# Patient Record
Sex: FEMALE | Race: WHITE | NOT HISPANIC OR LATINO | Employment: OTHER | URBAN - METROPOLITAN AREA
[De-identification: names, ages, dates, MRNs, and addresses within clinical notes are randomized per-mention and may not be internally consistent; named-entity substitution may affect disease eponyms.]

---

## 2017-04-03 ENCOUNTER — OFFICE VISIT (OUTPATIENT)
Dept: FAMILY MEDICINE | Facility: CLINIC | Age: 67
End: 2017-04-03
Payer: MEDICARE

## 2017-04-03 VITALS
HEIGHT: 63 IN | HEART RATE: 92 BPM | WEIGHT: 167.4 LBS | DIASTOLIC BLOOD PRESSURE: 80 MMHG | TEMPERATURE: 98.8 F | SYSTOLIC BLOOD PRESSURE: 138 MMHG | BODY MASS INDEX: 29.66 KG/M2 | OXYGEN SATURATION: 96 %

## 2017-04-03 DIAGNOSIS — K21.00 GASTROESOPHAGEAL REFLUX DISEASE WITH ESOPHAGITIS: ICD-10-CM

## 2017-04-03 DIAGNOSIS — F32.5 MAJOR DEPRESSION IN COMPLETE REMISSION (H): ICD-10-CM

## 2017-04-03 DIAGNOSIS — I10 ESSENTIAL HYPERTENSION WITH GOAL BLOOD PRESSURE LESS THAN 140/90: ICD-10-CM

## 2017-04-03 DIAGNOSIS — L20.82 FLEXURAL ECZEMA: ICD-10-CM

## 2017-04-03 DIAGNOSIS — L29.9 PRURITIC DISORDER: Primary | ICD-10-CM

## 2017-04-03 DIAGNOSIS — E78.5 HYPERLIPIDEMIA LDL GOAL <130: ICD-10-CM

## 2017-04-03 LAB
ALBUMIN SERPL-MCNC: 4 G/DL (ref 3.4–5)
ALP SERPL-CCNC: 92 U/L (ref 40–150)
ALT SERPL W P-5'-P-CCNC: 39 U/L (ref 0–50)
ANION GAP SERPL CALCULATED.3IONS-SCNC: 9 MMOL/L (ref 3–14)
AST SERPL W P-5'-P-CCNC: 36 U/L (ref 0–45)
BILIRUB SERPL-MCNC: 0.5 MG/DL (ref 0.2–1.3)
BUN SERPL-MCNC: 17 MG/DL (ref 7–30)
CALCIUM SERPL-MCNC: 9.8 MG/DL (ref 8.5–10.1)
CHLORIDE SERPL-SCNC: 101 MMOL/L (ref 94–109)
CO2 SERPL-SCNC: 29 MMOL/L (ref 20–32)
CREAT SERPL-MCNC: 1.2 MG/DL (ref 0.52–1.04)
CREAT UR-MCNC: 62 MG/DL
GFR SERPL CREATININE-BSD FRML MDRD: 45 ML/MIN/1.7M2
GLUCOSE SERPL-MCNC: 96 MG/DL (ref 70–99)
MICROALBUMIN UR-MCNC: <5 MG/L
MICROALBUMIN/CREAT UR: NORMAL MG/G CR (ref 0–25)
POTASSIUM SERPL-SCNC: 4.4 MMOL/L (ref 3.4–5.3)
PROT SERPL-MCNC: 7.9 G/DL (ref 6.8–8.8)
SODIUM SERPL-SCNC: 139 MMOL/L (ref 133–144)
TSH SERPL DL<=0.005 MIU/L-ACNC: 0.49 MU/L (ref 0.4–4)

## 2017-04-03 PROCEDURE — 99214 OFFICE O/P EST MOD 30 MIN: CPT | Performed by: PHYSICIAN ASSISTANT

## 2017-04-03 PROCEDURE — 84443 ASSAY THYROID STIM HORMONE: CPT | Performed by: PHYSICIAN ASSISTANT

## 2017-04-03 PROCEDURE — 36415 COLL VENOUS BLD VENIPUNCTURE: CPT | Performed by: PHYSICIAN ASSISTANT

## 2017-04-03 PROCEDURE — 80053 COMPREHEN METABOLIC PANEL: CPT | Performed by: PHYSICIAN ASSISTANT

## 2017-04-03 PROCEDURE — 82043 UR ALBUMIN QUANTITATIVE: CPT | Performed by: PHYSICIAN ASSISTANT

## 2017-04-03 RX ORDER — HYDROXYZINE HYDROCHLORIDE 25 MG/1
25-50 TABLET, FILM COATED ORAL EVERY 6 HOURS PRN
Qty: 60 TABLET | Refills: 1 | Status: SHIPPED | OUTPATIENT
Start: 2017-04-03 | End: 2017-07-17

## 2017-04-03 RX ORDER — TRIAMTERENE AND HYDROCHLOROTHIAZIDE 37.5; 25 MG/1; MG/1
1 CAPSULE ORAL DAILY
Qty: 90 CAPSULE | Refills: 1 | Status: SHIPPED | OUTPATIENT
Start: 2017-04-03 | End: 2017-10-09

## 2017-04-03 RX ORDER — LISINOPRIL 10 MG/1
10 TABLET ORAL DAILY
Qty: 90 TABLET | Refills: 1 | Status: SHIPPED | OUTPATIENT
Start: 2017-04-03 | End: 2017-10-09

## 2017-04-03 RX ORDER — TRIAMCINOLONE ACETONIDE 1 MG/G
OINTMENT TOPICAL
Qty: 60 G | Refills: 1 | Status: SHIPPED | OUTPATIENT
Start: 2017-04-03 | End: 2017-11-28

## 2017-04-03 RX ORDER — ATORVASTATIN CALCIUM 20 MG/1
20 TABLET, FILM COATED ORAL DAILY
Qty: 90 TABLET | Refills: 1 | Status: SHIPPED | OUTPATIENT
Start: 2017-04-03 | End: 2017-10-09

## 2017-04-03 RX ORDER — PREDNISONE 20 MG/1
40 TABLET ORAL DAILY
Qty: 10 TABLET | Refills: 0 | Status: SHIPPED | OUTPATIENT
Start: 2017-04-03 | End: 2017-04-08

## 2017-04-03 ASSESSMENT — ANXIETY QUESTIONNAIRES
7. FEELING AFRAID AS IF SOMETHING AWFUL MIGHT HAPPEN: NOT AT ALL
GAD7 TOTAL SCORE: 0
1. FEELING NERVOUS, ANXIOUS, OR ON EDGE: NOT AT ALL
5. BEING SO RESTLESS THAT IT IS HARD TO SIT STILL: NOT AT ALL
3. WORRYING TOO MUCH ABOUT DIFFERENT THINGS: NOT AT ALL
6. BECOMING EASILY ANNOYED OR IRRITABLE: NOT AT ALL
2. NOT BEING ABLE TO STOP OR CONTROL WORRYING: NOT AT ALL

## 2017-04-03 ASSESSMENT — PATIENT HEALTH QUESTIONNAIRE - PHQ9: 5. POOR APPETITE OR OVEREATING: NOT AT ALL

## 2017-04-03 NOTE — PROGRESS NOTES
SUBJECTIVE:                                                    Marcela Allen is a 66 year old female who presents to clinic today for the following health issues:    Hyperlipidemia Follow-Up      Rate your low fat/cholesterol diet?: good    Taking statin?  Yes, no muscle aches from statin    Other lipid medications/supplements?:  none     Hypertension Follow-up      Outpatient blood pressures are not being checked.    Low Salt Diet: not monitoring salt       Amount of exercise or physical activity: 2-3 days/week for an average of 45-60 minutes    Problems taking medications regularly: No    Medication side effects: itching not sure if it is related to medication    Diet: regular (no restrictions)  Depression Followup    Status since last visit: Stable- have periods of feeling down    See PHQ-9 for current symptoms.  Other associated symptoms: None    Complicating factors:   Significant life event:  Yes-   passed away last october   Current substance abuse:  None  Anxiety or Panic symptoms:  No    PHQ-9  English PHQ-9   Any Language        She continues to have generalized itchy rash.  She did see derm for this and was started on medication, however it has not improved.  She is so itchy and she will scratch it with a pumice stone.            Problem list and histories reviewed & adjusted, as indicated.  Additional history: as documented    Patient Active Problem List   Diagnosis     GERD (gastroesophageal reflux disease)     Eczema     Hiatal hernia     HTN (hypertension)     OA (osteoarthritis)     Osteopenia     Cervical pain     Advanced directives, counseling/discussion     Major depression in complete remission (H)     Hyperlipidemia LDL goal <130     Hypertension goal BP (blood pressure) < 140/90     Seasonal allergies     CKD (chronic kidney disease) stage 3, GFR 30-59 ml/min     Liver hemangioma     S/P LEEP of cervix     Former smoker     Elevated platelet count (H)     s/p B TKA 6/27     S/P knee  replacement     Postsurgical hypothyroidism     Papillary thyroid carcinoma (H)     Enlarged lymph nodes     Cervical cancer (H)     Advance care planning     Cervical radiculopathy     Malignant neoplasm of cervix, unspecified site (H)     Vulvar cancer (H)     Chronic obstructive pulmonary disease, unspecified COPD type (H)     Past Surgical History:   Procedure Laterality Date     ABDOMEN SURGERY      Teenager     APPENDECTOMY OPEN  1968     ARTHROPLASTY MINIMALLY INVASIVE KNEE BILATERAL  6/27/2013    Procedure: ARTHROPLASTY MINIMALLY INVASIVE KNEE BILATERAL;  Minimally Invasive Bilateral Total Knee Arthroplasty;  Surgeon: Christophe Welch MD;  Location: [facility] OR     BIOPSY  Ongoing    Lymph nodes neck     BONE MARROW ASPIRATION ONLY Left 12/7/2015    Procedure: BONE MARROW ASPIRATION ONLY;  Surgeon: Aguilar Roldan MD;  Location:  OR     CARPAL TUNNEL RELEASE RT/LT       COLONOSCOPY  2007    neg     COLPOSCOPY CERVIX, BIOPSY CERVIX, ENDOCERVICAL CURETTAGE, COMBINED  2003, 2007     completion thyroidectomy Right 3/14/00     CYSTECTOMY OVARIAN BENIGN  1969     ESOPHAGOSCOPY, GASTROSCOPY, DUODENOSCOPY (EGD), COMBINED N/A 8/28/2014    Procedure: COMBINED ESOPHAGOSCOPY, GASTROSCOPY, DUODENOSCOPY (EGD), BIOPSY SINGLE OR MULTIPLE;  Surgeon: Kelvin Montgomery MD;  Location: [facility] OR     FUSION CERVICAL ANTERIOR THREE+ LEVELS WITH BONE ALLOGRAFT N/A 12/7/2015    Procedure: FUSION CERVICAL ANTERIOR THREE+ LEVELS WITH BONE ALLOGRAFT;  Surgeon: Aguilar Roldan MD;  Location:  OR     HAND SURGERY       HEAD & NECK SURGERY  2000    Thyroidectomy     KNEE SURGERY  2001 & 2005    bilat     LAPAROSCOPIC CHOLECYSTECTOMY  1998     LEEP TX, CERVICAL  2007    BULMARO II on colp, leep path WNL     left thyroid total lobectomy, isthmusectomy and 50% right thyroid lobectomy Left 3/7/00     TUBAL LIGATION  1988     VULVECTOMY SIMPLE  2006    FELICIA 3, wide local excision x 2     VULVECTOMY SIMPLE         Social History    Substance Use Topics     Smoking status: Former Smoker     Packs/day: 1.00     Years: 45.00     Types: Cigarettes     Quit date: 8/1/2012     Smokeless tobacco: Never Used      Comment: quitting with e cigarette     Alcohol use Yes      Comment: less than weekly     Family History   Problem Relation Age of Onset     C.A.D. Father 61     three MI's, smoker     Blood Disease Mother 75     thrombocythemia on hydrea     Hypertension Mother      Hypertension Father      DIABETES Paternal Aunt      Cancer - colorectal Maternal Grandmother      unsure of her age.     Breast Cancer No family hx of      Asthma No family hx of      Coronary Artery Disease Father      CEREBROVASCULAR DISEASE Mother      TIA     Anesthesia Reaction Mother      N/V     OSTEOPOROSIS Mother          Current Outpatient Prescriptions   Medication Sig Dispense Refill     hydrOXYzine (ATARAX) 25 MG tablet Take 1-2 tablets (25-50 mg) by mouth every 6 hours as needed for itching 60 tablet 1     predniSONE (DELTASONE) 20 MG tablet Take 2 tablets (40 mg) by mouth daily for 5 days 10 tablet 0     atorvastatin (LIPITOR) 20 MG tablet Take 1 tablet (20 mg) by mouth daily 90 tablet 1     FLUoxetine (PROZAC) 20 MG capsule Take 3 capsules (60 mg) by mouth daily 270 capsule 1     lisinopril (PRINIVIL/ZESTRIL) 10 MG tablet Take 1 tablet (10 mg) by mouth daily 90 tablet 1     omeprazole (PRILOSEC) 20 MG CR capsule Take 1 capsule (20 mg) by mouth every 3 days 90 capsule 1     triamcinolone (KENALOG) 0.1 % ointment Apply twice daily as needed to affected area, do not use for more than 10 days. 60 g 1     triamterene-hydrochlorothiazide (DYAZIDE) 37.5-25 MG per capsule Take 1 capsule by mouth daily 90 capsule 1     fluocinonide (LIDEX) 0.05 % ointment Apply twice a day to affected areas of the arms, legs, trunk for up to 4 weeks. 60 g 1     levothyroxine (SYNTHROID,LEVOTHROID) 100 MCG tablet Monday-Saturday: (1 tablet/day);   Sunday: (0.5 tablet) 90 tablet 3      multivitamin, therapeutic with minerals (MULTI-VITAMIN) TABS Take 1 tablet by mouth daily       hypromellose (ARTIFICIAL TEARS) 0.5 % SOLN Place 1 drop into both eyes every 4 hours as needed        sennosides (SENOKOT) 8.6 MG tablet Take 2 tablets by mouth daily        cetirizine (ZYRTEC) 10 MG tablet Take 1 tablet (10 mg) by mouth daily 90 tablet 1     aspirin 81 MG tablet Take 81 mg by mouth daily        Omega-3 Fatty Acids (FISH OIL) 1200 MG CAPS Take 1 capsule by mouth daily        CALCIUM CITRATE PO Take 1,200 mg by mouth daily        Cholecalciferol (VITAMIN D) 1000 UNIT capsule Take 1 capsule by mouth daily.       [DISCONTINUED] triamterene-hydrochlorothiazide (DYAZIDE) 37.5-25 MG per capsule Take 1 capsule by mouth daily 90 capsule 1     [DISCONTINUED] lisinopril (PRINIVIL,ZESTRIL) 10 MG tablet Take 1 tablet (10 mg) by mouth daily 90 tablet 1     [DISCONTINUED] atorvastatin (LIPITOR) 20 MG tablet Take 1 tablet (20 mg) by mouth daily 90 tablet 1     [DISCONTINUED] FLUoxetine (PROZAC) 20 MG capsule Take 3 capsules (60 mg) by mouth daily 270 capsule 1     HYDROcodone-acetaminophen (NORCO) 5-325 MG per tablet Take 1-2 tablets by mouth every 4 hours as needed for moderate to severe pain (Patient not taking: Reported on 4/3/2017) 60 tablet 0     BP Readings from Last 3 Encounters:   04/03/17 138/80   10/10/16 93/64   08/02/16 113/75    Wt Readings from Last 3 Encounters:   04/03/17 167 lb 6.4 oz (75.9 kg)   10/10/16 156 lb (70.8 kg)   08/02/16 159 lb (72.1 kg)                    Reviewed and updated as needed this visit by clinical staff  Tobacco  Allergies  Meds  Med Hx  Surg Hx  Fam Hx  Soc Hx      Reviewed and updated as needed this visit by Provider         ROS:  Constitutional, HEENT, cardiovascular, pulmonary, GI, , musculoskeletal, neuro, skin, endocrine and psych systems are negative, except as otherwise noted.    OBJECTIVE:                                                    /80 (BP Location:  "Left arm, Patient Position: Chair, Cuff Size: Adult Regular)  Pulse 92  Temp 98.8  F (37.1  C) (Oral)  Ht 5' 2.76\" (1.594 m)  Wt 167 lb 6.4 oz (75.9 kg)  SpO2 96%  BMI 29.88 kg/m2  Body mass index is 29.88 kg/(m^2).  GENERAL: healthy, alert and no distress  NECK: no adenopathy, no asymmetry, masses, or scars and thyroid normal to palpation  RESP: lungs clear to auscultation - no rales, rhonchi or wheezes  CV: regular rate and rhythm, normal S1 S2, no S3 or S4, no murmur, click or rub, no peripheral edema and peripheral pulses strong  ABDOMEN: soft, nontender, no hepatosplenomegaly, no masses and bowel sounds normal  MS: no gross musculoskeletal defects noted, no edema  Skin:  Diffuse erythematous papular rash with excoriations noted.     Diagnostic Test Results:  none      ASSESSMENT/PLAN:                                                            1. Essential hypertension with goal blood pressure less than 140/90  BP at goal, will leave meds as is and due to recheck labs.   - Comprehensive metabolic panel  - Albumin Random Urine Quantitative  - lisinopril (PRINIVIL/ZESTRIL) 10 MG tablet; Take 1 tablet (10 mg) by mouth daily  Dispense: 90 tablet; Refill: 1  - triamterene-hydrochlorothiazide (DYAZIDE) 37.5-25 MG per capsule; Take 1 capsule by mouth daily  Dispense: 90 capsule; Refill: 1    2. Pruritic disorder  At this point we need to breaking the itching cycle, will start atarax and prednisone, if no improvement f/u with derm.   Continue to moisturize BID.   - hydrOXYzine (ATARAX) 25 MG tablet; Take 1-2 tablets (25-50 mg) by mouth every 6 hours as needed for itching  Dispense: 60 tablet; Refill: 1  - TSH with free T4 reflex  - predniSONE (DELTASONE) 20 MG tablet; Take 2 tablets (40 mg) by mouth daily for 5 days  Dispense: 10 tablet; Refill: 0    3. Hyperlipidemia LDL goal <130  Stable.   - atorvastatin (LIPITOR) 20 MG tablet; Take 1 tablet (20 mg) by mouth daily  Dispense: 90 tablet; Refill: 1    4. Major " depression in complete remission  Doing well. She is grieving the loss of her  and feels she is handling it ok.  She declines counseling at this time.  She does not have a strong support system (she states she does not have many friends and does not like to join groups).  She does volunteer and has some social outlet there as also has 2 dogs.    - FLUoxetine (PROZAC) 20 MG capsule; Take 3 capsules (60 mg) by mouth daily  Dispense: 270 capsule; Refill: 1    5. Gastroesophageal reflux disease with esophagitis  Stable.   - omeprazole (PRILOSEC) 20 MG CR capsule; Take 1 capsule (20 mg) by mouth every 3 days  Dispense: 90 capsule; Refill: 1    6. Flexural eczema  As above.   - triamcinolone (KENALOG) 0.1 % ointment; Apply twice daily as needed to affected area, do not use for more than 10 days.  Dispense: 60 g; Refill: 1    FUTURE APPOINTMENTS:       - Follow-up visit in 6 months, sooner razia García PA-C  [facility]

## 2017-04-03 NOTE — MR AVS SNAPSHOT
After Visit Summary   4/3/2017    Marcela Allen    MRN: [id number]           Patient Information     Date Of Birth          1950        Visit Information        Provider Department      4/3/2017 9:00 AM Honey García PA-C [facility]        Today's Diagnoses     Pruritic disorder    -  1    Essential hypertension with goal blood pressure less than 140/90        Hyperlipidemia LDL goal <130        Major depression in complete remission        Gastroesophageal reflux disease with esophagitis        Flexural eczema           Follow-ups after your visit        Who to contact     If you have questions or need follow up information about today's clinic visit or your schedule please contact [facility] directly at [phone].  Normal or non-critical lab and imaging results will be communicated to you by [employer], letter or phone within 4 business days after the clinic has received the results. If you do not hear from us within 7 days, please contact the clinic through [employer] or phone. If you have a critical or abnormal lab result, we will notify you by phone as soon as possible.  Submit refill requests through [employer] or call your pharmacy and they will forward the refill request to us. Please allow 3 business days for your refill to be completed.          Additional Information About Your Visit        MyChart Information     [employer] gives you secure access to your electronic health record. If you see a primary care provider, you can also send messages to your care team and make appointments. If you have questions, please call your primary care clinic.  If you do not have a primary care provider, please call [phone] and they will assist you.        Care EveryWhere ID     This is your Care EveryWhere ID. This could be used by other organizations to access your [facility] medical records  [id number]        Your Vitals Were     Pulse Temperature Height  "Pulse Oximetry BMI (Body Mass Index)       92 98.8  F (37.1  C) (Oral) 5' 2.76\" (1.594 m) 96% 29.88 kg/m2        Blood Pressure from Last 3 Encounters:   04/03/17 138/80   10/10/16 93/64   08/02/16 113/75    Weight from Last 3 Encounters:   04/03/17 167 lb 6.4 oz (75.9 kg)   10/10/16 156 lb (70.8 kg)   08/02/16 159 lb (72.1 kg)              We Performed the Following     Albumin Random Urine Quantitative     Comprehensive metabolic panel     TSH with free T4 reflex          Today's Medication Changes          These changes are accurate as of: 4/3/17 10:58 AM.  If you have any questions, ask your nurse or doctor.               Start taking these medicines.        Dose/Directions    hydrOXYzine 25 MG tablet   Commonly known as:  ATARAX   Used for:  Pruritic disorder   Started by:  Honey García PA-C        Dose:  25-50 mg   Take 1-2 tablets (25-50 mg) by mouth every 6 hours as needed for itching   Quantity:  60 tablet   Refills:  1       predniSONE 20 MG tablet   Commonly known as:  DELTASONE   Used for:  Pruritic disorder   Started by:  Honey García PA-C        Dose:  40 mg   Take 2 tablets (40 mg) by mouth daily for 5 days   Quantity:  10 tablet   Refills:  0            Where to get your medicines      These medications were sent to [facility] [place], [place] - [address]  [address] [facility] [address]     Phone:  [phone]     hydrOXYzine 25 MG tablet    predniSONE 20 MG tablet         These medications [facility]  [address] [address]     Phone:  [phone]     atorvastatin 20 MG tablet    FLUoxetine 20 MG capsule    lisinopril 10 MG tablet    omeprazole 20 MG CR capsule    triamcinolone 0.1 % ointment    triamterene-hydrochlorothiazide 37.5-25 MG per capsule                Primary Care Provider Office Phone # Fax #    Honey García PA-C [phone]       " [facility] [address]  [facility] [address]        Thank you!     Thank you for choosing [facility]  for your care. Our goal is always to provide you with excellent care. Hearing back from our patients is one way we can continue to improve our services. Please take a few minutes to complete the written survey that you may receive in the mail after your visit with us. Thank you!             Your Updated Medication List - Protect others around you: Learn how to safely use, store and throw away your medicines at www.disposemymeds.org.          This list is accurate as of: 4/3/17 10:58 AM.  Always use your most recent med list.                   Brand Name Dispense Instructions for use    aspirin 81 MG tablet      Take 81 mg by mouth daily       atorvastatin 20 MG tablet    LIPITOR    90 tablet    Take 1 tablet (20 mg) by mouth daily       CALCIUM CITRATE PO      Take 1,200 mg by mouth daily       cetirizine 10 MG tablet    zyrTEC    90 tablet    Take 1 tablet (10 mg) by mouth daily       Fish Oil 1200 MG Caps      Take 1 capsule by mouth daily       fluocinonide 0.05 % ointment    LIDEX    60 g    Apply twice a day to affected areas of the arms, legs, trunk for up to 4 weeks.       FLUoxetine 20 MG capsule    PROzac    270 capsule    Take 3 capsules (60 mg) by mouth daily       HYDROcodone-acetaminophen 5-325 MG per tablet    NORCO    60 tablet    Take 1-2 tablets by mouth every 4 hours as needed for moderate to severe pain       hydrOXYzine 25 MG tablet    ATARAX    60 tablet    Take 1-2 tablets (25-50 mg) by mouth every 6 hours as needed for itching       hypromellose 0.5 % Soln ophthalmic solution    ARTIFICIAL TEARS     Place 1 drop into both eyes every 4 hours as needed       levothyroxine 100 MCG tablet    SYNTHROID/LEVOTHROID    90 tablet    Monday-Saturday: (1 tablet/day);  Sunday: (0.5 tablet)       lisinopril 10 MG tablet    PRINIVIL/ZESTRIL    90 tablet    Take 1  tablet (10 mg) by mouth daily       Multi-vitamin Tabs tablet      Take 1 tablet by mouth daily       omeprazole 20 MG CR capsule    priLOSEC    90 capsule    Take 1 capsule (20 mg) by mouth every 3 days       predniSONE 20 MG tablet    DELTASONE    10 tablet    Take 2 tablets (40 mg) by mouth daily for 5 days       sennosides 8.6 MG tablet    SENOKOT     Take 2 tablets by mouth daily       triamcinolone 0.1 % ointment    KENALOG    60 g    Apply twice daily as needed to affected area, do not use for more than 10 days.       triamterene-hydrochlorothiazide 37.5-25 MG per capsule    DYAZIDE    90 capsule    Take 1 capsule by mouth daily       vitamin D 1000 UNITS capsule      Take 1 capsule by mouth daily.

## 2017-04-03 NOTE — NURSING NOTE
"Chief Complaint   Patient presents with     RECHECK     medication       Initial /80 (BP Location: Left arm, Patient Position: Chair, Cuff Size: Adult Regular)  Pulse 92  Temp 98.8  F (37.1  C) (Oral)  Ht 5' 2.76\" (1.594 m)  Wt 167 lb 6.4 oz (75.9 kg)  SpO2 96%  BMI 29.88 kg/m2 Estimated body mass index is 29.88 kg/(m^2) as calculated from the following:    Height as of this encounter: 5' 2.76\" (1.594 m).    Weight as of this encounter: 167 lb 6.4 oz (75.9 kg).  Medication Reconciliation: complete   Jackie Lazaro CMA      "

## 2017-04-04 ASSESSMENT — PATIENT HEALTH QUESTIONNAIRE - PHQ9: SUM OF ALL RESPONSES TO PHQ QUESTIONS 1-9: 8

## 2017-04-04 ASSESSMENT — ANXIETY QUESTIONNAIRES: GAD7 TOTAL SCORE: 0

## 2017-04-05 ENCOUNTER — MYC MEDICAL ADVICE (OUTPATIENT)
Dept: FAMILY MEDICINE | Facility: CLINIC | Age: 67
End: 2017-04-05

## 2017-06-02 ENCOUNTER — MYC MEDICAL ADVICE (OUTPATIENT)
Dept: DERMATOLOGY | Facility: CLINIC | Age: 67
End: 2017-06-02

## 2017-06-03 ENCOUNTER — HEALTH MAINTENANCE LETTER (OUTPATIENT)
Age: 67
End: 2017-06-03

## 2017-06-06 ENCOUNTER — OFFICE VISIT (OUTPATIENT)
Dept: DERMATOLOGY | Facility: CLINIC | Age: 67
End: 2017-06-06
Payer: MEDICARE

## 2017-06-06 DIAGNOSIS — L20.84 INTRINSIC ATOPIC DERMATITIS: Primary | ICD-10-CM

## 2017-06-06 PROCEDURE — 99214 OFFICE O/P EST MOD 30 MIN: CPT | Performed by: DERMATOLOGY

## 2017-06-06 RX ORDER — TRIAMCINOLONE ACETONIDE 1 MG/G
OINTMENT TOPICAL
Qty: 454 G | Refills: 1 | Status: SHIPPED | OUTPATIENT
Start: 2017-06-06 | End: 2018-10-05

## 2017-06-06 RX ORDER — PREDNISONE 10 MG/1
TABLET ORAL
Qty: 19 TABLET | Refills: 0 | Status: SHIPPED | OUTPATIENT
Start: 2017-06-06 | End: 2017-10-09

## 2017-06-06 NOTE — PATIENT INSTRUCTIONS
Wet PJ treatment:    Do this once a day every day or every other day initially. Apply moisturizer and medicine. Then put on a set of damp PJ's. This is not sopping wet, just damp. And then over that put on a set of dry clothing. Sit in this for at least an hour. This helps drive the medicine into the skin.      CLn wash: this is a wash that can take place of the bleach baths. This is over the counter but may be found online.

## 2017-06-06 NOTE — PROGRESS NOTES
[facility] Dermatology Note      Dermatology Problem List:  1. Intrinsic eczema - Currently flaring: Sarah cream BID, Alternate TMC 0.1% ointment and Fluocinonide (LIDEX) 0.05% ointment BID for 4 weeks max then break for 2-3 weeks. Minimize soap use. PRN 1% hydrocortisone to eyelids.    Last TBSE: 2017    Encounter Date: 2017    CC:  Chief Complaint   Patient presents with     Derm Problem     recheck itching/eczema       History of Present Illness:  Ms. Marcela Allen is a 66 year old female who presents for a follow-up for eczema.  She was last seen 10/27/2016 and began alternating fluocinonide 0.05% ointment and triamcinolone 0.1% twice daily with using each one for a few weeks at a time.    Within the last couple months the patient reports extreme pruritus. Her primary care provider prescribe prednisone 20mg BID for 5 days (which she took as 20 mg QD for 10 days) and this helped significantly, but it returned once she stopped it. She has since switched from Sarah to Eucerin lotion and to an oatmeal body wash. She does not have anyone at home to help apply medication on her back and she cannot apply the triamcinolone to the mid-back. She has not tried bleach baths because she has trouble getting in and out of the bathtub. No seasonal allergies. No significant stress lately (  in Oct 2016 but no new stressors since). No changes in oral medications. She has kidney disease at baseline. No oral medication changes.       Past Medical History:   Patient Active Problem List   Diagnosis     GERD (gastroesophageal reflux disease)     Eczema     Hiatal hernia     HTN (hypertension)     OA (osteoarthritis)     Osteopenia     Cervical pain     Advanced directives, counseling/discussion     Major depression in complete remission (H)     Hyperlipidemia LDL goal <130     Hypertension goal BP (blood pressure) < 140/90     Seasonal allergies     CKD (chronic kidney disease) stage 3, GFR  30-59 ml/min     Liver hemangioma     S/P LEEP of cervix     Former smoker     Elevated platelet count (H)     s/p B TKA 6/27     S/P knee replacement     Postsurgical hypothyroidism     Papillary thyroid carcinoma (H)     Enlarged lymph nodes     Cervical cancer (H)     Advance care planning     Cervical radiculopathy     Malignant neoplasm of cervix, unspecified site (H)     Vulvar cancer (H)     Chronic obstructive pulmonary disease, unspecified COPD type (H)     Past Medical History:   Diagnosis Date     BULMARO II (cervical intraepithelial neoplasia II) 2007    on colp - leep path WNL     COPD (chronic obstructive pulmonary disease) (H)      Depression 1990     Depressive disorder 25 yrs     Eczema      GERD (gastroesophageal reflux disease)      Hiatal hernia      History of blood transfusion 2013    Post op     HTN (hypertension)      Hypercholesterolemia      Liver hemangioma 9/4/2012     OA (osteoarthritis)      Osteopenia      Papillary thyroid carcinoma (H) 2000    BL, MF; Tx in 2 operations, RRA     PONV (postoperative nausea and vomiting)      Postsurgical hypothyroidism 2000     FELICIA III (vulvar intraepithelial neoplasia III) 2006    wide local exc x 2     Past Surgical History:   Procedure Laterality Date     ABDOMEN SURGERY      Teenager     APPENDECTOMY OPEN  1968     ARTHROPLASTY MINIMALLY INVASIVE KNEE BILATERAL  6/27/2013    Procedure: ARTHROPLASTY MINIMALLY INVASIVE KNEE BILATERAL;  Minimally Invasive Bilateral Total Knee Arthroplasty;  Surgeon: Christophe Welch MD;  Location: [facility]     BIOPSY  Ongoing    Lymph nodes neck     BONE MARROW ASPIRATION ONLY Left 12/7/2015    Procedure: BONE MARROW ASPIRATION ONLY;  Surgeon: Aguilar Roldan MD;  Location: [facility] OR     CARPAL TUNNEL RELEASE RT/LT       COLONOSCOPY  2007    neg     COLPOSCOPY CERVIX, BIOPSY CERVIX, ENDOCERVICAL CURETTAGE, COMBINED  2003, 2007     completion thyroidectomy Right 3/14/00     CYSTECTOMY OVARIAN BENIGN  1969      ESOPHAGOSCOPY, GASTROSCOPY, DUODENOSCOPY (EGD), COMBINED N/A 2014    Procedure: COMBINED ESOPHAGOSCOPY, GASTROSCOPY, DUODENOSCOPY (EGD), BIOPSY SINGLE OR MULTIPLE;  Surgeon: Kelvin Montgomery MD;  Location: [facility] OR     FUSION CERVICAL ANTERIOR THREE+ LEVELS WITH BONE ALLOGRAFT N/A 2015    Procedure: FUSION CERVICAL ANTERIOR THREE+ LEVELS WITH BONE ALLOGRAFT;  Surgeon: Aguilar Roldan MD;  Location: [facility] OR     HAND SURGERY       HEAD & NECK SURGERY      Thyroidectomy     KNEE SURGERY   &     bilat     LAPAROSCOPIC CHOLECYSTECTOMY  1998     LEEP TX, CERVICAL  2007    BULMARO II on colp, leep path WNL     left thyroid total lobectomy, isthmusectomy and 50% right thyroid lobectomy Left 3/7/00     TUBAL LIGATION       VULVECTOMY SIMPLE  2006    FELICIA 3, wide local excision x 2     VULVECTOMY SIMPLE         Social History:  Reviewed and unchanged but kept in chart for clinician convenience  The patient is retired nurse. The patient admits to 1-2 drinks a week and is a non-smoker.   10/2016. Volunteers.    Family History:  Reviewed and unchanged but kept in chart for clinician convenience  There is no family history of skin cancer. There is a family hx of Cardiovascular disease.     Medications:  Current Outpatient Prescriptions   Medication Sig Dispense Refill     hydrOXYzine (ATARAX) 25 MG tablet Take 1-2 tablets (25-50 mg) by mouth every 6 hours as needed for itching 60 tablet 1     atorvastatin (LIPITOR) 20 MG tablet Take 1 tablet (20 mg) by mouth daily 90 tablet 1     FLUoxetine (PROZAC) 20 MG capsule Take 3 capsules (60 mg) by mouth daily 270 capsule 1     lisinopril (PRINIVIL/ZESTRIL) 10 MG tablet Take 1 tablet (10 mg) by mouth daily 90 tablet 1     omeprazole (PRILOSEC) 20 MG CR capsule Take 1 capsule (20 mg) by mouth every 3 days 90 capsule 1     triamcinolone (KENALOG) 0.1 % ointment Apply twice daily as needed to affected area, do not use for more than 10 days. 60 g 1      triamterene-hydrochlorothiazide (DYAZIDE) 37.5-25 MG per capsule Take 1 capsule by mouth daily 90 capsule 1     fluocinonide (LIDEX) 0.05 % ointment Apply twice a day to affected areas of the arms, legs, trunk for up to 4 weeks. 60 g 1     levothyroxine (SYNTHROID,LEVOTHROID) 100 MCG tablet Monday-Saturday: (1 tablet/day);   Sunday: (0.5 tablet) 90 tablet 3     HYDROcodone-acetaminophen (NORCO) 5-325 MG per tablet Take 1-2 tablets by mouth every 4 hours as needed for moderate to severe pain (Patient not taking: Reported on 4/3/2017) 60 tablet 0     multivitamin, therapeutic with minerals (MULTI-VITAMIN) TABS Take 1 tablet by mouth daily       hypromellose (ARTIFICIAL TEARS) 0.5 % SOLN Place 1 drop into both eyes every 4 hours as needed        sennosides (SENOKOT) 8.6 MG tablet Take 2 tablets by mouth daily        cetirizine (ZYRTEC) 10 MG tablet Take 1 tablet (10 mg) by mouth daily 90 tablet 1     aspirin 81 MG tablet Take 81 mg by mouth daily        Omega-3 Fatty Acids (FISH OIL) 1200 MG CAPS Take 1 capsule by mouth daily        CALCIUM CITRATE PO Take 1,200 mg by mouth daily        Cholecalciferol (VITAMIN D) 1000 UNIT capsule Take 1 capsule by mouth daily.         Allergies   Allergen Reactions     Dye [Contrast Dye] Anaphylaxis     Echo dye       Review of Systems:  -As per HPI  -Constitutional: The patient is otherwise feeling well.   -Skin: As above in HPI. No additional skin concerns.  -Psych: no new stressors. Her pets and working with animals is protective for her.    Physical exam:  Vitals: There were no vitals taken for this visit.  GEN: This is a well-nourished, well developed female in no acute distress  NEURO: Alert and oriented  PSYCH: in a pleasant mood, appropriate affect  SKIN: Total skin excluding the undergarment areas was performed. The exam included the head/face, neck, both arms, chest, back, abdomen, both legs, digits and/or nails.   -scattered pink eczematous papules on the upper back, neck,  bilateral arms, breasts, abdomen, lower legs  -redness and flaking of the scalp  -No other lesions of concern on areas examined.       Impression/Plan:  1. Intrinsic eczema with flare. Encouraged liberal moisturization. . Continue bathing twice a week but minimize soap use if possible. Sarah cream is fine. Unable to use cyclosporin for quick resolution. Conside Methotrexate if rash continues or consider nbUVB. Will do a longer course of prednisone taper for comfort.    Continue Triamcinolone 0.1% ointment + emollients BID    Wet PJs/wet wraps once a day every other day.    Occasional use of 1% hydrocortisone cream on eyelids is fine.     Start prednisone taper. 20mg x 5 days, 10mg x 5 days, then 10mg every other day for 8 days    CLn wash given inability to do bleach baths as doesn't feel safe in bath tub.    Benadryl at night is fine.      Follow-up in 1 month, or for new or changing lesions.   CC: FRANC García    Staff Involved:  Scribe/Staff      Scribe Disclosure:   I, Ethan Richter, am serving as a scribe to document services personally performed by Dr. Jonah Keller, based on data collection and the provider's statements to me.     Provider Disclosure:   I have reviewed the documentation recorded by the scribe and have edited it as needed. I have personally performed the services documented here and the documentation accurately represents those services and the decisions me by me.     Jonah Keller MD, MS    Department of Dermatology  Formerly named [facility]: Phone: [phone], Fax:[phone]  [facility]: Phone: [phone], Fax: [phone]

## 2017-06-06 NOTE — MR AVS SNAPSHOT
After Visit Summary   6/6/2017    Marcela Allen    MRN: [id number]           Patient Information     Date Of Birth          1950        Visit Information        Provider Department      6/6/2017 11:15 AM Jonah Keller MD [facility]        Today's Diagnoses     Intrinsic atopic dermatitis    -  1      Care Instructions    Wet PJ treatment:    Do this once a day every day or every other day initially. Apply moisturizer and medicine. Then put on a set of damp PJ's. This is not sopping wet, just damp. And then over that put on a set of dry clothing. Sit in this for at least an hour. This helps drive the medicine into the skin.      CLn wash: this is a wash that can take place of the bleach baths. This is over the counter but may be found online.           Follow-ups after your visit        Your next 10 appointments already scheduled     Jul 03, 2017  3:45 PM CDT   Return Visit with Jonah Keller MD   [facility] ([facility])    [address] [place] [id number]   [phone]              Who to contact     If you have questions or need follow up information about today's clinic visit or your schedule please contact [facility] directly at [phone].  Normal or non-critical lab and imaging results will be communicated to you by Presslyhart, letter or phone within 4 business days after the clinic has received the results. If you do not hear from us within 7 days, please contact the clinic through Presslyhart or phone. If you have a critical or abnormal lab result, we will notify you by phone as soon as possible.  Submit refill requests through [employer] or call your pharmacy and they will forward the refill request to us. Please allow 3 business days for your refill to be completed.          Additional Information About Your Visit        [employer] Information     [employer] gives you secure access to your electronic health  record. If you see a primary care provider, you can also send messages to your care team and make appointments. If you have questions, please call your primary care clinic.  If you do not have a primary care provider, please call [phone] and they will assist you.      [email or web address] is an electronic gateway that provides easy, online access to your medical records. With [email or web address], you can request a clinic appointment, read your test results, renew a prescription or communicate with your care team.     To access your existing account, please contact your [facility] or call [phone] for assistance.        Care EveryWhere ID     This is your Care EveryWhere ID. This could be used by other organizations to access your [facility] medical records  [id number]         Blood Pressure from Last 3 Encounters:   04/03/17 138/80   10/10/16 93/64   08/02/16 113/75    Weight from Last 3 Encounters:   04/03/17 75.9 kg (167 lb 6.4 oz)   10/10/16 70.8 kg (156 lb)   08/02/16 72.1 kg (159 lb)              Today, you had the following     No orders found for display         Today's Medication Changes          These changes are accurate as of: 6/6/17 11:59 PM.  If you have any questions, ask your nurse or doctor.               Start taking these medicines.        Dose/Directions    predniSONE 10 MG tablet   Commonly known as:  DELTASONE   Used for:  Intrinsic atopic dermatitis        Take 2 pills x 5 days, 1 pill x 5 days, then 1 pill every other day for 8 days.   Quantity:  19 tablet   Refills:  0         These medicines have changed or have updated prescriptions.        Dose/Directions    * triamcinolone 0.1 % ointment   Commonly known as:  KENALOG   This may have changed:  Another medication with the same name was added. Make sure you understand how and when to take each.   Used for:  Flexural eczema        Apply twice daily as needed to affected area, do not use for more than 10 days.   Quantity:  60 g    Refills:  1       * triamcinolone 0.1 % ointment   Commonly known as:  KENALOG   This may have changed:  You were already taking a medication with the same name, and this prescription was added. Make sure you understand how and when to take each.   Used for:  Intrinsic atopic dermatitis        Apply to affected area of the skin twice a day after using a moisturizer. Can do wet wraps with this.   Quantity:  454 g   Refills:  1       * Notice:  This list has 2 medication(s) that are the same as other medications prescribed for you. Read the directions carefully, and ask your doctor or other care provider to review them with you.         Where to get your medicines      These medications were sent to [facility] - [place], [place] - [address], [address]  [address], [address], [facility] [address]     Phone:  [phone]     predniSONE 10 MG tablet    triamcinolone 0.1 % ointment                Primary Care Provider Office Phone # Fax #    Honey García PA-C [phone]       [facility] [address]  [facility] [address]        Thank you!     Thank you for choosing [facility]  for your care. Our goal is always to provide you with excellent care. Hearing back from our patients is one way we can continue to improve our services. Please take a few minutes to complete the written survey that you may receive in the mail after your visit with us. Thank you!             Your Updated Medication List - Protect others around you: Learn how to safely use, store and throw away your medicines at www.disposemymeds.org.          This list is accurate as of: 6/6/17 11:59 PM.  Always use your most recent med list.                   Brand Name Dispense Instructions for use    aspirin 81 MG tablet      Take 81 mg by mouth daily       atorvastatin 20 MG tablet    LIPITOR    90 tablet    Take 1 tablet (20 mg) by mouth daily       CALCIUM CITRATE PO       Take 1,200 mg by mouth daily       cetirizine 10 MG tablet    zyrTEC    90 tablet    Take 1 tablet (10 mg) by mouth daily       Fish Oil 1200 MG Caps      Take 1 capsule by mouth daily       fluocinonide 0.05 % ointment    LIDEX    60 g    Apply twice a day to affected areas of the arms, legs, trunk for up to 4 weeks.       FLUoxetine 20 MG capsule    PROzac    270 capsule    Take 3 capsules (60 mg) by mouth daily       HYDROcodone-acetaminophen 5-325 MG per tablet    NORCO    60 tablet    Take 1-2 tablets by mouth every 4 hours as needed for moderate to severe pain       hydrOXYzine 25 MG tablet    ATARAX    60 tablet    Take 1-2 tablets (25-50 mg) by mouth every 6 hours as needed for itching       hypromellose 0.5 % Soln ophthalmic solution    ARTIFICIAL TEARS     Place 1 drop into both eyes every 4 hours as needed       levothyroxine 100 MCG tablet    SYNTHROID/LEVOTHROID    90 tablet    Monday-Saturday: (1 tablet/day);  Sunday: (0.5 tablet)       lisinopril 10 MG tablet    PRINIVIL/ZESTRIL    90 tablet    Take 1 tablet (10 mg) by mouth daily       Multi-vitamin Tabs tablet      Take 1 tablet by mouth daily       omeprazole 20 MG CR capsule    priLOSEC    90 capsule    Take 1 capsule (20 mg) by mouth every 3 days       predniSONE 10 MG tablet    DELTASONE    19 tablet    Take 2 pills x 5 days, 1 pill x 5 days, then 1 pill every other day for 8 days.       sennosides 8.6 MG tablet    SENOKOT     Take 2 tablets by mouth daily       * triamcinolone 0.1 % ointment    KENALOG    60 g    Apply twice daily as needed to affected area, do not use for more than 10 days.       * triamcinolone 0.1 % ointment    KENALOG    454 g    Apply to affected area of the skin twice a day after using a moisturizer. Can do wet wraps with this.       triamterene-hydrochlorothiazide 37.5-25 MG per capsule    DYAZIDE    90 capsule    Take 1 capsule by mouth daily       vitamin D 1000 UNITS capsule      Take 1 capsule by mouth daily.        * Notice:  This list has 2 medication(s) that are the same as other medications prescribed for you. Read the directions carefully, and ask your doctor or other care provider to review them with you.

## 2017-06-06 NOTE — NURSING NOTE
Dermatology Rooming Note    Marcela Allen's goals for this visit include:   Chief Complaint   Patient presents with     Derm Problem     recheck itching/eczema       Is a scribe okay for this visit:YES    Are records needed for this visit(If yes, obtain release of information): Not applicable     Vitals: There were no vitals taken for this visit.    Referring Provider:  No referring provider defined for this encounter.

## 2017-06-27 ENCOUNTER — TELEPHONE (OUTPATIENT)
Dept: FAMILY MEDICINE | Facility: CLINIC | Age: 67
End: 2017-06-27

## 2017-06-27 NOTE — TELEPHONE ENCOUNTER
Panel Management Review      BP Readings from Last 1 Encounters:   04/03/17 138/80    , No results found for: A1C, 4/3/2017    Fail List measure: COPD      Patient is due/failing the following:   SPIROMETRY    Action needed:   Patient needs office visit for COPD.    Type of outreach:    None, routed to provider for review.    Questions for provider review:    Sindt Pt is in the COPD fail list, shall we call for pt do an spirometry, shall pt follow up with you or with ancillary?                                                                                                                                    Olya Sevilla CMA      Chart routed to Provider .

## 2017-07-03 ENCOUNTER — TRANSFERRED RECORDS (OUTPATIENT)
Dept: HEALTH INFORMATION MANAGEMENT | Facility: CLINIC | Age: 67
End: 2017-07-03

## 2017-07-03 ENCOUNTER — OFFICE VISIT (OUTPATIENT)
Dept: DERMATOLOGY | Facility: CLINIC | Age: 67
End: 2017-07-03
Payer: MEDICARE

## 2017-07-03 DIAGNOSIS — R21 RASH: Primary | ICD-10-CM

## 2017-07-03 PROCEDURE — 88305 TISSUE EXAM BY PATHOLOGIST: CPT | Performed by: DERMATOLOGY

## 2017-07-03 PROCEDURE — 11101 HC BIOPSY SKIN/SUBQ/MUC MEM, EACH ADDTL LESION: CPT | Performed by: DERMATOLOGY

## 2017-07-03 PROCEDURE — 11100 HC BIOPSY SKIN/SUBQ/MUC MEM, SINGLE LESION: CPT | Performed by: DERMATOLOGY

## 2017-07-03 PROCEDURE — 00000159 ZZHCL STATISTIC H-SEND OUTS PREP: Performed by: DERMATOLOGY

## 2017-07-03 PROCEDURE — 88350 IMFLUOR EA ADDL 1ANTB STN PX: CPT | Performed by: DERMATOLOGY

## 2017-07-03 PROCEDURE — 99214 OFFICE O/P EST MOD 30 MIN: CPT | Mod: 25 | Performed by: DERMATOLOGY

## 2017-07-03 PROCEDURE — 99000 SPECIMEN HANDLING OFFICE-LAB: CPT | Performed by: DERMATOLOGY

## 2017-07-03 PROCEDURE — 88346 IMFLUOR 1ST 1ANTB STAIN PX: CPT | Performed by: DERMATOLOGY

## 2017-07-03 RX ORDER — LIDOCAINE HYDROCHLORIDE AND EPINEPHRINE 10; 10 MG/ML; UG/ML
3 INJECTION, SOLUTION INFILTRATION; PERINEURAL ONCE
Qty: 0.5 ML | Refills: 0 | OUTPATIENT
Start: 2017-07-03 | End: 2017-07-03

## 2017-07-03 RX ORDER — CLOBETASOL PROPIONATE 0.5 MG/G
OINTMENT TOPICAL
Qty: 120 G | Refills: 2 | Status: SHIPPED | OUTPATIENT
Start: 2017-07-03 | End: 2017-07-18

## 2017-07-03 NOTE — NURSING NOTE
Dermatology Rooming Note    Marcela Allen's goals for this visit include:   Chief Complaint   Patient presents with     Derm Problem     Intrinsic eczema doing well on prednisone but now starting to get irritated again       Is a scribe okay for this visit:YES    Are records needed for this visit(If yes, obtain release of information): Not applicable     Vitals: There were no vitals taken for this visit.    Referring Provider:  No referring provider defined for this encounter.

## 2017-07-03 NOTE — LETTER
7/3/2017       RE: Marcela Allen  [id number] [facility]  [place] [facility] [address]     Dear Colleague,    Thank you for referring your patient, Marcela Allen, to the [facility]. Please see a copy of my visit note below.    [facility] Dermatology Note      Dermatology Problem List:  1. Rash (diffuss) - x2 punch biopsies right forearm, right forearm +DIF, clobetasol 0.05% ointment   -Previous tx: Sarah cream BID, Alternate TMC 0.1% ointment and Fluocinonide (LIDEX) 0.05% ointment BID for 4 weeks max then break for 2-3 weeks. Minimize soap use. PRN 1% hydrocortisone to eyelids. S/p 2 courses of prednisone.  -DDx: atopic dermatitis flare, drug reaction, autoimmune blistering disease, contact dermatitis    Last TBSE: 6/6/2017    Encounter Date: Jul 3, 2017    CC:  Chief Complaint   Patient presents with     Derm Problem     Intrinsic eczema doing well on prednisone but now starting to get irritated again       History of Present Illness:  Ms. Marcela Allen is a 66 year old female who presents for a follow-up for eczema.  Her PCP originally started her on prednisone with great results. She was last seen 6/6/2017 when she continued on triamcinolone 0.1% ointment and a tapering prednisone regimen at 20 mg. Today she reports the prednisone helped with continued use of triamcinolone and Eucerin moisturizer. It usually takes 4-6 weeks after finishing prednisone for her symptoms to fully return. She tried doing wet wrapping but has stopped because it causes her to become pruritic. Her hands are very pruritic and painful. She reports using a triple antibiotic cream on her hands followed by wearing gloves. No problems inside oral cavity. Pt switched to T-gel shampoo. Pt doesn't do baths due to safety concerns.  She has kidney disease at baseline. No oral medication changes.    Past Medical History:   Patient Active Problem List   Diagnosis     GERD  (gastroesophageal reflux disease)     Eczema     Hiatal hernia     HTN (hypertension)     OA (osteoarthritis)     Osteopenia     Cervical pain     Advanced directives, counseling/discussion     Major depression in complete remission (H)     Hyperlipidemia LDL goal <130     Hypertension goal BP (blood pressure) < 140/90     Seasonal allergies     CKD (chronic kidney disease) stage 3, GFR 30-59 ml/min     Liver hemangioma     S/P LEEP of cervix     Former smoker     Elevated platelet count (H)     s/p B TKA 6/27     S/P knee replacement     Postsurgical hypothyroidism     Papillary thyroid carcinoma (H)     Enlarged lymph nodes     Cervical cancer (H)     Advance care planning     Cervical radiculopathy     Malignant neoplasm of cervix, unspecified site (H)     Vulvar cancer (H)     Chronic obstructive pulmonary disease, unspecified COPD type (H)     Past Medical History:   Diagnosis Date     BULMARO II (cervical intraepithelial neoplasia II) 2007    on colp - leep path WNL     COPD (chronic obstructive pulmonary disease) (H)      Depression 1990     Depressive disorder 25 yrs     Eczema      GERD (gastroesophageal reflux disease)      Hiatal hernia      History of blood transfusion 2013    Post op     HTN (hypertension)      Hypercholesterolemia      Liver hemangioma 9/4/2012     OA (osteoarthritis)      Osteopenia      Papillary thyroid carcinoma (H) 2000    BL, MF; Tx in 2 operations, RRA     PONV (postoperative nausea and vomiting)      Postsurgical hypothyroidism 2000     FELICIA III (vulvar intraepithelial neoplasia III) 2006    wide local exc x 2     Past Surgical History:   Procedure Laterality Date     ABDOMEN SURGERY      Teenager     APPENDECTOMY OPEN  1968     ARTHROPLASTY MINIMALLY INVASIVE KNEE BILATERAL  6/27/2013    Procedure: ARTHROPLASTY MINIMALLY INVASIVE KNEE BILATERAL;  Minimally Invasive Bilateral Total Knee Arthroplasty;  Surgeon: Christophe Welch MD;  [facility]     BIOPSY  Ongoing    Lymph  nodes neck     BONE MARROW ASPIRATION ONLY Left 12/7/2015    Procedure: BONE MARROW ASPIRATION ONLY;  Surgeon: Aguilar Roldan MD;  Location: [facility] OR     CARPAL TUNNEL RELEASE RT/LT       COLONOSCOPY  2007    neg     COLPOSCOPY CERVIX, BIOPSY CERVIX, ENDOCERVICAL CURETTAGE, COMBINED  2003, 2007     completion thyroidectomy Right 3/14/00     CYSTECTOMY OVARIAN BENIGN  1969     ESOPHAGOSCOPY, GASTROSCOPY, DUODENOSCOPY (EGD), COMBINED N/A 8/28/2014    Procedure: COMBINED ESOPHAGOSCOPY, GASTROSCOPY, DUODENOSCOPY (EGD), BIOPSY SINGLE OR MULTIPLE;  Surgeon: Kelvin Montgomery MD;  Location: [facility]     FUSION CERVICAL ANTERIOR THREE+ LEVELS WITH BONE ALLOGRAFT N/A 12/7/2015    Procedure: FUSION CERVICAL ANTERIOR THREE+ LEVELS WITH BONE ALLOGRAFT;  Surgeon: Aguilar Roldan MD;  [facility]: [facility] OR     HAND SURGERY       HEAD & NECK SURGERY  2000    Thyroidectomy     KNEE SURGERY  2001 & 2005    bilat     LAPAROSCOPIC CHOLECYSTECTOMY  1998     LEEP TX, CERVICAL  2007    BULMARO II on colp, leep path WNL     left thyroid total lobectomy, isthmusectomy and 50% right thyroid lobectomy Left 3/7/00     TUBAL LIGATION  1988     VULVECTOMY SIMPLE  2006    FELICIA 3, wide local excision x 2     VULVECTOMY SIMPLE         Social History:  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 The patient admits to 1-2 drinks a week and is a non-smoker.  passed away 10/2016. Pt volunteers.    Family History:  Reviewed and unchanged but kept in chart for clinician convenience  There is no family history of skin cancer. There is a family hx of Cardiovascular disease.     Medications:  Current Outpatient Prescriptions   Medication Sig Dispense Refill     predniSONE (DELTASONE) 10 MG tablet Take 2 pills x 5 days, 1 pill x 5 days, then 1 pill every other day for 8 days. 19 tablet 0     triamcinolone (KENALOG) 0.1 % ointment  Apply to affected area of the skin twice a day after using a moisturizer. Can do wet wraps with this. 454 g 1     hydrOXYzine (ATARAX) 25 MG tablet Take 1-2 tablets (25-50 mg) by mouth every 6 hours as needed for itching 60 tablet 1     atorvastatin (LIPITOR) 20 MG tablet Take 1 tablet (20 mg) by mouth daily 90 tablet 1     FLUoxetine (PROZAC) 20 MG capsule Take 3 capsules (60 mg) by mouth daily 270 capsule 1     lisinopril (PRINIVIL/ZESTRIL) 10 MG tablet Take 1 tablet (10 mg) by mouth daily 90 tablet 1     omeprazole (PRILOSEC) 20 MG CR capsule Take 1 capsule (20 mg) by mouth every 3 days 90 capsule 1     triamcinolone (KENALOG) 0.1 % ointment Apply twice daily as needed to affected area, do not use for more than 10 days. 60 g 1     triamterene-hydrochlorothiazide (DYAZIDE) 37.5-25 MG per capsule Take 1 capsule by mouth daily 90 capsule 1     fluocinonide (LIDEX) 0.05 % ointment Apply twice a day to affected areas of the arms, legs, trunk for up to 4 weeks. 60 g 1     levothyroxine (SYNTHROID,LEVOTHROID) 100 MCG tablet Monday-Saturday: (1 tablet/day);   Sunday: (0.5 tablet) 90 tablet 3     HYDROcodone-acetaminophen (NORCO) 5-325 MG per tablet Take 1-2 tablets by mouth every 4 hours as needed for moderate to severe pain (Patient not taking: Reported on 4/3/2017) 60 tablet 0     multivitamin, therapeutic with minerals (MULTI-VITAMIN) TABS Take 1 tablet by mouth daily       hypromellose (ARTIFICIAL TEARS) 0.5 % SOLN Place 1 drop into both eyes every 4 hours as needed        sennosides (SENOKOT) 8.6 MG tablet Take 2 tablets by mouth daily        cetirizine (ZYRTEC) 10 MG tablet Take 1 tablet (10 mg) by mouth daily 90 tablet 1     aspirin 81 MG tablet Take 81 mg by mouth daily        Omega-3 Fatty Acids (FISH OIL) 1200 MG CAPS Take 1 capsule by mouth daily        CALCIUM CITRATE PO Take 1,200 mg by mouth daily        Cholecalciferol (VITAMIN D) 1000 UNIT capsule Take 1 capsule by mouth daily.         Allergies    Allergen Reactions     Dye [Contrast Dye] Anaphylaxis     Echo dye       Review of Systems:  -Constitutional: The patient is otherwise feeling well.   -Skin Establ Pt: The patient denies any new rash, pruritus, or lesions that are symptomatic, changing or bleeding, except as per HPI.  - Mouth: no mouth sores, erosions, difficulty swallowing.  -Eyes: no itchiness or grittiness  -: no pain on urination  -GI: on pain with bowel movements.    Physical exam:  Vitals: There were no vitals taken for this visit.  GEN: This is a well-nourished, well developed female in no acute distress  NEURO: Alert and oriented  PSYCH: in a pleasant mood, appropriate affect  SKIN: Focused examination of the bilateral upper extremities, hands, shoulders, and back was performed.  -pink eczematous appearing papules on bilateral forearms up onto upper arms, shoulders, and upper back  -scaling and cracking on the palms and finger tips, worse on the right than the left  -No other lesions of concern on areas examined.     Impression/Plan:  1. Rash: diagnosis unclear at this point. Rash presented as akin to atopic dermatitis flare however isn't responding as such. Small concern for contact dermatitis especially given volunteering which patient isn't willing to temporarily stop. Drug reaction also possibility. Autoimmune blistering conditions can start in a similar way. Will require continued workup. Punch bx for H&E and DIF of perilesional skin.  x2 Punch biopsy. Differential diagnosis includes: Atopic Dermatitis, eczematous/urticarial phase of blistering conditions, lichenoid drug, others.  After discussion of benefits and risks including but not limited to bleeding/bruising, pain/swelling, infection, scar, incomplete removal, nerve damage/numbness, recurrence, and non-diagnostic biopsy, written consent, verbal consent and photographs were obtained. Time-out was performed. The area was cleaned with isopropyl alcohol. 0.5 mL of 1% lidocaine  with epinephrine was injected to obtain adequate anesthesia of the lesion on the right forearm and right forearm DIF. A 4 mm punch biopsy was performed.  4-0 prolene sutures were utilized to approximate the epidermal edges.  White petroleum jelly/VaselineTM and a bandage was applied to the wound.  Explicit verbal and written wound care instructions were provided.  The patient left the Dermatology Clinic in good condition. The patient was counseled to follow up for suture removal in approximately 14 days.    Stop Triamcinolone 0.1% ointment    Start clobetasol 0.05% ointment + emollients    Wet PJs/wet wraps once a day every other day as tolerated.    Occasional use of 1% hydrocortisone cream on eyelids is fine.     No prednisone course at this point.    CLn wash given inability to do bleach baths as doesn't feel safe in bath tub.    Benadryl at night is fine.      Follow-up in 4-6 weeks, or for new or changing lesions.   CC: FRANC García    Staff Involved:  Scribe/Staff      Scribe Disclosure:   I, Ethan Richter, am serving as a scribe to document services personally performed by Dr. Jonah Keller, based on data collection and the provider's statements to me.     Provider Disclosure:   I have reviewed the documentation recorded by the scribe and have edited it as needed. I have personally performed the services documented here and the documentation accurately represents those services and the decisions me by me.     Jonah Keller MD, MS    Department of Dermatology  [facility]: Phone: [phone], Fax:[phone]  [facility]: Phone: [phone], Fax: [phone]

## 2017-07-03 NOTE — PROGRESS NOTES
[facility] Dermatology Note      Dermatology Problem List:  1. Rash (diffuss) - x2 punch biopsies right forearm, right forearm +DIF, clobetasol 0.05% ointment   -Previous tx: Sarah cream BID, Alternate TMC 0.1% ointment and Fluocinonide (LIDEX) 0.05% ointment BID for 4 weeks max then break for 2-3 weeks. Minimize soap use. PRN 1% hydrocortisone to eyelids. S/p 2 courses of prednisone.  -DDx: atopic dermatitis flare, drug reaction, autoimmune blistering disease, contact dermatitis    Last TBSE: 6/6/2017    Encounter Date: Jul 3, 2017    CC:  Chief Complaint   Patient presents with     Derm Problem     Intrinsic eczema doing well on prednisone but now starting to get irritated again       History of Present Illness:  Ms. Marcela Allen is a 66 year old female who presents for a follow-up for eczema.  Her PCP originally started her on prednisone with great results. She was last seen 6/6/2017 when she continued on triamcinolone 0.1% ointment and a tapering prednisone regimen at 20 mg. Today she reports the prednisone helped with continued use of triamcinolone and Eucerin moisturizer. It usually takes 4-6 weeks after finishing prednisone for her symptoms to fully return. She tried doing wet wrapping but has stopped because it causes her to become pruritic. Her hands are very pruritic and painful. She reports using a triple antibiotic cream on her hands followed by wearing gloves. No problems inside oral cavity. Pt switched to T-gel shampoo. Pt doesn't do baths due to safety concerns.  She has kidney disease at baseline. No oral medication changes.    Past Medical History:   Patient Active Problem List   Diagnosis     GERD (gastroesophageal reflux disease)     Eczema     Hiatal hernia     HTN (hypertension)     OA (osteoarthritis)     Osteopenia     Cervical pain     Advanced directives, counseling/discussion     Major depression in complete remission (H)     Hyperlipidemia LDL goal <130      Hypertension goal BP (blood pressure) < 140/90     Seasonal allergies     CKD (chronic kidney disease) stage 3, GFR 30-59 ml/min     Liver hemangioma     S/P LEEP of cervix     Former smoker     Elevated platelet count (H)     s/p B TKA 6/27     S/P knee replacement     Postsurgical hypothyroidism     Papillary thyroid carcinoma (H)     Enlarged lymph nodes     Cervical cancer (H)     Advance care planning     Cervical radiculopathy     Malignant neoplasm of cervix, unspecified site (H)     Vulvar cancer (H)     Chronic obstructive pulmonary disease, unspecified COPD type (H)     Past Medical History:   Diagnosis Date     BULMARO II (cervical intraepithelial neoplasia II) 2007    on colp - leep path WNL     COPD (chronic obstructive pulmonary disease) (H)      Depression 1990     Depressive disorder 25 yrs     Eczema      GERD (gastroesophageal reflux disease)      Hiatal hernia      History of blood transfusion 2013    Post op     HTN (hypertension)      Hypercholesterolemia      Liver hemangioma 9/4/2012     OA (osteoarthritis)      Osteopenia      Papillary thyroid carcinoma (H) 2000    BL, MF; Tx in 2 operations, RRA     PONV (postoperative nausea and vomiting)      Postsurgical hypothyroidism 2000     FELICIA III (vulvar intraepithelial neoplasia III) 2006    wide local exc x 2     Past Surgical History:   Procedure Laterality Date     ABDOMEN SURGERY      Teenager     APPENDECTOMY OPEN  1968     ARTHROPLASTY MINIMALLY INVASIVE KNEE BILATERAL  6/27/2013    Procedure: ARTHROPLASTY MINIMALLY INVASIVE KNEE BILATERAL;  Minimally Invasive Bilateral Total Knee Arthroplasty;  Surgeon: Christophe Welch MD;  [facility]     BIOPSY  Ongoing    Lymph nodes neck     BONE MARROW ASPIRATION ONLY Left 12/7/2015    Procedure: BONE MARROW ASPIRATION ONLY;  Surgeon: Aguilar Roldan MD;  Location: [facility] OR     CARPAL TUNNEL RELEASE RT/LT       COLONOSCOPY  2007    neg     COLPOSCOPY CERVIX, BIOPSY CERVIX, ENDOCERVICAL  CURETTAGE, COMBINED  2003, 2007     completion thyroidectomy Right 3/14/00     CYSTECTOMY OVARIAN BENIGN  1969     ESOPHAGOSCOPY, GASTROSCOPY, DUODENOSCOPY (EGD), COMBINED N/A 8/28/2014    Procedure: COMBINED ESOPHAGOSCOPY, GASTROSCOPY, DUODENOSCOPY (EGD), BIOPSY SINGLE OR MULTIPLE;  Surgeon: Kelvin Montgomery MD;  Location: [facility] OR     FUSION CERVICAL ANTERIOR THREE+ LEVELS WITH BONE ALLOGRAFT N/A 12/7/2015    Procedure: FUSION CERVICAL ANTERIOR THREE+ LEVELS WITH BONE ALLOGRAFT;  Surgeon: Aguilar Roldan MD;  Location: [facility] OR     HAND SURGERY       HEAD & NECK SURGERY  2000    Thyroidectomy     KNEE SURGERY  2001 & 2005    bilat     LAPAROSCOPIC CHOLECYSTECTOMY  1998     LEEP TX, CERVICAL  2007    BULMARO II on colp, leep path WNL     left thyroid total lobectomy, isthmusectomy and 50% right thyroid lobectomy Left 3/7/00     TUBAL LIGATION  1988     VULVECTOMY SIMPLE  2006    FELICIA 3, wide local excision x 2     VULVECTOMY SIMPLE         Social History:  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 The patient admits to 1-2 drinks a week and is a non-smoker.  passed away 10/2016. Pt volunteers.    Family History:  Reviewed and unchanged but kept in chart for clinician convenience  There is no family history of skin cancer. There is a family hx of Cardiovascular disease.     Medications:  Current Outpatient Prescriptions   Medication Sig Dispense Refill     predniSONE (DELTASONE) 10 MG tablet Take 2 pills x 5 days, 1 pill x 5 days, then 1 pill every other day for 8 days. 19 tablet 0     triamcinolone (KENALOG) 0.1 % ointment Apply to affected area of the skin twice a day after using a moisturizer. Can do wet wraps with this. 454 g 1     hydrOXYzine (ATARAX) 25 MG tablet Take 1-2 tablets (25-50 mg) by mouth every 6 hours as needed for itching 60 tablet 1     atorvastatin (LIPITOR) 20 MG tablet  Take 1 tablet (20 mg) by mouth daily 90 tablet 1     FLUoxetine (PROZAC) 20 MG capsule Take 3 capsules (60 mg) by mouth daily 270 capsule 1     lisinopril (PRINIVIL/ZESTRIL) 10 MG tablet Take 1 tablet (10 mg) by mouth daily 90 tablet 1     omeprazole (PRILOSEC) 20 MG CR capsule Take 1 capsule (20 mg) by mouth every 3 days 90 capsule 1     triamcinolone (KENALOG) 0.1 % ointment Apply twice daily as needed to affected area, do not use for more than 10 days. 60 g 1     triamterene-hydrochlorothiazide (DYAZIDE) 37.5-25 MG per capsule Take 1 capsule by mouth daily 90 capsule 1     fluocinonide (LIDEX) 0.05 % ointment Apply twice a day to affected areas of the arms, legs, trunk for up to 4 weeks. 60 g 1     levothyroxine (SYNTHROID,LEVOTHROID) 100 MCG tablet Monday-Saturday: (1 tablet/day);   Sunday: (0.5 tablet) 90 tablet 3     HYDROcodone-acetaminophen (NORCO) 5-325 MG per tablet Take 1-2 tablets by mouth every 4 hours as needed for moderate to severe pain (Patient not taking: Reported on 4/3/2017) 60 tablet 0     multivitamin, therapeutic with minerals (MULTI-VITAMIN) TABS Take 1 tablet by mouth daily       hypromellose (ARTIFICIAL TEARS) 0.5 % SOLN Place 1 drop into both eyes every 4 hours as needed        sennosides (SENOKOT) 8.6 MG tablet Take 2 tablets by mouth daily        cetirizine (ZYRTEC) 10 MG tablet Take 1 tablet (10 mg) by mouth daily 90 tablet 1     aspirin 81 MG tablet Take 81 mg by mouth daily        Omega-3 Fatty Acids (FISH OIL) 1200 MG CAPS Take 1 capsule by mouth daily        CALCIUM CITRATE PO Take 1,200 mg by mouth daily        Cholecalciferol (VITAMIN D) 1000 UNIT capsule Take 1 capsule by mouth daily.         Allergies   Allergen Reactions     Dye [Contrast Dye] Anaphylaxis     Echo dye       Review of Systems:  -Constitutional: The patient is otherwise feeling well.   -Skin Establ Pt: The patient denies any new rash, pruritus, or lesions that are symptomatic, changing or bleeding, except as per  HPI.  - Mouth: no mouth sores, erosions, difficulty swallowing.  -Eyes: no itchiness or grittiness  -: no pain on urination  -GI: on pain with bowel movements.    Physical exam:  Vitals: There were no vitals taken for this visit.  GEN: This is a well-nourished, well developed female in no acute distress  NEURO: Alert and oriented  PSYCH: in a pleasant mood, appropriate affect  SKIN: Focused examination of the bilateral upper extremities, hands, shoulders, and back was performed.  -pink eczematous appearing papules on bilateral forearms up onto upper arms, shoulders, and upper back  -scaling and cracking on the palms and finger tips, worse on the right than the left  -No other lesions of concern on areas examined.     Impression/Plan:  1. Rash: diagnosis unclear at this point. Rash presented as akin to atopic dermatitis flare however isn't responding as such. Small concern for contact dermatitis especially given volunteering which patient isn't willing to temporarily stop. Drug reaction also possibility. Autoimmune blistering conditions can start in a similar way. Will require continued workup. Punch bx for H&E and DIF of perilesional skin.  x2 Punch biopsy. Differential diagnosis includes: Atopic Dermatitis, eczematous/urticarial phase of blistering conditions, lichenoid drug, others.  After discussion of benefits and risks including but not limited to bleeding/bruising, pain/swelling, infection, scar, incomplete removal, nerve damage/numbness, recurrence, and non-diagnostic biopsy, written consent, verbal consent and photographs were obtained. Time-out was performed. The area was cleaned with isopropyl alcohol. 0.5 mL of 1% lidocaine with epinephrine was injected to obtain adequate anesthesia of the lesion on the right forearm and right forearm DIF. A 4 mm punch biopsy was performed.  4-0 prolene sutures were utilized to approximate the epidermal edges.  White petroleum jelly/VaselineTM and a bandage was applied  to the wound.  Explicit verbal and written wound care instructions were provided.  The patient left the Dermatology Clinic in good condition. The patient was counseled to follow up for suture removal in approximately 14 days.    Stop Triamcinolone 0.1% ointment    Start clobetasol 0.05% ointment + emollients    Wet PJs/wet wraps once a day every other day as tolerated.    Occasional use of 1% hydrocortisone cream on eyelids is fine.     No prednisone course at this point.    CLn wash given inability to do bleach baths as doesn't feel safe in bath tub.    Benadryl at night is fine.      Follow-up in 4-6 weeks, or for new or changing lesions.   CC: FRANC García    Staff Involved:  Scribe/Staff      Scribe Disclosure:   I, Ethan Richter, am serving as a scribe to document services personally performed by Dr. Jonah Keller, based on data collection and the provider's statements to me.     Provider Disclosure:   I have reviewed the documentation recorded by the scribe and have edited it as needed. I have personally performed the services documented here and the documentation accurately represents those services and the decisions me by me.     Jonah Keller MD, MS    Department of Dermatology  [facility] [place]: Phone: [phone], Fax:[phone]  [facility]: Phone: [phone], Fax: [phone]

## 2017-07-03 NOTE — PATIENT INSTRUCTIONS

## 2017-07-03 NOTE — MR AVS SNAPSHOT
After Visit Summary   7/3/2017    Marcela Allen    MRN: [id number]           Patient Information     Date Of Birth          1950        Visit Information        Provider Department      7/3/2017 3:45 PM Jonah Keller MD [facility]        Today's Diagnoses     Rash    -  1      Care Instructions    Wound Care After a Biopsy    What is a skin biopsy?  A skin biopsy allows the doctor to examine a very small piece of tissue under the microscope to determine the diagnosis and the best treatment for the skin condition. A local anesthetic (numbing medicine)  is injected with a very small needle into the skin area to be tested. A small piece of skin is taken from the area. Sometimes a suture (stitch) is used.     What are the risks of a skin biopsy?  I will experience scar, bleeding, swelling, pain, crusting and redness. I may experience incomplete removal or recurrence. Risks of this procedure are excessive bleeding, bruising, infection, nerve damage, numbness, thick (hypertrophic or keloidal) scar and non-diagnostic biopsy.    How should I care for my wound for the first 24 hours?    Keep the wound dry and covered for 24 hours    If it bleeds, hold direct pressure on the area for 15 minutes. If bleeding does not stop then go to the emergency room    Avoid strenuous exercise the first 1-2 days or as your doctor instructs you    How should I care for the wound after 24 hours?    After 24 hours, remove the bandage    You may bathe or shower as normal    If you had a scalp biopsy, you can shampoo as usual and can use shower water to clean the biopsy site daily    Clean the wound twice a day with gentle soap and water    Do not scrub, be gentle    Apply white petroleum/Vaseline after cleaning the wound with a cotton swab or a clean finger, and keep the site covered with a Bandaid /bandage. Bandages are not necessary with a scalp biopsy    If you are unable to cover the site with a  Bandaid /bandage, re-apply ointment 2-3 times a day to keep the site moist. Moisture will help with healing    Avoid strenuous activity for first 1-2 days    Avoid lakes, rivers, pools, and oceans until the stitches are removed or the site is healed    How do I clean my wound?    Wash hands thoroughly with soap or use hand  before all wound care    Clean the wound with gentle soap and water    Apply white petroleum/Vaseline  to wound after it is clean    Replace the Bandaid /bandage to keep the wound covered for the first few days or as instructed by your doctor    If you had a scalp biopsy, warm shower water to the area on a daily basis should suffice    What should I use to clean my wound?     Cotton-tipped applicators (Qtips )    White petroleum jelly (Vaseline ). Use a clean new container and use Q-tips to apply.    Bandaids   as needed    Gentle soap     How should I care for my wound long term?    Do not get your wound dirty    Keep up with wound care for one week or until the area is healed.    A small scab will form and fall off by itself when the area is completely healed. The area will be red and will become pink in color as it heals. Sun protection is very important for how your scar will turn out. Sunscreen with an SPF 30 or greater is recommended once the area is healed.    If you have stitches, stitches need to be removed in 14 days. You may return to our clinic for this or you may have it done locally at your doctor s office.    You should have some soreness but it should be mild and slowly go away over several days. Talk to your doctor about using tylenol for pain,    When should I call my doctor?  If you have increased:     Pain or swelling    Pus or drainage (clear or slightly yellow drainage is ok)    Temperature over 100F    Spreading redness or warmth around wound    When will I hear about my results?  The biopsy results can take 2-3 weeks to come back. The clinic will call you with the  results, send you a Cam[employer] message, or have you schedule a follow-up clinic or phone time to discuss the results. Contact our clinics if you do not hear from us in 3 weeks.     Who should I call with questions?    [facility]: [phone]     [facility]: [phone]    For urgent needs outside of business hours call the [facility] at [phone] and ask for the dermatology resident on call              Follow-ups after your visit        Your next 10 appointments already scheduled     Jul 17, 2017  1:00 PM CDT   [facility] with Jonah Keller MD   [facility]    [address] [address] [place] [id number] [phone]              Who to contact     If you have questions or need follow up information about today's clinic visit or your schedule please contact [facility] directly at [phone].  Normal or non-critical lab and imaging results will be communicated to you by [employer], letter or phone within 4 business days after the clinic has received the results. If you do not hear from us within 7 days, please contact the clinic through Cell[employer]Point[employer] or phone. If you have a critical or abnormal lab result, we will notify you by phone as soon as possible.  Submit refill requests through [employer] or call your pharmacy and they will forward the refill request to us. Please allow 3 business days for your refill to be completed.          Additional Information About Your Visit        [employer] Information     [employer] gives you secure access to your electronic health record. If you see a primary care provider, you can also send messages to your care team and make appointments. If you have questions, please call your primary care clinic.  If you do not have a primary care provider, please call [phone] and they will assist you.      [employer] is an electronic gateway that provides  easy, online access to your medical records. With [employer], you can request a clinic appointment, read your test results, renew a prescription or communicate with your care team.     To access your existing account, please contact your [facility] or call [phone] for assistance.        Care EveryWhere ID     This is your Care EveryWhere ID. This could be used by other organizations to access your [place] medical records  [id number]         Blood Pressure from Last 3 Encounters:   04/03/17 138/80   10/10/16 93/64   08/02/16 113/75    Weight from Last 3 Encounters:   04/03/17 75.9 kg (167 lb 6.4 oz)   10/10/16 70.8 kg (156 lb)   08/02/16 72.1 kg (159 lb)              We Performed the Following     BIOPSY SKIN/SUBQ/MUC MEM, EACH ADDTL LESION     BIOPSY SKIN/SUBQ/MUC MEM, SINGLE LESION     Surgical pathology exam          Today's Medication Changes          These changes are accurate as of: 7/3/17  4:31 PM.  If you have any questions, ask your nurse or doctor.               Start taking these medicines.        Dose/Directions    clobetasol 0.05 % ointment   Commonly known as:  TEMOVATE   Used for:  Rash   Started by:  Jonah Keller MD        Apply twice a day to the affected area of the skin until follow up appointment.   Quantity:  120 g   Refills:  2       lidocaine 1% with EPINEPHrine 1:100,000 1 %-1:132932 injection   Used for:  Rash   Started by:  Jonah Keller MD        Dose:  3 mL   Inject 3 mLs into the skin once for 1 dose   Quantity:  0.5 mL   Refills:  0            Where to get your medicines      These medications were sent to [facility] - [place], [place] - [address], [address]  [address], [address], [facility] [address]     Phone:  [phone]     clobetasol 0.05 % ointment         Some of these will need a paper prescription and others can be bought over the counter.  Ask your nurse if you have questions.     You don't  need a prescription for these medications     lidocaine 1% with EPINEPHrine 1:100,000 1 %-1:825453 injection                Primary Care Provider Office Phone # Fax #    Honey García PA-C [phone]       [facility] [address]  [facility] [address]        Equal Access to Services     [facility] [place] : Hadii aad ku hadasho Soomaali, waaxda luqadaha, qaybta kaalmada adeegyada, waxay idiin hayaan adeeg kharash la'lisa . So [facility] [phone].    ATENCIÓN: Si habla español, tiene a trammell disposición servicios gratuitos de asistencia lingüística. Rizwana al [phone].    We comply with applicable federal civil rights laws and [place] laws. We do not discriminate on the basis of race, color, national origin, age, disability sex, sexual orientation or gender identity.            Thank you!     Thank you for choosing [facility]  for your care. Our goal is always to provide you with excellent care. Hearing back from our patients is one way we can continue to improve our services. Please take a few minutes to complete the written survey that you may receive in the mail after your visit with us. Thank you!             Your Updated Medication List - Protect others around you: Learn how to safely use, store and throw away your medicines at www.disposemymeds.org.          This list is accurate as of: 7/3/17  4:31 PM.  Always use your most recent med list.                   Brand Name Dispense Instructions for use Diagnosis    aspirin 81 MG tablet      Take 81 mg by mouth daily        atorvastatin 20 MG tablet    LIPITOR    90 tablet    Take 1 tablet (20 mg) by mouth daily    Hyperlipidemia LDL goal <130       CALCIUM CITRATE PO      Take 1,200 mg by mouth daily        cetirizine 10 MG tablet    zyrTEC    90 tablet    Take 1 tablet (10 mg) by mouth daily    Seasonal allergies       clobetasol 0.05 % ointment    TEMOVATE    120 g    Apply twice a day to the affected area of  the skin until follow up appointment.    Rash       Fish Oil 1200 MG Caps      Take 1 capsule by mouth daily        fluocinonide 0.05 % ointment    LIDEX    60 g    Apply twice a day to affected areas of the arms, legs, trunk for up to 4 weeks.    Intrinsic eczema       FLUoxetine 20 MG capsule    PROzac    270 capsule    Take 3 capsules (60 mg) by mouth daily    Major depression in complete remission (H)       HYDROcodone-acetaminophen 5-325 MG per tablet    NORCO    60 tablet    Take 1-2 tablets by mouth every 4 hours as needed for moderate to severe pain    Cervical radiculopathy       hydrOXYzine 25 MG tablet    ATARAX    60 tablet    Take 1-2 tablets (25-50 mg) by mouth every 6 hours as needed for itching    Pruritic disorder       hypromellose 0.5 % Soln ophthalmic solution    ARTIFICIAL TEARS     Place 1 drop into both eyes every 4 hours as needed        levothyroxine 100 MCG tablet    SYNTHROID/LEVOTHROID    90 tablet    Monday-Saturday: (1 tablet/day);  Sunday: (0.5 tablet)    Postsurgical hypothyroidism, Papillary thyroid carcinoma (H)       lidocaine 1% with EPINEPHrine 1:100,000 1 %-1:258463 injection     0.5 mL    Inject 3 mLs into the skin once for 1 dose    Rash       lisinopril 10 MG tablet    PRINIVIL/ZESTRIL    90 tablet    Take 1 tablet (10 mg) by mouth daily    Essential hypertension with goal blood pressure less than 140/90       Multi-vitamin Tabs tablet      Take 1 tablet by mouth daily        omeprazole 20 MG CR capsule    priLOSEC    90 capsule    Take 1 capsule (20 mg) by mouth every 3 days    Gastroesophageal reflux disease with esophagitis       predniSONE 10 MG tablet    DELTASONE    19 tablet    Take 2 pills x 5 days, 1 pill x 5 days, then 1 pill every other day for 8 days.    Intrinsic atopic dermatitis       sennosides 8.6 MG tablet    SENOKOT     Take 2 tablets by mouth daily        * triamcinolone 0.1 % ointment    KENALOG    60 g    Apply twice daily as needed to affected area, do  not use for more than 10 days.    Flexural eczema       * triamcinolone 0.1 % ointment    KENALOG    454 g    Apply to affected area of the skin twice a day after using a moisturizer. Can do wet wraps with this.    Intrinsic atopic dermatitis       triamterene-hydrochlorothiazide 37.5-25 MG per capsule    DYAZIDE    90 capsule    Take 1 capsule by mouth daily    Essential hypertension with goal blood pressure less than 140/90       vitamin D 1000 UNITS capsule      Take 1 capsule by mouth daily.        * Notice:  This list has 2 medication(s) that are the same as other medications prescribed for you. Read the directions carefully, and ask your doctor or other care provider to review them with you.

## 2017-07-12 LAB — COPATH REPORT: NORMAL

## 2017-07-13 NOTE — PROGRESS NOTES
Called and spoke to patient regarding biopsy and DIF results. DIF negative for any autoimmune blistering disorders or lupus.     Biopsy for H&E indicated a differential of atopic dermatitis, contact dermatitis, other eczematous dermatitis as well as possibly pityriasis rosea of MO-like drug eruption.     A scan pt's drug list, the most likely culprits would be LISINOPRIL and TRIAMTERENE-HYDROCHLOROTHIAZIDE, which as both blood pressure medications.     Given the difficultness in treating pt's rash, I believe it is worthwhile to do a trial of stopping these medications however I require assistance from CATHERINE Collins, with BP management. Meds need to be stopped for 1 month to be confident of effect. I would stop HCTZ first while maintaining lisinopril and triamterene (if possible).    Jonah Keller MD, MS    Department of Dermatology  [facility]: Phone: [phone], Fax:[phone]  [facility]: Phone: [phone], Fax: [phone]      Will send Honey

## 2017-07-17 ENCOUNTER — MYC MEDICAL ADVICE (OUTPATIENT)
Dept: FAMILY MEDICINE | Facility: CLINIC | Age: 67
End: 2017-07-17

## 2017-07-17 ENCOUNTER — OFFICE VISIT (OUTPATIENT)
Dept: DERMATOLOGY | Facility: CLINIC | Age: 67
End: 2017-07-17
Payer: MEDICARE

## 2017-07-17 DIAGNOSIS — R21 RASH: Primary | ICD-10-CM

## 2017-07-17 PROCEDURE — 99213 OFFICE O/P EST LOW 20 MIN: CPT | Performed by: DERMATOLOGY

## 2017-07-17 NOTE — LETTER
7/17/2017       RE: Marcela Allen  [id number] [facility]  [place] [facility] [address]     Dear Colleague,    Thank you for referring your patient, Marcela Allen, to the [facility]. Please see a copy of my visit note below.    [facility] Dermatology Note      Dermatology Problem List:  1. Rash (diffuss) - x2 punch biopsies right forearm, right forearm +DIF, clobetasol 0.05% ointment   -Trial of stopping HCTZ-Triamterene x 1 month then lisinopril x 1 month if not improved then omeprazole x 1 month. Continue topicals.  -Previous tx: Sarah cream BID, Alternate TMC 0.1% ointment and Fluocinonide (LIDEX) 0.05% ointment BID for 4 weeks max then break for 2-3 weeks. Minimize soap use. PRN 1% hydrocortisone to eyelids. S/p 2 courses of prednisone.  -DDx: atopic dermatitis flare, drug reaction, contact dermatitis    Last TBSE: 6/6/2017    Encounter Date: Jul 17, 2017    CC:  Chief Complaint   Patient presents with     Derm Problem     2 week recheck        History of Present Illness:  Ms. Marcela Allen is a 66 year old female who presents for a follow-up for a rash. She was last seen 7/3/2017 when two punch biopsies were taken and she started clobetasol 0.05% ointment. The biopsies indicated a differential of atopic dermatitis, contact dermatitis, or other eczematous dermatitis as well as possible pityriasis rosea or [place]-like drug eruption. She is currently on lisinopril, omeprazole, and triamterene-hydrochlorothiazide. She has not been in contact with her PCP since her last derm visit and has not changed any medications. She is still pruritic and has been taking 50 mg daily of Benadryl without drowsiness. Using clobetasol and emollient regularly with some relief from the pruritus. No other lesions of concern.    Past Medical History:   Patient Active Problem List   Diagnosis     GERD (gastroesophageal reflux disease)     Eczema     Hiatal hernia      HTN (hypertension)     OA (osteoarthritis)     Osteopenia     Cervical pain     Advanced directives, counseling/discussion     Major depression in complete remission (H)     Hyperlipidemia LDL goal <130     Hypertension goal BP (blood pressure) < 140/90     Seasonal allergies     CKD (chronic kidney disease) stage 3, GFR 30-59 ml/min     Liver hemangioma     S/P LEEP of cervix     Former smoker     Elevated platelet count (H)     s/p B TKA 6/27     S/P knee replacement     Postsurgical hypothyroidism     Papillary thyroid carcinoma (H)     Enlarged lymph nodes     Cervical cancer (H)     Advance care planning     Cervical radiculopathy     Malignant neoplasm of cervix, unspecified site (H)     Vulvar cancer (H)     Chronic obstructive pulmonary disease, unspecified COPD type (H)     Past Medical History:   Diagnosis Date     BULMARO II (cervical intraepithelial neoplasia II) 2007    on colp - leep path WNL     COPD (chronic obstructive pulmonary disease) (H)      Depression 1990     Depressive disorder 25 yrs     Eczema      GERD (gastroesophageal reflux disease)      Hiatal hernia      History of blood transfusion 2013    Post op     HTN (hypertension)      Hypercholesterolemia      Liver hemangioma 9/4/2012     OA (osteoarthritis)      Osteopenia      Papillary thyroid carcinoma (H) 2000    BL, MF; Tx in 2 operations, RRA     PONV (postoperative nausea and vomiting)      Postsurgical hypothyroidism 2000     FELICIA III (vulvar intraepithelial neoplasia III) 2006    wide local exc x 2     Past Surgical History:   Procedure Laterality Date     ABDOMEN SURGERY      Teenager     APPENDECTOMY OPEN  1968     ARTHROPLASTY MINIMALLY INVASIVE KNEE BILATERAL  6/27/2013    Procedure: ARTHROPLASTY MINIMALLY INVASIVE KNEE BILATERAL;  Minimally Invasive Bilateral Total Knee Arthroplasty;  Surgeon: Christophe Welch MD;  [facility]     BIOPSY  Ongoing    Lymph nodes neck     BONE MARROW ASPIRATION ONLY Left 12/7/2015     Procedure: BONE MARROW ASPIRATION ONLY;  Surgeon: Aguilar Roldan MD;  Location: [facility] OR     CARPAL TUNNEL RELEASE RT/LT       COLONOSCOPY  2007    neg     COLPOSCOPY CERVIX, BIOPSY CERVIX, ENDOCERVICAL CURETTAGE, COMBINED  2003, 2007     completion thyroidectomy Right 3/14/00     CYSTECTOMY OVARIAN BENIGN  1969     ESOPHAGOSCOPY, GASTROSCOPY, DUODENOSCOPY (EGD), COMBINED N/A 8/28/2014    Procedure: COMBINED ESOPHAGOSCOPY, GASTROSCOPY, DUODENOSCOPY (EGD), BIOPSY SINGLE OR MULTIPLE;  Surgeon: Kelvin Montgomery MD;  [facility]: [facility] OR     FUSION CERVICAL ANTERIOR THREE+ LEVELS WITH BONE ALLOGRAFT N/A 12/7/2015    Procedure: FUSION CERVICAL ANTERIOR THREE+ LEVELS WITH BONE ALLOGRAFT;  Surgeon: Aguilar Roldan MD;  Location:  OR     HAND SURGERY       HEAD & NECK SURGERY  2000    Thyroidectomy     KNEE SURGERY  2001 & 2005    bilat     LAPAROSCOPIC CHOLECYSTECTOMY  1998     LEEP TX, CERVICAL  2007    BULMARO II on colp, leep path WNL     left thyroid total lobectomy, isthmusectomy and 50% right thyroid lobectomy Left 3/7/00     TUBAL LIGATION  1988     VULVECTOMY SIMPLE  2006    FELICIA 3, wide local excision x 2     VULVECTOMY SIMPLE         Social History:  Reviewed and unchanged but kept in chart for clinician convenience  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 The patient admits to 1-2 drinks a week and is a non-smoker.  passed away 10/2016. Pt volunteers.    Family History:  Reviewed and unchanged but kept in chart for clinician convenience  There is no family history of skin cancer. There is a family hx of Cardiovascular disease.     Medications:  Current Outpatient Prescriptions   Medication Sig Dispense Refill     clobetasol (TEMOVATE) 0.05 % ointment Apply twice a day to the affected area of the skin until follow up appointment. 120 g 2     predniSONE (DELTASONE) 10 MG tablet  Take 2 pills x 5 days, 1 pill x 5 days, then 1 pill every other day for 8 days. 19 tablet 0     triamcinolone (KENALOG) 0.1 % ointment Apply to affected area of the skin twice a day after using a moisturizer. Can do wet wraps with this. 454 g 1     hydrOXYzine (ATARAX) 25 MG tablet Take 1-2 tablets (25-50 mg) by mouth every 6 hours as needed for itching 60 tablet 1     atorvastatin (LIPITOR) 20 MG tablet Take 1 tablet (20 mg) by mouth daily 90 tablet 1     FLUoxetine (PROZAC) 20 MG capsule Take 3 capsules (60 mg) by mouth daily 270 capsule 1     lisinopril (PRINIVIL/ZESTRIL) 10 MG tablet Take 1 tablet (10 mg) by mouth daily 90 tablet 1     omeprazole (PRILOSEC) 20 MG CR capsule Take 1 capsule (20 mg) by mouth every 3 days 90 capsule 1     triamcinolone (KENALOG) 0.1 % ointment Apply twice daily as needed to affected area, do not use for more than 10 days. 60 g 1     triamterene-hydrochlorothiazide (DYAZIDE) 37.5-25 MG per capsule Take 1 capsule by mouth daily 90 capsule 1     fluocinonide (LIDEX) 0.05 % ointment Apply twice a day to affected areas of the arms, legs, trunk for up to 4 weeks. 60 g 1     levothyroxine (SYNTHROID,LEVOTHROID) 100 MCG tablet Monday-Saturday: (1 tablet/day);   Sunday: (0.5 tablet) 90 tablet 3     HYDROcodone-acetaminophen (NORCO) 5-325 MG per tablet Take 1-2 tablets by mouth every 4 hours as needed for moderate to severe pain (Patient not taking: Reported on 4/3/2017) 60 tablet 0     multivitamin, therapeutic with minerals (MULTI-VITAMIN) TABS Take 1 tablet by mouth daily       hypromellose (ARTIFICIAL TEARS) 0.5 % SOLN Place 1 drop into both eyes every 4 hours as needed        sennosides (SENOKOT) 8.6 MG tablet Take 2 tablets by mouth daily        cetirizine (ZYRTEC) 10 MG tablet Take 1 tablet (10 mg) by mouth daily 90 tablet 1     aspirin 81 MG tablet Take 81 mg by mouth daily        Omega-3 Fatty Acids (FISH OIL) 1200 MG CAPS Take 1 capsule by mouth daily        CALCIUM CITRATE PO Take  1,200 mg by mouth daily        Cholecalciferol (VITAMIN D) 1000 UNIT capsule Take 1 capsule by mouth daily.         Allergies   Allergen Reactions     Dye [Contrast Dye] Anaphylaxis     Echo dye       Review of Systems:  -Constitutional: The patient is otherwise feeling well.   -Skin Establ Pt: The patient denies any new rash, pruritus, or lesions that are symptomatic, changing or bleeding, except as per HPI.    Physical exam:  Vitals: There were no vitals taken for this visit.  GEN: This is a well-nourished, well developed female in no acute distress  NEURO: Alert and oriented  PSYCH: in a pleasant mood, appropriate affect  SKIN: Focused examination of the back, and bilateral upper and lower extremities was performed.  -there are pink eczematous-appearing papules scattered on bilateral upper arms, feet, legs, and mid-back  -No other lesions of concern on areas examined.     Impression/Plan:  1. Rash: diagnosis unclear at this point. S/p bx x 2. No blistering disorders per DIF and H&E. Ddx: Papular urticaria, drug-reaction, atopic dermatitis flare, contact dermatitis. Given difficulties with conventional treatment, will attempt cessation of suspected meds x 1 month each.    Continue clobetasol 0.05% ointment + emollients daily to BID    Occasional use of 1% hydrocortisone cream on eyelids is fine.     No prednisone course at this point.    CLn wash given inability to do bleach baths as doesn't feel safe in bath tub.    Benadryl at night is fine or Fexofenadine 180mg BID or loratadine 10mg TID, or cetirizine 10mg TID.    Medications to stop: needs 1 month cessation each. Will contact Primary PA to start trial of cessation of med.    HCTZ-Triamterene    Lisinopril    Omeprazole      Follow-up in 1 month, or for new or changing lesions.   CC: FRANC García    Staff Involved:  Scribe/Staff      Scribe Disclosure:   Ethan MELENDEZ, am serving as a scribe to document services personally performed by Dr. Mckenzie  Krishna, based on data collection and the provider's statements to me.     Provider Disclosure:   I have reviewed the documentation recorded by the scribe and have edited it as needed. I have personally performed the services documented here and the documentation accurately represents those services and the decisions me by me.     Jonah Keller MD, MS    Department of Dermatology  [facility]: Phone: [phone], Fax:[phone]  [facility]: Phone: [phone], Fax: [phone]        Again, thank you for allowing me to participate in the care of your patient.      Sincerely,    Jonah Keller MD

## 2017-07-17 NOTE — NURSING NOTE
Dermatology Rooming Note    Marcela Allen's goals for this visit include:   Chief Complaint   Patient presents with     Derm Problem     2 week recheck        Is a scribe okay for this visit:YES    Are records needed for this visit(If yes, obtain release of information): Not applicable     Vitals: There were no vitals taken for this visit.    Referring Provider:  No referring provider defined for this encounter.

## 2017-07-17 NOTE — PROGRESS NOTES
[facility] Dermatology Note      Dermatology Problem List:  1. Rash (diffuss) - x2 punch biopsies right forearm, right forearm +DIF, clobetasol 0.05% ointment   -Trial of stopping HCTZ-Triamterene x 1 month then lisinopril x 1 month if not improved then omeprazole x 1 month. Continue topicals.  -Previous tx: Sarah cream BID, Alternate TMC 0.1% ointment and Fluocinonide (LIDEX) 0.05% ointment BID for 4 weeks max then break for 2-3 weeks. Minimize soap use. PRN 1% hydrocortisone to eyelids. S/p 2 courses of prednisone.  -DDx: atopic dermatitis flare, drug reaction, contact dermatitis    Last TBSE: 6/6/2017    Encounter Date: Jul 17, 2017    CC:  Chief Complaint   Patient presents with     Derm Problem     2 week recheck        History of Present Illness:  Ms. Marcela Allen is a 66 year old female who presents for a follow-up for a rash. She was last seen 7/3/2017 when two punch biopsies were taken and she started clobetasol 0.05% ointment. The biopsies indicated a differential of atopic dermatitis, contact dermatitis, or other eczematous dermatitis as well as possible pityriasis rosea or [place]-like drug eruption. She is currently on lisinopril, omeprazole, and triamterene-hydrochlorothiazide. She has not been in contact with her PCP since her last derm visit and has not changed any medications. She is still pruritic and has been taking 50 mg daily of Benadryl without drowsiness. Using clobetasol and emollient regularly with some relief from the pruritus. No other lesions of concern.    Past Medical History:   Patient Active Problem List   Diagnosis     GERD (gastroesophageal reflux disease)     Eczema     Hiatal hernia     HTN (hypertension)     OA (osteoarthritis)     Osteopenia     Cervical pain     Advanced directives, counseling/discussion     Major depression in complete remission (H)     Hyperlipidemia LDL goal <130     Hypertension goal BP (blood pressure) < 140/90     Seasonal allergies      CKD (chronic kidney disease) stage 3, GFR 30-59 ml/min     Liver hemangioma     S/P LEEP of cervix     Former smoker     Elevated platelet count (H)     s/p B TKA 6/27     S/P knee replacement     Postsurgical hypothyroidism     Papillary thyroid carcinoma (H)     Enlarged lymph nodes     Cervical cancer (H)     Advance care planning     Cervical radiculopathy     Malignant neoplasm of cervix, unspecified site (H)     Vulvar cancer (H)     Chronic obstructive pulmonary disease, unspecified COPD type (H)     Past Medical History:   Diagnosis Date     BULMARO II (cervical intraepithelial neoplasia II) 2007    on colp - leep path WNL     COPD (chronic obstructive pulmonary disease) (H)      Depression 1990     Depressive disorder 25 yrs     Eczema      GERD (gastroesophageal reflux disease)      Hiatal hernia      History of blood transfusion 2013    Post op     HTN (hypertension)      Hypercholesterolemia      Liver hemangioma 9/4/2012     OA (osteoarthritis)      Osteopenia      Papillary thyroid carcinoma (H) 2000    BL, MF; Tx in 2 operations, RRA     PONV (postoperative nausea and vomiting)      Postsurgical hypothyroidism 2000     FELICIA III (vulvar intraepithelial neoplasia III) 2006    wide local exc x 2     Past Surgical History:   Procedure Laterality Date     ABDOMEN SURGERY      Teenager     APPENDECTOMY OPEN  1968     ARTHROPLASTY MINIMALLY INVASIVE KNEE BILATERAL  6/27/2013    Procedure: ARTHROPLASTY MINIMALLY INVASIVE KNEE BILATERAL;  Minimally Invasive Bilateral Total Knee Arthroplasty;  Surgeon: Christophe Welch MD;  [facility]     BIOPSY  Ongoing    Lymph nodes neck     BONE MARROW ASPIRATION ONLY Left 12/7/2015    Procedure: BONE MARROW ASPIRATION ONLY;  Surgeon: Aguilar Roldan MD;  Location: [facility] OR     CARPAL TUNNEL RELEASE RT/LT       COLONOSCOPY  2007    neg     COLPOSCOPY CERVIX, BIOPSY CERVIX, ENDOCERVICAL CURETTAGE, COMBINED  2003, 2007     completion thyroidectomy Right 3/14/00      CYSTECTOMY OVARIAN BENIGN  1969     ESOPHAGOSCOPY, GASTROSCOPY, DUODENOSCOPY (EGD), COMBINED N/A 8/28/2014    Procedure: COMBINED ESOPHAGOSCOPY, GASTROSCOPY, DUODENOSCOPY (EGD), BIOPSY SINGLE OR MULTIPLE;  Surgeon: Kelvin Montgomery MD;  Location: [facility]     FUSION CERVICAL ANTERIOR THREE+ LEVELS WITH BONE ALLOGRAFT N/A 12/7/2015    Procedure: FUSION CERVICAL ANTERIOR THREE+ LEVELS WITH BONE ALLOGRAFT;  Surgeon: Aguilar Roldan MD;  Location: [facility] OR     HAND SURGERY       HEAD & NECK SURGERY  2000    Thyroidectomy     KNEE SURGERY  2001 & 2005    bilat     LAPAROSCOPIC CHOLECYSTECTOMY  1998     LEEP TX, CERVICAL  2007    BULMARO II on colp, leep path WNL     left thyroid total lobectomy, isthmusectomy and 50% right thyroid lobectomy Left 3/7/00     TUBAL LIGATION  1988     VULVECTOMY SIMPLE  2006    FELICIA 3, wide local excision x 2     VULVECTOMY SIMPLE         Social History:  Reviewed and unchanged but kept in chart for clinician convenience  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 The patient admits to 1-2 drinks a week and is a non-smoker.  passed away 10/2016. Pt volunteers.    Family History:  Reviewed and unchanged but kept in chart for clinician convenience  There is no family history of skin cancer. There is a family hx of Cardiovascular disease.     Medications:  Current Outpatient Prescriptions   Medication Sig Dispense Refill     clobetasol (TEMOVATE) 0.05 % ointment Apply twice a day to the affected area of the skin until follow up appointment. 120 g 2     predniSONE (DELTASONE) 10 MG tablet Take 2 pills x 5 days, 1 pill x 5 days, then 1 pill every other day for 8 days. 19 tablet 0     triamcinolone (KENALOG) 0.1 % ointment Apply to affected area of the skin twice a day after using a moisturizer. Can do wet wraps with this. 454 g 1     hydrOXYzine (ATARAX) 25 MG tablet Take  1-2 tablets (25-50 mg) by mouth every 6 hours as needed for itching 60 tablet 1     atorvastatin (LIPITOR) 20 MG tablet Take 1 tablet (20 mg) by mouth daily 90 tablet 1     FLUoxetine (PROZAC) 20 MG capsule Take 3 capsules (60 mg) by mouth daily 270 capsule 1     lisinopril (PRINIVIL/ZESTRIL) 10 MG tablet Take 1 tablet (10 mg) by mouth daily 90 tablet 1     omeprazole (PRILOSEC) 20 MG CR capsule Take 1 capsule (20 mg) by mouth every 3 days 90 capsule 1     triamcinolone (KENALOG) 0.1 % ointment Apply twice daily as needed to affected area, do not use for more than 10 days. 60 g 1     triamterene-hydrochlorothiazide (DYAZIDE) 37.5-25 MG per capsule Take 1 capsule by mouth daily 90 capsule 1     fluocinonide (LIDEX) 0.05 % ointment Apply twice a day to affected areas of the arms, legs, trunk for up to 4 weeks. 60 g 1     levothyroxine (SYNTHROID,LEVOTHROID) 100 MCG tablet Monday-Saturday: (1 tablet/day);   Sunday: (0.5 tablet) 90 tablet 3     HYDROcodone-acetaminophen (NORCO) 5-325 MG per tablet Take 1-2 tablets by mouth every 4 hours as needed for moderate to severe pain (Patient not taking: Reported on 4/3/2017) 60 tablet 0     multivitamin, therapeutic with minerals (MULTI-VITAMIN) TABS Take 1 tablet by mouth daily       hypromellose (ARTIFICIAL TEARS) 0.5 % SOLN Place 1 drop into both eyes every 4 hours as needed        sennosides (SENOKOT) 8.6 MG tablet Take 2 tablets by mouth daily        cetirizine (ZYRTEC) 10 MG tablet Take 1 tablet (10 mg) by mouth daily 90 tablet 1     aspirin 81 MG tablet Take 81 mg by mouth daily        Omega-3 Fatty Acids (FISH OIL) 1200 MG CAPS Take 1 capsule by mouth daily        CALCIUM CITRATE PO Take 1,200 mg by mouth daily        Cholecalciferol (VITAMIN D) 1000 UNIT capsule Take 1 capsule by mouth daily.         Allergies   Allergen Reactions     Dye [Contrast Dye] Anaphylaxis     Echo dye       Review of Systems:  -Constitutional: The patient is otherwise feeling well.   -Skin  Establ Pt: The patient denies any new rash, pruritus, or lesions that are symptomatic, changing or bleeding, except as per HPI.    Physical exam:  Vitals: There were no vitals taken for this visit.  GEN: This is a well-nourished, well developed female in no acute distress  NEURO: Alert and oriented  PSYCH: in a pleasant mood, appropriate affect  SKIN: Focused examination of the back, and bilateral upper and lower extremities was performed.  -there are pink eczematous-appearing papules scattered on bilateral upper arms, feet, legs, and mid-back  -No other lesions of concern on areas examined.     Impression/Plan:  1. Rash: diagnosis unclear at this point. S/p bx x 2. No blistering disorders per DIF and H&E. Ddx: Papular urticaria, drug-reaction, atopic dermatitis flare, contact dermatitis. Given difficulties with conventional treatment, will attempt cessation of suspected meds x 1 month each.    Continue clobetasol 0.05% ointment + emollients daily to BID    Occasional use of 1% hydrocortisone cream on eyelids is fine.     No prednisone course at this point.    CLn wash given inability to do bleach baths as doesn't feel safe in bath tub.    Benadryl at night is fine or Fexofenadine 180mg BID or loratadine 10mg TID, or cetirizine 10mg TID.    Medications to stop: needs 1 month cessation each. Will contact Primary PA to start trial of cessation of med.    HCTZ-Triamterene    Lisinopril    Omeprazole      Follow-up in 1 month, or for new or changing lesions.   CC: FRANC García    Staff Involved:  Scribe/Staff      Scribe Disclosure:   I, Ethan Richter, am serving as a scribe to document services personally performed by Dr. Jonah Keller, based on data collection and the provider's statements to me.     Provider Disclosure:   I have reviewed the documentation recorded by the scribe and have edited it as needed. I have personally performed the services documented here and the documentation accurately represents those  services and the decisions me by me.     Jonah Keller MD, MS    Department of Dermatology  [facility]: Phone: [phone], Fax:[phone]  [facility]: Phone: [phone], Fax: [phone]

## 2017-07-17 NOTE — PATIENT INSTRUCTIONS
Be off each med for 1 month at least:  HCTZ first  Lisinopril second  Triamterene third.  Omeprazole fourth

## 2017-07-17 NOTE — TELEPHONE ENCOUNTER
Hi Ms. García,     I am concerned for a mild drug reaction leading to skin rash on Ms. Allen. I am most concerned about the Triamterene-HCTZ med as well as the lisinopril but more the HCTZ. However, we would have to do a trial of stopping each med for at least a month and med can be replaced after a month if no improvement. I did not stop any blood pressure meds at this point since I am not qualified to really manage blood pressure.     I forwarded a chart with this question to you last week but I know that things get buried as we get so many charts forwarded to us so I decided to send a message as well.

## 2017-07-18 ENCOUNTER — MYC MEDICAL ADVICE (OUTPATIENT)
Dept: DERMATOLOGY | Facility: CLINIC | Age: 67
End: 2017-07-18

## 2017-07-18 DIAGNOSIS — R21 RASH: ICD-10-CM

## 2017-07-18 RX ORDER — CLOBETASOL PROPIONATE 0.5 MG/G
OINTMENT TOPICAL
Qty: 120 G | Refills: 2 | Status: SHIPPED | OUTPATIENT
Start: 2017-07-18 | End: 2017-07-20

## 2017-07-20 RX ORDER — CLOBETASOL PROPIONATE 0.5 MG/G
OINTMENT TOPICAL
Qty: 120 G | Refills: 2 | Status: SHIPPED | OUTPATIENT
Start: 2017-07-20 | End: 2017-11-28

## 2017-08-02 ENCOUNTER — MYC MEDICAL ADVICE (OUTPATIENT)
Dept: DERMATOLOGY | Facility: CLINIC | Age: 67
End: 2017-08-02

## 2017-08-08 ENCOUNTER — MYC MEDICAL ADVICE (OUTPATIENT)
Dept: FAMILY MEDICINE | Facility: CLINIC | Age: 67
End: 2017-08-08

## 2017-08-08 DIAGNOSIS — E89.0 POSTSURGICAL HYPOTHYROIDISM: Chronic | ICD-10-CM

## 2017-08-08 DIAGNOSIS — C73 PAPILLARY THYROID CARCINOMA (H): Chronic | ICD-10-CM

## 2017-08-08 RX ORDER — LEVOTHYROXINE SODIUM 100 UG/1
TABLET ORAL
Qty: 90 TABLET | Refills: 3 | Status: SHIPPED | OUTPATIENT
Start: 2017-08-08 | End: 2018-07-24

## 2017-08-21 ENCOUNTER — OFFICE VISIT (OUTPATIENT)
Dept: DERMATOLOGY | Facility: CLINIC | Age: 67
End: 2017-08-21
Payer: MEDICARE

## 2017-08-21 DIAGNOSIS — L21.9 DERMATITIS, SEBORRHEIC: Primary | ICD-10-CM

## 2017-08-21 DIAGNOSIS — L27.0 DRUG EXANTHEM: ICD-10-CM

## 2017-08-21 PROCEDURE — 99213 OFFICE O/P EST LOW 20 MIN: CPT | Performed by: DERMATOLOGY

## 2017-08-21 RX ORDER — KETOCONAZOLE 20 MG/ML
SHAMPOO TOPICAL
Qty: 120 ML | Refills: 11 | Status: SHIPPED | OUTPATIENT
Start: 2017-08-21 | End: 2018-10-05

## 2017-08-21 NOTE — NURSING NOTE
Dermatology Rooming Note    Marcela Allen's goals for this visit include:   Chief Complaint   Patient presents with     RECHECK     rash it is better        Is a scribe okay for this visit:YES    Are records needed for this visit(If yes, obtain release of information): No     Vitals: There were no vitals taken for this visit.    Referring Provider:  No referring provider defined for this encounter.

## 2017-08-21 NOTE — MR AVS SNAPSHOT
After Visit Summary   8/21/2017    Marcela Allen    MRN: [id number]           Patient Information     Date Of Birth          1950        Visit Information        Provider Department      8/21/2017 9:00 AM Jonah Keller MD [facility]        Today's Diagnoses     Dermatitis, seborrheic    -  1    Drug exanthem          Care Instructions    1) Skin: Clobetasol is the strongest, Lidex is middle high, Triamcinolone is middle. Taper down to weaker strength ones.     2) Ears: Use triamcinolone ointment 3 times a week and taper down as needed. Triamcinolone is usually too strong for ears but ok if used sparingly.    3) Scalp: sometimes you just need to rotate things around. Stop using the T gel and switch to a prescription shampoo. Below is generic information regarding    Dandruff Shampoos    What do I use dandruff shampoos for?    Flaking    Redness    Itching    How and when do I use these shampoos?    Rub gently into scalp.    Leave on for at least five minutes before rinsing.    You will feel better with daily use.    As redness, flaking, and itching get better you can use the shampoo less    Using two shampoos with different active elements may work best. Switch shampoos each week.    What shampoos can I buy?  Below is a list of active elements that help with itching, redness, and flaking. Name brand shampoos having these elements are included.  Non-brand shampoos that have these active elements can also be used.     Selenium Sulfide  o Blue Head and Shoulders (1% selenium sulfide)  o Selsun Blue (1% selenium sulfide)  o Selsun Gold (2.5% selenium sulfide, prescription needed)    Salicylic Acid  o Neutrogena T/Sal  (3% salicylic acid)  o Sebulex  (2% sulfur, 2% salicylic acid)  o Ionil  (2% salicylic acid)    Pyrithione Zinc  o White Head and Shoulders (1% pyrithione zinc)  o DHS Zinc Shampoo (1% pyrithione zinc)    Ketoconazole  o Nizoral  (1% ketoconazole)    Tar (Note: tar  may turn white/gray hair yellow if used often)  o Neutrogena T/Gel (0.5% coal tar)  o Neutrogena T/Gel extra strength (1% coal tar)  o DHS Tar Shampoo (0.5% coal tar)            Follow-ups after your visit        Follow-up notes from your care team     Return in about 3 months (around 11/21/2017).      Your next 10 appointments already scheduled     Nov 06, 2017  2:00 PM CST   (Arrive by 1:45 PM)   RETURN ENDOCRINE with Geovanna Chatterjee MD   [facility] ([facility])    [address]  [address] [facility] [address] [phone]            Nov 28, 2017  1:15 PM CST   Return Visit with Ly Morrissey MD   [facility]    [address] [place] [address] [phone]              Who to contact     If you have questions or need follow up information about today's clinic visit or your schedule please contact [facility] directly at [phone].  Normal or non-critical lab and imaging results will be communicated to you by Bridjhart, letter or phone within 4 business days after the clinic has received the results. If you do not hear from us within 7 days, please contact the clinic through Saplot or phone. If you have a critical or abnormal lab result, we will notify you by phone as soon as possible.  Submit refill requests through Prizeo or call your pharmacy and they will forward the refill request to us. Please allow 3 business days for your refill to be completed.          Additional Information About Your Visit        Bridjhart Information     Prizeo gives you secure access to your electronic health record. If you see a primary care provider, you can also send messages to your care team and make appointments. If you have questions, please call your primary care clinic.  If you do not have a primary care provider, please call [phone] and they will assist you.      Prizeo is an  electronic gateway that provides easy, online access to your medical records. With InHiro, you can request a clinic appointment, read your test results, renew a prescription or communicate with your care team.     To access your existing account, please contact your [facility] or call [phone] for assistance.        Care EveryWhere ID     This is your Care EveryWhere ID. This could be used by other organizations to access your [facility] medical records  [id number]         Blood Pressure from Last 3 Encounters:   04/03/17 138/80   10/10/16 93/64   08/02/16 113/75    Weight from Last 3 Encounters:   04/03/17 75.9 kg (167 lb 6.4 oz)   10/10/16 70.8 kg (156 lb)   08/02/16 72.1 kg (159 lb)              Today, you had the following     No orders found for display         Today's Medication Changes          These changes are accurate as of: 8/21/17  3:42 PM.  If you have any questions, ask your nurse or doctor.               Start taking these medicines.        Dose/Directions    ketoconazole 2 % shampoo   Commonly known as:  NIZORAL   Used for:  Dermatitis, seborrheic        Apply to the scalp, let sit for 3-5 minutes, and rinse off. Can wash ears. Use 3-4x a week as needed.   Quantity:  120 mL   Refills:  11            Where to get your medicines      These medications were sent to [facility] - [place], [place] - [address], [address]  [address], [address], [facility] [address]     Phone:  [phone]     ketoconazole 2 % shampoo                Primary Care Provider Office Phone # Fax #    Honey García PA-C [phone]       [address]  [facility] [address]        Equal Access to Services     KAHLIL ANTONIO : Hadii luis enrique Chopra, wafreemanda venita, qaybta kaalmada josesito, stefan brewer. So [facility] [phone].    ATENCIÓN: Si habla español, tiene a trammell disposición servicios gratuitos de  asistencia lingüística. Rizwana al [phone].    We comply with applicable federal civil rights laws and [place] laws. We do not discriminate on the basis of race, color, national origin, age, disability sex, sexual orientation or gender identity.            Thank you!     Thank you for choosing [facility]  for your care. Our goal is always to provide you with excellent care. Hearing back from our patients is one way we can continue to improve our services. Please take a few minutes to complete the written survey that you may receive in the mail after your visit with us. Thank you!             Your Updated Medication List - Protect others around you: Learn how to safely use, store and throw away your medicines at www.disposemymeds.org.          This list is accurate as of: 8/21/17  3:42 PM.  Always use your most recent med list.                   Brand Name Dispense Instructions for use Diagnosis    aspirin 81 MG tablet      Take 81 mg by mouth daily        atorvastatin 20 MG tablet    LIPITOR    90 tablet    Take 1 tablet (20 mg) by mouth daily    Hyperlipidemia LDL goal <130       CALCIUM CITRATE PO      Take 1,200 mg by mouth daily        cetirizine 10 MG tablet    zyrTEC    90 tablet    Take 1 tablet (10 mg) by mouth daily    Seasonal allergies       clobetasol 0.05 % ointment    TEMOVATE    120 g    Apply twice a day to the affected area of the skin until follow up appointment.    Rash       Fish Oil 1200 MG Caps      Take 1 capsule by mouth daily        fluocinonide 0.05 % ointment    LIDEX    60 g    Apply twice a day to affected areas of the arms, legs, trunk for up to 4 weeks.    Intrinsic eczema       FLUoxetine 20 MG capsule    PROzac    270 capsule    Take 3 capsules (60 mg) by mouth daily    Major depression in complete remission (H)       hypromellose 0.5 % Soln ophthalmic solution    ARTIFICIAL TEARS     Place 1 drop into both eyes every 4 hours as needed        ketoconazole 2 %  shampoo    NIZORAL    120 mL    Apply to the scalp, let sit for 3-5 minutes, and rinse off. Can wash ears. Use 3-4x a week as needed.    Dermatitis, seborrheic       levothyroxine 100 MCG tablet    SYNTHROID/LEVOTHROID    90 tablet    Monday-Saturday: 1 tablet/day,  Sunday: 0.5 tablet    Postsurgical hypothyroidism, Papillary thyroid carcinoma (H)       lisinopril 10 MG tablet    PRINIVIL/ZESTRIL    90 tablet    Take 1 tablet (10 mg) by mouth daily    Essential hypertension with goal blood pressure less than 140/90       Multi-vitamin Tabs tablet      Take 1 tablet by mouth daily        omeprazole 20 MG CR capsule    priLOSEC    90 capsule    Take 1 capsule (20 mg) by mouth every 3 days    Gastroesophageal reflux disease with esophagitis       predniSONE 10 MG tablet    DELTASONE    19 tablet    Take 2 pills x 5 days, 1 pill x 5 days, then 1 pill every other day for 8 days.    Intrinsic atopic dermatitis       sennosides 8.6 MG tablet    SENOKOT     Take 2 tablets by mouth daily        * triamcinolone 0.1 % ointment    KENALOG    60 g    Apply twice daily as needed to affected area, do not use for more than 10 days.    Flexural eczema       * triamcinolone 0.1 % ointment    KENALOG    454 g    Apply to affected area of the skin twice a day after using a moisturizer. Can do wet wraps with this.    Intrinsic atopic dermatitis       triamterene-hydrochlorothiazide 37.5-25 MG per capsule    DYAZIDE    90 capsule    Take 1 capsule by mouth daily    Essential hypertension with goal blood pressure less than 140/90       vitamin D 1000 UNITS capsule      Take 1 capsule by mouth daily.        * Notice:  This list has 2 medication(s) that are the same as other medications prescribed for you. Read the directions carefully, and ask your doctor or other care provider to review them with you.

## 2017-08-21 NOTE — LETTER
"8/21/2017       RE: Marcela Allen  [address] [facility] [address]     Dear Colleague,    Thank you for referring your patient, Marcela Allen, to the [facility]. Please see a copy of my visit note below.    [facility] Dermatology Note      Dermatology Problem List:  1. Eczematous vs WI-like Drug exanthem possibly to HCTZ-Triamterene with cessation end of July 2017 with improvement. S/p x2 punch biopsies right forearm, right forearm +DIF.  Tx: clobetasol 0.05% ointment taper to lidex oint taper to triaminolone ointment to just moisturier. Continue off HCTZ-Triamterene.  -Previous tx: Sarah cream BID, Alternate TMC 0.1% ointment and Fluocinonide (LIDEX) 0.05% ointment BID for 4 weeks max then break for 2-3 weeks. Minimize soap use. PRN 1% hydrocortisone to eyelids. S/p 2 courses of prednisone.  -DDx: atopic dermatitis flare, drug reaction, contact dermatitis    2.Seborrheic dermatitis. TMc 0.1% ointment 2-3x a week just once a day to ears.   -Current tx: ketoconazole 2% shampoo rotating with OTC shampoos    Last TBSE: 6/6/2017    Encounter Date: Aug 21, 2017    CC:  Chief Complaint   Patient presents with     RECHECK     rash it is better        History of Present Illness:  Ms. Marcela Allen is a 66 year old female who presents for a follow-up for a rash. Pt was last seen 7/17/2017 when she continued continued clobetasol 0.05% ointment, 1% hydrocortisone, and ceased use of HCTZ-Triamterene x 1 month as well as change her laundry detergent x 2 weeks . Currently, pt is mixing her clobetasol 0.05% with her Eurcerin moisturizer nightly. Overall, she is improved but still has itchy scabs on her scalp and a few lesions on her back. She reports \"85% improved\" since first being seen for this by derm and would like to taper off Clobetasol. Additionally, pt has changed her laundry soap for a few weeks. She noted that her blood " pressure has not increased since stopping HCTZ-Triamterene.    For her seb derm, she has been using Head and Shoulders shampoo for the last few weeks without much improvement. She has previously used T-Gal without improvement. Pt shampoos 2-3x a week.     Past Medical History:   Patient Active Problem List   Diagnosis     GERD (gastroesophageal reflux disease)     Eczema     Hiatal hernia     HTN (hypertension)     OA (osteoarthritis)     Osteopenia     Cervical pain     Advanced directives, counseling/discussion     Major depression in complete remission (H)     Hyperlipidemia LDL goal <130     Hypertension goal BP (blood pressure) < 140/90     Seasonal allergies     CKD (chronic kidney disease) stage 3, GFR 30-59 ml/min     Liver hemangioma     S/P LEEP of cervix     Former smoker     Elevated platelet count (H)     s/p B TKA 6/27     S/P knee replacement     Postsurgical hypothyroidism     Papillary thyroid carcinoma (H)     Enlarged lymph nodes     Cervical cancer (H)     Advance care planning     Cervical radiculopathy     Malignant neoplasm of cervix, unspecified site (H)     Vulvar cancer (H)     Chronic obstructive pulmonary disease, unspecified COPD type (H)     Past Medical History:   Diagnosis Date     BULMARO II (cervical intraepithelial neoplasia II) 2007    on colp - leep path WNL     COPD (chronic obstructive pulmonary disease) (H)      Depression 1990     Depressive disorder 25 yrs     Eczema      GERD (gastroesophageal reflux disease)      Hiatal hernia      History of blood transfusion 2013    Post op     HTN (hypertension)      Hypercholesterolemia      Liver hemangioma 9/4/2012     OA (osteoarthritis)      Osteopenia      Papillary thyroid carcinoma (H) 2000    BL, MF; Tx in 2 operations, RRA     PONV (postoperative nausea and vomiting)      Postsurgical hypothyroidism 2000     FELICIA III (vulvar intraepithelial neoplasia III) 2006    wide local exc x 2     Past Surgical History:   Procedure Laterality  Date     ABDOMEN SURGERY      Teenager     APPENDECTOMY OPEN  1968     ARTHROPLASTY MINIMALLY INVASIVE KNEE BILATERAL  6/27/2013    Procedure: ARTHROPLASTY MINIMALLY INVASIVE KNEE BILATERAL;  Minimally Invasive Bilateral Total Knee Arthroplasty;  Surgeon: Christophe Welch MD;  Location: [facility] OR     BIOPSY  Ongoing    Lymph nodes neck     BONE MARROW ASPIRATION ONLY Left 12/7/2015    Procedure: BONE MARROW ASPIRATION ONLY;  Surgeon: Aguilar Roldan MD;  Location:  OR     CARPAL TUNNEL RELEASE RT/LT       COLONOSCOPY  2007    neg     COLPOSCOPY CERVIX, BIOPSY CERVIX, ENDOCERVICAL CURETTAGE, COMBINED  2003, 2007     completion thyroidectomy Right 3/14/00     CYSTECTOMY OVARIAN BENIGN  1969     ESOPHAGOSCOPY, GASTROSCOPY, DUODENOSCOPY (EGD), COMBINED N/A 8/28/2014    Procedure: COMBINED ESOPHAGOSCOPY, GASTROSCOPY, DUODENOSCOPY (EGD), BIOPSY SINGLE OR MULTIPLE;  Surgeon: Kelvin Montgomery MD;  Location: [facility] OR     FUSION CERVICAL ANTERIOR THREE+ LEVELS WITH BONE ALLOGRAFT N/A 12/7/2015    Procedure: FUSION CERVICAL ANTERIOR THREE+ LEVELS WITH BONE ALLOGRAFT;  Surgeon: Aguilar Roldan MD;  Location:  OR     HAND SURGERY       HEAD & NECK SURGERY  2000    Thyroidectomy     KNEE SURGERY  2001 & 2005    bilat     LAPAROSCOPIC CHOLECYSTECTOMY  1998     LEEP TX, CERVICAL  2007    BULMARO II on colp, leep path WNL     left thyroid total lobectomy, isthmusectomy and 50% right thyroid lobectomy Left 3/7/00     TUBAL LIGATION  1988     VULVECTOMY SIMPLE  2006    FELICIA 3, wide local excision x 2     VULVECTOMY SIMPLE         Social History:  Reviewed and unchanged but kept in chart for clinician convenience  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 The patient admits to 1-2 drinks a week and is a non-smoker.  passed away 10/2016. Pt volunteers.    Family History:  Reviewed and unchanged but  kept in chart for clinician convenience  There is no family history of skin cancer. There is a family hx of Cardiovascular disease.     Medications:  Current Outpatient Prescriptions   Medication Sig Dispense Refill     levothyroxine (SYNTHROID/LEVOTHROID) 100 MCG tablet Monday-Saturday: 1 tablet/day,  Sunday: 0.5 tablet 90 tablet 3     clobetasol (TEMOVATE) 0.05 % ointment Apply twice a day to the affected area of the skin until follow up appointment. 120 g 2     predniSONE (DELTASONE) 10 MG tablet Take 2 pills x 5 days, 1 pill x 5 days, then 1 pill every other day for 8 days. 19 tablet 0     triamcinolone (KENALOG) 0.1 % ointment Apply to affected area of the skin twice a day after using a moisturizer. Can do wet wraps with this. 454 g 1     atorvastatin (LIPITOR) 20 MG tablet Take 1 tablet (20 mg) by mouth daily 90 tablet 1     FLUoxetine (PROZAC) 20 MG capsule Take 3 capsules (60 mg) by mouth daily 270 capsule 1     lisinopril (PRINIVIL/ZESTRIL) 10 MG tablet Take 1 tablet (10 mg) by mouth daily 90 tablet 1     omeprazole (PRILOSEC) 20 MG CR capsule Take 1 capsule (20 mg) by mouth every 3 days 90 capsule 1     triamcinolone (KENALOG) 0.1 % ointment Apply twice daily as needed to affected area, do not use for more than 10 days. 60 g 1     triamterene-hydrochlorothiazide (DYAZIDE) 37.5-25 MG per capsule Take 1 capsule by mouth daily 90 capsule 1     fluocinonide (LIDEX) 0.05 % ointment Apply twice a day to affected areas of the arms, legs, trunk for up to 4 weeks. 60 g 1     multivitamin, therapeutic with minerals (MULTI-VITAMIN) TABS Take 1 tablet by mouth daily       hypromellose (ARTIFICIAL TEARS) 0.5 % SOLN Place 1 drop into both eyes every 4 hours as needed        sennosides (SENOKOT) 8.6 MG tablet Take 2 tablets by mouth daily        cetirizine (ZYRTEC) 10 MG tablet Take 1 tablet (10 mg) by mouth daily 90 tablet 1     aspirin 81 MG tablet Take 81 mg by mouth daily        Omega-3 Fatty Acids (FISH OIL) 1200 MG  CAPS Take 1 capsule by mouth daily        CALCIUM CITRATE PO Take 1,200 mg by mouth daily        Cholecalciferol (VITAMIN D) 1000 UNIT capsule Take 1 capsule by mouth daily.         Allergies   Allergen Reactions     Dye [Contrast Dye] Anaphylaxis     Echo dye       Review of Systems:  -Constitutional: The patient is otherwise feeling well.   -Skin Establ Pt: The patient denies any new rash, pruritus, or lesions that are symptomatic, changing or bleeding, except as per HPI.    Physical exam:  Vitals: There were no vitals taken for this visit.  GEN: This is a well-nourished, well developed female in no acute distress  NEURO: Alert and oriented  PSYCH: in a pleasant mood, appropriate affect  SKIN: Focused examination of the bilateral forearms, bilateral legs, and scalp was performed.  -there is redness and flaking in the conchal bowl   -scaly flakes and plaques in the hair-bearing scalp  -pink papules on the arms have nearly resolved  -light pink papules on the legs, much improved from last appointment  -No other lesions of concern on areas examined.     Impression/Plan:  1. Seborrheic dermatitis    Start ketoconazole 2% shampoo 2-4 times a week.    Rotate ketoconazole with other OTC shampoos, info provided in AVS    Can use triamcinolone 0.1% ointment in ears 3x a week only.     2. Drug exanthem: s/p punch biopsy and negative DIF. Improved with stopping HCTZ-Triamterene and increased topical steroid use. Pt is much better and itching a lot less. No increase in BP after stopping HCTZ-Triamterene.    Continue clobetasol 0.05% ointment + emollients daily to BID taper down to using lidex ointment then triamcinolone instead.     Continue to stay off HCTZ-Triamterene     Okay to continue Lisinopril      Follow-up in 2-3 months or as needed for new or changing lesions.   CC: FRANC García    Staff Involved:  Scribe/Staff      Scribe Disclosure:   Ethan MELENDEZ, am serving as a scribe to document services  personally performed by Dr. Jonah Keller, based on data collection and the provider's statements to me.     Provider Disclosure:   I have reviewed the documentation recorded by the scribe and have edited it as needed. I have personally performed the services documented here and the documentation accurately represents those services and the decisions me by me.     Jonah Keller MD, MS    Department of Dermatology  [facility]: Phone: [phone], Fax:[phone]  [facility]: Phone: [phone], Fax: [phone]        Again, thank you for allowing me to participate in the care of your patient.      Sincerely,    Jonah Keller MD

## 2017-08-21 NOTE — PROGRESS NOTES
"[facility] Dermatology Note      Dermatology Problem List:  1. Eczematous vs VA-like Drug exanthem possibly to HCTZ-Triamterene with cessation end of July 2017 with improvement. S/p x2 punch biopsies right forearm, right forearm +DIF.  Tx: clobetasol 0.05% ointment taper to lidex oint taper to triaminolone ointment to just moisturier. Continue off HCTZ-Triamterene.  -Previous tx: Sarah cream BID, Alternate TMC 0.1% ointment and Fluocinonide (LIDEX) 0.05% ointment BID for 4 weeks max then break for 2-3 weeks. Minimize soap use. PRN 1% hydrocortisone to eyelids. S/p 2 courses of prednisone.  -DDx: atopic dermatitis flare, drug reaction, contact dermatitis    2.Seborrheic dermatitis. TMc 0.1% ointment 2-3x a week just once a day to ears.   -Current tx: ketoconazole 2% shampoo rotating with OTC shampoos    Last TBSE: 6/6/2017    Encounter Date: Aug 21, 2017    CC:  Chief Complaint   Patient presents with     RECHECK     rash it is better        History of Present Illness:  Ms. Marcela Allen is a 66 year old female who presents for a follow-up for a rash. Pt was last seen 7/17/2017 when she continued continued clobetasol 0.05% ointment, 1% hydrocortisone, and ceased use of HCTZ-Triamterene x 1 month as well as change her laundry detergent x 2 weeks . Currently, pt is mixing her clobetasol 0.05% with her Eurcerin moisturizer nightly. Overall, she is improved but still has itchy scabs on her scalp and a few lesions on her back. She reports \"85% improved\" since first being seen for this by derm and would like to taper off Clobetasol. Additionally, pt has changed her laundry soap for a few weeks. She noted that her blood pressure has not increased since stopping HCTZ-Triamterene.    For her seb derm, she has been using Head and Shoulders shampoo for the last few weeks without much improvement. She has previously used T-Gal without improvement. Pt shampoos 2-3x a week.     Past Medical History:   Patient " Active Problem List   Diagnosis     GERD (gastroesophageal reflux disease)     Eczema     Hiatal hernia     HTN (hypertension)     OA (osteoarthritis)     Osteopenia     Cervical pain     Advanced directives, counseling/discussion     Major depression in complete remission (H)     Hyperlipidemia LDL goal <130     Hypertension goal BP (blood pressure) < 140/90     Seasonal allergies     CKD (chronic kidney disease) stage 3, GFR 30-59 ml/min     Liver hemangioma     S/P LEEP of cervix     Former smoker     Elevated platelet count (H)     s/p B TKA 6/27     S/P knee replacement     Postsurgical hypothyroidism     Papillary thyroid carcinoma (H)     Enlarged lymph nodes     Cervical cancer (H)     Advance care planning     Cervical radiculopathy     Malignant neoplasm of cervix, unspecified site (H)     Vulvar cancer (H)     Chronic obstructive pulmonary disease, unspecified COPD type (H)     Past Medical History:   Diagnosis Date     BULMARO II (cervical intraepithelial neoplasia II) 2007    on colp - leep path WNL     COPD (chronic obstructive pulmonary disease) (H)      Depression 1990     Depressive disorder 25 yrs     Eczema      GERD (gastroesophageal reflux disease)      Hiatal hernia      History of blood transfusion 2013    Post op     HTN (hypertension)      Hypercholesterolemia      Liver hemangioma 9/4/2012     OA (osteoarthritis)      Osteopenia      Papillary thyroid carcinoma (H) 2000    BL, MF; Tx in 2 operations, RRA     PONV (postoperative nausea and vomiting)      Postsurgical hypothyroidism 2000     FELICIA III (vulvar intraepithelial neoplasia III) 2006    wide local exc x 2     Past Surgical History:   Procedure Laterality Date     ABDOMEN SURGERY      Teenager     APPENDECTOMY OPEN  1968     ARTHROPLASTY MINIMALLY INVASIVE KNEE BILATERAL  6/27/2013    Procedure: ARTHROPLASTY MINIMALLY INVASIVE KNEE BILATERAL;  Minimally Invasive Bilateral Total Knee Arthroplasty;  Surgeon: Christophe Welch MD;   Location: [facility] OR     BIOPSY  Ongoing    Lymph nodes neck     BONE MARROW ASPIRATION ONLY Left 12/7/2015    Procedure: BONE MARROW ASPIRATION ONLY;  Surgeon: Aguilar Roldan MD;  Location: [facility] OR     CARPAL TUNNEL RELEASE RT/LT       COLONOSCOPY  2007    neg     COLPOSCOPY CERVIX, BIOPSY CERVIX, ENDOCERVICAL CURETTAGE, COMBINED  2003, 2007     completion thyroidectomy Right 3/14/00     CYSTECTOMY OVARIAN BENIGN  1969     ESOPHAGOSCOPY, GASTROSCOPY, DUODENOSCOPY (EGD), COMBINED N/A 8/28/2014    Procedure: COMBINED ESOPHAGOSCOPY, GASTROSCOPY, DUODENOSCOPY (EGD), BIOPSY SINGLE OR MULTIPLE;  Surgeon: Kelvin Montgomery MD;  Location: [facility] OR     FUSION CERVICAL ANTERIOR THREE+ LEVELS WITH BONE ALLOGRAFT N/A 12/7/2015    Procedure: FUSION CERVICAL ANTERIOR THREE+ LEVELS WITH BONE ALLOGRAFT;  Surgeon: Aguilar Roldan MD;  Location: [facility] OR     HAND SURGERY       HEAD & NECK SURGERY  2000    Thyroidectomy     KNEE SURGERY  2001 & 2005    bilat     LAPAROSCOPIC CHOLECYSTECTOMY  1998     LEEP TX, CERVICAL  2007    BULMARO II on colp, leep path WNL     left thyroid total lobectomy, isthmusectomy and 50% right thyroid lobectomy Left 3/7/00     TUBAL LIGATION  1988     VULVECTOMY SIMPLE  2006    FELICIA 3, wide local excision x 2     VULVECTOMY SIMPLE         Social History:  Reviewed and unchanged but kept in chart for clinician convenience  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 The patient admits to 1-2 drinks a week and is a non-smoker.  passed away 10/2016. Pt volunteers.    Family History:  Reviewed and unchanged but kept in chart for clinician convenience  There is no family history of skin cancer. There is a family hx of Cardiovascular disease.     Medications:  Current Outpatient Prescriptions   Medication Sig Dispense Refill     levothyroxine (SYNTHROID/LEVOTHROID) 100 MCG tablet  Monday-Saturday: 1 tablet/day,  Sunday: 0.5 tablet 90 tablet 3     clobetasol (TEMOVATE) 0.05 % ointment Apply twice a day to the affected area of the skin until follow up appointment. 120 g 2     predniSONE (DELTASONE) 10 MG tablet Take 2 pills x 5 days, 1 pill x 5 days, then 1 pill every other day for 8 days. 19 tablet 0     triamcinolone (KENALOG) 0.1 % ointment Apply to affected area of the skin twice a day after using a moisturizer. Can do wet wraps with this. 454 g 1     atorvastatin (LIPITOR) 20 MG tablet Take 1 tablet (20 mg) by mouth daily 90 tablet 1     FLUoxetine (PROZAC) 20 MG capsule Take 3 capsules (60 mg) by mouth daily 270 capsule 1     lisinopril (PRINIVIL/ZESTRIL) 10 MG tablet Take 1 tablet (10 mg) by mouth daily 90 tablet 1     omeprazole (PRILOSEC) 20 MG CR capsule Take 1 capsule (20 mg) by mouth every 3 days 90 capsule 1     triamcinolone (KENALOG) 0.1 % ointment Apply twice daily as needed to affected area, do not use for more than 10 days. 60 g 1     triamterene-hydrochlorothiazide (DYAZIDE) 37.5-25 MG per capsule Take 1 capsule by mouth daily 90 capsule 1     fluocinonide (LIDEX) 0.05 % ointment Apply twice a day to affected areas of the arms, legs, trunk for up to 4 weeks. 60 g 1     multivitamin, therapeutic with minerals (MULTI-VITAMIN) TABS Take 1 tablet by mouth daily       hypromellose (ARTIFICIAL TEARS) 0.5 % SOLN Place 1 drop into both eyes every 4 hours as needed        sennosides (SENOKOT) 8.6 MG tablet Take 2 tablets by mouth daily        cetirizine (ZYRTEC) 10 MG tablet Take 1 tablet (10 mg) by mouth daily 90 tablet 1     aspirin 81 MG tablet Take 81 mg by mouth daily        Omega-3 Fatty Acids (FISH OIL) 1200 MG CAPS Take 1 capsule by mouth daily        CALCIUM CITRATE PO Take 1,200 mg by mouth daily        Cholecalciferol (VITAMIN D) 1000 UNIT capsule Take 1 capsule by mouth daily.         Allergies   Allergen Reactions     Dye [Contrast Dye] Anaphylaxis     Echo dye        Review of Systems:  -Constitutional: The patient is otherwise feeling well.   -Skin Establ Pt: The patient denies any new rash, pruritus, or lesions that are symptomatic, changing or bleeding, except as per HPI.    Physical exam:  Vitals: There were no vitals taken for this visit.  GEN: This is a well-nourished, well developed female in no acute distress  NEURO: Alert and oriented  PSYCH: in a pleasant mood, appropriate affect  SKIN: Focused examination of the bilateral forearms, bilateral legs, and scalp was performed.  -there is redness and flaking in the conchal bowl   -scaly flakes and plaques in the hair-bearing scalp  -pink papules on the arms have nearly resolved  -light pink papules on the legs, much improved from last appointment  -No other lesions of concern on areas examined.     Impression/Plan:  1. Seborrheic dermatitis    Start ketoconazole 2% shampoo 2-4 times a week.    Rotate ketoconazole with other OTC shampoos, info provided in AVS    Can use triamcinolone 0.1% ointment in ears 3x a week only.     2. Drug exanthem: s/p punch biopsy and negative DIF. Improved with stopping HCTZ-Triamterene and increased topical steroid use. Pt is much better and itching a lot less. No increase in BP after stopping HCTZ-Triamterene.    Continue clobetasol 0.05% ointment + emollients daily to BID taper down to using lidex ointment then triamcinolone instead.     Continue to stay off HCTZ-Triamterene     Okay to continue Lisinopril      Follow-up in 2-3 months or as needed for new or changing lesions.   CC: FRANC García    Staff Involved:  Scribe/Staff      Scribe Disclosure:   I, Ethan Richter, am serving as a scribe to document services personally performed by Dr. Jonah Keller, based on data collection and the provider's statements to me.     Provider Disclosure:   I have reviewed the documentation recorded by the scribe and have edited it as needed. I have personally performed the services documented  here and the documentation accurately represents those services and the decisions me by me.     Jonah Keller MD, MS    Department of Dermatology  [facility]: Phone: [phone], Fax:[phone]  [facility]: Phone: [phone], Fax: [phone]

## 2017-08-21 NOTE — PATIENT INSTRUCTIONS
1) Skin: Clobetasol is the strongest, Lidex is middle high, Triamcinolone is middle. Taper down to weaker strength ones.     2) Ears: Use triamcinolone ointment 3 times a week and taper down as needed. Triamcinolone is usually too strong for ears but ok if used sparingly.    3) Scalp: sometimes you just need to rotate things around. Stop using the T gel and switch to a prescription shampoo. Below is generic information regarding    Dandruff Shampoos    What do I use dandruff shampoos for?    Flaking    Redness    Itching    How and when do I use these shampoos?    Rub gently into scalp.    Leave on for at least five minutes before rinsing.    You will feel better with daily use.    As redness, flaking, and itching get better you can use the shampoo less    Using two shampoos with different active elements may work best. Switch shampoos each week.    What shampoos can I buy?  Below is a list of active elements that help with itching, redness, and flaking. Name brand shampoos having these elements are included.  Non-brand shampoos that have these active elements can also be used.     Selenium Sulfide  o Blue Head and Shoulders (1% selenium sulfide)  o Selsun Blue (1% selenium sulfide)  o Selsun Gold (2.5% selenium sulfide, prescription needed)    Salicylic Acid  o Neutrogena T/Sal  (3% salicylic acid)  o Sebulex  (2% sulfur, 2% salicylic acid)  o Ionil  (2% salicylic acid)    Pyrithione Zinc  o White Head and Shoulders (1% pyrithione zinc)  o DHS Zinc Shampoo (1% pyrithione zinc)    Ketoconazole  o Nizoral  (1% ketoconazole)    Tar (Note: tar may turn white/gray hair yellow if used often)  o Neutrogena T/Gel (0.5% coal tar)  o Neutrogena T/Gel extra strength (1% coal tar)  o DHS Tar Shampoo (0.5% coal tar)

## 2017-09-25 ENCOUNTER — MYC MEDICAL ADVICE (OUTPATIENT)
Dept: FAMILY MEDICINE | Facility: CLINIC | Age: 67
End: 2017-09-25

## 2017-09-25 ENCOUNTER — E-VISIT (OUTPATIENT)
Dept: FAMILY MEDICINE | Facility: CLINIC | Age: 67
End: 2017-09-25
Payer: MEDICARE

## 2017-09-25 DIAGNOSIS — L03.011 PARONYCHIA OF FINGER, RIGHT: Primary | ICD-10-CM

## 2017-09-25 PROCEDURE — 99444 ZZC PHYSICIAN ONLINE EVALUATION & MANAGEMENT SERVICE: CPT | Performed by: PHYSICIAN ASSISTANT

## 2017-09-25 RX ORDER — CEPHALEXIN 500 MG/1
500 CAPSULE ORAL 4 TIMES DAILY
Qty: 40 CAPSULE | Refills: 0 | Status: SHIPPED | OUTPATIENT
Start: 2017-09-25 | End: 2017-10-09

## 2017-10-06 ENCOUNTER — TRANSFERRED RECORDS (OUTPATIENT)
Dept: HEALTH INFORMATION MANAGEMENT | Facility: CLINIC | Age: 67
End: 2017-10-06

## 2017-10-09 ENCOUNTER — OFFICE VISIT (OUTPATIENT)
Dept: FAMILY MEDICINE | Facility: CLINIC | Age: 67
End: 2017-10-09
Payer: MEDICARE

## 2017-10-09 ENCOUNTER — RADIANT APPOINTMENT (OUTPATIENT)
Dept: GENERAL RADIOLOGY | Facility: CLINIC | Age: 67
End: 2017-10-09
Attending: PHYSICIAN ASSISTANT
Payer: MEDICARE

## 2017-10-09 VITALS
SYSTOLIC BLOOD PRESSURE: 120 MMHG | WEIGHT: 160.6 LBS | HEART RATE: 85 BPM | TEMPERATURE: 97 F | BODY MASS INDEX: 28.46 KG/M2 | HEIGHT: 63 IN | DIASTOLIC BLOOD PRESSURE: 80 MMHG

## 2017-10-09 DIAGNOSIS — F32.5 MAJOR DEPRESSION IN COMPLETE REMISSION (H): ICD-10-CM

## 2017-10-09 DIAGNOSIS — Z23 NEED FOR PROPHYLACTIC VACCINATION AND INOCULATION AGAINST INFLUENZA: ICD-10-CM

## 2017-10-09 DIAGNOSIS — C53.9 MALIGNANT NEOPLASM OF CERVIX, UNSPECIFIED SITE (H): ICD-10-CM

## 2017-10-09 DIAGNOSIS — K21.00 GASTROESOPHAGEAL REFLUX DISEASE WITH ESOPHAGITIS: ICD-10-CM

## 2017-10-09 DIAGNOSIS — E78.5 HYPERLIPIDEMIA LDL GOAL <130: ICD-10-CM

## 2017-10-09 DIAGNOSIS — C51.9 VULVAR CANCER (H): ICD-10-CM

## 2017-10-09 DIAGNOSIS — Z00.00 MEDICARE ANNUAL WELLNESS VISIT, SUBSEQUENT: Primary | ICD-10-CM

## 2017-10-09 DIAGNOSIS — Z23 ENCOUNTER FOR IMMUNIZATION: ICD-10-CM

## 2017-10-09 DIAGNOSIS — I10 ESSENTIAL HYPERTENSION WITH GOAL BLOOD PRESSURE LESS THAN 140/90: ICD-10-CM

## 2017-10-09 DIAGNOSIS — L03.011 PARONYCHIA OF FINGER OF RIGHT HAND: ICD-10-CM

## 2017-10-09 LAB
ALBUMIN SERPL-MCNC: 3.6 G/DL (ref 3.4–5)
ALP SERPL-CCNC: 67 U/L (ref 40–150)
ALT SERPL W P-5'-P-CCNC: 36 U/L (ref 0–50)
ANION GAP SERPL CALCULATED.3IONS-SCNC: 6 MMOL/L (ref 3–14)
AST SERPL W P-5'-P-CCNC: 41 U/L (ref 0–45)
BILIRUB SERPL-MCNC: 0.6 MG/DL (ref 0.2–1.3)
BUN SERPL-MCNC: 11 MG/DL (ref 7–30)
CALCIUM SERPL-MCNC: 9 MG/DL (ref 8.5–10.1)
CHLORIDE SERPL-SCNC: 104 MMOL/L (ref 94–109)
CHOLEST SERPL-MCNC: 158 MG/DL
CO2 SERPL-SCNC: 29 MMOL/L (ref 20–32)
CREAT SERPL-MCNC: 0.98 MG/DL (ref 0.52–1.04)
GFR SERPL CREATININE-BSD FRML MDRD: 57 ML/MIN/1.7M2
GLUCOSE SERPL-MCNC: 88 MG/DL (ref 70–99)
HDLC SERPL-MCNC: 69 MG/DL
LDLC SERPL CALC-MCNC: 68 MG/DL
NONHDLC SERPL-MCNC: 89 MG/DL
POTASSIUM SERPL-SCNC: 4.5 MMOL/L (ref 3.4–5.3)
PROT SERPL-MCNC: 7.2 G/DL (ref 6.8–8.8)
SODIUM SERPL-SCNC: 139 MMOL/L (ref 133–144)
TRIGL SERPL-MCNC: 104 MG/DL

## 2017-10-09 PROCEDURE — 36415 COLL VENOUS BLD VENIPUNCTURE: CPT | Performed by: PHYSICIAN ASSISTANT

## 2017-10-09 PROCEDURE — 73140 X-RAY EXAM OF FINGER(S): CPT | Mod: RT

## 2017-10-09 PROCEDURE — G0009 ADMIN PNEUMOCOCCAL VACCINE: HCPCS | Performed by: PHYSICIAN ASSISTANT

## 2017-10-09 PROCEDURE — 99213 OFFICE O/P EST LOW 20 MIN: CPT | Mod: 25 | Performed by: PHYSICIAN ASSISTANT

## 2017-10-09 PROCEDURE — G0438 PPPS, INITIAL VISIT: HCPCS | Performed by: PHYSICIAN ASSISTANT

## 2017-10-09 PROCEDURE — 80053 COMPREHEN METABOLIC PANEL: CPT | Performed by: PHYSICIAN ASSISTANT

## 2017-10-09 PROCEDURE — 80061 LIPID PANEL: CPT | Performed by: PHYSICIAN ASSISTANT

## 2017-10-09 PROCEDURE — 90662 IIV NO PRSV INCREASED AG IM: CPT | Performed by: PHYSICIAN ASSISTANT

## 2017-10-09 PROCEDURE — G0008 ADMIN INFLUENZA VIRUS VAC: HCPCS | Performed by: PHYSICIAN ASSISTANT

## 2017-10-09 PROCEDURE — 90732 PPSV23 VACC 2 YRS+ SUBQ/IM: CPT | Performed by: PHYSICIAN ASSISTANT

## 2017-10-09 RX ORDER — LISINOPRIL 20 MG/1
20 TABLET ORAL DAILY
Qty: 30 TABLET | Refills: 0 | Status: SHIPPED | OUTPATIENT
Start: 2017-10-09 | End: 2017-10-09

## 2017-10-09 RX ORDER — LISINOPRIL 20 MG/1
20 TABLET ORAL DAILY
Qty: 90 TABLET | Refills: 1 | Status: SHIPPED | OUTPATIENT
Start: 2017-10-09 | End: 2018-04-30

## 2017-10-09 RX ORDER — ATORVASTATIN CALCIUM 20 MG/1
20 TABLET, FILM COATED ORAL DAILY
Qty: 90 TABLET | Refills: 1 | Status: SHIPPED | OUTPATIENT
Start: 2017-10-09 | End: 2018-04-30

## 2017-10-09 NOTE — PROGRESS NOTES
SUBJECTIVE:   Marcela Allen is a 67 year old female who presents for Preventive Visit.      Are you in the first 12 months of your Medicare Part B coverage?  No    Answers for HPI/ROS submitted by the patient on 10/6/2017     Annual Exam:    Getting at least 3 servings of Calcium per day:: NO  Bi-annual eye exam:: NO  Dental care twice a year:: NO  Sleep apnea or symptoms of sleep apnea:: None  Diet:: Regular (no restrictions)  Frequency of exercise:: 4-5 days/week  Taking medications regularly:: Yes  Medication side effects:: Not applicable  Additional concerns today:: No  PHQ-2 Score: 0  Duration of exercise:: 15-30 minutes    COGNITIVE SCREEN  1) Repeat 3 items (Banana, Sunrise, Chair)    2) Clock draw: NORMAL  3) 3 item recall: Recalls 2 objects   Results: NORMAL clock, 1-2 items recalled: COGNITIVE IMPAIRMENT LESS LIKELY    Mini-CogTM Copyright S Do. Licensed by the author for use in [facility]; reprinted with permission (sogio@[facility]). All rights reserved.          HTN:  Home readings are averaging 130/85 mmHg.      HAd finger infection for about a month on right hand went through a course of keflex and its still red, painful and swollen.     Right third digit, still with swelling, no drainage.      Reviewed and updated as needed this visit by clinical staff  Tobacco  Allergies  Meds  Med Hx  Surg Hx  Fam Hx  Soc Hx        Reviewed and updated as needed this visit by Provider        Social History   Substance Use Topics     Smoking status: Former Smoker     Packs/day: 1.00     Years: 45.00     Types: Cigarettes     Quit date: 8/1/2012     Smokeless tobacco: Never Used      Comment: quitting with e cigarette     Alcohol use Yes      Comment: less than weekly       The patient does not drink >3 drinks per day nor >7 drinks per week.    Today's PHQ-2 Score:   PHQ-2 ( 1999 Pfizer) 10/6/2017 10/10/2016   Q1: Little interest or pleasure in doing things 0 0   Q2: Feeling down, depressed or  hopeless 0 0   PHQ-2 Score 0 0   Q1: Little interest or pleasure in doing things Not at all -   Q2: Feeling down, depressed or hopeless Not at all -   PHQ-2 Score 0 -         Do you feel safe in your environment - Yes    Do you have a Health Care Directive?: Yes: Advance Directive has been received and scanned.    Current providers sharing in care for this patient include: Patient Care Team:  Honey García PA-C as PCP - General (Family Practice)  Geovanna Chatterjee MD as MD (INTERNAL MEDICINE - ENDOCRINOLOGY, DIABETES & METABOLISM)      Hearing impairment: No    Ability to successfully perform activities of daily living: Yes, no assistance needed     Fall risk:  Fallen 2 or more times in the past year?: No  Any fall with injury in the past year?: No      Home safety:  none identified      The following health maintenance items are reviewed in Epic and correct as of today:  Health Maintenance   Topic Date Due     DEPRESSION ACTION PLAN Q1 YR  04/04/2017     INFLUENZA VACCINE (SYSTEM ASSIGNED)  09/01/2017     COLON CANCER SCREEN (SYSTEM ASSIGNED)  08/28/2017     PHQ-9 Q6 MONTHS  10/03/2017     FALL RISK ASSESSMENT  10/10/2017     PNEUMOCOCCAL (2 of 2 - PPSV23) 10/10/2017     MAMMO Q1 YR  10/24/2017     DEXA Q3 YR  04/03/2018     TETANUS IMMUNIZATION (SYSTEM ASSIGNED)  01/28/2019     ADVANCE DIRECTIVE PLANNING Q5 YRS  11/23/2020     LIPID SCREEN Q5 YR FEMALE (SYSTEM ASSIGNED)  10/10/2021     HEPATITIS C SCREENING  Completed     Labs reviewed in EPIC  BP Readings from Last 3 Encounters:   10/09/17 120/80   04/03/17 138/80   10/10/16 93/64    Wt Readings from Last 3 Encounters:   10/09/17 160 lb 9.6 oz (72.8 kg)   04/03/17 167 lb 6.4 oz (75.9 kg)   10/10/16 156 lb (70.8 kg)                  Current Outpatient Prescriptions   Medication Sig Dispense Refill     lisinopril (PRINIVIL/ZESTRIL) 20 MG tablet Take 1 tablet (20 mg) by mouth daily 90 tablet 1     FLUoxetine (PROZAC) 20 MG capsule Take 3 capsules (60 mg) by  mouth daily 270 capsule 1     atorvastatin (LIPITOR) 20 MG tablet Take 1 tablet (20 mg) by mouth daily 90 tablet 1     omeprazole (PRILOSEC) 20 MG CR capsule Take 1 capsule (20 mg) by mouth every 3 days 90 capsule 1     ketoconazole (NIZORAL) 2 % shampoo Apply to the scalp, let sit for 3-5 minutes, and rinse off. Can wash ears. Use 3-4x a week as needed. 120 mL 11     levothyroxine (SYNTHROID/LEVOTHROID) 100 MCG tablet Monday-Saturday: 1 tablet/day,  Sunday: 0.5 tablet 90 tablet 3     clobetasol (TEMOVATE) 0.05 % ointment Apply twice a day to the affected area of the skin until follow up appointment. 120 g 2     triamcinolone (KENALOG) 0.1 % ointment Apply to affected area of the skin twice a day after using a moisturizer. Can do wet wraps with this. 454 g 1     triamcinolone (KENALOG) 0.1 % ointment Apply twice daily as needed to affected area, do not use for more than 10 days. 60 g 1     fluocinonide (LIDEX) 0.05 % ointment Apply twice a day to affected areas of the arms, legs, trunk for up to 4 weeks. 60 g 1     multivitamin, therapeutic with minerals (MULTI-VITAMIN) TABS Take 1 tablet by mouth daily       hypromellose (ARTIFICIAL TEARS) 0.5 % SOLN Place 1 drop into both eyes every 4 hours as needed        sennosides (SENOKOT) 8.6 MG tablet Take 2 tablets by mouth daily        cetirizine (ZYRTEC) 10 MG tablet Take 1 tablet (10 mg) by mouth daily 90 tablet 1     aspirin 81 MG tablet Take 81 mg by mouth daily        Omega-3 Fatty Acids (FISH OIL) 1200 MG CAPS Take 1 capsule by mouth daily        CALCIUM CITRATE PO Take 1,200 mg by mouth daily        Cholecalciferol (VITAMIN D) 1000 UNIT capsule Take 1 capsule by mouth daily.       [DISCONTINUED] lisinopril (PRINIVIL/ZESTRIL) 20 MG tablet Take 1 tablet (20 mg) by mouth daily 30 tablet 0     [DISCONTINUED] FLUoxetine (PROZAC) 20 MG capsule Take 3 capsules (60 mg) by mouth daily 90 capsule 0     [DISCONTINUED] atorvastatin (LIPITOR) 20 MG tablet Take 1 tablet (20 mg)  "by mouth daily 90 tablet 1     [DISCONTINUED] FLUoxetine (PROZAC) 20 MG capsule Take 3 capsules (60 mg) by mouth daily 270 capsule 1     [DISCONTINUED] lisinopril (PRINIVIL/ZESTRIL) 10 MG tablet Take 1 tablet (10 mg) by mouth daily 90 tablet 1         Pneumonia Vaccine:    ROS:  Constitutional, HEENT, cardiovascular, pulmonary, GI, , musculoskeletal, neuro, skin, endocrine and psych systems are negative, except as otherwise noted.      OBJECTIVE:   /80  Pulse 85  Temp 97  F (36.1  C) (Tympanic)  Ht 5' 2.76\" (1.594 m)  Wt 160 lb 9.6 oz (72.8 kg)  Breastfeeding? No  BMI 28.67 kg/m2 Estimated body mass index is 28.67 kg/(m^2) as calculated from the following:    Height as of this encounter: 5' 2.76\" (1.594 m).    Weight as of this encounter: 160 lb 9.6 oz (72.8 kg).  EXAM:   GENERAL APPEARANCE: healthy, alert and no distress  EYES: Eyes grossly normal to inspection, PERRL and conjunctivae and sclerae normal  HENT: ear canals and TM's normal, nose and mouth without ulcers or lesions, oropharynx clear and oral mucous membranes moist  NECK: no adenopathy, no asymmetry, masses, or scars and thyroid normal to palpation  RESP: lungs clear to auscultation - no rales, rhonchi or wheezes  BREAST: normal without masses, tenderness or nipple discharge and no palpable axillary masses or adenopathy  CV: regular rate and rhythm, normal S1 S2, no S3 or S4, no murmur, click or rub, no peripheral edema and peripheral pulses strong  ABDOMEN: soft, nontender, no hepatosplenomegaly, no masses and bowel sounds normal  MS: no musculoskeletal defects are noted and gait is age appropriate without ataxia  SKIN: no suspicious lesions or rashes  NEURO: Normal strength and tone, sensory exam grossly normal, mentation intact and speech normal  PSYCH: mentation appears normal and affect normal/bright  Skin:  Right 3rd digit, no erythema or warmth.  Area is swollen at DIP    ASSESSMENT / PLAN:   1. Encounter for general adult medical " examination with abnormal findings       HEALTH CARE MAINTENANCE              Reviewed USPTF recommendations and anticipatory guidance.              See orders.     2. Paronychia of finger of right hand  Improved with keflex however swelling still present.  Will check an xray today due to swelling, looking for bony involvement.    She does bowl and this causes irritation to the area, I suggest she try to rest it as much as possible.  If no improvement, f/u with hand specialist.   - XR Finger Right G/E 2 Views    3. Essential hypertension with goal blood pressure less than 140/90  Off Dyazide, doing well on higher dose of lisinopril.  Will leave as is, recheck labs.   - lisinopril (PRINIVIL/ZESTRIL) 20 MG tablet; Take 1 tablet (20 mg) by mouth daily  Dispense: 90 tablet; Refill: 1  - Comprehensive metabolic panel    4. Major depression in complete remission  stable  - FLUoxetine (PROZAC) 20 MG capsule; Take 3 capsules (60 mg) by mouth daily  Dispense: 270 capsule; Refill: 1    5. Hyperlipidemia LDL goal <130  Due to recheck lipids.  Tolerating statin well.   - atorvastatin (LIPITOR) 20 MG tablet; Take 1 tablet (20 mg) by mouth daily  Dispense: 90 tablet; Refill: 1  - Lipid panel reflex to direct LDL    6. Gastroesophageal reflux disease with esophagitis  Stable, uses PRN  - omeprazole (PRILOSEC) 20 MG CR capsule; Take 1 capsule (20 mg) by mouth every 3 days  Dispense: 90 capsule; Refill: 1    7. Encounter for immunization  Due.   - Pneumococcal vaccine 23 valent PPSV23  (Pneumovax) [86157]  - ADMIN MEDICARE: Pneumococcal Vaccine ()    8. Malignant neoplasm of cervix, unspecified site (H)  I recommend checking one last pap today, however she declined.  Prefers to no longer do pelvic exams as well.  She will notify me if she has an symptoms or concerns.     9. Vulvar cancer (H)  As above.         End of Life Planning:  Patient currently has an advanced directive: Yes.  Practitioner is supportive of  "decision.    COUNSELING:  Reviewed preventive health counseling, as reflected in patient instructions       Regular exercise       Healthy diet/nutrition       Colon cancer screening Had colonoscopy done last week.     Will schedule mammo        Estimated body mass index is 28.67 kg/(m^2) as calculated from the following:    Height as of this encounter: 5' 2.76\" (1.594 m).    Weight as of this encounter: 160 lb 9.6 oz (72.8 kg).     reports that she quit smoking about 5 years ago. Her smoking use included Cigarettes. She has a 45.00 pack-year smoking history. She has never used smokeless tobacco.        Appropriate preventive services were discussed with this patient, including applicable screening as appropriate for cardiovascular disease, diabetes, osteopenia/osteoporosis, and glaucoma.  As appropriate for age/gender, discussed screening for colorectal cancer, prostate cancer, breast cancer, and cervical cancer. Checklist reviewing preventive services available has been given to the patient.    Reviewed patients plan of care and provided an AVS. The Intermediate Care Plan ( asthma action plan, low back pain action plan, and migraine action plan) for Marcela meets the Care Plan requirement. This Care Plan has been established and reviewed with the Patient.    Counseling Resources:  ATP IV Guidelines  Pooled Cohorts Equation Calculator  Breast Cancer Risk Calculator  FRAX Risk Assessment  ICSI Preventive Guidelines  Dietary Guidelines for Americans, 2010  USDA's MyPlate  ASA Prophylaxis  Lung CA Screening    Honey García PA-C  [facility]          "

## 2017-10-09 NOTE — PROGRESS NOTES
Injectable Influenza Immunization Documentation    1.  Is the person to be vaccinated sick today?   No    2. Does the person to be vaccinated have an allergy to a component   of the vaccine?   No    3. Has the person to be vaccinated ever had a serious reaction   to influenza vaccine in the past?   No    4. Has the person to be vaccinated ever had Guillain-Barré syndrome?   No    Form completed by Yecenia Hampton MA

## 2017-10-09 NOTE — MR AVS SNAPSHOT
After Visit Summary   10/9/2017    Marcela Allen    MRN: [id number]           Patient Information     Date Of Birth          1950        Visit Information        Provider Department      10/9/2017 9:00 AM Honey García PA-C [facility]        Today's Diagnoses     Encounter for general adult medical examination with abnormal findings    -  1    Paronychia of finger of right hand        Essential hypertension with goal blood pressure less than 140/90        Major depression in complete remission        Hyperlipidemia LDL goal <130        Gastroesophageal reflux disease with esophagitis        Encounter for immunization        Malignant neoplasm of cervix, unspecified site (H)        Vulvar cancer (H)        Need for prophylactic vaccination and inoculation against influenza          Care Instructions      Preventive Health Recommendations    Female Ages 65 +    Yearly exam:     See your health care provider every year in order to  o Review health changes.   o Discuss preventive care.    o Review your medicines if your doctor has prescribed any.      You no longer need a yearly Pap test unless you've had an abnormal Pap test in the past 10 years. If you have vaginal symptoms, such as bleeding or discharge, be sure to talk with your provider about a Pap test.      Every 1 to 2 years, have a mammogram.  If you are over 69, talk with your health care provider about whether or not you want to continue having screening mammograms.      Every 10 years, have a colonoscopy. Or, have a yearly FIT test (stool test). These exams will check for colon cancer.       Have a cholesterol test every 5 years, or more often if your doctor advises it.       Have a diabetes test (fasting glucose) every three years. If you are at risk for diabetes, you should have this test more often.       At age 65, have a bone density scan (DEXA) to check for osteoporosis (brittle bone  disease).    Shots:    Get a flu shot each year.    Get a tetanus shot every 10 years.    Talk to your doctor about your pneumonia vaccines. There are now two you should receive - Pneumovax (PPSV 23) and Prevnar (PCV 13).    Talk to your doctor about the shingles vaccine.    Talk to your doctor about the hepatitis B vaccine.    Nutrition:     Eat at least 5 servings of fruits and vegetables each day.      Eat whole-grain bread, whole-wheat pasta and brown rice instead of white grains and rice.      Talk to your provider about Calcium and Vitamin D.     Lifestyle    Exercise at least 150 minutes a week (30 minutes a day, 5 days a week). This will help you control your weight and prevent disease.      Limit alcohol to one drink per day.      No smoking.       Wear sunscreen to prevent skin cancer.       See your dentist twice a year for an exam and cleaning.      See your eye doctor every 1 to 2 years to screen for conditions such as glaucoma, macular degeneration and cataracts.          Follow-ups after your visit        Your next 10 appointments already scheduled     Nov 06, 2017  2:00 PM CST   (Arrive by 1:45 PM)   RETURN ENDOCRINE with Geovanna Chatterjee MD   [facility] ([facility])    [address] [address] [phone]            Nov 28, 2017  1:15 PM CST   Return Visit with Ly Morrissey MD   [facility] ([facility])    [address] [place] [address] [phone]              Who to contact     Normal or non-critical lab and imaging results will be communicated to you by MyChart, letter or phone within 4 business days after the clinic has received the results. If you do not hear from us within 7 days, please contact the clinic through [employer] or phone. If you have a critical or abnormal lab result, we will notify you by phone as soon as possible.  Submit refill requests through [employer]  "or call your pharmacy and they will forward the refill request to us. Please allow 3 business days for your refill to be completed.          If you need to speak with a  for additional information , please call: [phone]           Additional Information About Your Visit        Public Mobile[employer] Information     [employer] gives you secure access to your electronic health record. If you see a primary care provider, you can also send messages to your care team and make appointments. If you have questions, please call your primary care clinic.  If you do not have a primary care provider, please call [phone] and they will assist you.        Care EveryWhere ID     This is your Care EveryWhere ID. This could be used by other organizations to access your [place] medical records  [id number]        Your Vitals Were     Pulse Temperature Height Breastfeeding? BMI (Body Mass Index)       85 97  F (36.1  C) (Tympanic) 5' 2.76\" (1.594 m) No 28.67 kg/m2        Blood Pressure from Last 3 Encounters:   10/09/17 120/80   04/03/17 138/80   10/10/16 93/64    Weight from Last 3 Encounters:   10/09/17 160 lb 9.6 oz (72.8 kg)   04/03/17 167 lb 6.4 oz (75.9 kg)   10/10/16 156 lb (70.8 kg)              We Performed the Following     ADMIN INFLUENZA (For MEDICARE Patients ONLY) []     ADMIN MEDICARE: Pneumococcal Vaccine ()     ADMIN PNEUMOVAX VACCINE (For MEDICARE Patients ONLY) []     Comprehensive metabolic panel     FLU VACCINE, INCREASED ANTIGEN, PRESV FREE, AGE 65+ [08693]     Lipid panel reflex to direct LDL     Pneumococcal vaccine 23 valent PPSV23  (Pneumovax) [12268]     XR Finger Right G/E 2 Views          Today's Medication Changes          These changes are accurate as of: 10/9/17  9:49 AM.  If you have any questions, ask your nurse or doctor.               Start taking these medicines.        Dose/Directions    FLUoxetine 20 MG capsule   Commonly known as:  PROzac   Used for:  Major depression in " complete remission (H)   Started by:  Honey García PA-C        Dose:  60 mg   Take 3 capsules (60 mg) by mouth daily   Quantity:  270 capsule   Refills:  1       lisinopril 20 MG tablet   Commonly known as:  PRINIVIL/ZESTRIL   Used for:  Essential hypertension with goal blood pressure less than 140/90   Started by:  Honey García PA-C        Dose:  20 mg   Take 1 tablet (20 mg) by mouth daily   Quantity:  90 tablet   Refills:  1            Where to get your medicines      These medications were sent to [facility] - [address] [facility]  [address] [address]     Phone:  [phone]     atorvastatin 20 MG tablet    FLUoxetine 20 MG capsule    lisinopril 20 MG tablet    omeprazole 20 MG CR capsule                Primary Care Provider Office Phone # Fax #    Honey García PA-C [phone] [address]  [facility] [address]        Equal Access to Services     [facility]: Hadii aad ku hadasho Soomaali, waaxda luqadaha, qaybta kaalmada adeegyada, waxay idiin hayaan clarisa kharaoscar river . So [facility] [phone].    ATENCIÓN: Si habla español, tiene a trammell disposición servicios gratuitos de asistencia lingüística. [facility] [phone].    We comply with applicable federal civil rights laws and [place] laws. We do not discriminate on the basis of race, color, national origin, age, disability, sex, sexual orientation, or gender identity.            Thank you!     Thank you for choosing [facility]  for your care. Our goal is always to provide you with excellent care. Hearing back from our patients is one way we can continue to improve our services. Please take a few minutes to complete the written survey that you may receive in the mail after your visit with us. Thank you!             Your Updated Medication List - Protect others around you: Learn how to safely use, store and throw away your medicines at  www.disposemymeds.org.          This list is accurate as of: 10/9/17  9:49 AM.  Always use your most recent med list.                   Brand Name Dispense Instructions for use Diagnosis    aspirin 81 MG tablet      Take 81 mg by mouth daily        atorvastatin 20 MG tablet    LIPITOR    90 tablet    Take 1 tablet (20 mg) by mouth daily    Hyperlipidemia LDL goal <130       CALCIUM CITRATE PO      Take 1,200 mg by mouth daily        cetirizine 10 MG tablet    zyrTEC    90 tablet    Take 1 tablet (10 mg) by mouth daily    Seasonal allergies       clobetasol 0.05 % ointment    TEMOVATE    120 g    Apply twice a day to the affected area of the skin until follow up appointment.    Rash       fish Oil 1200 MG capsule      Take 1 capsule by mouth daily        fluocinonide 0.05 % ointment    LIDEX    60 g    Apply twice a day to affected areas of the arms, legs, trunk for up to 4 weeks.    Intrinsic eczema       FLUoxetine 20 MG capsule    PROzac    270 capsule    Take 3 capsules (60 mg) by mouth daily    Major depression in complete remission (H)       hypromellose 0.5 % Soln ophthalmic solution    ARTIFICIAL TEARS     Place 1 drop into both eyes every 4 hours as needed        ketoconazole 2 % shampoo    NIZORAL    120 mL    Apply to the scalp, let sit for 3-5 minutes, and rinse off. Can wash ears. Use 3-4x a week as needed.    Dermatitis, seborrheic       levothyroxine 100 MCG tablet    SYNTHROID/LEVOTHROID    90 tablet    Monday-Saturday: 1 tablet/day,  Sunday: 0.5 tablet    Postsurgical hypothyroidism, Papillary thyroid carcinoma (H)       lisinopril 20 MG tablet    PRINIVIL/ZESTRIL    90 tablet    Take 1 tablet (20 mg) by mouth daily    Essential hypertension with goal blood pressure less than 140/90       Multi-vitamin Tabs tablet      Take 1 tablet by mouth daily        omeprazole 20 MG CR capsule    priLOSEC    90 capsule    Take 1 capsule (20 mg) by mouth every 3 days    Gastroesophageal reflux disease with  esophagitis       sennosides 8.6 MG tablet    SENOKOT     Take 2 tablets by mouth daily        * triamcinolone 0.1 % ointment    KENALOG    60 g    Apply twice daily as needed to affected area, do not use for more than 10 days.    Flexural eczema       * triamcinolone 0.1 % ointment    KENALOG    454 g    Apply to affected area of the skin twice a day after using a moisturizer. Can do wet wraps with this.    Intrinsic atopic dermatitis       vitamin D 1000 UNITS capsule      Take 1 capsule by mouth daily.        * Notice:  This list has 2 medication(s) that are the same as other medications prescribed for you. Read the directions carefully, and ask your doctor or other care provider to review them with you.

## 2017-10-09 NOTE — NURSING NOTE
"Chief Complaint   Patient presents with     Physical       Initial /80  Pulse 85  Temp 97  F (36.1  C) (Tympanic)  Ht 5' 2.76\" (1.594 m)  Wt 160 lb 9.6 oz (72.8 kg)  Breastfeeding? No  BMI 28.67 kg/m2 Estimated body mass index is 28.67 kg/(m^2) as calculated from the following:    Height as of this encounter: 5' 2.76\" (1.594 m).    Weight as of this encounter: 160 lb 9.6 oz (72.8 kg).  Medication Reconciliation: complete     Yecenia Hampton MA      "

## 2017-10-11 PROBLEM — Z86.0100 HISTORY OF COLONIC POLYPS: Status: ACTIVE | Noted: 2017-10-11

## 2017-10-12 ENCOUNTER — MYC MEDICAL ADVICE (OUTPATIENT)
Dept: FAMILY MEDICINE | Facility: CLINIC | Age: 67
End: 2017-10-12

## 2017-11-03 ENCOUNTER — OFFICE VISIT (OUTPATIENT)
Dept: FAMILY MEDICINE | Facility: CLINIC | Age: 67
End: 2017-11-03
Payer: MEDICARE

## 2017-11-03 VITALS
WEIGHT: 161.4 LBS | BODY MASS INDEX: 28.6 KG/M2 | DIASTOLIC BLOOD PRESSURE: 79 MMHG | HEART RATE: 83 BPM | TEMPERATURE: 97.8 F | OXYGEN SATURATION: 96 % | SYSTOLIC BLOOD PRESSURE: 133 MMHG | HEIGHT: 63 IN

## 2017-11-03 DIAGNOSIS — J00 ACUTE NASOPHARYNGITIS: Primary | ICD-10-CM

## 2017-11-03 DIAGNOSIS — Z12.31 VISIT FOR SCREENING MAMMOGRAM: ICD-10-CM

## 2017-11-03 LAB
DEPRECATED S PYO AG THROAT QL EIA: NORMAL
SPECIMEN SOURCE: NORMAL

## 2017-11-03 PROCEDURE — 87880 STREP A ASSAY W/OPTIC: CPT | Performed by: PHYSICIAN ASSISTANT

## 2017-11-03 PROCEDURE — 87081 CULTURE SCREEN ONLY: CPT | Performed by: PHYSICIAN ASSISTANT

## 2017-11-03 PROCEDURE — 99213 OFFICE O/P EST LOW 20 MIN: CPT | Performed by: PHYSICIAN ASSISTANT

## 2017-11-03 NOTE — NURSING NOTE
"Chief Complaint   Patient presents with     Pharyngitis       Initial /79 (BP Location: Left arm, Patient Position: Sitting, Cuff Size: Adult Regular)  Pulse 83  Temp 97.8  F (36.6  C) (Oral)  Ht 5' 2.76\" (1.594 m)  Wt 161 lb 6.4 oz (73.2 kg)  SpO2 96%  BMI 28.81 kg/m2 Estimated body mass index is 28.81 kg/(m^2) as calculated from the following:    Height as of this encounter: 5' 2.76\" (1.594 m).    Weight as of this encounter: 161 lb 6.4 oz (73.2 kg).  Medication Reconciliation: complete   Clarice Fonseca MA      "

## 2017-11-03 NOTE — MR AVS SNAPSHOT
After Visit Summary   11/3/2017    Marcela Allen    MRN: [id number]           Patient Information     Date Of Birth          1950        Visit Information        Provider Department      11/3/2017 11:20 AM Arlette Hwang PA-C [facility]        Today's Diagnoses     Acute nasopharyngitis    -  1    Visit for screening mammogram           Follow-ups after your visit        Your next 10 appointments already scheduled     Nov 06, 2017  2:00 PM CST   (Arrive by 1:45 PM)   RETURN ENDOCRINE with Geovanna Chatterjee MD   [facility] ([facility])    [address] [facility]  3rd Floor  [facility] [id number] [phone]            Nov 14, 2017 10:30 AM CST   Screening Mammogram with BKMA1   [facility] ([facility])    [address] [facility] [id number] [phone]           Do NOT use body powder, lotions, perfume or deodorant the day of the exam.  If your last mammogram was not done at [facility], please bring your mammogram films. We will need the name of your provider to send a copy of your report.  A mammogram may be covered on an annual or biannual basis, please check with your insurance company.            Nov 28, 2017  1:15 PM CST   Return Visit with Ly Morrissey MD   [facility] ([facility])    [address] [place] [address] [phone]              Future tests that were ordered for you today     Open Future Orders        Priority Expected Expires Ordered    MA SCREENING DIGITAL BILAT - Future  (s+30) Routine  11/3/2018 11/3/2017            Who to contact     If you have questions or need follow up information about today's clinic visit or your schedule please contact [facility] directly at [phone].  Normal or non-critical lab and imaging results will be communicated to you by [employer]  "letter or phone within 4 business days after the clinic has received the results. If you do not hear from us within 7 days, please contact the clinic through [employer] or phone. If you have a critical or abnormal lab result, we will notify you by phone as soon as possible.  Submit refill requests through [employer] or call your pharmacy and they will forward the refill request to us. Please allow 3 business days for your refill to be completed.          Additional Information About Your Visit        [employer] Information     [employer] gives you secure access to your electronic health record. If you see a primary care provider, you can also send messages to your care team and make appointments. If you have questions, please call your primary care clinic.  If you do not have a primary care provider, please call [phone] and they will assist you.        Care EveryWhere ID     This is your Care EveryWhere ID. This could be used by other organizations to access your [place] medical records  [id number]        Your Vitals Were     Pulse Temperature Height Pulse Oximetry BMI (Body Mass Index)       83 97.8  F (36.6  C) (Oral) 5' 2.76\" (1.594 m) 96% 28.81 kg/m2        Blood Pressure from Last 3 Encounters:   11/03/17 133/79   10/09/17 120/80   04/03/17 138/80    Weight from Last 3 Encounters:   11/03/17 161 lb 6.4 oz (73.2 kg)   10/09/17 160 lb 9.6 oz (72.8 kg)   04/03/17 167 lb 6.4 oz (75.9 kg)              We Performed the Following     Beta strep group A culture     Rapid strep screen        Primary Care Provider Office Phone # Fax #    Honey García PA-C [phone]       [address]  [facility] [address]        Equal Access to Services     MARLEY ANTONIO : Andrew Chopra, wafreemanda luqadaha, qaybta kaalmada clarisayada, stefan brewer. So [facility] [phone].    ATENCIÓN: Si habla español, tiene a trammell disposición servicios gratuitos de asistencia lingüística. " Rizwana dickinson [phone].    We comply with applicable federal civil rights laws and [place] laws. We do not discriminate on the basis of race, color, national origin, age, disability, sex, sexual orientation, or gender identity.            Thank you!     Thank you for choosing [facility]  for your care. Our goal is always to provide you with excellent care. Hearing back from our patients is one way we can continue to improve our services. Please take a few minutes to complete the written survey that you may receive in the mail after your visit with us. Thank you!             Your Updated Medication List - Protect others around you: Learn how to safely use, store and throw away your medicines at www.disposemymeds.org.          This list is accurate as of: 11/3/17 11:49 AM.  Always use your most recent med list.                   Brand Name Dispense Instructions for use Diagnosis    aspirin 81 MG tablet      Take 81 mg by mouth daily        atorvastatin 20 MG tablet    LIPITOR    90 tablet    Take 1 tablet (20 mg) by mouth daily    Hyperlipidemia LDL goal <130       CALCIUM CITRATE PO      Take 1,200 mg by mouth daily        cetirizine 10 MG tablet    zyrTEC    90 tablet    Take 1 tablet (10 mg) by mouth daily    Seasonal allergies       clobetasol 0.05 % ointment    TEMOVATE    120 g    Apply twice a day to the affected area of the skin until follow up appointment.    Rash       fish Oil 1200 MG capsule      Take 1 capsule by mouth daily        fluocinonide 0.05 % ointment    LIDEX    60 g    Apply twice a day to affected areas of the arms, legs, trunk for up to 4 weeks.    Intrinsic eczema       FLUoxetine 20 MG capsule    PROzac    270 capsule    Take 3 capsules (60 mg) by mouth daily    Major depression in complete remission (H)       hypromellose 0.5 % Soln ophthalmic solution    ARTIFICIAL TEARS     Place 1 drop into both eyes every 4 hours as needed        ketoconazole 2 % shampoo    NIZORAL     120 mL    Apply to the scalp, let sit for 3-5 minutes, and rinse off. Can wash ears. Use 3-4x a week as needed.    Dermatitis, seborrheic       levothyroxine 100 MCG tablet    SYNTHROID/LEVOTHROID    90 tablet    Monday-Saturday: 1 tablet/day,  Sunday: 0.5 tablet    Postsurgical hypothyroidism, Papillary thyroid carcinoma (H)       lisinopril 20 MG tablet    PRINIVIL/ZESTRIL    90 tablet    Take 1 tablet (20 mg) by mouth daily    Essential hypertension with goal blood pressure less than 140/90       Multi-vitamin Tabs tablet      Take 1 tablet by mouth daily        omeprazole 20 MG CR capsule    priLOSEC    90 capsule    Take 1 capsule (20 mg) by mouth every 3 days    Gastroesophageal reflux disease with esophagitis       sennosides 8.6 MG tablet    SENOKOT     Take 2 tablets by mouth daily        * triamcinolone 0.1 % ointment    KENALOG    60 g    Apply twice daily as needed to affected area, do not use for more than 10 days.    Flexural eczema       * triamcinolone 0.1 % ointment    KENALOG    454 g    Apply to affected area of the skin twice a day after using a moisturizer. Can do wet wraps with this.    Intrinsic atopic dermatitis       vitamin D 1000 UNITS capsule      Take 1 capsule by mouth daily.        * Notice:  This list has 2 medication(s) that are the same as other medications prescribed for you. Read the directions carefully, and ask your doctor or other care provider to review them with you.

## 2017-11-03 NOTE — PROGRESS NOTES
SUBJECTIVE:   Marcela Allen is a 67 year old female who presents to clinic today for the following health issues:      Acute Illness   Acute illness concerns: Sore throat, low grade fever   Onset: 1 week     Fever: YES- 100.2    Chills/Sweats: YES- sweats     Headache (location?): YES    Sinus Pressure:no    Conjunctivitis:  no    Ear Pain: YES: bilateral    Rhinorrhea: YES    Congestion: YES    Sore Throat: YES     Cough: YES-productive of clear sputum, productive of yellow sputum    Wheeze: no    Decreased Appetite: YES- unable to taste food     Nausea: no    Vomiting: no    Diarrhea:  no    Dysuria/Freq.: no    Fatigue/Achiness: no    Sick/Strep Exposure: no     Therapies Tried and outcome: Excedrin, Benadryl, cough drops         Problem list and histories reviewed & adjusted, as indicated.  Additional history: as documented    Patient Active Problem List   Diagnosis     GERD (gastroesophageal reflux disease)     Eczema     Hiatal hernia     HTN (hypertension)     OA (osteoarthritis)     Osteopenia     Cervical pain     Advanced directives, counseling/discussion     Major depression in complete remission (H)     Hyperlipidemia LDL goal <130     Hypertension goal BP (blood pressure) < 140/90     Seasonal allergies     CKD (chronic kidney disease) stage 3, GFR 30-59 ml/min     Liver hemangioma     S/P LEEP of cervix     Former smoker     Elevated platelet count (H)     s/p B TKA 6/27     S/P knee replacement     Postsurgical hypothyroidism     Papillary thyroid carcinoma (H)     Enlarged lymph nodes     Cervical cancer (H)     Advance care planning     Cervical radiculopathy     Malignant neoplasm of cervix, unspecified site (H)     Vulvar cancer (H)     Chronic obstructive pulmonary disease, unspecified COPD type (H)     History of colonic polyps     Past Surgical History:   Procedure Laterality Date     ABDOMEN SURGERY      Teenager     APPENDECTOMY OPEN  1968     ARTHROPLASTY MINIMALLY INVASIVE KNEE BILATERAL   6/27/2013    Procedure: ARTHROPLASTY MINIMALLY INVASIVE KNEE BILATERAL;  Minimally Invasive Bilateral Total Knee Arthroplasty;  Surgeon: Christophe Welch MD;  [facility]     BIOPSY  Ongoing    Lymph nodes neck     BONE MARROW ASPIRATION ONLY Left 12/7/2015    Procedure: BONE MARROW ASPIRATION ONLY;  Surgeon: Aguilar Roldan MD;  Location: [facility]     CARPAL TUNNEL RELEASE RT/LT       COLONOSCOPY  2007    neg     COLPOSCOPY CERVIX, BIOPSY CERVIX, ENDOCERVICAL CURETTAGE, COMBINED  2003, 2007     completion thyroidectomy Right 3/14/00     CYSTECTOMY OVARIAN BENIGN  1969     ESOPHAGOSCOPY, GASTROSCOPY, DUODENOSCOPY (EGD), COMBINED N/A 8/28/2014    Procedure: COMBINED ESOPHAGOSCOPY, GASTROSCOPY, DUODENOSCOPY (EGD), BIOPSY SINGLE OR MULTIPLE;  Surgeon: Kelvin Montgomery MD;  Location: [facility]     FUSION CERVICAL ANTERIOR THREE+ LEVELS WITH BONE ALLOGRAFT N/A 12/7/2015    Procedure: FUSION CERVICAL ANTERIOR THREE+ LEVELS WITH BONE ALLOGRAFT;  Surgeon: Aguilar Roldan MD;  Location:  OR     HAND SURGERY       HEAD & NECK SURGERY  2000    Thyroidectomy     KNEE SURGERY  2001 & 2005    bilat     LAPAROSCOPIC CHOLECYSTECTOMY  1998     LEEP TX, CERVICAL  2007    BULMARO II on colp, leep path WNL     left thyroid total lobectomy, isthmusectomy and 50% right thyroid lobectomy Left 3/7/00     TUBAL LIGATION  1988     VULVECTOMY SIMPLE  2006    FELICIA 3, wide local excision x 2     VULVECTOMY SIMPLE         Social History   Substance Use Topics     Smoking status: Former Smoker     Packs/day: 1.00     Years: 45.00     Types: Cigarettes     Quit date: 8/1/2012     Smokeless tobacco: Never Used      Comment: quitting with e cigarette     Alcohol use Yes      Comment: less than weekly     Family History   Problem Relation Age of Onset     Blood Disease Mother 75     thrombocythemia on hydrea     Hypertension Mother      CEREBROVASCULAR DISEASE Mother      TIA     Anesthesia Reaction Mother      N/V      OSTEOPOROSIS Mother      C.A.D. Father 61     three MI's, smoker     Hypertension Father      Coronary Artery Disease Father      DIABETES Paternal Aunt      Cancer - colorectal Maternal Grandmother      unsure of her age.     Breast Cancer No family hx of      Asthma No family hx of          Current Outpatient Prescriptions   Medication Sig Dispense Refill     lisinopril (PRINIVIL/ZESTRIL) 20 MG tablet Take 1 tablet (20 mg) by mouth daily 90 tablet 1     FLUoxetine (PROZAC) 20 MG capsule Take 3 capsules (60 mg) by mouth daily 270 capsule 1     atorvastatin (LIPITOR) 20 MG tablet Take 1 tablet (20 mg) by mouth daily 90 tablet 1     omeprazole (PRILOSEC) 20 MG CR capsule Take 1 capsule (20 mg) by mouth every 3 days 90 capsule 1     ketoconazole (NIZORAL) 2 % shampoo Apply to the scalp, let sit for 3-5 minutes, and rinse off. Can wash ears. Use 3-4x a week as needed. 120 mL 11     levothyroxine (SYNTHROID/LEVOTHROID) 100 MCG tablet Monday-Saturday: 1 tablet/day,  Sunday: 0.5 tablet 90 tablet 3     clobetasol (TEMOVATE) 0.05 % ointment Apply twice a day to the affected area of the skin until follow up appointment. 120 g 2     triamcinolone (KENALOG) 0.1 % ointment Apply to affected area of the skin twice a day after using a moisturizer. Can do wet wraps with this. 454 g 1     triamcinolone (KENALOG) 0.1 % ointment Apply twice daily as needed to affected area, do not use for more than 10 days. 60 g 1     fluocinonide (LIDEX) 0.05 % ointment Apply twice a day to affected areas of the arms, legs, trunk for up to 4 weeks. 60 g 1     multivitamin, therapeutic with minerals (MULTI-VITAMIN) TABS Take 1 tablet by mouth daily       hypromellose (ARTIFICIAL TEARS) 0.5 % SOLN Place 1 drop into both eyes every 4 hours as needed        sennosides (SENOKOT) 8.6 MG tablet Take 2 tablets by mouth daily        cetirizine (ZYRTEC) 10 MG tablet Take 1 tablet (10 mg) by mouth daily 90 tablet 1     aspirin 81 MG tablet Take 81 mg by mouth  "daily        Omega-3 Fatty Acids (FISH OIL) 1200 MG CAPS Take 1 capsule by mouth daily        CALCIUM CITRATE PO Take 1,200 mg by mouth daily        Cholecalciferol (VITAMIN D) 1000 UNIT capsule Take 1 capsule by mouth daily.       Allergies   Allergen Reactions     Dye [Contrast Dye] Anaphylaxis     Echo dye         ROS:  Constitutional, HEENT, cardiovascular, pulmonary, gi and gu systems are negative, except as otherwise noted.      OBJECTIVE:   /79 (BP Location: Left arm, Patient Position: Sitting, Cuff Size: Adult Regular)  Pulse 83  Temp 97.8  F (36.6  C) (Oral)  Ht 5' 2.76\" (1.594 m)  Wt 161 lb 6.4 oz (73.2 kg)  SpO2 96%  BMI 28.81 kg/m2  Body mass index is 28.81 kg/(m^2).  GENERAL: healthy, alert and no distress  EYES: Eyes grossly normal to inspection, PERRL and conjunctivae and sclerae normal  HENT: normal cephalic/atraumatic, ear canals and TM's normal, rhinorrhea clear, oropharynx clear and oral mucous membranes moist  NECK: no adenopathy, no asymmetry, masses, or scars and thyroid normal to palpation  RESP: lungs clear to auscultation - no rales, rhonchi or wheezes  CV: regular rate and rhythm, normal S1 S2, no S3 or S4, no murmur, click or rub, no peripheral edema and peripheral pulses strong  ABDOMEN: soft, nontender, no hepatosplenomegaly, no masses and bowel sounds normal  MS: no gross musculoskeletal defects noted, no edema    Diagnostic Test Results:  Results for orders placed or performed in visit on 11/03/17 (from the past 24 hour(s))   Rapid strep screen   Result Value Ref Range    Specimen Description Throat     Rapid Strep A Screen       NEGATIVE: No Group A streptococcal antigen detected by immunoassay, await culture report.       ASSESSMENT/PLAN:       ICD-10-CM    1. Acute nasopharyngitis J00    2. Visit for screening mammogram Z12.31 MA SCREENING DIGITAL BILAT - Future  (s+30)     Symptomatic care     Increase fluids  Nasal wash  Mucinex DM  Sudafed  cepacol      Arlette " Juan Hwang PA-C  [facility]

## 2017-11-04 LAB
BACTERIA SPEC CULT: NORMAL
SPECIMEN SOURCE: NORMAL

## 2017-11-05 ASSESSMENT — ENCOUNTER SYMPTOMS
MYALGIAS: 1
NECK PAIN: 1
ARTHRALGIAS: 1
SORE THROAT: 1
DOUBLE VISION: 0
EYE IRRITATION: 0
STIFFNESS: 1
JOINT SWELLING: 1
EYE WATERING: 0
BACK PAIN: 1
EYE PAIN: 0
EYE REDNESS: 0

## 2017-11-06 ENCOUNTER — OFFICE VISIT (OUTPATIENT)
Dept: ENDOCRINOLOGY | Facility: CLINIC | Age: 67
End: 2017-11-06

## 2017-11-06 VITALS — BODY MASS INDEX: 28.63 KG/M2 | HEIGHT: 63 IN | WEIGHT: 161.6 LBS

## 2017-11-06 DIAGNOSIS — C73 PAPILLARY THYROID CARCINOMA (H): Primary | Chronic | ICD-10-CM

## 2017-11-06 DIAGNOSIS — E89.0 POSTSURGICAL HYPOTHYROIDISM: Chronic | ICD-10-CM

## 2017-11-06 DIAGNOSIS — C73 PAPILLARY THYROID CARCINOMA (H): Chronic | ICD-10-CM

## 2017-11-06 LAB
T4 FREE SERPL-MCNC: 1.1 NG/DL (ref 0.76–1.46)
TSH SERPL DL<=0.005 MIU/L-ACNC: 0.6 MU/L (ref 0.4–4)

## 2017-11-06 ASSESSMENT — PAIN SCALES - GENERAL: PAINLEVEL: NO PAIN (0)

## 2017-11-06 NOTE — NURSING NOTE
Chief Complaint   Patient presents with     RECHECK     F/U HYPOTHYROID     Jocelin Flores, VALERIA  Endocrinology & Diabetes 3G

## 2017-11-06 NOTE — LETTER
"11/6/2017       RE: Marcela Allen  [address] [facility]  [place] [address]     Dear Colleague,    Thank you for referring your patient, Marcela Allen, to the [facility] at [facility]. Please see a copy of my visit note below.     Assessment   1.  Papillary thyroid cancer, bilateral, multifocal, no nodes removed. She has been treated with thyroidectomy in 2 procedures.  She has had 29 mCi 131I  in 2000.   We don't have TSH stimulated Tg data on her.   Labs today to include TFTs, Tg ---  RTC 1 year    2.  Hypothyroidism due to thyroidectomy -She is likely to be cured.  I would lighten the TSH suppression to up to around 1.       3. Cervical adenopathy  On US - we have been focusing on left level 6/(called level 3 in 2010, 2011) node  Which has remained stable for years.  This remains on stable on real time US performed by me again today.    post menopausal - not addressed  Her bone risk factors include TSH suppression therapy, smoking and post menopausal state. She is now off alendronate, per her PCP.   Most recent DXA showed normal BMD.      Geovanna Chatterjee MD.    JESUS Medrano presents for follow up of thyroid cancer and post surgical  hypothyroidism.       She was diagnosed with PCT  in 2000 after she had partial thyroid surgery.   One week later she had completion thyroidectomy.  Both operations were performed at [facility] by Dr Antonina Peralta.  See my 1/28/09 note for the operative reports.  Primary tumor size is not specified in the path reports, multifocal, bilateral..\" She got 29.3 mCi 131I, 5/30/00) and one year later she had a TBS at [facility], which was negative.    She continues to have neck US and we have been focusing on a left level 6 lymph node - as described on the 2014 study  # 1 (0.8 x 0.35 x 1.2 cm) - hypoechoic with one tiny spot of peripheral blood flow; echogenic hilum not seen. The LN is pretracheal and medial to CCA.  This LN can be seen " "years back on US, where it has also been labeled as being in level 2-3 or level 3 in the past.  I have reviewed older US studies today and I believe I see this LN on the 2008 US, looking very similar to 2014.     I have reviewed all available tumor marker data to date:  5/12/00: .72, ROSARIO in lab  5/18/00: 5.16 mCi 131I TBS: \"intense activity in the thyroid bed.  No evidence of activity outside of the neck\"  5/30/00: 29.3 mCi 131I ([facility])  8/7/00: TSH 48.26, free T4 0.87  8/24/01 4.03 mCi 131I TBS ([facility]): negative study  6/6/04: TSH 3.10,  Tg < 0.3 ([facility]), ROSARIO < 1  10/6/04: ROSARIO < 2 IU/ml, free T4 1.2, TSH 16.07  3/26/08: Tg 0.18, ROSARIO < 1, TSH 0.02  1/28/09: Tg < 0.1, ROSARIO < 0.4, TSH 0.01   11/17/09: Tg < 0.1, ROSARIO < 0.4, TSH 0.02  US guided FNAB of left level 2 LN in 12/09, with benign cytology.  Needle wash Tg was < 0.1.   2/23/10: Tg < 0.1, ROSARIO < 0.4, TSH 0.04  5/18/10: Tg < 0.1, ROSARIO < 0.4, TSH 0.01  6/1/11: Tg < 0.1, ROSARIO < 0.4, TSH 0.06  4/30/13: Tg < 0.1, ROSARIO < 0.4, TSH 0.03  10/23/13: Tg < 0.1, ROSARIO < 0.4, TSH 0.1  5/7/14: Tg <0.1, ROSARIO < 0.4, TSH   6/8/15: Tg < 0.1, ROSARIO < 0.4, TSH  8/2/16: Tg < 0.1, ROSARIO < 0.4,  TSH 0.09- after this we reduced LT4 dose by 1/2 tablet per week  4/3/17: TSH 0.49     In 7/13 she had a normal CXR.     DXA 4/3/15: (as read by me) lowest T-score -0.9 at the right femoral neck (they compared with 2012 study -I don't have images from 2012 to compare)     ROS   Weight is stable  Cardiac: negative  Respiratory: On the tail end of a cold  GI: constipated  No bone pain  Always has back pain      PMH   Past Medical History:   Diagnosis Date     BULMARO II (cervical intraepithelial neoplasia II) 2007    on colp - leep path WNL     COPD (chronic obstructive pulmonary disease) (H)      Depression 1990     Depressive disorder 25 yrs     Eczema      GERD (gastroesophageal reflux disease)      Hiatal hernia      History of blood transfusion 2013    Post op     HTN (hypertension)      " Hypercholesterolemia      Liver hemangioma 9/4/2012     OA (osteoarthritis)      Osteopenia      Papillary thyroid carcinoma (H) 2000    BL, MF; Tx in 2 operations, RRA     PONV (postoperative nausea and vomiting)      Postsurgical hypothyroidism 2000     FELICIA III (vulvar intraepithelial neoplasia III) 2006    wide local exc x 2     Chlamydia    genital herpes  HPV of cervix.   cryotherapy times two     eczema and contact dermatitis.  Chronic neck pain    Arnold-Chiari malformation.    Past Surgical History:   Procedure Laterality Date     ABDOMEN SURGERY      Teenager     APPENDECTOMY OPEN  1968     ARTHROPLASTY MINIMALLY INVASIVE KNEE BILATERAL  6/27/2013    Procedure: ARTHROPLASTY MINIMALLY INVASIVE KNEE BILATERAL;  Minimally Invasive Bilateral Total Knee Arthroplasty;  Surgeon: Christophe Welch MD;  [facility]     BIOPSY  Ongoing    Lymph nodes neck     BONE MARROW ASPIRATION ONLY Left 12/7/2015    Procedure: BONE MARROW ASPIRATION ONLY;  Surgeon: Aguilar Roldan MD;  Location: [facility] OR     CARPAL TUNNEL RELEASE RT/LT       COLONOSCOPY  2007    neg     COLPOSCOPY CERVIX, BIOPSY CERVIX, ENDOCERVICAL CURETTAGE, COMBINED  2003, 2007     completion thyroidectomy Right 3/14/00     CYSTECTOMY OVARIAN BENIGN  1969     ESOPHAGOSCOPY, GASTROSCOPY, DUODENOSCOPY (EGD), COMBINED N/A 8/28/2014    Procedure: COMBINED ESOPHAGOSCOPY, GASTROSCOPY, DUODENOSCOPY (EGD), BIOPSY SINGLE OR MULTIPLE;  Surgeon: Kelvin Montgomery MD;  Location: [facility]     FUSION CERVICAL ANTERIOR THREE+ LEVELS WITH BONE ALLOGRAFT N/A 12/7/2015    Procedure: FUSION CERVICAL ANTERIOR THREE+ LEVELS WITH BONE ALLOGRAFT;  Surgeon: Aguilar Roldan MD;  Location:  OR     HAND SURGERY       HEAD & NECK SURGERY  2000    Thyroidectomy     KNEE SURGERY  2001 & 2005    bilat     LAPAROSCOPIC CHOLECYSTECTOMY  1998     LEEP TX, CERVICAL  2007    BULMARO II on colp, leep path WNL     left thyroid total lobectomy, isthmusectomy and 50% right  thyroid lobectomy Left 3/7/00     TUBAL LIGATION  1988     VULVECTOMY SIMPLE  2006    FELICIA 3, wide local excision x 2     VULVECTOMY SIMPLE         Finger ligament repair in 2000.  left arm ligament repair.    Current Outpatient Prescriptions   Medication Sig Dispense Refill     lisinopril (PRINIVIL/ZESTRIL) 20 MG tablet Take 1 tablet (20 mg) by mouth daily 90 tablet 1     FLUoxetine (PROZAC) 20 MG capsule Take 3 capsules (60 mg) by mouth daily 270 capsule 1     atorvastatin (LIPITOR) 20 MG tablet Take 1 tablet (20 mg) by mouth daily 90 tablet 1     omeprazole (PRILOSEC) 20 MG CR capsule Take 1 capsule (20 mg) by mouth every 3 days 90 capsule 1     ketoconazole (NIZORAL) 2 % shampoo Apply to the scalp, let sit for 3-5 minutes, and rinse off. Can wash ears. Use 3-4x a week as needed. 120 mL 11     levothyroxine (SYNTHROID/LEVOTHROID) 100 MCG tablet Monday-Saturday: 1 tablet/day,  Sunday: 0.5 tablet 90 tablet 3     clobetasol (TEMOVATE) 0.05 % ointment Apply twice a day to the affected area of the skin until follow up appointment. 120 g 2     triamcinolone (KENALOG) 0.1 % ointment Apply to affected area of the skin twice a day after using a moisturizer. Can do wet wraps with this. 454 g 1     triamcinolone (KENALOG) 0.1 % ointment Apply twice daily as needed to affected area, do not use for more than 10 days. 60 g 1     fluocinonide (LIDEX) 0.05 % ointment Apply twice a day to affected areas of the arms, legs, trunk for up to 4 weeks. 60 g 1     multivitamin, therapeutic with minerals (MULTI-VITAMIN) TABS Take 1 tablet by mouth daily       hypromellose (ARTIFICIAL TEARS) 0.5 % SOLN Place 1 drop into both eyes every 4 hours as needed        sennosides (SENOKOT) 8.6 MG tablet Take 2 tablets by mouth daily        cetirizine (ZYRTEC) 10 MG tablet Take 1 tablet (10 mg) by mouth daily 90 tablet 1     aspirin 81 MG tablet Take 81 mg by mouth daily        Omega-3 Fatty Acids (FISH OIL) 1200 MG CAPS Take 1 capsule by mouth  "daily        CALCIUM CITRATE PO Take 1,200 mg by mouth daily        Cholecalciferol (VITAMIN D) 1000 UNIT capsule Take 1 capsule by mouth daily.       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 She is intermittently coughing  BP (P) 123/82  Pulse (P) 105  Ht 1.594 m (5' 2.75\")  Wt 73.3 kg (161 lb 9.6 oz)  BMI 28.85 kg/m2  SKIN: normal color, temperature  HEENT: PER, no scleral icterus,  NECK: no masses;  I have performed real time neck US.  The left level 6 LN is still similar to past studies.  Today it measures 0.8 x 0.3 x 0.9 cm .There are no other abnormalities.    LUNGS: clear bilaterally  CARDIAC: RRR S1 S2  NEURO: Alert, responds appropriately to questions, moves all extremities    DATA REVIEW:    Results for DANILO VENTURA (MRN [id number]) as of 2017 08:39   Ref. Range 10/9/2017 09:47   Sodium Latest Ref Range: 133 - 144 mmol/L 139   Potassium Latest Ref Range: 3.4 - " 5.3 mmol/L 4.5   Chloride Latest Ref Range: 94 - 109 mmol/L 104   Carbon Dioxide Latest Ref Range: 20 - 32 mmol/L 29   Urea Nitrogen Latest Ref Range: 7 - 30 mg/dL 11   Creatinine Latest Ref Range: 0.52 - 1.04 mg/dL 0.98   GFR Estimate Latest Ref Range: >60 mL/min/1.7m2 57 (L)   GFR Estimate If Black Latest Ref Range: >60 mL/min/1.7m2 69   Calcium Latest Ref Range: 8.5 - 10.1 mg/dL 9.0   Anion Gap Latest Ref Range: 3 - 14 mmol/L 6   Albumin Latest Ref Range: 3.4 - 5.0 g/dL 3.6   Protein Total Latest Ref Range: 6.8 - 8.8 g/dL 7.2   Bilirubin Total Latest Ref Range: 0.2 - 1.3 mg/dL 0.6   Alkaline Phosphatase Latest Ref Range: 40 - 150 U/L 67   ALT Latest Ref Range: 0 - 50 U/L 36   AST Latest Ref Range: 0 - 45 U/L 41   Cholesterol Latest Ref Range: <200 mg/dL 158   HDL Cholesterol Latest Ref Range: >49 mg/dL 69   LDL Cholesterol Calculated Latest Ref Range: <100 mg/dL 68   Non HDL Cholesterol Latest Ref Range: <130 mg/dL 89   Triglycerides Latest Ref Range: <150 mg/dL 104   Glucose Latest Ref Range: 70 - 99 mg/dL 88       Again, thank you for allowing me to participate in the care of your patient.      Sincerely,    Geovanna Chatterjee MD

## 2017-11-06 NOTE — PROGRESS NOTES
" Assessment   1.  Papillary thyroid cancer, bilateral, multifocal, no nodes removed. She has been treated with thyroidectomy in 2 procedures.  She has had 29 mCi 131I  in 2000.   We don't have TSH stimulated Tg data on her.   Labs today to include TFTs, Tg ---  RTC 1 year    2.  Hypothyroidism due to thyroidectomy -She is likely to be cured.  I would lighten the TSH suppression to up to around 1.       3. Cervical adenopathy  On US - we have been focusing on left level 6/(called level 3 in 2010, 2011) node  Which has remained stable for years.  This remains on stable on real time US performed by me again today.    post menopausal - not addressed  Her bone risk factors include TSH suppression therapy, smoking and post menopausal state. She is now off alendronate, per her PCP.   Most recent DXA showed normal BMD.      Geovanna Chatterjee MD.    JESUS Medrano presents for follow up of thyroid cancer and post surgical  hypothyroidism.       She was diagnosed with PCT  in 2000 after she had partial thyroid surgery.   One week later she had completion thyroidectomy.  Both operations were performed at [facility] by Dr Antonina Peralta.  See my 1/28/09 note for the operative reports.  Primary tumor size is not specified in the path reports, multifocal, bilateral..\" She got 29.3 mCi 131I, 5/30/00) and one year later she had a TBS at [facility], which was negative.    She continues to have neck US and we have been focusing on a left level 6 lymph node - as described on the 2014 study  # 1 (0.8 x 0.35 x 1.2 cm) - hypoechoic with one tiny spot of peripheral blood flow; echogenic hilum not seen. The LN is pretracheal and medial to CCA.  This LN can be seen years back on US, where it has also been labeled as being in level 2-3 or level 3 in the past.  I have reviewed older US studies today and I believe I see this LN on the 2008 US, looking very similar to 2014.     I have reviewed all available tumor marker data to date:  5/12/00: TSH " "116.72, ROSARIO in lab  5/18/00: 5.16 mCi 131I TBS: \"intense activity in the thyroid bed.  No evidence of activity outside of the neck\"  5/30/00: 29.3 mCi 131I ([facility])  8/7/00: TSH 48.26, free T4 0.87  8/24/01 4.03 mCi 131I TBS ([facility]): negative study  6/6/04: TSH 3.10,  Tg < 0.3 ([facility]), ROSARIO < 1  10/6/04: ROSARIO < 2 IU/ml, free T4 1.2, TSH 16.07  3/26/08: Tg 0.18, ROSARIO < 1, TSH 0.02  1/28/09: Tg < 0.1, ROSARIO < 0.4, TSH 0.01   11/17/09: Tg < 0.1, ROSARIO < 0.4, TSH 0.02  US guided FNAB of left level 2 LN in 12/09, with benign cytology.  Needle wash Tg was < 0.1.   2/23/10: Tg < 0.1, ROSARIO < 0.4, TSH 0.04  5/18/10: Tg < 0.1, ROSARIO < 0.4, TSH 0.01  6/1/11: Tg < 0.1, ROSARIO < 0.4, TSH 0.06  4/30/13: Tg < 0.1, ROSARIO < 0.4, TSH 0.03  10/23/13: Tg < 0.1, ROSARIO < 0.4, TSH 0.1  5/7/14: Tg <0.1, ROSARIO < 0.4, TSH   6/8/15: Tg < 0.1, ROSARIO < 0.4, TSH  8/2/16: Tg < 0.1, ROSARIO < 0.4,  TSH 0.09- after this we reduced LT4 dose by 1/2 tablet per week  4/3/17: TSH 0.49  11/6/17: Tg < 0.1, ROSARIO < 0.4, TSH 0.6, free T4 1.10     In 7/13 she had a normal CXR.     DXA 4/3/15: (as read by me) lowest T-score -0.9 at the right femoral neck (they compared with 2012 study -I don't have images from 2012 to compare)     ROS   Weight is stable  Cardiac: negative  Respiratory: On the tail end of a cold  GI: constipated  No bone pain  Always has back pain      PMH   Past Medical History:   Diagnosis Date     BULMARO II (cervical intraepithelial neoplasia II) 2007    on colp - leep path WNL     COPD (chronic obstructive pulmonary disease) (H)      Depression 1990     Depressive disorder 25 yrs     Eczema      GERD (gastroesophageal reflux disease)      Hiatal hernia      History of blood transfusion 2013    Post op     HTN (hypertension)      Hypercholesterolemia      Liver hemangioma 9/4/2012     OA (osteoarthritis)      Osteopenia      Papillary thyroid carcinoma (H) 2000    BL, MF; Tx in 2 operations, RRA     PONV (postoperative nausea and vomiting)      Postsurgical " hypothyroidism 2000     FELICIA III (vulvar intraepithelial neoplasia III) 2006    wide local exc x 2     Chlamydia    genital herpes  HPV of cervix.   cryotherapy times two     eczema and contact dermatitis.  Chronic neck pain    Arnold-Chiari malformation.    Past Surgical History:   Procedure Laterality Date     ABDOMEN SURGERY      Teenager     APPENDECTOMY OPEN  1968     ARTHROPLASTY MINIMALLY INVASIVE KNEE BILATERAL  6/27/2013    Procedure: ARTHROPLASTY MINIMALLY INVASIVE KNEE BILATERAL;  Minimally Invasive Bilateral Total Knee Arthroplasty;  Surgeon: Christophe Welch MD;  Location: [facility] OR     BIOPSY  Ongoing    Lymph nodes neck     BONE MARROW ASPIRATION ONLY Left 12/7/2015    Procedure: BONE MARROW ASPIRATION ONLY;  Surgeon: Aguilar Roldan MD;  Location:  OR     CARPAL TUNNEL RELEASE RT/LT       COLONOSCOPY  2007    neg     COLPOSCOPY CERVIX, BIOPSY CERVIX, ENDOCERVICAL CURETTAGE, COMBINED  2003, 2007     completion thyroidectomy Right 3/14/00     CYSTECTOMY OVARIAN BENIGN  1969     ESOPHAGOSCOPY, GASTROSCOPY, DUODENOSCOPY (EGD), COMBINED N/A 8/28/2014    Procedure: COMBINED ESOPHAGOSCOPY, GASTROSCOPY, DUODENOSCOPY (EGD), BIOPSY SINGLE OR MULTIPLE;  Surgeon: Kelvin Montgomery MD;  Location: [facility] OR     FUSION CERVICAL ANTERIOR THREE+ LEVELS WITH BONE ALLOGRAFT N/A 12/7/2015    Procedure: FUSION CERVICAL ANTERIOR THREE+ LEVELS WITH BONE ALLOGRAFT;  Surgeon: Aguilar Roldan MD;  Location:  OR     HAND SURGERY       HEAD & NECK SURGERY  2000    Thyroidectomy     KNEE SURGERY  2001 & 2005    bilat     LAPAROSCOPIC CHOLECYSTECTOMY  1998     LEEP TX, CERVICAL  2007    BULMARO II on colp, leep path WNL     left thyroid total lobectomy, isthmusectomy and 50% right thyroid lobectomy Left 3/7/00     TUBAL LIGATION  1988     VULVECTOMY SIMPLE  2006    FELICIA 3, wide local excision x 2     VULVECTOMY SIMPLE         Finger ligament repair in 2000.  left arm ligament repair.    Current Outpatient  Prescriptions   Medication Sig Dispense Refill     lisinopril (PRINIVIL/ZESTRIL) 20 MG tablet Take 1 tablet (20 mg) by mouth daily 90 tablet 1     FLUoxetine (PROZAC) 20 MG capsule Take 3 capsules (60 mg) by mouth daily 270 capsule 1     atorvastatin (LIPITOR) 20 MG tablet Take 1 tablet (20 mg) by mouth daily 90 tablet 1     omeprazole (PRILOSEC) 20 MG CR capsule Take 1 capsule (20 mg) by mouth every 3 days 90 capsule 1     ketoconazole (NIZORAL) 2 % shampoo Apply to the scalp, let sit for 3-5 minutes, and rinse off. Can wash ears. Use 3-4x a week as needed. 120 mL 11     levothyroxine (SYNTHROID/LEVOTHROID) 100 MCG tablet Monday-Saturday: 1 tablet/day,  Sunday: 0.5 tablet 90 tablet 3     clobetasol (TEMOVATE) 0.05 % ointment Apply twice a day to the affected area of the skin until follow up appointment. 120 g 2     triamcinolone (KENALOG) 0.1 % ointment Apply to affected area of the skin twice a day after using a moisturizer. Can do wet wraps with this. 454 g 1     triamcinolone (KENALOG) 0.1 % ointment Apply twice daily as needed to affected area, do not use for more than 10 days. 60 g 1     fluocinonide (LIDEX) 0.05 % ointment Apply twice a day to affected areas of the arms, legs, trunk for up to 4 weeks. 60 g 1     multivitamin, therapeutic with minerals (MULTI-VITAMIN) TABS Take 1 tablet by mouth daily       hypromellose (ARTIFICIAL TEARS) 0.5 % SOLN Place 1 drop into both eyes every 4 hours as needed        sennosides (SENOKOT) 8.6 MG tablet Take 2 tablets by mouth daily        cetirizine (ZYRTEC) 10 MG tablet Take 1 tablet (10 mg) by mouth daily 90 tablet 1     aspirin 81 MG tablet Take 81 mg by mouth daily        Omega-3 Fatty Acids (FISH OIL) 1200 MG CAPS Take 1 capsule by mouth daily        CALCIUM CITRATE PO Take 1,200 mg by mouth daily        Cholecalciferol (VITAMIN D) 1000 UNIT capsule Take 1 capsule by mouth daily.       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 She is intermittently coughing  BP (P) 123/82  Pulse (P) 105  Ht 1.594 m (5' 2.75\")  Wt 73.3 kg (161 lb 9.6 oz)  BMI 28.85 kg/m2  SKIN: normal color, temperature  HEENT: PER, no scleral icterus,  NECK: no masses;  I have performed real time neck US.  The left level 6 LN is still similar to past studies.  Today it measures 0.8 x 0.3 x 0.9 cm .There are no other abnormalities.    LUNGS: clear bilaterally  CARDIAC: RRR S1 S2  NEURO: Alert, responds appropriately to questions, moves all extremities    DATA REVIEW:    Results for DANILO VENTURA RADHA (MRN [id number]) as of 2017 08:39   Ref. Range 10/9/2017 09:47   Sodium Latest Ref Range: 133 - 144 mmol/L 139   Potassium Latest Ref Range: 3.4 - 5.3 mmol/L 4.5   Chloride Latest Ref Range: 94 - 109 mmol/L 104   Carbon Dioxide Latest Ref Range: 20 - 32 mmol/L 29   Urea Nitrogen Latest Ref Range: 7 - 30 mg/dL 11   Creatinine Latest Ref Range: 0.52 - 1.04 mg/dL 0.98 "   GFR Estimate Latest Ref Range: >60 mL/min/1.7m2 57 (L)   GFR Estimate If Black Latest Ref Range: >60 mL/min/1.7m2 69   Calcium Latest Ref Range: 8.5 - 10.1 mg/dL 9.0   Anion Gap Latest Ref Range: 3 - 14 mmol/L 6   Albumin Latest Ref Range: 3.4 - 5.0 g/dL 3.6   Protein Total Latest Ref Range: 6.8 - 8.8 g/dL 7.2   Bilirubin Total Latest Ref Range: 0.2 - 1.3 mg/dL 0.6   Alkaline Phosphatase Latest Ref Range: 40 - 150 U/L 67   ALT Latest Ref Range: 0 - 50 U/L 36   AST Latest Ref Range: 0 - 45 U/L 41   Cholesterol Latest Ref Range: <200 mg/dL 158   HDL Cholesterol Latest Ref Range: >49 mg/dL 69   LDL Cholesterol Calculated Latest Ref Range: <100 mg/dL 68   Non HDL Cholesterol Latest Ref Range: <130 mg/dL 89   Triglycerides Latest Ref Range: <150 mg/dL 104   Glucose Latest Ref Range: 70 - 99 mg/dL 88

## 2017-11-06 NOTE — MR AVS SNAPSHOT
After Visit Summary   11/6/2017    Marcela Allen    MRN: [id number]           Patient Information     Date Of Birth          1950        Visit Information        Provider Department      11/6/2017 2:00 PM Geovanna Chatterjee MD [facility]        Today's Diagnoses     Papillary thyroid carcinoma (H)    -  1    Postsurgical hypothyroidism           Follow-ups after your visit        Follow-up notes from your care team     Return in about 1 year (around 11/6/2018).      Your next 10 appointments already scheduled     Nov 14, 2017 10:30 AM CST   Screening Mammogram with BKMA1   [facility] ([facility])    [address] [id number] [phone]           Do NOT use body powder, lotions, perfume or deodorant the day of the exam.  If your last mammogram was not done at [place], please bring your mammogram films. We will need the name of your provider to send a copy of your report.  A mammogram may be covered on an annual or biannual basis, please check with your insurance company.            Nov 28, 2017  1:15 PM CST   Return Visit with Ly Morrissey MD   [facility] ([facility]    [address] [place] [id number] [phone]              Future tests that were ordered for you today     Open Future Orders        Priority Expected Expires Ordered    TSH Routine  11/6/2018 11/6/2017    T4 free Routine  11/6/2018 11/6/2017    Thyroglobulin (Total, antibody & recovery %) Routine  11/6/2018 11/6/2017            Who to contact     Please call your clinic at [phone] to:    Ask questions about your health    Make or cancel appointments    Discuss your medicines    Learn about your test results    Speak to your doctor   If you have compliments or concerns about an experience at your clinic, or if you wish to file a complaint, please contact [facility]  "Patient Relations at [phone] or email us at [email or web address]         Additional Information About Your Visit        Departinghart Information     [employer] gives you secure access to your electronic health record. If you see a primary care provider, you can also send messages to your care team and make appointments. If you have questions, please call your primary care clinic.  If you do not have a primary care provider, please call [phone] and they will assist you.      [employer] is an electronic gateway that provides easy, online access to your medical records. With [employer], you can request a clinic appointment, read your test results, renew a prescription or communicate with your care team.     To access your existing account, please contact your [facility] or call [phone] for assistance.        Care EveryWhere ID     This is your Care EveryWhere ID. This could be used by other organizations to access your [place] medical records  [id number]        Your Vitals Were     Height BMI (Body Mass Index)                1.594 m (5' 2.75\") 28.85 kg/m2           Blood Pressure from Last 3 Encounters:   11/06/17 (P) 123/82   11/03/17 133/79   10/09/17 120/80    Weight from Last 3 Encounters:   11/06/17 73.3 kg (161 lb 9.6 oz)   11/03/17 73.2 kg (161 lb 6.4 oz)   10/09/17 72.8 kg (160 lb 9.6 oz)               Primary Care Provider Office Phone # Fax #    Honey Manisha García PA-C [phone]       [address]  [facility] [address]        Equal Access to Services     KAHLIL ANTONIO : Hadii luis enrique dhillon Sonisha, waaxda luqadaha, qaybta kaalmastefan miranda. So [facility] [phone].    ATENCIÓN: Si habla español, tiene a trammell disposición servicios gratuitos de asistencia lingüística. Llame al [phone].    We comply with applicable federal civil rights laws and [place] laws. We do not discriminate on the basis " of race, color, national origin, age, disability, sex, sexual orientation, or gender identity.            Thank you!     Thank you for choosing [facility]  for your care. Our goal is always to provide you with excellent care. Hearing back from our patients is one way we can continue to improve our services. Please take a few minutes to complete the written survey that you may receive in the mail after your visit with us. Thank you!             Your Updated Medication List - Protect others around you: Learn how to safely use, store and throw away your medicines at www.disposemymeds.org.          This list is accurate as of: 11/6/17  2:21 PM.  Always use your most recent med list.                   Brand Name Dispense Instructions for use Diagnosis    aspirin 81 MG tablet      Take 81 mg by mouth daily        atorvastatin 20 MG tablet    LIPITOR    90 tablet    Take 1 tablet (20 mg) by mouth daily    Hyperlipidemia LDL goal <130       CALCIUM CITRATE PO      Take 1,200 mg by mouth daily        cetirizine 10 MG tablet    zyrTEC    90 tablet    Take 1 tablet (10 mg) by mouth daily    Seasonal allergies       clobetasol 0.05 % ointment    TEMOVATE    120 g    Apply twice a day to the affected area of the skin until follow up appointment.    Rash       fish Oil 1200 MG capsule      Take 1 capsule by mouth daily        fluocinonide 0.05 % ointment    LIDEX    60 g    Apply twice a day to affected areas of the arms, legs, trunk for up to 4 weeks.    Intrinsic eczema       FLUoxetine 20 MG capsule    PROzac    270 capsule    Take 3 capsules (60 mg) by mouth daily    Major depression in complete remission (H)       hypromellose 0.5 % Soln ophthalmic solution    ARTIFICIAL TEARS     Place 1 drop into both eyes every 4 hours as needed        ketoconazole 2 % shampoo    NIZORAL    120 mL    Apply to the scalp, let sit for 3-5 minutes, and rinse off. Can wash ears. Use 3-4x a week as needed.    Dermatitis, seborrheic        levothyroxine 100 MCG tablet    SYNTHROID/LEVOTHROID    90 tablet    Monday-Saturday: 1 tablet/day,  Sunday: 0.5 tablet    Postsurgical hypothyroidism, Papillary thyroid carcinoma (H)       lisinopril 20 MG tablet    PRINIVIL/ZESTRIL    90 tablet    Take 1 tablet (20 mg) by mouth daily    Essential hypertension with goal blood pressure less than 140/90       Multi-vitamin Tabs tablet      Take 1 tablet by mouth daily        omeprazole 20 MG CR capsule    priLOSEC    90 capsule    Take 1 capsule (20 mg) by mouth every 3 days    Gastroesophageal reflux disease with esophagitis       sennosides 8.6 MG tablet    SENOKOT     Take 2 tablets by mouth daily        * triamcinolone 0.1 % ointment    KENALOG    60 g    Apply twice daily as needed to affected area, do not use for more than 10 days.    Flexural eczema       * triamcinolone 0.1 % ointment    KENALOG    454 g    Apply to affected area of the skin twice a day after using a moisturizer. Can do wet wraps with this.    Intrinsic atopic dermatitis       vitamin D 1000 UNITS capsule      Take 1 capsule by mouth daily.        * Notice:  This list has 2 medication(s) that are the same as other medications prescribed for you. Read the directions carefully, and ask your doctor or other care provider to review them with you.

## 2017-11-14 DIAGNOSIS — Z12.31 VISIT FOR SCREENING MAMMOGRAM: ICD-10-CM

## 2017-11-14 LAB — LAB SCANNED RESULT: NORMAL

## 2017-11-14 PROCEDURE — G0202 SCR MAMMO BI INCL CAD: HCPCS | Mod: TC

## 2017-11-28 ENCOUNTER — OFFICE VISIT (OUTPATIENT)
Dept: DERMATOLOGY | Facility: CLINIC | Age: 67
End: 2017-11-28
Payer: MEDICARE

## 2017-11-28 DIAGNOSIS — R21 RASH: Primary | ICD-10-CM

## 2017-11-28 DIAGNOSIS — L21.9 DERMATITIS, SEBORRHEIC: ICD-10-CM

## 2017-11-28 PROCEDURE — 99213 OFFICE O/P EST LOW 20 MIN: CPT | Performed by: DERMATOLOGY

## 2017-11-28 NOTE — PROGRESS NOTES
[facility] Dermatology Note      Dermatology Problem List:  1. Eczematous vs UT-like Drug exanthem possibly to HCTZ-Triamterene with cessation end of July 2017 with improvement. S/p x2 punch biopsies right forearm, right forearm +DIF.  Tx: clobetasol 0.05% ointment taper to lidex oint taper to triaminolone ointment to just moisturier. Continue off HCTZ-Triamterene.  -Previous tx: Sarah cream BID, Alternate TMC 0.1% ointment and Fluocinonide (LIDEX) 0.05% ointment BID for 4 weeks max then break for 2-3 weeks. Minimize soap use. PRN 1% hydrocortisone to eyelids. S/p 2 courses of prednisone.  -DDx: atopic dermatitis flare, drug reaction, contact dermatitis    2.Seborrheic dermatitis. TMc 0.1% ointment 2-3x a week just once a day to ears.   -Current tx: ketoconazole 2% shampoo rotating with OTC shampoos, Triamcinolone     Last TBSE: 6/6/2017    Encounter Date: Nov 28, 2017    CC:  Chief Complaint   Patient presents with     Follow Up For     dermatitis       History of Present Illness:  Ms. Marcela Allen is a 67 year old female who presents for a follow-up for a rash. Pt was last seen 8/21/17 when she was treated with ketoconazole for seb derm and had a drug xanthem was treated with clobetasol. The patient notes her ear issue has resolved. She is using Eucerin BID from the neck down and the triamcinolone ointment once a day. She has not taken her HCTZ med since July.     No other new or changing lesions.      Past Medical History:   Patient Active Problem List   Diagnosis     GERD (gastroesophageal reflux disease)     Eczema     Hiatal hernia     HTN (hypertension)     OA (osteoarthritis)     Osteopenia     Cervical pain     Advanced directives, counseling/discussion     Major depression in complete remission (H)     Hyperlipidemia LDL goal <130     Hypertension goal BP (blood pressure) < 140/90     Seasonal allergies     CKD (chronic kidney disease) stage 3, GFR 30-59 ml/min     Liver hemangioma      S/P LEEP of cervix     Former smoker     Elevated platelet count (H)     s/p B TKA 6/27     S/P knee replacement     Postsurgical hypothyroidism     Papillary thyroid carcinoma (H)     Enlarged lymph nodes     Cervical cancer (H)     Advance care planning     Cervical radiculopathy     Malignant neoplasm of cervix, unspecified site (H)     Vulvar cancer (H)     Chronic obstructive pulmonary disease, unspecified COPD type (H)     History of colonic polyps     Past Medical History:   Diagnosis Date     BULMARO II (cervical intraepithelial neoplasia II) 2007    on colp - leep path WNL     COPD (chronic obstructive pulmonary disease) (H)      Depression 1990     Depressive disorder 25 yrs     Eczema      GERD (gastroesophageal reflux disease)      Hiatal hernia      History of blood transfusion 2013    Post op     HTN (hypertension)      Hypercholesterolemia      Liver hemangioma 9/4/2012     OA (osteoarthritis)      Osteopenia      Papillary thyroid carcinoma (H) 2000    BL, MF; Tx in 2 operations, RRA     PONV (postoperative nausea and vomiting)      Postsurgical hypothyroidism 2000     FELICIA III (vulvar intraepithelial neoplasia III) 2006    wide local exc x 2     Past Surgical History:   Procedure Laterality Date     ABDOMEN SURGERY      Teenager     APPENDECTOMY OPEN  1968     ARTHROPLASTY MINIMALLY INVASIVE KNEE BILATERAL  6/27/2013    Procedure: ARTHROPLASTY MINIMALLY INVASIVE KNEE BILATERAL;  Minimally Invasive Bilateral Total Knee Arthroplasty;  Surgeon: Christophe Welch MD;  Location: [facility] OR     BIOPSY  Ongoing    Lymph nodes neck     BONE MARROW ASPIRATION ONLY Left 12/7/2015    Procedure: BONE MARROW ASPIRATION ONLY;  Surgeon: Aguilar Roldan MD;  Location: [facility] OR     CARPAL TUNNEL RELEASE RT/LT       COLONOSCOPY  2007    neg     COLPOSCOPY CERVIX, BIOPSY CERVIX, ENDOCERVICAL CURETTAGE, COMBINED  2003, 2007     completion thyroidectomy Right 3/14/00     CYSTECTOMY OVARIAN BENIGN  1969      ESOPHAGOSCOPY, GASTROSCOPY, DUODENOSCOPY (EGD), COMBINED N/A 8/28/2014    Procedure: COMBINED ESOPHAGOSCOPY, GASTROSCOPY, DUODENOSCOPY (EGD), BIOPSY SINGLE OR MULTIPLE;  Surgeon: Kelvin Montgomery MD;  Location: [facility] OR     FUSION CERVICAL ANTERIOR THREE+ LEVELS WITH BONE ALLOGRAFT N/A 12/7/2015    Procedure: FUSION CERVICAL ANTERIOR THREE+ LEVELS WITH BONE ALLOGRAFT;  Surgeon: Aguilar Roldan MD;  Location: [facility] OR     HAND SURGERY       HEAD & NECK SURGERY  2000    Thyroidectomy     KNEE SURGERY  2001 & 2005    bilat     LAPAROSCOPIC CHOLECYSTECTOMY  1998     LEEP TX, CERVICAL  2007    BULMARO II on colp, leep path WNL     left thyroid total lobectomy, isthmusectomy and 50% right thyroid lobectomy Left 3/7/00     TUBAL LIGATION  1988     VULVECTOMY SIMPLE  2006    FELICIA 3, wide local excision x 2     VULVECTOMY SIMPLE         Social History:  Reviewed and unchanged but kept in chart for clinician convenience  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 The patient admits to 1-2 drinks a week and is a non-smoker.  passed away 10/2016. Pt volunteers.    Family History:  Reviewed and unchanged but kept in chart for clinician convenience  There is no family history of skin cancer. There is a family hx of Cardiovascular disease.     Medications:  Current Outpatient Prescriptions   Medication Sig Dispense Refill     lisinopril (PRINIVIL/ZESTRIL) 20 MG tablet Take 1 tablet (20 mg) by mouth daily 90 tablet 1     FLUoxetine (PROZAC) 20 MG capsule Take 3 capsules (60 mg) by mouth daily 270 capsule 1     atorvastatin (LIPITOR) 20 MG tablet Take 1 tablet (20 mg) by mouth daily 90 tablet 1     omeprazole (PRILOSEC) 20 MG CR capsule Take 1 capsule (20 mg) by mouth every 3 days 90 capsule 1     ketoconazole (NIZORAL) 2 % shampoo Apply to the scalp, let sit for 3-5 minutes, and rinse off. Can wash ears. Use  3-4x a week as needed. 120 mL 11     levothyroxine (SYNTHROID/LEVOTHROID) 100 MCG tablet Monday-Saturday: 1 tablet/day,  Sunday: 0.5 tablet 90 tablet 3     clobetasol (TEMOVATE) 0.05 % ointment Apply twice a day to the affected area of the skin until follow up appointment. 120 g 2     triamcinolone (KENALOG) 0.1 % ointment Apply to affected area of the skin twice a day after using a moisturizer. Can do wet wraps with this. 454 g 1     triamcinolone (KENALOG) 0.1 % ointment Apply twice daily as needed to affected area, do not use for more than 10 days. 60 g 1     fluocinonide (LIDEX) 0.05 % ointment Apply twice a day to affected areas of the arms, legs, trunk for up to 4 weeks. 60 g 1     multivitamin, therapeutic with minerals (MULTI-VITAMIN) TABS Take 1 tablet by mouth daily       hypromellose (ARTIFICIAL TEARS) 0.5 % SOLN Place 1 drop into both eyes every 4 hours as needed        sennosides (SENOKOT) 8.6 MG tablet Take 2 tablets by mouth daily        cetirizine (ZYRTEC) 10 MG tablet Take 1 tablet (10 mg) by mouth daily 90 tablet 1     aspirin 81 MG tablet Take 81 mg by mouth daily        Omega-3 Fatty Acids (FISH OIL) 1200 MG CAPS Take 1 capsule by mouth daily        CALCIUM CITRATE PO Take 1,200 mg by mouth daily        Cholecalciferol (VITAMIN D) 1000 UNIT capsule Take 1 capsule by mouth daily.         Allergies   Allergen Reactions     Dye [Contrast Dye] Anaphylaxis     Echo dye       Review of Systems:  -Constitutional: The patient is otherwise feeling well. No fevers, chills. Patient has a cold   -Skin Establ Pt: The patient denies any new rash, pruritus, or lesions that are symptomatic, changing or bleeding, except as per HPI.    Physical exam:  Vitals: There were no vitals taken for this visit.  GEN: This is a well-nourished, well developed female in no acute distress  NEURO: Alert and oriented  PSYCH: in a pleasant mood, appropriate affect  SKIN: Focused examination of the bilateral forearms, bilateral  legs, and scalp was performed.  -Acnieform papule on back  - There are pink scaly patches and plaques on the legs and back.   -Scalp is healthy   -No other lesions of concern on areas examined.     Impression/Plan:  1. Seborrheic dermatitis- improved    Continue ketoconazole 2% shampoo 2-4 times a week.      2. Rash, right forearm s/p biopsy 7/2017 with subtle spongiotic dermitis direct immunoflorescene. DIF was negative. Most likely eczematous dermatitis.     Hold clobetasol 0.05% ointment + emollients daily to BID taper down to  triamcinolone instead for up to 2 weeks in a row     Continue Lisinopril, as per primary care   3. Acne- back- will recheck to confirm resolution at follow up  Follow-up in 4 months for follow up on rash or as needed for new or changing lesions.     Staff Involved:  Scribe/Staff    Scribe Disclosure:   I, Darcie Gordon, am serving as a scribe to document services personally performed by Dr. Ly Morrissey, based on data collection and the provider's statements to me.       Provider Disclosure:   The documentation recorded by the scribe accurately reflects the services I personally performed and the decisions made by me.    Ly Morrissey MD    Department of Dermatology  [facility]: Phone: [phone], Fax:[phone]  [facility]: Phone: [phone], Fax: [phone]

## 2017-11-28 NOTE — NURSING NOTE
Dermatology Rooming Note    Marcela Allen's goals for this visit include:   Chief Complaint   Patient presents with     Follow Up For     dermatitis       Is a scribe okay for this visit:YES    Are records needed for this visit(If yes, obtain release of information): No     Vitals: There were no vitals taken for this visit.    Referring Provider:  No referring provider defined for this encounter.    Zarina Howard LPN

## 2017-11-28 NOTE — MR AVS SNAPSHOT
After Visit Summary   11/28/2017    Marcela Allen    MRN: [id number]           Patient Information     Date Of Birth          1950        Visit Information        Provider Department      11/28/2017 1:15 PM Ly Morrissey MD [facility]        Today's Diagnoses     Rash    -  1    Dermatitis, seborrheic           Follow-ups after your visit        Follow-up notes from your care team     Return in about 4 months (around 3/28/2018) for TOTAL SKIN EXAM RASH.      Your next 10 appointments already scheduled     Mar 27, 2018 10:30 AM CDT   Return Visit with Ly Morrissey MD   [facility]    [address] [address] [place] [id number] [phone]              Who to contact     If you have questions or need follow up information about today's clinic visit or your schedule please contact [facility] directly at [phone].  Normal or non-critical lab and imaging results will be communicated to you by [employer], letter or phone within 4 business days after the clinic has received the results. If you do not hear from us within 7 days, please contact the clinic through [phone] or phone. If you have a critical or abnormal lab result, we will notify you by phone as soon as possible.  Submit refill requests through [employer] or call your pharmacy and they will forward the refill request to us. Please allow 3 business days for your refill to be completed.          Additional Information About Your Visit        [employer] Information     [employer] gives you secure access to your electronic health record. If you see a primary care provider, you can also send messages to your care team and make appointments. If you have questions, please call your primary care clinic.  If you do not have a primary care provider, please call [phone] and they will assist you.      [employer] is an electronic gateway that provides easy, online access to  your medical records. With [email or web address], you can request a clinic appointment, read your test results, renew a prescription or communicate with your care team.     To access your existing account, please contact your [facility] or call [phone] for assistance.        Care EveryWhere ID     This is your Care EveryWhere ID. This could be used by other organizations to access your [place] medical records  [id number]         Blood Pressure from Last 3 Encounters:   11/06/17 (P) 123/82   11/03/17 133/79   10/09/17 120/80    Weight from Last 3 Encounters:   11/06/17 73.3 kg (161 lb 9.6 oz)   11/03/17 73.2 kg (161 lb 6.4 oz)   10/09/17 72.8 kg (160 lb 9.6 oz)              Today, you had the following     No orders found for display         Today's Medication Changes          These changes are accurate as of: 11/28/17  6:17 PM.  If you have any questions, ask your nurse or doctor.               These medicines have changed or have updated prescriptions.        Dose/Directions    triamcinolone 0.1 % ointment   Commonly known as:  KENALOG   This may have changed:  Another medication with the same name was removed. Continue taking this medication, and follow the directions you see here.   Used for:  Intrinsic atopic dermatitis   Changed by:  Jonah Keller MD        Apply to affected area of the skin twice a day after using a moisturizer. Can do wet wraps with this.   Quantity:  454 g   Refills:  1         Stop taking these medicines if you haven't already. Please contact your care team if you have questions.     clobetasol 0.05 % ointment   Commonly known as:  TEMOVATE   Stopped by:  Ly Morrissey MD           fluocinonide 0.05 % ointment   Commonly known as:  LIDEX   Stopped by:  Ly Morrissey MD                    Primary Care Provider Office Phone # Fax #    Honey García PA-C [phone]       [address]  [facility] [address]        Equal Access to Services      KAHLIL ANTONIO : Hadii aad ku jacquie Chopra, waaxda luqadaha, qaybta kaalmada adelynsey, stefan andrew kathialeo mckenzienatalieoscar river . So [facility] [phone].    ATENCIÓN: Si habla español, tiene a trammell disposición servicios gratuitos de asistencia lingüística. Llame al [phone].    We comply with applicable federal civil rights laws and [place] laws. We do not discriminate on the basis of race, color, national origin, age, disability, sex, sexual orientation, or gender identity.            Thank you!     Thank you for choosing [facility]  for your care. Our goal is always to provide you with excellent care. Hearing back from our patients is one way we can continue to improve our services. Please take a few minutes to complete the written survey that you may receive in the mail after your visit with us. Thank you!             Your Updated Medication List - Protect others around you: Learn how to safely use, store and throw away your medicines at www.disposemymeds.org.          This list is accurate as of: 11/28/17  6:17 PM.  Always use your most recent med list.                   Brand Name Dispense Instructions for use Diagnosis    aspirin 81 MG tablet      Take 81 mg by mouth daily        atorvastatin 20 MG tablet    LIPITOR    90 tablet    Take 1 tablet (20 mg) by mouth daily    Hyperlipidemia LDL goal <130       CALCIUM CITRATE PO      Take 1,200 mg by mouth daily        cetirizine 10 MG tablet    zyrTEC    90 tablet    Take 1 tablet (10 mg) by mouth daily    Seasonal allergies       fish Oil 1200 MG capsule      Take 1 capsule by mouth daily        FLUoxetine 20 MG capsule    PROzac    270 capsule    Take 3 capsules (60 mg) by mouth daily    Major depression in complete remission (H)       hypromellose 0.5 % Soln ophthalmic solution    ARTIFICIAL TEARS     Place 1 drop into both eyes every 4 hours as needed        ketoconazole 2 % shampoo    NIZORAL    120 mL    Apply to the scalp, let sit  for 3-5 minutes, and rinse off. Can wash ears. Use 3-4x a week as needed.    Dermatitis, seborrheic       levothyroxine 100 MCG tablet    SYNTHROID/LEVOTHROID    90 tablet    Monday-Saturday: 1 tablet/day,  Sunday: 0.5 tablet    Postsurgical hypothyroidism, Papillary thyroid carcinoma (H)       lisinopril 20 MG tablet    PRINIVIL/ZESTRIL    90 tablet    Take 1 tablet (20 mg) by mouth daily    Essential hypertension with goal blood pressure less than 140/90       Multi-vitamin Tabs tablet      Take 1 tablet by mouth daily        omeprazole 20 MG CR capsule    priLOSEC    90 capsule    Take 1 capsule (20 mg) by mouth every 3 days    Gastroesophageal reflux disease with esophagitis       sennosides 8.6 MG tablet    SENOKOT     Take 2 tablets by mouth daily        triamcinolone 0.1 % ointment    KENALOG    454 g    Apply to affected area of the skin twice a day after using a moisturizer. Can do wet wraps with this.    Intrinsic atopic dermatitis       vitamin D 1000 UNITS capsule      Take 1 capsule by mouth daily.

## 2018-01-04 ENCOUNTER — MYC MEDICAL ADVICE (OUTPATIENT)
Dept: FAMILY MEDICINE | Facility: CLINIC | Age: 68
End: 2018-01-04

## 2018-01-04 DIAGNOSIS — K21.00 GASTROESOPHAGEAL REFLUX DISEASE WITH ESOPHAGITIS: ICD-10-CM

## 2018-04-23 ENCOUNTER — MYC MEDICAL ADVICE (OUTPATIENT)
Dept: FAMILY MEDICINE | Facility: CLINIC | Age: 68
End: 2018-04-23

## 2018-04-30 ENCOUNTER — OFFICE VISIT (OUTPATIENT)
Dept: FAMILY MEDICINE | Facility: CLINIC | Age: 68
End: 2018-04-30
Payer: MEDICARE

## 2018-04-30 VITALS
WEIGHT: 165.6 LBS | HEIGHT: 63 IN | TEMPERATURE: 97.7 F | BODY MASS INDEX: 29.34 KG/M2 | OXYGEN SATURATION: 98 % | SYSTOLIC BLOOD PRESSURE: 120 MMHG | DIASTOLIC BLOOD PRESSURE: 80 MMHG

## 2018-04-30 DIAGNOSIS — F32.5 MAJOR DEPRESSION IN COMPLETE REMISSION (H): ICD-10-CM

## 2018-04-30 DIAGNOSIS — R53.83 FATIGUE, UNSPECIFIED TYPE: ICD-10-CM

## 2018-04-30 DIAGNOSIS — N18.30 CKD (CHRONIC KIDNEY DISEASE) STAGE 3, GFR 30-59 ML/MIN (H): ICD-10-CM

## 2018-04-30 DIAGNOSIS — R05.9 COUGH: ICD-10-CM

## 2018-04-30 DIAGNOSIS — I10 ESSENTIAL HYPERTENSION WITH GOAL BLOOD PRESSURE LESS THAN 140/90: ICD-10-CM

## 2018-04-30 DIAGNOSIS — J44.9 CHRONIC OBSTRUCTIVE PULMONARY DISEASE, UNSPECIFIED COPD TYPE (H): ICD-10-CM

## 2018-04-30 DIAGNOSIS — E78.5 HYPERLIPIDEMIA LDL GOAL <130: Primary | ICD-10-CM

## 2018-04-30 DIAGNOSIS — K21.00 GASTROESOPHAGEAL REFLUX DISEASE WITH ESOPHAGITIS: ICD-10-CM

## 2018-04-30 LAB
ANION GAP SERPL CALCULATED.3IONS-SCNC: 3 MMOL/L (ref 3–14)
BUN SERPL-MCNC: 13 MG/DL (ref 7–30)
CALCIUM SERPL-MCNC: 9.2 MG/DL (ref 8.5–10.1)
CHLORIDE SERPL-SCNC: 104 MMOL/L (ref 94–109)
CO2 SERPL-SCNC: 30 MMOL/L (ref 20–32)
CREAT SERPL-MCNC: 0.92 MG/DL (ref 0.52–1.04)
CREAT UR-MCNC: 88 MG/DL
ERYTHROCYTE [DISTWIDTH] IN BLOOD BY AUTOMATED COUNT: 14.9 % (ref 10–15)
GFR SERPL CREATININE-BSD FRML MDRD: 61 ML/MIN/1.7M2
GLUCOSE SERPL-MCNC: 94 MG/DL (ref 70–99)
HCT VFR BLD AUTO: 42.3 % (ref 35–47)
HGB BLD-MCNC: 14.3 G/DL (ref 11.7–15.7)
MCH RBC QN AUTO: 28.5 PG (ref 26.5–33)
MCHC RBC AUTO-ENTMCNC: 33.8 G/DL (ref 31.5–36.5)
MCV RBC AUTO: 84 FL (ref 78–100)
MICROALBUMIN UR-MCNC: <5 MG/L
MICROALBUMIN/CREAT UR: NORMAL MG/G CR (ref 0–25)
PLATELET # BLD AUTO: 372 10E9/L (ref 150–450)
POTASSIUM SERPL-SCNC: 4.4 MMOL/L (ref 3.4–5.3)
RBC # BLD AUTO: 5.01 10E12/L (ref 3.8–5.2)
SODIUM SERPL-SCNC: 137 MMOL/L (ref 133–144)
WBC # BLD AUTO: 7.2 10E9/L (ref 4–11)

## 2018-04-30 PROCEDURE — 36415 COLL VENOUS BLD VENIPUNCTURE: CPT | Performed by: PHYSICIAN ASSISTANT

## 2018-04-30 PROCEDURE — 99214 OFFICE O/P EST MOD 30 MIN: CPT | Performed by: PHYSICIAN ASSISTANT

## 2018-04-30 PROCEDURE — 85027 COMPLETE CBC AUTOMATED: CPT | Performed by: PHYSICIAN ASSISTANT

## 2018-04-30 PROCEDURE — 82043 UR ALBUMIN QUANTITATIVE: CPT | Performed by: PHYSICIAN ASSISTANT

## 2018-04-30 PROCEDURE — 80048 BASIC METABOLIC PNL TOTAL CA: CPT | Performed by: PHYSICIAN ASSISTANT

## 2018-04-30 RX ORDER — LISINOPRIL 20 MG/1
20 TABLET ORAL DAILY
Qty: 90 TABLET | Refills: 1 | Status: SHIPPED | OUTPATIENT
Start: 2018-04-30 | End: 2018-10-05

## 2018-04-30 RX ORDER — ATORVASTATIN CALCIUM 20 MG/1
20 TABLET, FILM COATED ORAL DAILY
Qty: 90 TABLET | Refills: 1 | Status: SHIPPED | OUTPATIENT
Start: 2018-04-30 | End: 2018-10-05

## 2018-04-30 NOTE — PROGRESS NOTES
SUBJECTIVE:   Marcela Allen is a 67 year old female who presents to clinic today for the following health issues:      Hyperlipidemia Follow-Up      Rate your low fat/cholesterol diet?: not monitoring fat    Taking statin?  Yes, no muscle aches from statin    Other lipid medications/supplements?:  Q 10     Hypertension Follow-up      Outpatient blood pressures are being checked at home.      Low Salt Diet: not monitoring salt    Depression Followup    Status since last visit: Stable     See PHQ-9 for current symptoms.  Other associated symptoms: None    Complicating factors:   Significant life event:  No   Current substance abuse:  None  Anxiety or Panic symptoms:  No    Was sick multiple times this winter with self limited URI's.  Still with a mild cough and sore throat that seems constant.  No allergy symptoms.       PHQ-9 4/4/2016 10/10/2016 4/3/2017   Total Score 2 0 8   Q9: Suicide Ideation Not at all Not at all Several days       PHQ-9  English  PHQ-9   Any Language  Suicide Assessment Five-step Evaluation and Treatment (SAFE-T)    Amount of exercise or physical activity: 4-5 days/week for an average of 30-45 minutes    Problems taking medications regularly: No    Medication side effects: none    Diet: regular (no restrictions)      Swelling noted below left knee. She has had both knee's replaced in the past.  No recent injury.  No significant pain.     She does c/o fatigue at times.  Thyroid managed by endo.  She is on a vitamin D supplement.  She will take naps at times during the day, has been doing this for years.       She also c/o cough off and on.   We have cut back on her PPI due to kidney concerns, she starts to notice more heart burn building up by Sunday and she will take a tums.    She is on lisinopril, tolerating it well.  Stopped hydrochlorothiazide and doing fine without it.    Problem list and histories reviewed & adjusted, as indicated.  Additional history: as documented    Patient Active  Problem List   Diagnosis     GERD (gastroesophageal reflux disease)     Eczema     Hiatal hernia     HTN (hypertension)     OA (osteoarthritis)     Osteopenia     Cervical pain     Advanced directives, counseling/discussion     Major depression in complete remission (H)     Hyperlipidemia LDL goal <130     Hypertension goal BP (blood pressure) < 140/90     Seasonal allergies     CKD (chronic kidney disease) stage 3, GFR 30-59 ml/min     Liver hemangioma     S/P LEEP of cervix     Former smoker     Elevated platelet count (H)     s/p B TKA 6/27     S/P knee replacement     Postsurgical hypothyroidism     Papillary thyroid carcinoma (H)     Enlarged lymph nodes     Cervical cancer (H)     Advance care planning     Cervical radiculopathy     Malignant neoplasm of cervix, unspecified site (H)     Vulvar cancer (H)     Chronic obstructive pulmonary disease, unspecified COPD type (H)     History of colonic polyps     Past Surgical History:   Procedure Laterality Date     ABDOMEN SURGERY      Teenager     APPENDECTOMY OPEN  1968     ARTHROPLASTY MINIMALLY INVASIVE KNEE BILATERAL  6/27/2013    Procedure: ARTHROPLASTY MINIMALLY INVASIVE KNEE BILATERAL;  Minimally Invasive Bilateral Total Knee Arthroplasty;  Surgeon: Christophe Welch MD;  Location: [facility]     BIOPSY  Ongoing    Lymph nodes neck     BONE MARROW ASPIRATION ONLY Left 12/7/2015    Procedure: BONE MARROW ASPIRATION ONLY;  Surgeon: Aguilar Roldan MD;  Location: [facility] OR     CARPAL TUNNEL RELEASE RT/LT       COLONOSCOPY  2007    neg     COLPOSCOPY CERVIX, BIOPSY CERVIX, ENDOCERVICAL CURETTAGE, COMBINED  2003, 2007     completion thyroidectomy Right 3/14/00     CYSTECTOMY OVARIAN BENIGN  1969     ESOPHAGOSCOPY, GASTROSCOPY, DUODENOSCOPY (EGD), COMBINED N/A 8/28/2014    Procedure: COMBINED ESOPHAGOSCOPY, GASTROSCOPY, DUODENOSCOPY (EGD), BIOPSY SINGLE OR MULTIPLE;  Surgeon: Kelvin Montgomery MD;  Location: [facility]     FUSION CERVICAL ANTERIOR THREE+ LEVELS  WITH BONE ALLOGRAFT N/A 12/7/2015    Procedure: FUSION CERVICAL ANTERIOR THREE+ LEVELS WITH BONE ALLOGRAFT;  Surgeon: Aguilar Roldan MD;  Location: [facility]     HAND SURGERY       HEAD & NECK SURGERY  2000    Thyroidectomy     KNEE SURGERY  2001 & 2005    bilat     LAPAROSCOPIC CHOLECYSTECTOMY  1998     LEEP TX, CERVICAL  2007    BULMARO II on colp, leep path WNL     left thyroid total lobectomy, isthmusectomy and 50% right thyroid lobectomy Left 3/7/00     TUBAL LIGATION  1988     VULVECTOMY SIMPLE  2006    FELICIA 3, wide local excision x 2     VULVECTOMY SIMPLE         Social History   Substance Use Topics     Smoking status: Former Smoker     Packs/day: 1.00     Years: 45.00     Types: Cigarettes     Quit date: 8/1/2012     Smokeless tobacco: Never Used      Comment: quitting with e cigarette     Alcohol use Yes      Comment: less than weekly     Family History   Problem Relation Age of Onset     Blood Disease Mother 75     thrombocythemia on hydrea     Hypertension Mother      CEREBROVASCULAR DISEASE Mother      TIA     Anesthesia Reaction Mother      N/V     OSTEOPOROSIS Mother      C.A.D. Father 61     three MI's, smoker     Hypertension Father      Coronary Artery Disease Father      DIABETES Paternal Aunt      Cancer - colorectal Maternal Grandmother      unsure of her age.     Breast Cancer No family hx of      Asthma No family hx of          Current Outpatient Prescriptions   Medication Sig Dispense Refill     aspirin 81 MG tablet Take 81 mg by mouth daily        atorvastatin (LIPITOR) 20 MG tablet Take 1 tablet (20 mg) by mouth daily 90 tablet 1     CALCIUM CITRATE PO Take 1,200 mg by mouth daily        cetirizine (ZYRTEC) 10 MG tablet Take 1 tablet (10 mg) by mouth daily 90 tablet 1     Cholecalciferol (VITAMIN D) 1000 UNIT capsule Take 1 capsule by mouth daily.       FLUoxetine (PROZAC) 20 MG capsule Take 3 capsules (60 mg) by mouth daily 270 capsule 1     hypromellose (ARTIFICIAL TEARS) 0.5 % SOLN Place  "1 drop into both eyes every 4 hours as needed        levothyroxine (SYNTHROID/LEVOTHROID) 100 MCG tablet Monday-Saturday: 1 tablet/day,  Sunday: 0.5 tablet 90 tablet 3     lisinopril (PRINIVIL/ZESTRIL) 20 MG tablet Take 1 tablet (20 mg) by mouth daily 90 tablet 1     multivitamin, therapeutic with minerals (MULTI-VITAMIN) TABS Take 1 tablet by mouth daily       omeprazole (PRILOSEC) 20 MG CR capsule 1 capsule M, W, F 90 capsule 1     sennosides (SENOKOT) 8.6 MG tablet Take 2 tablets by mouth daily        triamcinolone (KENALOG) 0.1 % ointment Apply to affected area of the skin twice a day after using a moisturizer. Can do wet wraps with this. 454 g 1     ketoconazole (NIZORAL) 2 % shampoo Apply to the scalp, let sit for 3-5 minutes, and rinse off. Can wash ears. Use 3-4x a week as needed. 120 mL 11     Omega-3 Fatty Acids (FISH OIL) 1200 MG CAPS Take 1 capsule by mouth daily        [DISCONTINUED] atorvastatin (LIPITOR) 20 MG tablet Take 1 tablet (20 mg) by mouth daily 90 tablet 1     [DISCONTINUED] FLUoxetine (PROZAC) 20 MG capsule Take 3 capsules (60 mg) by mouth daily 270 capsule 1     [DISCONTINUED] lisinopril (PRINIVIL/ZESTRIL) 20 MG tablet Take 1 tablet (20 mg) by mouth daily 90 tablet 1     BP Readings from Last 3 Encounters:   04/30/18 120/80   11/06/17 (P) 123/82   11/03/17 133/79    Wt Readings from Last 3 Encounters:   04/30/18 165 lb 9.6 oz (75.1 kg)   11/06/17 161 lb 9.6 oz (73.3 kg)   11/03/17 161 lb 6.4 oz (73.2 kg)                    Reviewed and updated as needed this visit by clinical staff  Tobacco  Allergies  Meds  Med Hx  Surg Hx  Fam Hx  Soc Hx      Reviewed and updated as needed this visit by Provider         ROS:  Constitutional, HEENT, cardiovascular, pulmonary, GI, , musculoskeletal, neuro, skin, endocrine and psych systems are negative, except as otherwise noted.    OBJECTIVE:     /80  Temp 97.7  F (36.5  C) (Tympanic)  Ht 5' 2.75\" (1.594 m)  Wt 165 lb 9.6 oz (75.1 kg)  " SpO2 98%  Breastfeeding? No  BMI 29.57 kg/m2  Body mass index is 29.57 kg/(m^2).  GENERAL: healthy, alert and no distress  NECK: no adenopathy, no asymmetry, masses, or scars and thyroid normal to palpation  RESP: lungs clear to auscultation - no rales, rhonchi or wheezes  CV: regular rate and rhythm, normal S1 S2, no S3 or S4, no murmur, click or rub, no peripheral edema and peripheral pulses strong  ABDOMEN: soft, nontender, no hepatosplenomegaly, no masses and bowel sounds normal  MS: no gross musculoskeletal defects noted, no edema  Left knee with an effusion noted on medial aspect below knee.  Full ROM.  Surgical scars noted.  No warmth or redness.  No calf pain.   Diagnostic Test Results:  none     ASSESSMENT/PLAN:       1. Hyperlipidemia LDL goal <130  Doing well on statin, will check fasting labs in 6 months.   - atorvastatin (LIPITOR) 20 MG tablet; Take 1 tablet (20 mg) by mouth daily  Dispense: 90 tablet; Refill: 1    2. Major depression in complete remission  Stable.   - FLUoxetine (PROZAC) 20 MG capsule; Take 3 capsules (60 mg) by mouth daily  Dispense: 270 capsule; Refill: 1    3. Essential hypertension with goal blood pressure less than 140/90  Bp at goal, leave meds as is.   - lisinopril (PRINIVIL/ZESTRIL) 20 MG tablet; Take 1 tablet (20 mg) by mouth daily  Dispense: 90 tablet; Refill: 1  - Basic metabolic panel  - Albumin Random Urine Quantitative with Creat Ratio    4. Gastroesophageal reflux disease with esophagitis  Continue to use this M, W, F, may consider increasing slightly.    - omeprazole (PRILOSEC) 20 MG CR capsule; 1 capsule M, W, F  Dispense: 90 capsule; Refill: 1    5. Fatigue, unspecified type  Will check CBC given fatigue, due to recheck platelets as well.  She is already on a vitamin D supplement.   - CBC with platelets    6. Cough  May be due to COPD, versus not fully treated GERD, vs ACE-I side effect vs other.  At this point it is not bothering her much, she will keep me in the  loop.     7. Chronic obstructive pulmonary disease, unspecified COPD type (H)  Breathing stable.     8. CKD (chronic kidney disease) stage 3, GFR 30-59 ml/min  Will recheck labs.       FUTURE APPOINTMENTS:       - Follow-up visit in 6 months (physical)    Honey García PA-C  [facility]

## 2018-04-30 NOTE — MR AVS SNAPSHOT
After Visit Summary   4/30/2018    Marcela Allen    MRN: [id number]           Patient Information     Date Of Birth          1950        Visit Information        Provider Department      4/30/2018 10:00 AM Honey García PA-C [facility]        Today's Diagnoses     Hyperlipidemia LDL goal <130    -  1    Major depression in complete remission        Essential hypertension with goal blood pressure less than 140/90        Gastroesophageal reflux disease with esophagitis        Fatigue, unspecified type        Cough        Chronic obstructive pulmonary disease, unspecified COPD type (H)        CKD (chronic kidney disease) stage 3, GFR 30-59 ml/min           Follow-ups after your visit        Who to contact     Normal or non-critical lab and imaging results will be communicated to you by Vinjahart, letter or phone within 4 business days after the clinic has received the results. If you do not hear from us within 7 days, please contact the clinic through Vinjahart or phone. If you have a critical or abnormal lab result, we will notify you by phone as soon as possible.  Submit refill requests through [employer] Elite or call your pharmacy and they will forward the refill request to us. Please allow 3 business days for your refill to be completed.          If you need to speak with a  for additional information , please call: [phone]           Additional Information About Your Visit        [employer] Information     [employer] Elite gives you secure access to your electronic health record. If you see a primary care provider, you can also send messages to your care team and make appointments. If you have questions, please call your primary care clinic.  If you do not have a primary care provider, please call [phone] and they will assist you.        Care EveryWhere ID     This is your Care EveryWhere ID. This could be used by other organizations to access your [facility]  "records  [id number]        Your Vitals Were     Temperature Height Pulse Oximetry Breastfeeding? BMI (Body Mass Index)       97.7  F (36.5  C) (Tympanic) 5' 2.75\" (1.594 m) 98% No 29.57 kg/m2        Blood Pressure from Last 3 Encounters:   04/30/18 120/80   11/06/17 (P) 123/82   11/03/17 133/79    Weight from Last 3 Encounters:   04/30/18 165 lb 9.6 oz (75.1 kg)   11/06/17 161 lb 9.6 oz (73.3 kg)   11/03/17 161 lb 6.4 oz (73.2 kg)              We Performed the Following     Albumin Random Urine Quantitative with Creat Ratio     Basic metabolic panel     CBC with platelets          Where to get your medicines      These medications were sent to [facility] [place] [place] - [address]  [address], [place] [address]     Phone:  [phone]     atorvastatin 20 MG tablet    FLUoxetine 20 MG capsule    lisinopril 20 MG tablet    omeprazole 20 MG CR capsule          Primary Care Provider Office Phone # Fax #    Honey García PA-C [phone] [address] [place] [address]        Equal Access to Services     KAHLIL ANTONIO : Hadii luis enrique ku hadasho Soomaali, waaxda luqadaha, qaybta kaalmada adeegyada, waxay andrew brewer. So [facility] [phone].    ATENCIÓN: Si habla español, tiene a trammell disposición servicios gratuitos de asistencia lingüística. Llame al [phone].    We comply with applicable federal civil rights laws and [place] laws. We do not discriminate on the basis of race, color, national origin, age, disability, sex, sexual orientation, or gender identity.            Thank you!     Thank you for choosing [facility] [place]  for your care. Our goal is always to provide you with excellent care. Hearing back from our patients is one way we can continue to improve our services. Please take a few minutes to complete the written survey that you may receive in the mail after your visit with us. Thank you!             Your Updated " Medication List - Protect others around you: Learn how to safely use, store and throw away your medicines at www.disposemymeds.org.          This list is accurate as of 4/30/18  1:25 PM.  Always use your most recent med list.                   Brand Name Dispense Instructions for use Diagnosis    aspirin 81 MG tablet      Take 81 mg by mouth daily        atorvastatin 20 MG tablet    LIPITOR    90 tablet    Take 1 tablet (20 mg) by mouth daily    Hyperlipidemia LDL goal <130       CALCIUM CITRATE PO      Take 1,200 mg by mouth daily        cetirizine 10 MG tablet    zyrTEC    90 tablet    Take 1 tablet (10 mg) by mouth daily    Seasonal allergies       fish Oil 1200 MG capsule      Take 1 capsule by mouth daily        FLUoxetine 20 MG capsule    PROzac    270 capsule    Take 3 capsules (60 mg) by mouth daily    Major depression in complete remission (H)       hypromellose 0.5 % Soln ophthalmic solution    ARTIFICIAL TEARS     Place 1 drop into both eyes every 4 hours as needed        ketoconazole 2 % shampoo    NIZORAL    120 mL    Apply to the scalp, let sit for 3-5 minutes, and rinse off. Can wash ears. Use 3-4x a week as needed.    Dermatitis, seborrheic       levothyroxine 100 MCG tablet    SYNTHROID/LEVOTHROID    90 tablet    Monday-Saturday: 1 tablet/day,  Sunday: 0.5 tablet    Postsurgical hypothyroidism, Papillary thyroid carcinoma (H)       lisinopril 20 MG tablet    PRINIVIL/ZESTRIL    90 tablet    Take 1 tablet (20 mg) by mouth daily    Essential hypertension with goal blood pressure less than 140/90       Multi-vitamin Tabs tablet      Take 1 tablet by mouth daily        omeprazole 20 MG CR capsule    priLOSEC    90 capsule    1 capsule M, W, F    Gastroesophageal reflux disease with esophagitis       sennosides 8.6 MG tablet    SENOKOT     Take 2 tablets by mouth daily        triamcinolone 0.1 % ointment    KENALOG    454 g    Apply to affected area of the skin twice a day after using a moisturizer. Can  do wet wraps with this.    Intrinsic atopic dermatitis       vitamin D 1000 units capsule      Take 1 capsule by mouth daily.

## 2018-04-30 NOTE — NURSING NOTE
"Chief Complaint   Patient presents with     Recheck Medication       Initial /80  Temp 97.7  F (36.5  C) (Tympanic)  Ht 5' 2.75\" (1.594 m)  Wt 165 lb 9.6 oz (75.1 kg)  Breastfeeding? No  BMI 29.57 kg/m2 Estimated body mass index is 29.57 kg/(m^2) as calculated from the following:    Height as of this encounter: 5' 2.75\" (1.594 m).    Weight as of this encounter: 165 lb 9.6 oz (75.1 kg).  Medication Reconciliation: complete     Yecenia Hampton MA      "

## 2018-07-02 ENCOUNTER — MYC MEDICAL ADVICE (OUTPATIENT)
Dept: FAMILY MEDICINE | Facility: CLINIC | Age: 68
End: 2018-07-02

## 2018-07-24 DIAGNOSIS — C73 PAPILLARY THYROID CARCINOMA (H): Chronic | ICD-10-CM

## 2018-07-24 DIAGNOSIS — E89.0 POSTSURGICAL HYPOTHYROIDISM: Chronic | ICD-10-CM

## 2018-07-24 RX ORDER — LEVOTHYROXINE SODIUM 100 UG/1
TABLET ORAL
Qty: 90 TABLET | Refills: 3 | Status: CANCELLED | OUTPATIENT
Start: 2018-07-24

## 2018-07-24 RX ORDER — LEVOTHYROXINE SODIUM 100 UG/1
TABLET ORAL
Qty: 30 TABLET | Refills: 0 | Status: SHIPPED | OUTPATIENT
Start: 2018-07-24 | End: 2018-07-25

## 2018-07-25 DIAGNOSIS — E89.0 POSTSURGICAL HYPOTHYROIDISM: Chronic | ICD-10-CM

## 2018-07-25 DIAGNOSIS — C73 PAPILLARY THYROID CARCINOMA (H): Chronic | ICD-10-CM

## 2018-07-25 RX ORDER — LEVOTHYROXINE SODIUM 100 UG/1
TABLET ORAL
Qty: 90 TABLET | Refills: 3 | Status: SHIPPED | OUTPATIENT
Start: 2018-07-25 | End: 2018-11-28

## 2018-08-20 ENCOUNTER — TRANSFERRED RECORDS (OUTPATIENT)
Dept: HEALTH INFORMATION MANAGEMENT | Facility: CLINIC | Age: 68
End: 2018-08-20

## 2018-10-05 ENCOUNTER — OFFICE VISIT (OUTPATIENT)
Dept: FAMILY MEDICINE | Facility: CLINIC | Age: 68
End: 2018-10-05
Payer: MEDICARE

## 2018-10-05 VITALS
SYSTOLIC BLOOD PRESSURE: 146 MMHG | BODY MASS INDEX: 28.04 KG/M2 | WEIGHT: 158.25 LBS | HEIGHT: 63 IN | DIASTOLIC BLOOD PRESSURE: 82 MMHG | HEART RATE: 76 BPM

## 2018-10-05 DIAGNOSIS — E78.5 HYPERLIPIDEMIA LDL GOAL <130: ICD-10-CM

## 2018-10-05 DIAGNOSIS — K21.00 GASTROESOPHAGEAL REFLUX DISEASE WITH ESOPHAGITIS: ICD-10-CM

## 2018-10-05 DIAGNOSIS — L20.84 INTRINSIC ATOPIC DERMATITIS: ICD-10-CM

## 2018-10-05 DIAGNOSIS — F32.5 MAJOR DEPRESSION IN COMPLETE REMISSION (H): ICD-10-CM

## 2018-10-05 DIAGNOSIS — Z00.00 MEDICARE ANNUAL WELLNESS VISIT, SUBSEQUENT: Primary | ICD-10-CM

## 2018-10-05 DIAGNOSIS — Z23 NEED FOR PROPHYLACTIC VACCINATION AND INOCULATION AGAINST INFLUENZA: ICD-10-CM

## 2018-10-05 DIAGNOSIS — I10 ESSENTIAL HYPERTENSION WITH GOAL BLOOD PRESSURE LESS THAN 140/90: ICD-10-CM

## 2018-10-05 PROCEDURE — 90686 IIV4 VACC NO PRSV 0.5 ML IM: CPT | Performed by: PHYSICIAN ASSISTANT

## 2018-10-05 PROCEDURE — 99214 OFFICE O/P EST MOD 30 MIN: CPT | Mod: 25 | Performed by: PHYSICIAN ASSISTANT

## 2018-10-05 PROCEDURE — G0008 ADMIN INFLUENZA VIRUS VAC: HCPCS | Performed by: PHYSICIAN ASSISTANT

## 2018-10-05 PROCEDURE — G0439 PPPS, SUBSEQ VISIT: HCPCS | Performed by: PHYSICIAN ASSISTANT

## 2018-10-05 RX ORDER — LISINOPRIL 40 MG/1
40 TABLET ORAL DAILY
Qty: 90 TABLET | Refills: 0 | Status: SHIPPED | OUTPATIENT
Start: 2018-10-05 | End: 2019-01-10

## 2018-10-05 RX ORDER — ATORVASTATIN CALCIUM 20 MG/1
20 TABLET, FILM COATED ORAL DAILY
Qty: 90 TABLET | Refills: 1 | Status: SHIPPED | OUTPATIENT
Start: 2018-10-05 | End: 2019-05-28

## 2018-10-05 RX ORDER — TRIAMCINOLONE ACETONIDE 1 MG/G
OINTMENT TOPICAL
Qty: 454 G | Refills: 1 | Status: SHIPPED | OUTPATIENT
Start: 2018-10-05 | End: 2021-08-13

## 2018-10-05 NOTE — PATIENT INSTRUCTIONS
Preventive Health Recommendations    Female Ages 65 +    Yearly exam:     See your health care provider every year in order to  o Review health changes.   o Discuss preventive care.    o Review your medicines if your doctor has prescribed any.      You no longer need a yearly Pap test unless you've had an abnormal Pap test in the past 10 years. If you have vaginal symptoms, such as bleeding or discharge, be sure to talk with your provider about a Pap test.      Every 1 to 2 years, have a mammogram.  If you are over 69, talk with your health care provider about whether or not you want to continue having screening mammograms.      Every 10 years, have a colonoscopy. Or, have a yearly FIT test (stool test). These exams will check for colon cancer.       Have a cholesterol test every 5 years, or more often if your doctor advises it.       Have a diabetes test (fasting glucose) every three years. If you are at risk for diabetes, you should have this test more often.       At age 65, have a bone density scan (DEXA) to check for osteoporosis (brittle bone disease).    Shots:    Get a flu shot each year.    Get a tetanus shot every 10 years.    Talk to your doctor about your pneumonia vaccines. There are now two you should receive - Pneumovax (PPSV 23) and Prevnar (PCV 13).    Talk to your pharmacist about the shingles vaccine.    Talk to your doctor about the hepatitis B vaccine.    Nutrition:     Eat at least 5 servings of fruits and vegetables each day.      Eat whole-grain bread, whole-wheat pasta and brown rice instead of white grains and rice.      Get adequate Calcium and Vitamin D.     Lifestyle    Exercise at least 150 minutes a week (30 minutes a day, 5 days a week). This will help you control your weight and prevent disease.      Limit alcohol to one drink per day.      No smoking.       Wear sunscreen to prevent skin cancer.       See your dentist twice a year for an exam and cleaning.      See your eye doctor  every 1 to 2 years to screen for conditions such as glaucoma, macular degeneration and cataracts.    We will increase your lisinopril today from 20 mg to 40 mg.  Schedule fasting labs over at [facility] lab and a nurse only BP check (in about 2-4 weeks)

## 2018-10-05 NOTE — PROGRESS NOTES
SUBJECTIVE:   Marcela Allen is a 67 year old female who presents for Preventive Visit.      Are you in the first 12 months of your Medicare Part B coverage?  No    Healthy Habits:    Do you get at least three servings of calcium containing foods daily (dairy, green leafy vegetables, etc.)? no, taking calcium and/or vitamin D supplement    Amount of exercise or daily activities, outside of work: None     Problems taking medications regularly No    Medication side effects: No    Have you had an eye exam in the past two years? yes    Do you see a dentist twice per year? yes    Do you have sleep apnea, excessive snoring or daytime drowsiness?no      Ability to successfully perform activities of daily living: Yes, no assistance needed    Home safety:  none identified     Hearing impairment: No    Fall risk:           COGNITIVE SCREEN  1) Repeat 3 items (Leader, Season, Table)    2) Clock draw: NORMAL  3) 3 item recall: Recalls 2 objects   Results: NORMAL clock, 1-2 items recalled: COGNITIVE IMPAIRMENT LESS LIKELY    Mini-CogTM Copyright B[employer]i. Licensed by the author for use in [facility]; reprinted with permission (arleen@.[email or web address]). All rights reserved.        - Mammogram last completed 11/14/2017 with normal findings. She is on a routine annual schedule for this and will be due to repeat 11/2018.    - MRI completed 8/20/2018      Reviewed and updated as needed this visit by clinical staff          HOme BP running 140/80-90's mmHg    Reviewed and updated as needed this visit by Provider        Social History   Substance Use Topics     Smoking status: Former Smoker     Packs/day: 1.00     Years: 45.00     Types: Cigarettes     Quit date: 8/1/2012     Smokeless tobacco: Never Used      Comment: quitting with e cigarette     Alcohol use Yes      Comment: less than weekly       If you drink alcohol do you typically have >3 drinks per day or >7 drinks per week? No                        Today's PHQ-2 Score:    PHQ-2 ( 1999 [employer]) 10/6/2017 10/10/2016   Q1: Little interest or pleasure in doing things 0 0   Q2: Feeling down, depressed or hopeless 0 0   PHQ-2 Score 0 0   Q1: Little interest or pleasure in doing things Not at all -   Q2: Feeling down, depressed or hopeless Not at all -   PHQ-2 Score 0 -       Do you feel safe in your environment - Yes    Do you have a Health Care Directive?: Yes: Advance Directive has been received and scanned.    Current providers sharing in care for this patient include:   Patient Care Team:  Honey García PA-C as PCP - General (Family Practice)  Geovanna Chatterjee MD as MD (INTERNAL MEDICINE - ENDOCRINOLOGY, DIABETES & METABOLISM)    The following health maintenance items are reviewed in Epic and correct as of today:  Health Maintenance   Topic Date Due     COPD ACTION PLAN Q1 YR  1950     DEPRESSION ACTION PLAN Q1 YR  04/04/2017     PHQ-9 Q6 MONTHS  10/03/2017     DEXA Q3 YR  04/03/2018     INFLUENZA VACCINE (1) 09/01/2018     FALL RISK ASSESSMENT  10/09/2018     MAMMO Q1 YR  11/14/2018     TETANUS IMMUNIZATION (SYSTEM ASSIGNED)  01/28/2019     ADVANCE DIRECTIVE PLANNING Q5 YRS  11/23/2020     LIPID SCREEN Q5 YR FEMALE (SYSTEM ASSIGNED)  10/09/2022     COLON CANCER SCREEN (SYSTEM ASSIGNED)  10/06/2027     SPIROMETRY ONETIME  Completed     PNEUMOCOCCAL  Completed     HEPATITIS C SCREENING  Completed     Labs reviewed in EPIC  BP Readings from Last 3 Encounters:   10/05/18 146/82   04/30/18 120/80   11/06/17 (P) 123/82    Wt Readings from Last 3 Encounters:   10/05/18 158 lb 4 oz (71.8 kg)   04/30/18 165 lb 9.6 oz (75.1 kg)   11/06/17 161 lb 9.6 oz (73.3 kg)                  Current Outpatient Prescriptions   Medication Sig Dispense Refill     aspirin 81 MG tablet Take 81 mg by mouth daily        atorvastatin (LIPITOR) 20 MG tablet Take 1 tablet (20 mg) by mouth daily 90 tablet 1     CALCIUM CITRATE PO Take 1,200 mg by mouth daily        cetirizine (ZYRTEC) 10 MG tablet  "Take 1 tablet (10 mg) by mouth daily 90 tablet 1     Cholecalciferol (VITAMIN D) 1000 UNIT capsule Take 1 capsule by mouth daily.       FLUoxetine (PROZAC) 20 MG capsule Take 3 capsules (60 mg) by mouth daily 270 capsule 1     hypromellose (ARTIFICIAL TEARS) 0.5 % SOLN Place 1 drop into both eyes every 4 hours as needed        levothyroxine (SYNTHROID/LEVOTHROID) 100 MCG tablet Monday-Saturday: 1 tablet/day,  Sunday: 0.5 tablet 90 tablet 3     lisinopril (PRINIVIL/ZESTRIL) 40 MG tablet Take 1 tablet (40 mg) by mouth daily 90 tablet 0     multivitamin, therapeutic with minerals (MULTI-VITAMIN) TABS Take 1 tablet by mouth daily       Omega-3 Fatty Acids (FISH OIL) 1200 MG CAPS Take 1 capsule by mouth daily        omeprazole (PRILOSEC) 20 MG CR capsule 1 capsule M, W, F 90 capsule 1     sennosides (SENOKOT) 8.6 MG tablet Take 2 tablets by mouth daily        triamcinolone (KENALOG) 0.1 % ointment Apply to affected area of the skin twice a day after using a moisturizer. Can do wet wraps with this. 454 g 1     [DISCONTINUED] atorvastatin (LIPITOR) 20 MG tablet Take 1 tablet (20 mg) by mouth daily 90 tablet 1     [DISCONTINUED] FLUoxetine (PROZAC) 20 MG capsule Take 3 capsules (60 mg) by mouth daily 270 capsule 1     [DISCONTINUED] lisinopril (PRINIVIL/ZESTRIL) 20 MG tablet Take 1 tablet (20 mg) by mouth daily 90 tablet 1           ROS:  Constitutional, HEENT, cardiovascular, pulmonary, GI, , musculoskeletal, neuro, skin, endocrine and psych systems are negative, except as otherwise noted.    OBJECTIVE:   There were no vitals taken for this visit. Estimated body mass index is 29.57 kg/(m^2) as calculated from the following:    Height as of 4/30/18: 5' 2.75\" (1.594 m).    Weight as of 4/30/18: 165 lb 9.6 oz (75.1 kg).  EXAM:   GENERAL APPEARANCE: healthy, alert and no distress  EYES: Eyes grossly normal to inspection, PERRL and conjunctivae and sclerae normal  HENT: ear canals and TM's normal, nose and mouth without " ulcers or lesions, oropharynx clear and oral mucous membranes moist  NECK: no adenopathy, no asymmetry, masses, or scars and thyroid normal to palpation  RESP: lungs clear to auscultation - no rales, rhonchi or wheezes  BREAST: normal without masses, tenderness or nipple discharge and no palpable axillary masses or adenopathy  CV: regular rate and rhythm, normal S1 S2, no S3 or S4, no murmur, click or rub, no peripheral edema and peripheral pulses strong  ABDOMEN: soft, nontender, no hepatosplenomegaly, no masses and bowel sounds normal  MS: no musculoskeletal defects are noted and gait is age appropriate without ataxia  SKIN: no suspicious lesions or rashes  NEURO: Normal strength and tone, sensory exam grossly normal, mentation intact and speech normal  PSYCH: mentation appears normal and affect normal/bright    Diagnostic Test Results:  No results found for this or any previous visit (from the past 24 hour(s)).    ASSESSMENT / PLAN:   1. Medicare annual wellness visit, subsequent       HEALTH CARE MAINTENANCE              Reviewed USPTF recommendations and anticipatory guidance.              See orders.        2. Essential hypertension with goal blood pressure less than 140/90  BP not at goal, will increase lisinopril from 20 mg to 40 mg and recheck in about 1 month along with fasting labs.  Was on diuretic in the past, however stopped this due to questionable derm reaction.   - Lipid panel reflex to direct LDL Fasting; Future  - lisinopril (PRINIVIL/ZESTRIL) 40 MG tablet; Take 1 tablet (40 mg) by mouth daily  Dispense: 90 tablet; Refill: 0  - Comprehensive metabolic panel; Future    3. Intrinsic atopic dermatitis  Use PRN  - triamcinolone (KENALOG) 0.1 % ointment; Apply to affected area of the skin twice a day after using a moisturizer. Can do wet wraps with this.  Dispense: 454 g; Refill: 1    4. Hyperlipidemia LDL goal <130  Stable.   - atorvastatin (LIPITOR) 20 MG tablet; Take 1 tablet (20 mg) by mouth daily   "Dispense: 90 tablet; Refill: 1    5. Gastroesophageal reflux disease with esophagitis  stable  - omeprazole (PRILOSEC) 20 MG CR capsule; 1 capsule M, W, F  Dispense: 90 capsule; Refill: 1    6. Major depression in complete remission  Stable.   - FLUoxetine (PROZAC) 20 MG capsule; Take 3 capsules (60 mg) by mouth daily  Dispense: 270 capsule; Refill: 1    7. Need for prophylactic vaccination and inoculation against influenza  due  - Vaccine Administration, Initial [17049]  - FLU VAC, SPLIT VIRUS IM > 3 YO (QUADRIVALENT) [51412]    End of Life Planning:  Patient currently has an advanced directive: Yes.  Practitioner is supportive of decision.    COUNSELING:  Reviewed preventive health counseling, as reflected in patient instructions       Regular exercise       Healthy diet/nutrition       Immunizations    Consider 2 dose shingles vaccine        BP Readings from Last 1 Encounters:   04/30/18 120/80     Estimated body mass index is 29.57 kg/(m^2) as calculated from the following:    Height as of 4/30/18: 5' 2.75\" (1.594 m).    Weight as of 4/30/18: 165 lb 9.6 oz (75.1 kg).           reports that she quit smoking about 6 years ago. Her smoking use included Cigarettes. She has a 45.00 pack-year smoking history. She has never used smokeless tobacco.      Appropriate preventive services were discussed with this patient, including applicable screening as appropriate for cardiovascular disease, diabetes, osteopenia/osteoporosis, and glaucoma.  As appropriate for age/gender, discussed screening for colorectal cancer, prostate cancer, breast cancer, and cervical cancer. Checklist reviewing preventive services available has been given to the patient.    Reviewed patients plan of care and provided an AVS. The Intermediate Care Plan ( asthma action plan, low back pain action plan, and migraine action plan) for Marcela meets the Care Plan requirement. This Care Plan has been established and reviewed with the Patient.    Counseling " Resources:  ATP IV Guidelines  Pooled Cohorts Equation Calculator  Breast Cancer Risk Calculator  FRAX Risk Assessment  ICSI Preventive Guidelines  Dietary Guidelines for Americans, 2010  USDA's MyPlate  ASA Prophylaxis  Lung CA Screening    Fasting labs and ancillary BP recheck in 1 month (she prefers to do this over at BP)  Honey García PA-C  [facility]

## 2018-10-05 NOTE — PROGRESS NOTES

## 2018-10-05 NOTE — MR AVS SNAPSHOT
After Visit Summary   10/5/2018    Marcela Allen    MRN: [id number]           Patient Information     Date Of Birth          1950        Visit Information        Provider Department      10/5/2018 11:40 AM Honey García PA-C [facility] [address]        Today's Diagnoses     Medicare annual wellness visit, subsequent    -  1    Essential hypertension with goal blood pressure less than 140/90        Intrinsic atopic dermatitis        Hyperlipidemia LDL goal <130        Gastroesophageal reflux disease with esophagitis        Major depression in complete remission          Care Instructions      Preventive Health Recommendations    Female Ages 65 +    Yearly exam:     See your health care provider every year in order to  o Review health changes.   o Discuss preventive care.    o Review your medicines if your doctor has prescribed any.      You no longer need a yearly Pap test unless you've had an abnormal Pap test in the past 10 years. If you have vaginal symptoms, such as bleeding or discharge, be sure to talk with your provider about a Pap test.      Every 1 to 2 years, have a mammogram.  If you are over 69, talk with your health care provider about whether or not you want to continue having screening mammograms.      Every 10 years, have a colonoscopy. Or, have a yearly FIT test (stool test). These exams will check for colon cancer.       Have a cholesterol test every 5 years, or more often if your doctor advises it.       Have a diabetes test (fasting glucose) every three years. If you are at risk for diabetes, you should have this test more often.       At age 65, have a bone density scan (DEXA) to check for osteoporosis (brittle bone disease).    Shots:    Get a flu shot each year.    Get a tetanus shot every 10 years.    Talk to your doctor about your pneumonia vaccines. There are now two you should receive - Pneumovax (PPSV 23) and Prevnar (PCV 13).    Talk to your pharmacist  about the shingles vaccine.    Talk to your doctor about the hepatitis B vaccine.    Nutrition:     Eat at least 5 servings of fruits and vegetables each day.      Eat whole-grain bread, whole-wheat pasta and brown rice instead of white grains and rice.      Get adequate Calcium and Vitamin D.     Lifestyle    Exercise at least 150 minutes a week (30 minutes a day, 5 days a week). This will help you control your weight and prevent disease.      Limit alcohol to one drink per day.      No smoking.       Wear sunscreen to prevent skin cancer.       See your dentist twice a year for an exam and cleaning.      See your eye doctor every 1 to 2 years to screen for conditions such as glaucoma, macular degeneration and cataracts.    We will increase your lisinopril today from 20 mg to 40 mg.  Schedule fasting labs over at [facility] lab and a nurse only BP check (in about 2-4 weeks)          Follow-ups after your visit        Your next 10 appointments already scheduled     Nov 06, 2018  1:00 PM CST   (Arrive by 12:45 PM)   RETURN ENDOCRINE with Geovanna Chatterjee MD   [facility] ([facility])    [address] [place], [place] [id number] [phone]              Future tests that were ordered for you today     Open Future Orders        Priority Expected Expires Ordered    Lipid panel reflex to direct LDL Fasting Routine 10/10/2018 10/5/2019 10/5/2018    Comprehensive metabolic panel Routine 10/10/2018 10/5/2019 10/5/2018            Who to contact     Normal or non-critical lab and imaging results will be communicated to you by MyChart, letter or phone within 4 business days after the clinic has received the results. If you do not hear from us within 7 days, please contact the clinic through MyChart or phone. If you have a critical or abnormal lab result, we will notify you by phone as soon as possible.  Submit refill requests through [employer] or call your pharmacy  "and they will forward the refill request to us. Please allow 3 business days for your refill to be completed.          If you need to speak with a  for additional information , please call: [phone]           Additional Information About Your Visit        [employer] Information     [employer] gives you secure access to your electronic health record. If you see a primary care provider, you can also send messages to your care team and make appointments. If you have questions, please call your primary care clinic.  If you do not have a primary care provider, please call [phone] and they will assist you.        Care EveryWhere ID     This is your Care EveryWhere ID. This could be used by other organizations to access your [facility] medical records  [id number]        Your Vitals Were     Pulse Height BMI (Body Mass Index)             76 5' 2.75\" (1.594 m) 28.26 kg/m2          Blood Pressure from Last 3 Encounters:   10/05/18 146/82   04/30/18 120/80   11/06/17 (P) 123/82    Weight from Last 3 Encounters:   10/05/18 158 lb 4 oz (71.8 kg)   04/30/18 165 lb 9.6 oz (75.1 kg)   11/06/17 161 lb 9.6 oz (73.3 kg)                 Today's Medication Changes          These changes are accurate as of 10/5/18 12:08 PM.  If you have any questions, ask your nurse or doctor.               These medicines have changed or have updated prescriptions.        Dose/Directions    lisinopril 40 MG tablet   Commonly known as:  PRINIVIL/ZESTRIL   This may have changed:    - medication strength  - how much to take   Used for:  Essential hypertension with goal blood pressure less than 140/90   Changed by:  Honey García PA-C        Dose:  40 mg   Take 1 tablet (40 mg) by mouth daily   Quantity:  90 tablet   Refills:  0            Where to get your medicines      These medications were sent to [facility] [address]  [address] [address]     Phone:  " [phone]     triamcinolone 0.1 % ointment         These medications were [facility] to [facility] [place] [place] - [address]  [address] [place] [address]     Phone:  [phone]     atorvastatin 20 MG tablet    FLUoxetine 20 MG capsule    lisinopril 40 MG tablet    omeprazole 20 MG CR capsule                Primary Care Provider Office Phone # Fax #    Honey García PA-C [phone] [address] [place] [address]        Equal Access to Services     [facility]: Hadii aad ku hadasho Soomaali, waaxda luqadaha, qaybta kaalmada adeegyada, stefan river . So [facility] [phone].    ATENCIÓN: Si habla español, tiene a trammell disposición servicios gratuitos de asistencia lingüística. [facility] [phone].    We comply with applicable federal civil rights laws and [place] laws. We do not discriminate on the basis of race, color, national origin, age, disability, sex, sexual orientation, or gender identity.            Thank you!     Thank you for choosing [facility]  for your care. Our goal is always to provide you with excellent care. Hearing back from our patients is one way we can continue to improve our services. Please take a few minutes to complete the written survey that you may receive in the mail after your visit with us. Thank you!             Your Updated Medication List - Protect others around you: Learn how to safely use, store and throw away your medicines at www.disposemymeds.org.          This list is accurate as of 10/5/18 12:08 PM.  Always use your most recent med list.                   Brand Name Dispense Instructions for use Diagnosis    aspirin 81 MG tablet      Take 81 mg by mouth daily        atorvastatin 20 MG tablet    LIPITOR    90 tablet    Take 1 tablet (20 mg) by mouth daily    Hyperlipidemia LDL goal <130       CALCIUM CITRATE PO      Take 1,200 mg by mouth daily        cetirizine 10 MG  tablet    zyrTEC    90 tablet    Take 1 tablet (10 mg) by mouth daily    Seasonal allergies       fish Oil 1200 MG capsule      Take 1 capsule by mouth daily        FLUoxetine 20 MG capsule    PROzac    270 capsule    Take 3 capsules (60 mg) by mouth daily    Major depression in complete remission (H)       hypromellose 0.5 % Soln ophthalmic solution    ARTIFICIAL TEARS     Place 1 drop into both eyes every 4 hours as needed        levothyroxine 100 MCG tablet    SYNTHROID/LEVOTHROID    90 tablet    Monday-Saturday: 1 tablet/day,  Sunday: 0.5 tablet    Postsurgical hypothyroidism, Papillary thyroid carcinoma (H)       lisinopril 40 MG tablet    PRINIVIL/ZESTRIL    90 tablet    Take 1 tablet (40 mg) by mouth daily    Essential hypertension with goal blood pressure less than 140/90       Multi-vitamin Tabs tablet      Take 1 tablet by mouth daily        omeprazole 20 MG CR capsule    priLOSEC    90 capsule    1 capsule M, W, F    Gastroesophageal reflux disease with esophagitis       sennosides 8.6 MG tablet    SENOKOT     Take 2 tablets by mouth daily        triamcinolone 0.1 % ointment    KENALOG    454 g    Apply to affected area of the skin twice a day after using a moisturizer. Can do wet wraps with this.    Intrinsic atopic dermatitis       vitamin D 1000 units capsule      Take 1 capsule by mouth daily.

## 2018-10-30 ASSESSMENT — ENCOUNTER SYMPTOMS
RECTAL PAIN: 0
STIFFNESS: 1
MYALGIAS: 1
MUSCLE CRAMPS: 0
NECK PAIN: 1
ABDOMINAL PAIN: 0
JOINT SWELLING: 1
HEARTBURN: 1
VOMITING: 0
BLOATING: 1
NAUSEA: 0
ARTHRALGIAS: 1
BOWEL INCONTINENCE: 0
BLOOD IN STOOL: 0
POOR WOUND HEALING: 0
JAUNDICE: 0
BACK PAIN: 1
DIARRHEA: 0
NAIL CHANGES: 0
CONSTIPATION: 1
SKIN CHANGES: 0

## 2018-11-01 ENCOUNTER — MYC MEDICAL ADVICE (OUTPATIENT)
Dept: FAMILY MEDICINE | Facility: CLINIC | Age: 68
End: 2018-11-01

## 2018-11-01 DIAGNOSIS — I10 ESSENTIAL HYPERTENSION WITH GOAL BLOOD PRESSURE LESS THAN 140/90: ICD-10-CM

## 2018-11-01 LAB
ALBUMIN SERPL-MCNC: 3.6 G/DL (ref 3.4–5)
ALP SERPL-CCNC: 85 U/L (ref 40–150)
ALT SERPL W P-5'-P-CCNC: 34 U/L (ref 0–50)
ANION GAP SERPL CALCULATED.3IONS-SCNC: 6 MMOL/L (ref 3–14)
AST SERPL W P-5'-P-CCNC: 38 U/L (ref 0–45)
BILIRUB SERPL-MCNC: 0.7 MG/DL (ref 0.2–1.3)
BUN SERPL-MCNC: 15 MG/DL (ref 7–30)
CALCIUM SERPL-MCNC: 8.8 MG/DL (ref 8.5–10.1)
CHLORIDE SERPL-SCNC: 104 MMOL/L (ref 94–109)
CHOLEST SERPL-MCNC: 149 MG/DL
CO2 SERPL-SCNC: 29 MMOL/L (ref 20–32)
CREAT SERPL-MCNC: 0.96 MG/DL (ref 0.52–1.04)
GFR SERPL CREATININE-BSD FRML MDRD: 58 ML/MIN/1.7M2
GLUCOSE SERPL-MCNC: 94 MG/DL (ref 70–99)
HDLC SERPL-MCNC: 46 MG/DL
LDLC SERPL CALC-MCNC: 70 MG/DL
NONHDLC SERPL-MCNC: 103 MG/DL
POTASSIUM SERPL-SCNC: 4.7 MMOL/L (ref 3.4–5.3)
PROT SERPL-MCNC: 7.4 G/DL (ref 6.8–8.8)
SODIUM SERPL-SCNC: 139 MMOL/L (ref 133–144)
TRIGL SERPL-MCNC: 166 MG/DL

## 2018-11-01 PROCEDURE — 80061 LIPID PANEL: CPT | Performed by: PHYSICIAN ASSISTANT

## 2018-11-01 PROCEDURE — 80053 COMPREHEN METABOLIC PANEL: CPT | Performed by: PHYSICIAN ASSISTANT

## 2018-11-01 PROCEDURE — 36415 COLL VENOUS BLD VENIPUNCTURE: CPT | Performed by: PHYSICIAN ASSISTANT

## 2018-11-06 ENCOUNTER — MYC MEDICAL ADVICE (OUTPATIENT)
Dept: FAMILY MEDICINE | Facility: CLINIC | Age: 68
End: 2018-11-06

## 2018-11-06 ENCOUNTER — OFFICE VISIT (OUTPATIENT)
Dept: ENDOCRINOLOGY | Facility: CLINIC | Age: 68
End: 2018-11-06
Payer: MEDICARE

## 2018-11-06 VITALS
DIASTOLIC BLOOD PRESSURE: 80 MMHG | SYSTOLIC BLOOD PRESSURE: 134 MMHG | BODY MASS INDEX: 28.01 KG/M2 | HEIGHT: 63 IN | WEIGHT: 158.1 LBS | HEART RATE: 88 BPM

## 2018-11-06 DIAGNOSIS — C73 PAPILLARY THYROID CARCINOMA (H): Chronic | ICD-10-CM

## 2018-11-06 DIAGNOSIS — Z78.0 POST-MENOPAUSAL: ICD-10-CM

## 2018-11-06 DIAGNOSIS — E89.0 POSTSURGICAL HYPOTHYROIDISM: Chronic | ICD-10-CM

## 2018-11-06 DIAGNOSIS — E89.0 POSTSURGICAL HYPOTHYROIDISM: Primary | Chronic | ICD-10-CM

## 2018-11-06 LAB
T4 FREE SERPL-MCNC: 1.1 NG/DL (ref 0.76–1.46)
TSH SERPL DL<=0.005 MIU/L-ACNC: 0.06 MU/L (ref 0.4–4)

## 2018-11-06 ASSESSMENT — PAIN SCALES - GENERAL: PAINLEVEL: MODERATE PAIN (5)

## 2018-11-06 NOTE — MR AVS SNAPSHOT
After Visit Summary   11/6/2018    Marcela Allen    MRN: [id number]           Patient Information     Date Of Birth          1950        Visit Information        Provider Department      11/6/2018 1:00 PM Geovanna Chatterjee MD [facility]        Today's Diagnoses     Postsurgical hypothyroidism    -  1    Papillary thyroid carcinoma (H)        Post-menopausal          Care Instructions    Labs today    Repeat bone density (DXA) this year    Get neck ultrasound just before your appointment in 2019          Follow-ups after your visit        Follow-up notes from your care team     Return in about 1 year (around 11/6/2019).      Your next 10 appointments already scheduled     Nov 06, 2018  1:20 PM CST   LAB with [facility] ([facility])    [address] [address] [phone]           Please do not eat 10-12 hours before your appointment if you are coming in fasting for labs on lipids, cholesterol, or glucose (sugar). This does not apply to pregnant women. Water, hot tea and black coffee (with nothing added) are okay. Do not drink other fluids, diet soda or chew gum.            Nov 23, 2018 12:45 PM CST   [facility] SCREENING DIGITAL BILATERAL with [id number]   [facility]    [address] [address] [phone]           How do I prepare for my exam? (Food and drink instructions) No Food and Drink Restrictions.  How do I prepare for my exam? (Other instructions) Do not use any powder, lotion or deodorant under your arms or on your breast. If you do, we will ask you to remove it before your exam.  What should I wear: Wear comfortable, two-piece clothing.  How long does the exam take: Most scans will take 15 minutes.  What should I bring: Bring any previous mammograms from other facilities or have them mailed to the breast center.  Do I need a  ":  No  is needed.  What do I need to tell my doctor: If you have any allergies, tell your care team.  What should I do after the exam: No restrictions, You may resume normal activities.  What is this test: This test is an x-ray of the breast to look for breast disease. The breast is pressed between two plates to flatten and spread the tissue. An X-ray is taken of the breast from different angles.  Who should I call with questions: If you have any questions, please call the Imaging Department where you will have your exam. Directions, parking instructions, and other information is available on our website, [email or web address].  Other information about my exam Three-dimensional (3D) mammograms are available at [facility] locations in [facility], [facility] and [place]. [facility] locations include [place] and the [facility] in [place].  Benefits of 3D mammograms include * Improved rate of cancer detection * Decreases your chance of having to go back for more tests, which means fewer: * \"False-positive\" results (This means that there is an abnormal area but it isn't cancer.) * Invasive testing procedures, such as a biopsy or surgery * Can provide clearer images of the breast if you have dense breast tissue.  *3D mammography is an optional exam that anyone can have with a 2D mammogram. It doesn't replace or take the place of a 2D mammogram. 2D mammograms remain an effective screening test for all women.  Not all insurance companies cover the cost of a 3D mammogram. Check with your insurance. Three-dimensional (3D) mammograms are available at [facility] locations in [facility], [facility], [facility] and [place]. Health locations include [place] and [facility] in [place]. Benefits of 3D mammograms include: - Improved rate of cancer detection - Decreases your chance of " "having to go back for more tests, which means fewer: - \"False-positive\" results (This means that there is an abnormal area but it isn't cancer.) - Invasive testing procedures, such as a biopsy or surgery - Can provide clearer images of the breast if you have dense breast tissue. 3D mammography is an optional exam that anyone can have with a 2D mammogram. It doesn't replace or take the place of a 2D mammogram. 2D mammograms remain an effective screening test for all women.  Not all insurance companies cover the cost of a 3D mammogram. Check with your insurance.              Future tests that were ordered for you today     Open Future Orders        Priority Expected Expires Ordered    US head neck soft tissue Routine 10/8/2019 11/4/2019 11/6/2018    Dexa hip/pelvis/spine Routine  6/4/2019 11/6/2018    T4 free Routine  11/6/2019 11/6/2018    TSH Routine  11/6/2019 11/6/2018    Thyroglobulin (Total, antibody & recovery %) Routine  11/6/2019 11/6/2018            Who to contact     Please call your clinic at [phone] to:    Ask questions about your health    Make or cancel appointments    Discuss your medicines    Learn about your test results    Speak to your doctor            Additional Information About Your Visit        [employer] Information     [employer] gives you secure access to your electronic health record. If you see a primary care provider, you can also send messages to your care team and make appointments. If you have questions, please call your primary care clinic.  If you do not have a primary care provider, please call [phone] and they will assist you.      [employer] is an electronic gateway that provides easy, online access to your medical records. With [employer], you can request a clinic appointment, read your test results, renew a prescription or communicate with your care team.     To access your existing account, please contact your [facility] or call [phone] for " "assistance.        Care EveryWhere ID     This is your Care EveryWhere ID. This could be used by other organizations to access your [facility] medical records  [id number]        Your Vitals Were     Pulse Height BMI (Body Mass Index)             88 1.591 m (5' 2.64\") 28.33 kg/m2          Blood Pressure from Last 3 Encounters:   11/06/18 134/80   10/05/18 146/82   04/30/18 120/80    Weight from Last 3 Encounters:   11/06/18 71.7 kg (158 lb 1.6 oz)   10/05/18 71.8 kg (158 lb 4 oz)   04/30/18 75.1 kg (165 lb 9.6 oz)               Primary Care Provider Office Phone # Fax #    Honey Manisah García PA-C [phone] [address] [place] [address]        Equal Access to Services     [facility]: Hadii aad ku hadasho Sonisha, waaxda luqadaha, qaybta kaalmada adeegyada, stefan river . So [facility] [phone].    ATENCIÓN: Si habla español, tiene a trammell disposición servicios gratuitos de asistencia lingüística. Eliseoame al [phone].    We comply with applicable federal civil rights laws and [place] laws. We do not discriminate on the basis of race, color, national origin, age, disability, sex, sexual orientation, or gender identity.            Thank you!     Thank you for choosing [facility]  for your care. Our goal is always to provide you with excellent care. Hearing back from our patients is one way we can continue to improve our services. Please take a few minutes to complete the written survey that you may receive in the mail after your visit with us. Thank you!             Your Updated Medication List - Protect others around you: Learn how to safely use, store and throw away your medicines at www.disposemymeds.org.          This list is accurate as of 11/6/18  1:14 PM.  Always use your most recent med list.                   Brand Name Dispense Instructions for use Diagnosis    aspirin 81 MG tablet      Take 81 mg by mouth daily        atorvastatin 20 MG " tablet    LIPITOR    90 tablet    Take 1 tablet (20 mg) by mouth daily    Hyperlipidemia LDL goal <130       CALCIUM CITRATE PO      Take 1,200 mg by mouth daily        cetirizine 10 MG tablet    zyrTEC    90 tablet    Take 1 tablet (10 mg) by mouth daily    Seasonal allergies       fish Oil 1200 MG capsule      Take 1 capsule by mouth daily        FLUoxetine 20 MG capsule    PROzac    270 capsule    Take 3 capsules (60 mg) by mouth daily    Major depression in complete remission (H)       hypromellose 0.5 % Soln ophthalmic solution    ARTIFICIAL TEARS     Place 1 drop into both eyes every 4 hours as needed        levothyroxine 100 MCG tablet    SYNTHROID/LEVOTHROID    90 tablet    Monday-Saturday: 1 tablet/day,  Sunday: 0.5 tablet    Postsurgical hypothyroidism, Papillary thyroid carcinoma (H)       lisinopril 40 MG tablet    PRINIVIL/ZESTRIL    90 tablet    Take 1 tablet (40 mg) by mouth daily    Essential hypertension with goal blood pressure less than 140/90       Multi-vitamin Tabs tablet      Take 1 tablet by mouth daily        omeprazole 20 MG CR capsule    priLOSEC    90 capsule    1 capsule M, W, F    Gastroesophageal reflux disease with esophagitis       sennosides 8.6 MG tablet    SENOKOT     Take 2 tablets by mouth daily        triamcinolone 0.1 % ointment    KENALOG    454 g    Apply to affected area of the skin twice a day after using a moisturizer. Can do wet wraps with this.    Intrinsic atopic dermatitis       vitamin D 1000 units capsule      Take 1 capsule by mouth daily.

## 2018-11-06 NOTE — PATIENT INSTRUCTIONS
Labs today    Repeat bone density (DXA) this year    Get neck ultrasound just before your appointment in 2019

## 2018-11-06 NOTE — PROGRESS NOTES
" Assessment   1.  Papillary thyroid cancer, bilateral, multifocal, no nodes removed. She has been treated with thyroidectomy in 2 procedures.  She has had 29 mCi 131I  in 2000.   We don't have TSH stimulated Tg data on her.   Labs today to include TFTs, Tg ---  RTC 1 year    2.  Hypothyroidism due to thyroidectomy -She is likely to be cured.  I would lighten the TSH suppression to up to around 1.       3. Cervical adenopathy  On US - we have been focusing on left level 6/(called level 3 in 2010, 2011) node  Which has remained stable for years.  Next US in 2019 , which will be 5 years after hte last one     post menopausal -  Her bone risk factors include TSH suppression therapy, past smoking and post menopausal state. Past alendronate.   Repeat DXA in Juan Francisco Chatterjee MD.    JESUS Medrano presents for follow up of thyroid cancer and post surgical  hypothyroidism.   I last saw her 11/17    She was diagnosed with PCT  in 2000 after she had partial thyroid surgery.   One week later she had completion thyroidectomy.  Both operations were performed at [facility] by Dr Antonina Peralta.  See my 1/28/09 note for the operative reports.  Primary tumor size is not specified in the path reports, multifocal, bilateral..\" She got 29.3 mCi 131I, 5/30/00) and one year later she had a TBS at [facility], which was negative.    I have reviewed all available tumor marker data to date:  5/12/00: .72, ROSARIO in lab  5/18/00: 5.16 mCi 131I TBS: \"intense activity in the thyroid bed.  No evidence of activity outside of the neck\"  5/30/00: 29.3 mCi 131I ([facility])  8/7/00: TSH 48.26, free T4 0.87  8/24/01 4.03 mCi 131I TBS ([facility]): negative study  6/6/04: TSH 3.10,  Tg < 0.3 ([facility]), ROSARIO < 1  10/6/04: ROSARIO < 2 IU/ml, free T4 1.2, TSH 16.07  3/26/08: Tg 0.18, ROSARIO < 1, TSH 0.02  1/28/09: Tg < 0.1, ROSARIO < 0.4, TSH 0.01   11/17/09: Tg < 0.1, ROSARIO < 0.4, TSH 0.02  US guided FNAB of left level 2 LN in 12/09, with benign cytology.  " Needle wash Tg was < 0.1.   2/23/10: Tg < 0.1, ROSARIO < 0.4, TSH 0.04  5/18/10: Tg < 0.1, ROSARIO < 0.4, TSH 0.01  6/1/11: Tg < 0.1, ROSARIO < 0.4, TSH 0.06  4/30/13: Tg < 0.1, ROSARIO < 0.4, TSH 0.03  10/23/13: Tg < 0.1, ROSARIO < 0.4, TSH 0.1  5/7/14: Tg <0.1, ROSARIO < 0.4, TSH   6/8/15: Tg < 0.1, ROSARIO < 0.4, TSH  8/2/16: Tg < 0.1, ROSARIO < 0.4,  TSH 0.09- after this we reduced LT4 dose by 1/2 tablet per week  4/3/17: TSH 0.49  11/6/17: Tg < 0.1, ROSARIO < 0.4, TSH 0.6, free T4 1.10  11/6/18; Tg < 0.1, ROSARIO < 0.4, TSH 0.06, free T4 1.l10     I have reviewed images on PACS  In 7/13 she had a normal CXR.   5/7/14 neck US:   Left level 6 carotid sheath 0.8 x 0.4 x 1.2 cm  (was 1 x 0.5 x 1.1 2011)    DXA 4/3/15: (as read by me) lowest T-score -0.9 at the right femoral neck (they compared with 2012 study -I don't have images from 2012 to compare)     ROS   Would sleep all the time  Sleeps OK at night  Naps  Energy is OK  Cardiac: negatve  Respiratory: negative  GI: always constipated; can't eat a meal - snacks throughout the day instead; if she eats a meal then abd bloate  Back issues      PMH   Past Medical History:   Diagnosis Date     BULMARO II (cervical intraepithelial neoplasia II) 2007    on colp - leep path WNL     COPD (chronic obstructive pulmonary disease) (H)      Depression 1990     Depressive disorder 25 yrs     Eczema      GERD (gastroesophageal reflux disease)      Hiatal hernia      History of blood transfusion 2013    Post op     HTN (hypertension)      Hypercholesterolemia      Liver hemangioma 9/4/2012     OA (osteoarthritis)      Osteopenia      Papillary thyroid carcinoma (H) 2000    BL, MF; Tx in 2 operations, RRA     PONV (postoperative nausea and vomiting)      Postsurgical hypothyroidism 2000     FELICIA III (vulvar intraepithelial neoplasia III) 2006    wide local exc x 2     Chlamydia    genital herpes  HPV of cervix.   cryotherapy times two     eczema and contact dermatitis.  Chronic neck pain    Arnold-Chiari  malformation.    Past Surgical History:   Procedure Laterality Date     ABDOMEN SURGERY      Teenager     APPENDECTOMY OPEN  1968     ARTHROPLASTY MINIMALLY INVASIVE KNEE BILATERAL  6/27/2013    Procedure: ARTHROPLASTY MINIMALLY INVASIVE KNEE BILATERAL;  Minimally Invasive Bilateral Total Knee Arthroplasty;  Surgeon: Christophe Welch MD;  Location: [facility] OR     BIOPSY  Ongoing    Lymph nodes neck     BONE MARROW ASPIRATION ONLY Left 12/7/2015    Procedure: BONE MARROW ASPIRATION ONLY;  Surgeon: Aguilar Roldan MD;  Location: [facility] OR     CARPAL TUNNEL RELEASE RT/LT       COLONOSCOPY  2007    neg     COLPOSCOPY CERVIX, BIOPSY CERVIX, ENDOCERVICAL CURETTAGE, COMBINED  2003, 2007     completion thyroidectomy Right 3/14/00     CYSTECTOMY OVARIAN BENIGN  1969     ESOPHAGOSCOPY, GASTROSCOPY, DUODENOSCOPY (EGD), COMBINED N/A 8/28/2014    Procedure: COMBINED ESOPHAGOSCOPY, GASTROSCOPY, DUODENOSCOPY (EGD), BIOPSY SINGLE OR MULTIPLE;  Surgeon: Kelvin Montgomery MD;  Location: [facility] OR     FUSION CERVICAL ANTERIOR THREE+ LEVELS WITH BONE ALLOGRAFT N/A 12/7/2015    Procedure: FUSION CERVICAL ANTERIOR THREE+ LEVELS WITH BONE ALLOGRAFT;  Surgeon: Aguilar Roldan MD;  Location:  OR     HAND SURGERY       HEAD & NECK SURGERY  2000    Thyroidectomy     KNEE SURGERY  2001 & 2005    bilat     LAPAROSCOPIC CHOLECYSTECTOMY  1998     LEEP TX, CERVICAL  2007    BULMARO II on colp, leep path WNL     left thyroid total lobectomy, isthmusectomy and 50% right thyroid lobectomy Left 3/7/00     TUBAL LIGATION  1988     VULVECTOMY SIMPLE  2006    FELICIA 3, wide local excision x 2     VULVECTOMY SIMPLE         Finger ligament repair in 2000.  left arm ligament repair.    Current Outpatient Prescriptions   Medication Sig Dispense Refill     aspirin 81 MG tablet Take 81 mg by mouth daily        atorvastatin (LIPITOR) 20 MG tablet Take 1 tablet (20 mg) by mouth daily 90 tablet 1     CALCIUM CITRATE PO Take 1,200 mg by mouth daily         cetirizine (ZYRTEC) 10 MG tablet Take 1 tablet (10 mg) by mouth daily 90 tablet 1     Cholecalciferol (VITAMIN D) 1000 UNIT capsule Take 1 capsule by mouth daily.       FLUoxetine (PROZAC) 20 MG capsule Take 3 capsules (60 mg) by mouth daily 270 capsule 1     hypromellose (ARTIFICIAL TEARS) 0.5 % SOLN Place 1 drop into both eyes every 4 hours as needed        levothyroxine (SYNTHROID/LEVOTHROID) 100 MCG tablet Monday-Saturday: 1 tablet/day,  Sunday: 0.5 tablet 90 tablet 3     lisinopril (PRINIVIL/ZESTRIL) 40 MG tablet Take 1 tablet (40 mg) by mouth daily 90 tablet 0     multivitamin, therapeutic with minerals (MULTI-VITAMIN) TABS Take 1 tablet by mouth daily       Omega-3 Fatty Acids (FISH OIL) 1200 MG CAPS Take 1 capsule by mouth daily        omeprazole (PRILOSEC) 20 MG CR capsule 1 capsule M, W, F 90 capsule 1     sennosides (SENOKOT) 8.6 MG tablet Take 2 tablets by mouth daily        triamcinolone (KENALOG) 0.1 % ointment Apply to affected area of the skin twice a day after using a moisturizer. Can do wet wraps with this. 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 No     Social History Narrative    Retired RN; ;      Retired nurse; ;  Bowls; no longr smoking    Physical Exam   GENERAL: middle aged  "woman in no apparent distress.   /80  Pulse 88  Ht 1.591 m (5' 2.64\")  Wt 71.7 kg (158 lb 1.6 oz)  BMI 28.33 kg/m2  SKIN: normal color, temperature  HEENT: PER, no scleral icterus,  NECK: no masses;   LUNGS: clear bilaterally  CARDIAC: RRR S1 S2  NEURO: Alert, responds appropriately to questions, moves all extremities    DATA REVIEW:    Results for DANILO VENTURA (MRN [id number]) as of 11/6/2018 09:19   Ref. Range 4/30/2018 11:09 11/1/2018 08:39   Sodium Latest Ref Range: 133 - 144 mmol/L  139   Potassium Latest Ref Range: 3.4 - 5.3 mmol/L  4.7   Chloride Latest Ref Range: 94 - 109 mmol/L  104   Carbon Dioxide Latest Ref Range: 20 - 32 mmol/L  29   Urea Nitrogen Latest Ref Range: 7 - 30 mg/dL  15   Creatinine Latest Ref Range: 0.52 - 1.04 mg/dL  0.96   GFR Estimate Latest Ref Range: >60 mL/min/1.7m2  58 (L)   GFR Estimate If Black Latest Ref Range: >60 mL/min/1.7m2  70   Calcium Latest Ref Range: 8.5 - 10.1 mg/dL  8.8   Anion Gap Latest Ref Range: 3 - 14 mmol/L  6   Albumin Latest Ref Range: 3.4 - 5.0 g/dL  3.6   Protein Total Latest Ref Range: 6.8 - 8.8 g/dL  7.4   Bilirubin Total Latest Ref Range: 0.2 - 1.3 mg/dL  0.7   Alkaline Phosphatase Latest Ref Range: 40 - 150 U/L  85   ALT Latest Ref Range: 0 - 50 U/L  34   AST Latest Ref Range: 0 - 45 U/L  38   Albumin Urine mg/g Cr Latest Ref Range: 0 - 25 mg/g Cr Unable to calcula...    Albumin Urine mg/L Latest Units: mg/L <5    Cholesterol Latest Ref Range: <200 mg/dL  149   Creatinine Urine Latest Units: mg/dL 88    HDL Cholesterol Latest Ref Range: >49 mg/dL  46 (L)   LDL Cholesterol Calculated Latest Ref Range: <100 mg/dL  70   Non HDL Cholesterol Latest Ref Range: <130 mg/dL  103   Triglycerides Latest Ref Range: <150 mg/dL  166 (H)   Glucose Latest Ref Range: 70 - 99 mg/dL  94     "

## 2018-11-06 NOTE — LETTER
"11/6/2018       RE: Marcela Allen  [address] [facility] [place] [address]     Dear Colleague,    Thank you for referring your patient, Marcela Allen, to the [facility] ENDOCRINOLOGY at [facility]. Please see a copy of my visit note below.     Assessment   1.  Papillary thyroid cancer, bilateral, multifocal, no nodes removed. She has been treated with thyroidectomy in 2 procedures.  She has had 29 mCi 131I  in 2000.   We don't have TSH stimulated Tg data on her.   Labs today to include TFTs, Tg ---  RTC 1 year    2.  Hypothyroidism due to thyroidectomy -She is likely to be cured.  I would lighten the TSH suppression to up to around 1.       3. Cervical adenopathy  On US - we have been focusing on left level 6/(called level 3 in 2010, 2011) node  Which has remained stable for years.  Next US in 2019 , which will be 5 years after hte last one     post menopausal -  Her bone risk factors include TSH suppression therapy, past smoking and post menopausal state. Past alendronate.   Repeat DXA in [place]     Geovanna Chatterjee MD.    JESUS Medrano presents for follow up of thyroid cancer and post surgical  hypothyroidism.   I last saw her 11/17    She was diagnosed with PCT  in 2000 after she had partial thyroid surgery.   One week later she had completion thyroidectomy.  Both operations were performed at [facility] by Dr Antonina Peralta.  See my 1/28/09 note for the operative reports.  Primary tumor size is not specified in the path reports, multifocal, bilateral..\" She got 29.3 mCi 131I, 5/30/00) and one year later she had a TBS at [facility], which was negative.    I have reviewed all available tumor marker data to date:  5/12/00: .72, ROSARIO in lab  5/18/00: 5.16 mCi 131I TBS: \"intense activity in the thyroid bed.  No evidence of activity outside of the neck\"  5/30/00: 29.3 mCi 131I ([facility])  8/7/00: TSH 48.26, free T4 0.87  8/24/01 4.03 mCi 131I TBS ([facility]): negative " study  6/6/04: TSH 3.10,  Tg < 0.3 ([facility]), ROSARIO < 1  10/6/04: ROSARIO < 2 IU/ml, free T4 1.2, TSH 16.07  3/26/08: Tg 0.18, ROSARIO < 1, TSH 0.02  1/28/09: Tg < 0.1, ROSARIO < 0.4, TSH 0.01   11/17/09: Tg < 0.1, ROSARIO < 0.4, TSH 0.02  US guided FNAB of left level 2 LN in 12/09, with benign cytology.  Needle wash Tg was < 0.1.   2/23/10: Tg < 0.1, ROSARIO < 0.4, TSH 0.04  5/18/10: Tg < 0.1, ROSARIO < 0.4, TSH 0.01  6/1/11: Tg < 0.1, ROSARIO < 0.4, TSH 0.06  4/30/13: Tg < 0.1, ROSARIO < 0.4, TSH 0.03  10/23/13: Tg < 0.1, ROSARIO < 0.4, TSH 0.1  5/7/14: Tg <0.1, ROSARIO < 0.4, TSH   6/8/15: Tg < 0.1, ROSARIO < 0.4, TSH  8/2/16: Tg < 0.1, ROSARIO < 0.4,  TSH 0.09- after this we reduced LT4 dose by 1/2 tablet per week  4/3/17: TSH 0.49  11/6/17: Tg < 0.1, ROSARIO < 0.4, TSH 0.6, free T4 1.10     I have reviewed images on PACS  In 7/13 she had a normal CXR.   5/7/14 neck US:   Left level 6 carotid sheath 0.8 x 0.4 x 1.2 cm  (was 1 x 0.5 x 1.1 2011)    DXA 4/3/15: (as read by me) lowest T-score -0.9 at the right femoral neck (they compared with 2012 study -I don't have images from 2012 to compare)     ROS   Would sleep all the time  Sleeps OK at night  Naps  Energy is OK  Cardiac: negatve  Respiratory: negative  GI: always constipated; can't eat a meal - snacks throughout the day instead; if she eats a meal then abd bloate  Back issues      PMH   Past Medical History:   Diagnosis Date     BULMARO II (cervical intraepithelial neoplasia II) 2007    on colp - leep path WNL     COPD (chronic obstructive pulmonary disease) (H)      Depression 1990     Depressive disorder 25 yrs     Eczema      GERD (gastroesophageal reflux disease)      Hiatal hernia      History of blood transfusion 2013    Post op     HTN (hypertension)      Hypercholesterolemia      Liver hemangioma 9/4/2012     OA (osteoarthritis)      Osteopenia      Papillary thyroid carcinoma (H) 2000    BL, MF; Tx in 2 operations, RRA     PONV (postoperative nausea and vomiting)      Postsurgical hypothyroidism 2000     FELICIA  III (vulvar intraepithelial neoplasia III) 2006    wide local exc x 2     Chlamydia    genital herpes  HPV of cervix.   cryotherapy times two     eczema and contact dermatitis.  Chronic neck pain    Arnold-Chiari malformation.    Past Surgical History:   Procedure Laterality Date     ABDOMEN SURGERY      Teenager     APPENDECTOMY OPEN  1968     ARTHROPLASTY MINIMALLY INVASIVE KNEE BILATERAL  6/27/2013    Procedure: ARTHROPLASTY MINIMALLY INVASIVE KNEE BILATERAL;  Minimally Invasive Bilateral Total Knee Arthroplasty;  Surgeon: Christophe Welch MD;  [facility]: [facility]     BIOPSY  Ongoing    Lymph nodes neck     BONE MARROW ASPIRATION ONLY Left 12/7/2015    Procedure: BONE MARROW ASPIRATION ONLY;  Surgeon: Aguilar Roldan MD;  Location:  OR     CARPAL TUNNEL RELEASE RT/LT       COLONOSCOPY  2007    neg     COLPOSCOPY CERVIX, BIOPSY CERVIX, ENDOCERVICAL CURETTAGE, COMBINED  2003, 2007     completion thyroidectomy Right 3/14/00     CYSTECTOMY OVARIAN BENIGN  1969     ESOPHAGOSCOPY, GASTROSCOPY, DUODENOSCOPY (EGD), COMBINED N/A 8/28/2014    Procedure: COMBINED ESOPHAGOSCOPY, GASTROSCOPY, DUODENOSCOPY (EGD), BIOPSY SINGLE OR MULTIPLE;  Surgeon: Kelvin Montgomery MD;  Location: [facility] OR     FUSION CERVICAL ANTERIOR THREE+ LEVELS WITH BONE ALLOGRAFT N/A 12/7/2015    Procedure: FUSION CERVICAL ANTERIOR THREE+ LEVELS WITH BONE ALLOGRAFT;  Surgeon: Aguilar Roldan MD;  Location:  OR     HAND SURGERY       HEAD & NECK SURGERY  2000    Thyroidectomy     KNEE SURGERY  2001 & 2005    bilat     LAPAROSCOPIC CHOLECYSTECTOMY  1998     LEEP TX, CERVICAL  2007    BULMARO II on colp, leep path WNL     left thyroid total lobectomy, isthmusectomy and 50% right thyroid lobectomy Left 3/7/00     TUBAL LIGATION  1988     VULVECTOMY SIMPLE  2006    FELICIA 3, wide local excision x 2     VULVECTOMY SIMPLE         Finger ligament repair in 2000.  left arm ligament repair.    Current Outpatient Prescriptions   Medication Sig  Dispense Refill     aspirin 81 MG tablet Take 81 mg by mouth daily        atorvastatin (LIPITOR) 20 MG tablet Take 1 tablet (20 mg) by mouth daily 90 tablet 1     CALCIUM CITRATE PO Take 1,200 mg by mouth daily        cetirizine (ZYRTEC) 10 MG tablet Take 1 tablet (10 mg) by mouth daily 90 tablet 1     Cholecalciferol (VITAMIN D) 1000 UNIT capsule Take 1 capsule by mouth daily.       FLUoxetine (PROZAC) 20 MG capsule Take 3 capsules (60 mg) by mouth daily 270 capsule 1     hypromellose (ARTIFICIAL TEARS) 0.5 % SOLN Place 1 drop into both eyes every 4 hours as needed        levothyroxine (SYNTHROID/LEVOTHROID) 100 MCG tablet Monday-Saturday: 1 tablet/day,  Sunday: 0.5 tablet 90 tablet 3     lisinopril (PRINIVIL/ZESTRIL) 40 MG tablet Take 1 tablet (40 mg) by mouth daily 90 tablet 0     multivitamin, therapeutic with minerals (MULTI-VITAMIN) TABS Take 1 tablet by mouth daily       Omega-3 Fatty Acids (FISH OIL) 1200 MG CAPS Take 1 capsule by mouth daily        omeprazole (PRILOSEC) 20 MG CR capsule 1 capsule M, W, F 90 capsule 1     sennosides (SENOKOT) 8.6 MG tablet Take 2 tablets by mouth daily        triamcinolone (KENALOG) 0.1 % ointment Apply to affected area of the skin twice a day after using a moisturizer. Can do wet wraps with this. 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 No     Social History Narrative    Retired RN; ;      Retired nurse; ;  Bowls; no longr smoking    Physical Exam   GENERAL: middle aged woman in no apparent distress.   /80  Pulse 88  Ht 1.591 m (5' 2.64\")  Wt 71.7 kg (158 lb 1.6 oz)  BMI 28.33 kg/m2  SKIN: normal color, temperature  HEENT: PER, no scleral icterus,  NECK: no masses;   LUNGS: clear bilaterally  CARDIAC: RRR S1 S2  NEURO: Alert, responds appropriately to questions, moves all extremities    DATA REVIEW:    Results for DANILO VENTURA (MRN [id number]) as of 11/6/2018 09:19   Ref. Range 4/30/2018 11:09 11/1/2018 08:39   Sodium Latest Ref Range: 133 - 144 mmol/L  139   Potassium Latest Ref Range: 3.4 - 5.3 mmol/L  4.7   Chloride Latest Ref Range: 94 - 109 mmol/L  104   Carbon Dioxide Latest Ref Range: 20 - 32 mmol/L  29   Urea Nitrogen Latest Ref Range: 7 - 30 mg/dL  15   Creatinine Latest Ref Range: 0.52 - 1.04 mg/dL  0.96   GFR Estimate Latest Ref Range: >60 mL/min/1.7m2  58 (L)   GFR Estimate If Black Latest Ref Range: >60 mL/min/1.7m2  70   Calcium Latest Ref Range: 8.5 - 10.1 mg/dL  8.8   Anion Gap Latest Ref Range: 3 - 14 mmol/L  6   Albumin Latest Ref Range: 3.4 - 5.0 g/dL  3.6   Protein Total Latest Ref Range: 6.8 - 8.8 g/dL  7.4   Bilirubin Total Latest Ref Range: 0.2 - 1.3 mg/dL  0.7   Alkaline Phosphatase Latest Ref Range: 40 - 150 U/L  85   ALT Latest Ref Range: 0 - 50 U/L  34   AST Latest Ref Range: 0 - 45 U/L  38   Albumin Urine mg/g Cr Latest Ref Range: 0 - 25 mg/g Cr Unable to calcula...    Albumin Urine mg/L Latest Units: mg/L <5    Cholesterol Latest Ref Range: <200 mg/dL  149   Creatinine Urine Latest Units: mg/dL 88    HDL Cholesterol Latest Ref Range: >49 mg/dL  46 (L)   LDL Cholesterol Calculated Latest Ref Range: <100 mg/dL  70   Non HDL Cholesterol Latest Ref Range: <130 mg/dL  103   Triglycerides Latest Ref Range: <150 mg/dL  166 (H) "   Glucose Latest Ref Range: 70 - 99 mg/dL  94       Again, thank you for allowing me to participate in the care of your patient.      Sincerely,    Geovanna Chatterjee MD

## 2018-11-13 LAB — LAB SCANNED RESULT: NORMAL

## 2018-11-23 ENCOUNTER — RADIANT APPOINTMENT (OUTPATIENT)
Dept: MAMMOGRAPHY | Facility: CLINIC | Age: 68
End: 2018-11-23
Payer: MEDICARE

## 2018-11-23 DIAGNOSIS — Z12.31 VISIT FOR SCREENING MAMMOGRAM: ICD-10-CM

## 2018-11-23 PROCEDURE — 77067 SCR MAMMO BI INCL CAD: CPT | Mod: TC

## 2018-11-26 ENCOUNTER — RADIANT APPOINTMENT (OUTPATIENT)
Dept: BONE DENSITY | Facility: CLINIC | Age: 68
End: 2018-11-26
Payer: MEDICARE

## 2018-11-26 DIAGNOSIS — E89.0 POSTSURGICAL HYPOTHYROIDISM: Chronic | ICD-10-CM

## 2018-11-26 DIAGNOSIS — C73 PAPILLARY THYROID CARCINOMA (H): Chronic | ICD-10-CM

## 2018-11-26 PROCEDURE — 77080 DXA BONE DENSITY AXIAL: CPT | Performed by: INTERNAL MEDICINE

## 2018-11-28 DIAGNOSIS — E89.0 POSTSURGICAL HYPOTHYROIDISM: Chronic | ICD-10-CM

## 2018-11-28 DIAGNOSIS — C73 PAPILLARY THYROID CARCINOMA (H): Chronic | ICD-10-CM

## 2018-11-28 RX ORDER — LEVOTHYROXINE SODIUM 100 UG/1
TABLET ORAL
Qty: 90 TABLET | Refills: 3 | Status: SHIPPED | OUTPATIENT
Start: 2018-11-28 | End: 2019-11-05

## 2019-01-10 ENCOUNTER — MYC REFILL (OUTPATIENT)
Dept: FAMILY MEDICINE | Facility: CLINIC | Age: 69
End: 2019-01-10

## 2019-01-10 DIAGNOSIS — I10 ESSENTIAL HYPERTENSION WITH GOAL BLOOD PRESSURE LESS THAN 140/90: ICD-10-CM

## 2019-01-11 NOTE — TELEPHONE ENCOUNTER
Routing refill request to provider for review/approval because:  Dosage change in October, may need BP check for refill    Jocelin Spence RN

## 2019-01-15 ENCOUNTER — MYC MEDICAL ADVICE (OUTPATIENT)
Dept: FAMILY MEDICINE | Facility: CLINIC | Age: 69
End: 2019-01-15

## 2019-01-15 RX ORDER — LISINOPRIL 40 MG/1
40 TABLET ORAL DAILY
Qty: 90 TABLET | Refills: 0 | Status: SHIPPED | OUTPATIENT
Start: 2019-01-15 | End: 2019-01-16

## 2019-01-16 ENCOUNTER — MYC MEDICAL ADVICE (OUTPATIENT)
Dept: FAMILY MEDICINE | Facility: CLINIC | Age: 69
End: 2019-01-16

## 2019-01-16 DIAGNOSIS — I10 ESSENTIAL HYPERTENSION WITH GOAL BLOOD PRESSURE LESS THAN 140/90: ICD-10-CM

## 2019-01-16 RX ORDER — LISINOPRIL 40 MG/1
40 TABLET ORAL DAILY
Qty: 90 TABLET | Refills: 1 | Status: SHIPPED | OUTPATIENT
Start: 2019-01-16 | End: 2019-01-31

## 2019-01-31 ENCOUNTER — MYC MEDICAL ADVICE (OUTPATIENT)
Dept: FAMILY MEDICINE | Facility: CLINIC | Age: 69
End: 2019-01-31

## 2019-01-31 DIAGNOSIS — I10 ESSENTIAL HYPERTENSION WITH GOAL BLOOD PRESSURE LESS THAN 140/90: ICD-10-CM

## 2019-01-31 RX ORDER — LISINOPRIL 40 MG/1
40 TABLET ORAL DAILY
Qty: 30 TABLET | Refills: 0 | Status: SHIPPED | OUTPATIENT
Start: 2019-01-31 | End: 2019-04-08

## 2019-04-08 ENCOUNTER — OFFICE VISIT (OUTPATIENT)
Dept: FAMILY MEDICINE | Facility: CLINIC | Age: 69
End: 2019-04-08
Payer: MEDICARE

## 2019-04-08 VITALS
BODY MASS INDEX: 28.17 KG/M2 | DIASTOLIC BLOOD PRESSURE: 82 MMHG | WEIGHT: 159 LBS | HEART RATE: 77 BPM | SYSTOLIC BLOOD PRESSURE: 140 MMHG | RESPIRATION RATE: 12 BRPM | HEIGHT: 63 IN | OXYGEN SATURATION: 97 % | TEMPERATURE: 97.7 F

## 2019-04-08 DIAGNOSIS — E89.0 POSTSURGICAL HYPOTHYROIDISM: Chronic | ICD-10-CM

## 2019-04-08 DIAGNOSIS — I10 ESSENTIAL HYPERTENSION WITH GOAL BLOOD PRESSURE LESS THAN 140/90: Primary | ICD-10-CM

## 2019-04-08 DIAGNOSIS — M54.2 CHRONIC NECK PAIN: ICD-10-CM

## 2019-04-08 DIAGNOSIS — M48.02 CERVICAL STENOSIS OF SPINAL CANAL: ICD-10-CM

## 2019-04-08 DIAGNOSIS — Z23 NEED FOR VACCINATION: ICD-10-CM

## 2019-04-08 DIAGNOSIS — F32.5 MAJOR DEPRESSION IN COMPLETE REMISSION (H): ICD-10-CM

## 2019-04-08 DIAGNOSIS — G89.29 CHRONIC NECK PAIN: ICD-10-CM

## 2019-04-08 DIAGNOSIS — K21.00 GASTROESOPHAGEAL REFLUX DISEASE WITH ESOPHAGITIS: ICD-10-CM

## 2019-04-08 PROCEDURE — 90471 IMMUNIZATION ADMIN: CPT | Performed by: PHYSICIAN ASSISTANT

## 2019-04-08 PROCEDURE — 90714 TD VACC NO PRESV 7 YRS+ IM: CPT | Performed by: PHYSICIAN ASSISTANT

## 2019-04-08 PROCEDURE — 99214 OFFICE O/P EST MOD 30 MIN: CPT | Mod: 25 | Performed by: PHYSICIAN ASSISTANT

## 2019-04-08 RX ORDER — TRIAMTERENE AND HYDROCHLOROTHIAZIDE 75; 50 MG/1; MG/1
1 TABLET ORAL DAILY
Qty: 90 TABLET | Refills: 1 | Status: SHIPPED | OUTPATIENT
Start: 2019-04-08 | End: 2019-10-07

## 2019-04-08 RX ORDER — LISINOPRIL 20 MG/1
20 TABLET ORAL DAILY
COMMUNITY
Start: 2019-04-08 | End: 2019-06-10

## 2019-04-08 ASSESSMENT — PAIN SCALES - GENERAL: PAINLEVEL: SEVERE PAIN (7)

## 2019-04-08 ASSESSMENT — MIFFLIN-ST. JEOR: SCORE: 1219

## 2019-04-08 ASSESSMENT — PATIENT HEALTH QUESTIONNAIRE - PHQ9: SUM OF ALL RESPONSES TO PHQ QUESTIONS 1-9: 4

## 2019-04-08 NOTE — PATIENT INSTRUCTIONS
Cut back on the lisinopril from 40 mg to 20 mg and restart Maxide (start with 1/2 tab daily for 10 days then up to a full tab).     Recheck labs and BP in about 1 month over at [facility].

## 2019-04-08 NOTE — PROGRESS NOTES
SUBJECTIVE:                                                    Marcela Allen is a 68 year old female who presents to clinic today for the following health issues:      Hyperlipidemia Follow-Up      Rate your low fat/cholesterol diet?: poor    Taking statin?  Yes, no muscle aches from statin    Other lipid medications/supplements?:  none    Hypertension Follow-up      Outpatient blood pressures are being checked at home.  Results are 120's/70-80's.    Low Salt Diet: not monitoring salt    Depression Followup    Status since last visit: up and down    See PHQ-9 for current symptoms.  Other associated symptoms: Pain    Complicating factors:   Significant life event:  No   Current substance abuse:  None  Anxiety or Panic symptoms:  No    PHQ 10/10/2016 4/3/2017 4/8/2019   PHQ-9 Total Score 0 8 4   Q9: Thoughts of better off dead/self-harm past 2 weeks Not at all Several days Not at all       PHQ-9  English  PHQ-9   Any Language  Suicide Assessment Five-step Evaluation and Treatment (SAFE-T)    Amount of exercise or physical activity: 4-5 days/week for an average of 15-30 minutes    Problems taking medications regularly: No    Medication side effects: none    Diet: regular (no restrictions)    She has a chronic itchy rash, for which she saw derm.  It was thought to be a med side effect so Maxide was stopped and lisinopril started instead over the past year.  Increased dose of lisinopril in October 2018 however BP seems higher on this (compared to when she was on Maxide).    Also with a cough (which could be from the smoking) but may be a little SE from lisinopril.     C/o chronic neck pain.  Has met with spine surgeon Sept 2018 and cervical fusion was advised.  She tried facet injections which gave her relief for 1 week.    Pain was a constant 5/10 and now it has been a constant 7/10.   Does massage every week, which lasts for a few days.   Does not want to take any medications (narcotics or marijuana).    Is checking  back in with her spine specialist in August 2019.  [facility]  Non-smoker.     Has a swelling on the inside of her left lower leg.  Saw derm for this and they were not concerned.  Sometimes will have pain with palpation.         Problem list and histories reviewed & adjusted, as indicated.  Additional history: as documented    Patient Active Problem List   Diagnosis     GERD (gastroesophageal reflux disease)     Eczema     Hiatal hernia     OA (osteoarthritis)     Osteopenia     Cervical pain     Advanced directives, counseling/discussion     Major depression in complete remission (H)     Hyperlipidemia LDL goal <130     Hypertension goal BP (blood pressure) < 140/90     Seasonal allergies     CKD (chronic kidney disease) stage 3, GFR 30-59 ml/min (H)     Liver hemangioma     S/P LEEP of cervix     Former smoker     Elevated platelet count     s/p B TKA 6/27     S/P knee replacement     Postsurgical hypothyroidism     Papillary thyroid carcinoma (H)     Enlarged lymph nodes     Cervical cancer (H)     Cervical radiculopathy     Malignant neoplasm of cervix, unspecified site (H)     Vulvar cancer (H)     Chronic obstructive pulmonary disease, unspecified COPD type (H)     History of colonic polyps     Post-menopausal     Past Surgical History:   Procedure Laterality Date     ABDOMEN SURGERY      Teenager     APPENDECTOMY OPEN  1968     ARTHROPLASTY MINIMALLY INVASIVE KNEE BILATERAL  6/27/2013    Procedure: ARTHROPLASTY MINIMALLY INVASIVE KNEE BILATERAL;  Minimally Invasive Bilateral Total Knee Arthroplasty;  Surgeon: Christophe Welch MD;  [facility]     BIOPSY  Ongoing    Lymph nodes neck     BONE MARROW ASPIRATION ONLY Left 12/7/2015    Procedure: BONE MARROW ASPIRATION ONLY;  Surgeon: Aguilar Roldan MD;  Location: [facility] OR     CARPAL TUNNEL RELEASE RT/LT       COLONOSCOPY  2007    neg     COLPOSCOPY CERVIX, BIOPSY CERVIX, ENDOCERVICAL CURETTAGE, COMBINED  2003, 2007     completion  thyroidectomy Right 3/14/00     CYSTECTOMY OVARIAN BENIGN  1969     ESOPHAGOSCOPY, GASTROSCOPY, DUODENOSCOPY (EGD), COMBINED N/A 2014    Procedure: COMBINED ESOPHAGOSCOPY, GASTROSCOPY, DUODENOSCOPY (EGD), BIOPSY SINGLE OR MULTIPLE;  Surgeon: Kelvin Montgomery MD;  Location: [facility] OR     FUSION CERVICAL ANTERIOR THREE+ LEVELS WITH BONE ALLOGRAFT N/A 2015    Procedure: FUSION CERVICAL ANTERIOR THREE+ LEVELS WITH BONE ALLOGRAFT;  Surgeon: Aguilar Roldan MD;  Location: [facility] OR     HAND SURGERY       HEAD & NECK SURGERY      Thyroidectomy     KNEE SURGERY   &     bilat     LAPAROSCOPIC CHOLECYSTECTOMY  1998     LEEP TX, CERVICAL  2007    BULMARO II on colp, leep path WNL     left thyroid total lobectomy, isthmusectomy and 50% right thyroid lobectomy Left 3/7/00     TUBAL LIGATION  1988     VULVECTOMY SIMPLE  2006    FELICIA 3, wide local excision x 2     VULVECTOMY SIMPLE         Social History     Tobacco Use     Smoking status: Former Smoker     Packs/day: 1.00     Years: 45.00     Pack years: 45.00     Types: Cigarettes     Last attempt to quit: 2012     Years since quittin.6     Smokeless tobacco: Never Used     Tobacco comment: quitting with e cigarette   Substance Use Topics     Alcohol use: Yes     Comment: less than weekly     Family History   Problem Relation Age of Onset     Blood Disease Mother 75        thrombocythemia on hydrea     Hypertension Mother      Cerebrovascular Disease Mother         TIA     Anesthesia Reaction Mother         N/V     Osteoporosis Mother      C.A.D. Father 61        three MI's, smoker     Hypertension Father      Coronary Artery Disease Father      Diabetes Paternal Aunt      Cancer - colorectal Maternal Grandmother         unsure of her age.     Breast Cancer No family hx of      Asthma No family hx of          Current Outpatient Medications   Medication Sig Dispense Refill     aspirin 81 MG tablet Take 81 mg by mouth daily        atorvastatin  "(LIPITOR) 20 MG tablet Take 1 tablet (20 mg) by mouth daily 90 tablet 1     CALCIUM CITRATE PO Take 1,200 mg by mouth daily        cetirizine (ZYRTEC) 10 MG tablet Take 1 tablet (10 mg) by mouth daily 90 tablet 1     Cholecalciferol (VITAMIN D) 1000 UNIT capsule Take 1 capsule by mouth daily.       FLUoxetine (PROZAC) 20 MG capsule Take 3 capsules (60 mg) by mouth daily 270 capsule 1     hypromellose (ARTIFICIAL TEARS) 0.5 % SOLN Place 1 drop into both eyes every 4 hours as needed        levothyroxine (SYNTHROID/LEVOTHROID) 100 MCG tablet Monday-Saturday: 1 tablet/day,  Sunday: skip 90 tablet 3     lisinopril (PRINIVIL/ZESTRIL) 20 MG tablet Take 1 tablet (20 mg) by mouth daily       multivitamin, therapeutic with minerals (MULTI-VITAMIN) TABS Take 1 tablet by mouth daily       Omega-3 Fatty Acids (FISH OIL) 1200 MG CAPS Take 1 capsule by mouth daily        omeprazole (PRILOSEC) 20 MG DR capsule 1 capsule M, W, F 90 capsule 1     sennosides (SENOKOT) 8.6 MG tablet Take 2 tablets by mouth daily        triamcinolone (KENALOG) 0.1 % ointment Apply to affected area of the skin twice a day after using a moisturizer. Can do wet wraps with this. 454 g 1     triamterene-HCTZ (MAXZIDE) 75-50 MG tablet Take 1 tablet by mouth daily 90 tablet 1     BP Readings from Last 3 Encounters:   04/08/19 141/87   11/06/18 134/80   10/05/18 146/82    Wt Readings from Last 3 Encounters:   04/08/19 72.1 kg (159 lb)   11/06/18 71.7 kg (158 lb 1.6 oz)   10/05/18 71.8 kg (158 lb 4 oz)                    ROS:  Constitutional, HEENT, cardiovascular, pulmonary, GI, , musculoskeletal, neuro, skin, endocrine and psych systems are negative, except as otherwise noted.    OBJECTIVE:     /87 (BP Location: Left arm, Patient Position: Chair, Cuff Size: Adult Large)   Pulse 77   Temp 97.7  F (36.5  C) (Tympanic)   Resp 12   Ht 1.59 m (5' 2.6\")   Wt 72.1 kg (159 lb)   LMP  (LMP Unknown)   SpO2 97%   Breastfeeding? No   BMI 28.53 kg/m  "   Body mass index is 28.53 kg/m .  GENERAL: healthy, alert and no distress  NECK: no adenopathy, no asymmetry, masses, or scars and thyroid normal to palpation  RESP: lungs clear to auscultation - no rales, rhonchi or wheezes  CV: regular rate and rhythm, normal S1 S2, no S3 or S4, no murmur, click or rub, no peripheral edema and peripheral pulses strong  ABDOMEN: soft, nontender, no hepatosplenomegaly, no masses and bowel sounds normal  MS: no gross musculoskeletal defects noted, no edema.  Mild soft tissue swelling noted on medial aspect of left leg (below knee).  No mass noted .     Diagnostic Test Results:  none     ASSESSMENT/PLAN:       1. Essential hypertension with goal blood pressure less than 140/90  Cut back on the lisinopril from 40 mg to 20 mg and restart Maxide (start with 1/2 tab daily for 10 days then up to a full tab).     Recheck labs and BP in about 1 month over at [facility].   - lisinopril (PRINIVIL/ZESTRIL) 20 MG tablet; Take 1 tablet (20 mg) by mouth daily  - triamterene-HCTZ (MAXZIDE) 75-50 MG tablet; Take 1 tablet by mouth daily  Dispense: 90 tablet; Refill: 1  - Basic metabolic panel; Future    2. Major depression in complete remission  Stable, will leave meds as is  - FLUoxetine (PROZAC) 20 MG capsule; Take 3 capsules (60 mg) by mouth daily  Dispense: 270 capsule; Refill: 1    3. Gastroesophageal reflux disease with esophagitis  Stable.   - omeprazole (PRILOSEC) 20 MG DR capsule; 1 capsule M, W, F  Dispense: 90 capsule; Refill: 1    4. Need for vaccination  Due.   - TD PRSERV FREE >=7 YRS ADS IM [[id number]]  - 1st  Administration  [[id number]]    5. Postsurgical hypothyroidism  Managed by endo    6. Cervical stenosis of spinal canal  Managed by cervical spine specialist, has f/u appt near the end of the summer.  She is not interested in taking any chronic pain meds and is very hesitant to pursue surgery (which is what she needs).  May consider nerve ablation.     7. Chronic neck pain  As above.        FUTURE APPOINTMENTS:       - Follow-up visit in 1 month for labs and nurse only BP recheck.       Honey García PA-C  [facility]

## 2019-05-06 ENCOUNTER — ALLIED HEALTH/NURSE VISIT (OUTPATIENT)
Dept: NURSING | Facility: CLINIC | Age: 69
End: 2019-05-06
Payer: MEDICARE

## 2019-05-06 ENCOUNTER — MYC MEDICAL ADVICE (OUTPATIENT)
Dept: FAMILY MEDICINE | Facility: CLINIC | Age: 69
End: 2019-05-06

## 2019-05-06 VITALS — HEART RATE: 82 BPM | OXYGEN SATURATION: 100 % | SYSTOLIC BLOOD PRESSURE: 112 MMHG | DIASTOLIC BLOOD PRESSURE: 71 MMHG

## 2019-05-06 DIAGNOSIS — Z01.30 BLOOD PRESSURE CHECK: ICD-10-CM

## 2019-05-06 DIAGNOSIS — E89.0 POSTSURGICAL HYPOTHYROIDISM: Chronic | ICD-10-CM

## 2019-05-06 DIAGNOSIS — I10 ESSENTIAL HYPERTENSION WITH GOAL BLOOD PRESSURE LESS THAN 140/90: ICD-10-CM

## 2019-05-06 DIAGNOSIS — C73 PAPILLARY THYROID CARCINOMA (H): Chronic | ICD-10-CM

## 2019-05-06 LAB
ANION GAP SERPL CALCULATED.3IONS-SCNC: 9 MMOL/L (ref 3–14)
BUN SERPL-MCNC: 17 MG/DL (ref 7–30)
CALCIUM SERPL-MCNC: 9.4 MG/DL (ref 8.5–10.1)
CHLORIDE SERPL-SCNC: 104 MMOL/L (ref 94–109)
CO2 SERPL-SCNC: 26 MMOL/L (ref 20–32)
CREAT SERPL-MCNC: 1.06 MG/DL (ref 0.52–1.04)
GFR SERPL CREATININE-BSD FRML MDRD: 54 ML/MIN/{1.73_M2}
GLUCOSE SERPL-MCNC: 92 MG/DL (ref 70–99)
POTASSIUM SERPL-SCNC: 4.5 MMOL/L (ref 3.4–5.3)
SODIUM SERPL-SCNC: 139 MMOL/L (ref 133–144)
T4 FREE SERPL-MCNC: 1.04 NG/DL (ref 0.76–1.46)
TSH SERPL DL<=0.005 MIU/L-ACNC: 0.12 MU/L (ref 0.4–4)

## 2019-05-06 PROCEDURE — 36415 COLL VENOUS BLD VENIPUNCTURE: CPT | Performed by: PHYSICIAN ASSISTANT

## 2019-05-06 PROCEDURE — 84439 ASSAY OF FREE THYROXINE: CPT

## 2019-05-06 PROCEDURE — 84443 ASSAY THYROID STIM HORMONE: CPT

## 2019-05-06 PROCEDURE — 80048 BASIC METABOLIC PNL TOTAL CA: CPT | Performed by: PHYSICIAN ASSISTANT

## 2019-05-06 NOTE — NURSING NOTE
Ancillary BP check    HTN Guidelines:  Age 18-59 BP range:  Less than 140/90  Age 60-85 with Diabetes:  Less than 140/90  Age 60-85 without Diabetes:  less than 150/90        Marcela Allen is a 68 year old female who comes in today for a Blood Pressure check.    Patient is not taking medication as prescribed  Patient is tolerating medications well.  Patient is monitoring Blood Pressure at home.  Average readings if yes are sometimes below 100 for systolic and 70s for diastolic     BP goal: < 140/90mmHg (patient 18+ years of age with or without diabetes)    BP reading today: Passed     BP Readings from Last 1 Encounters:   05/06/19 112/71     A large cuff was used.  Pulse: 82    Vitals reviewed     Each reading recorded in vital signs section of chart: yes    Symptoms: none    Need for urgent appointment, based on criteria in ancillary BP workflow (elevated blood pressure and any of the following: dizziness, blurry vision, lightheadedness, severe shortness of breath, chest pain or visual disturbance): No       Plan: At goal follow up as directed by provider.      Completed by: MARC Wall MA    [facility]

## 2019-05-08 RX ORDER — TRIAMTERENE/HYDROCHLOROTHIAZID 37.5-25 MG
1 TABLET ORAL DAILY
Qty: 90 TABLET | Refills: 0 | Status: SHIPPED | OUTPATIENT
Start: 2019-05-08 | End: 2019-10-07

## 2019-05-27 ENCOUNTER — MYC MEDICAL ADVICE (OUTPATIENT)
Dept: FAMILY MEDICINE | Facility: CLINIC | Age: 69
End: 2019-05-27

## 2019-05-27 DIAGNOSIS — E78.5 HYPERLIPIDEMIA LDL GOAL <130: ICD-10-CM

## 2019-05-28 RX ORDER — ATORVASTATIN CALCIUM 20 MG/1
20 TABLET, FILM COATED ORAL DAILY
Qty: 90 TABLET | Refills: 0 | Status: SHIPPED | OUTPATIENT
Start: 2019-05-28 | End: 2019-09-09

## 2019-05-28 NOTE — TELEPHONE ENCOUNTER
Prescription approved per [facility] Refill Protocol.    Hemalatha ZAMORA RN

## 2019-06-08 ENCOUNTER — MYC MEDICAL ADVICE (OUTPATIENT)
Dept: FAMILY MEDICINE | Facility: CLINIC | Age: 69
End: 2019-06-08

## 2019-06-08 DIAGNOSIS — I10 ESSENTIAL HYPERTENSION WITH GOAL BLOOD PRESSURE LESS THAN 140/90: ICD-10-CM

## 2019-06-10 RX ORDER — LISINOPRIL 20 MG/1
20 TABLET ORAL DAILY
Qty: 90 TABLET | Refills: 1 | Status: SHIPPED | OUTPATIENT
Start: 2019-06-10 | End: 2019-10-07

## 2019-06-10 NOTE — TELEPHONE ENCOUNTER
Prescription approved per [facility] Refill Protocol.  Opal Pineda RN

## 2019-09-08 ENCOUNTER — MYC MEDICAL ADVICE (OUTPATIENT)
Dept: FAMILY MEDICINE | Facility: CLINIC | Age: 69
End: 2019-09-08

## 2019-09-08 DIAGNOSIS — E78.5 HYPERLIPIDEMIA LDL GOAL <130: ICD-10-CM

## 2019-09-09 ENCOUNTER — MYC MEDICAL ADVICE (OUTPATIENT)
Dept: FAMILY MEDICINE | Facility: CLINIC | Age: 69
End: 2019-09-09

## 2019-09-09 RX ORDER — ATORVASTATIN CALCIUM 20 MG/1
20 TABLET, FILM COATED ORAL DAILY
Qty: 90 TABLET | Refills: 0 | Status: SHIPPED | OUTPATIENT
Start: 2019-09-09 | End: 2019-11-29

## 2019-09-29 ENCOUNTER — HEALTH MAINTENANCE LETTER (OUTPATIENT)
Age: 69
End: 2019-09-29

## 2019-10-01 ASSESSMENT — ENCOUNTER SYMPTOMS
EYE PAIN: 0
MYALGIAS: 1
NAUSEA: 1
ABDOMINAL PAIN: 1
HEMATURIA: 0
DYSURIA: 0
DIARRHEA: 1
SORE THROAT: 0
HEMATOCHEZIA: 0
HEADACHES: 1
BREAST MASS: 0
PARESTHESIAS: 0
COUGH: 1
ARTHRALGIAS: 1
HEARTBURN: 1
CONSTIPATION: 1
JOINT SWELLING: 1
CHILLS: 0
DIZZINESS: 1
NERVOUS/ANXIOUS: 0
PALPITATIONS: 0

## 2019-10-01 ASSESSMENT — ACTIVITIES OF DAILY LIVING (ADL): CURRENT_FUNCTION: NO ASSISTANCE NEEDED

## 2019-10-04 NOTE — PROGRESS NOTES
"  SUBJECTIVE:   Marcela Allen is a 68 year old female who presents for Preventive Visit.    Are you in the first 12 months of your Medicare Part B coverage?  No    Physical Health:  Answers for HPI/ROS submitted by the patient on 10/1/2019   Annual Exam:  In general, how would you rate your overall physical health?: good  Frequency of exercise:: 2-3 days/week  Do you usually eat at least 4 servings of fruit and vegetables a day, include whole grains & fiber, and avoid regularly eating high fat or \"junk\" foods? : No  Taking medications regularly:: Yes  Medication side effects:: None  Activities of Daily Living: no assistance needed  Home safety: no safety concerns identified  Hearing Impairment:: no hearing concerns  In the past 6 months, have you been bothered by leaking of urine?: No  abdominal pain: Yes  Blood in stool: No  Blood in urine: No  chest pain: No  chills: No  congestion: No  constipation: Yes  cough: Yes  diarrhea: Yes  dizziness: Yes  ear pain: No  eye pain: No  nervous/anxious: No  genital sores: No  headaches: Yes  hearing loss: Yes  heartburn: Yes  arthralgias: Yes  joint swelling: Yes  peripheral edema: No  mood changes: No  myalgias: Yes  nausea: Yes  dysuria: No  palpitations: No  Skin sensation changes: No  sore throat: No  urgency: No  rash: No  pelvic pain: No  vaginal bleeding: No  vaginal discharge: No  tenderness: No  breast mass: No  breast discharge: No  In general, how would you rate your overall mental or emotional health?: fair  Additional concerns today:: No  Duration of exercise:: N/A    Do you feel safe in your environment? Yes    Do you have a Health Care Directive? Yes: Advance Directive has been received and scanned.     Additional concerns to address?  No    Fall risk:  Fallen 2 or more times in the past year?: No  Any fall with injury in the past year?: No    Cognitive Screenin) Repeat 3 items (Leader, Season, Table)    2) Clock draw: NORMAL  3) 3 item recall: Recalls 3 " objects  Results: NORMAL clock, 1-2 items recalled: COGNITIVE IMPAIRMENT LESS LIKELY    Mini-CogTM Copyright S Do. Licensed by the author for use in [facility]; reprinted with permission (arleen@.[email or web address]). All rights reserved.            Reviewed and updated as needed this visit by clinical staff  Tobacco      Ericka Garsia CMA      Had cervical nerve ablation in 2019 with [facility] and the shooting pains have improved.    Will be f/u with lumbar spine specialist soon.      If she eats too much, she feels nauseated and sometimes will vomit.  She c/o decreased appetite.  Feels bloated.  Gallbladder removed.  Doesn't eat fruits/veggies.  She does have a hiatal hernia.     C/o dizziness off and on, especially when getting up.  Home blood pressures are rarely over 90 systolic.  When she was off maxide, BP's were elevated over 140's.      She also c/o irritation near her sacrum that has been present for about 1 week now.  It feels irritated, no numbness or tingling.  She has tried putting A&D on the area.   She does have a h/o genital herpes, no recent outbreaks.       Reviewed and updated as needed this visit by Provider        Social History     Tobacco Use     Smoking status: Former Smoker     Packs/day: 1.00     Years: 45.00     Pack years: 45.00     Types: Cigarettes     Last attempt to quit: 2012     Years since quittin.1     Smokeless tobacco: Never Used     Tobacco comment: quitting with e cigarette   Substance Use Topics     Alcohol use: Yes     Comment: less than weekly                           Current providers sharing in care for this patient include:   Patient Care Team:  Honey García PA-C as PCP - General (Family Practice)  Geovanna Chatterjee MD as MD (INTERNAL MEDICINE - ENDOCRINOLOGY, DIABETES & METABOLISM)  Honey García PA-C as Assigned PCP    The following health maintenance items are reviewed in Epic and correct as of today:  Health Maintenance   Topic Date Due      COPD ACTION PLAN  1950     ZOSTER IMMUNIZATION (2 of 3) 04/09/2014     INFLUENZA VACCINE (1) 09/01/2019     MEDICARE ANNUAL WELLNESS VISIT  10/05/2019     PHQ-9  10/08/2019     MAMMO SCREENING  11/23/2019     FALL RISK ASSESSMENT  04/08/2020     LIPID  11/01/2023     DEXA  11/26/2023     ADVANCE CARE PLANNING  04/08/2024     COLONOSCOPY  10/06/2027     DTAP/TDAP/TD IMMUNIZATION (3 - Td) 04/08/2029     SPIROMETRY  Completed     HEPATITIS C SCREENING  Completed     DEPRESSION ACTION PLAN  Completed     PNEUMOCOCCAL IMMUNIZATION 65+ HIGH/HIGHEST RISK  Completed     IPV IMMUNIZATION  Aged Out     MENINGITIS IMMUNIZATION  Aged Out     Lab work is in process  Labs reviewed in EPIC  Patient Active Problem List   Diagnosis     GERD (gastroesophageal reflux disease)     Eczema     Hiatal hernia     OA (osteoarthritis)     Osteopenia     Chronic neck pain     Advanced directives, counseling/discussion     Major depression in complete remission (H)     Hyperlipidemia LDL goal <130     Hypertension goal BP (blood pressure) < 140/90     Seasonal allergies     CKD (chronic kidney disease) stage 3, GFR 30-59 ml/min (H)     Liver hemangioma     S/P LEEP of cervix     Former smoker     Elevated platelet count     s/p B TKA 6/27     S/P knee replacement     Postsurgical hypothyroidism     Papillary thyroid carcinoma (H)     Enlarged lymph nodes     Cervical cancer (H)     Cervical radiculopathy     Malignant neoplasm of cervix, unspecified site (H)     Vulvar cancer (H)     Chronic obstructive pulmonary disease, unspecified COPD type (H)     History of colonic polyps     Post-menopausal     Cervical stenosis of spinal canal     Blood pressure check     Past Surgical History:   Procedure Laterality Date     ABDOMEN SURGERY      Teenager     APPENDECTOMY OPEN  1968     ARTHROPLASTY MINIMALLY INVASIVE KNEE BILATERAL  6/27/2013    Procedure: ARTHROPLASTY MINIMALLY INVASIVE KNEE BILATERAL;  Minimally Invasive Bilateral Total  Knee Arthroplasty;  Surgeon: Christophe Welch MD;  Location: [facility] OR     BIOPSY  Ongoing    Lymph nodes neck     BONE MARROW ASPIRATION ONLY Left 2015    Procedure: BONE MARROW ASPIRATION ONLY;  Surgeon: Aguilar Roldan MD;  Location: [facility] OR     CARPAL TUNNEL RELEASE RT/LT       COLONOSCOPY  2007    neg     COLPOSCOPY CERVIX, BIOPSY CERVIX, ENDOCERVICAL CURETTAGE, COMBINED  ,      completion thyroidectomy Right 3/14/00     CYSTECTOMY OVARIAN BENIGN  1969     ESOPHAGOSCOPY, GASTROSCOPY, DUODENOSCOPY (EGD), COMBINED N/A 2014    Procedure: COMBINED ESOPHAGOSCOPY, GASTROSCOPY, DUODENOSCOPY (EGD), BIOPSY SINGLE OR MULTIPLE;  Surgeon: Kelvin Montgomery MD;  Location: [facility] OR     FUSION CERVICAL ANTERIOR THREE+ LEVELS WITH BONE ALLOGRAFT N/A 2015    Procedure: FUSION CERVICAL ANTERIOR THREE+ LEVELS WITH BONE ALLOGRAFT;  Surgeon: Aguilar Roldan MD;  Location:  OR     HAND SURGERY       HEAD & NECK SURGERY      Thyroidectomy     KNEE SURGERY   &     bilat     LAPAROSCOPIC CHOLECYSTECTOMY  1998     LEEP TX, CERVICAL  2007    BULMARO II on colp, leep path WNL     left thyroid total lobectomy, isthmusectomy and 50% right thyroid lobectomy Left 3/7/00     TUBAL LIGATION  1988     VULVECTOMY SIMPLE  2006    FELICIA 3, wide local excision x 2     VULVECTOMY SIMPLE         Social History     Tobacco Use     Smoking status: Former Smoker     Packs/day: 1.00     Years: 45.00     Pack years: 45.00     Types: Cigarettes     Last attempt to quit: 2012     Years since quittin.1     Smokeless tobacco: Never Used     Tobacco comment: quitting with e cigarette   Substance Use Topics     Alcohol use: Yes     Comment: less than weekly     Family History   Problem Relation Age of Onset     Blood Disease Mother 75        thrombocythemia on hydrea     Hypertension Mother      Cerebrovascular Disease Mother         TIA     Anesthesia Reaction Mother         N/V     Osteoporosis Mother       "C.A.D. Father 61        three MI's, smoker     Hypertension Father      Coronary Artery Disease Father      Diabetes Paternal Aunt      Cancer - colorectal Maternal Grandmother         unsure of her age.     Breast Cancer No family hx of      Asthma No family hx of          Mammogram Screening: Mammogram Screening: Patient over age 50, mutual decision to screen reflected in health maintenance.    ROS:  Constitutional, HEENT, cardiovascular, pulmonary, GI, , musculoskeletal, neuro, skin, endocrine and psych systems are negative, except as otherwise noted.    OBJECTIVE:   /68   Pulse 72   Temp 98.1  F (36.7  C) (Tympanic)   Resp 15   Ht 1.594 m (5' 2.75\")   Wt 69.4 kg (153 lb)   LMP  (LMP Unknown)   SpO2 99%   Breastfeeding? No   BMI 27.32 kg/m   Estimated body mass index is 27.32 kg/m  as calculated from the following:    Height as of this encounter: 1.594 m (5' 2.75\").    Weight as of this encounter: 69.4 kg (153 lb).  EXAM:   GENERAL APPEARANCE: healthy, alert and no distress  EYES: Eyes grossly normal to inspection, PERRL and conjunctivae and sclerae normal  HENT: ear canals and TM's normal, nose and mouth without ulcers or lesions, oropharynx clear and oral mucous membranes moist  NECK: no adenopathy, no asymmetry, masses, or scars and thyroid normal to palpation  RESP: lungs clear to auscultation - no rales, rhonchi or wheezes  BREAST: normal without masses, tenderness or nipple discharge and no palpable axillary masses or adenopathy  CV: regular rate and rhythm, normal S1 S2, no S3 or S4, no murmur, click or rub, no peripheral edema and peripheral pulses strong  ABDOMEN: soft, nontender, no hepatosplenomegaly, no masses and bowel sounds normal   (female): normal female external genitalia, normal urethral meatus, vaginal mucosal atrophy noted,  MS: no musculoskeletal defects are noted and gait is age appropriate without ataxia  SKIN: pilonidal cyst noted with no associated infection (this is " not new per patient)  Below this cyst, there is a cluster of open sores that are tender to the touch.    NEURO: Normal strength and tone, sensory exam grossly normal, mentation intact and speech normal  PSYCH: mentation appears normal and affect normal/bright    Diagnostic Test Results:  Labs reviewed in Epic    ASSESSMENT / PLAN:   1. Encounter for Medicare annual wellness exam       HEALTH CARE MAINTENANCE              Reviewed USPTF recommendations and anticipatory guidance.              See orders.     Make sure you are eating healthy foods (fruits and veggies).  Try to incorporate them into your diet, this is important!    Follow-up with [facility] in regards to your bloating/decreased appetite and hiatal hernia.   They may be able to help with these symptoms    Discussed Shingrex vaccine, please inquire at pharmacy for coverage and administration.         2. CKD (chronic kidney disease) stage 3, GFR 30-59 ml/min (H)  Will continue to monitor.   - CBC with platelets  - OFFICE/OUTPT VISIT,EST,LEVL IV    3. Hypertension goal BP (blood pressure) < 140/90  Cut back on lisinopril from 20 mg to 10 mg and let's see if this helps with the dizziness.   Will leave maxide as is for now.   - Comprehensive metabolic panel  - Albumin Random Urine Quantitative with Creat Ratio  - CBC with platelets  - OFFICE/OUTPT VISIT,EST,LEVL IV  - triamterene-HCTZ (MAXZIDE-25) 37.5-25 MG tablet; Take 0.5 tablets by mouth daily  Dispense: 45 tablet; Refill: 0  - lisinopril (PRINIVIL/ZESTRIL) 20 MG tablet; Take 0.5 tablets (10 mg) by mouth daily  Dispense: 90 tablet; Refill: 1  - OFFICE/OUTPT VISIT,EST,LEVL IV      4. Hyperlipidemia LDL goal <130  Tolerating statin well, will recheck labs and she prefers to notify us when she needs a refill.   - Lipid panel reflex to direct LDL Fasting  - OFFICE/OUTPT VISIT,EST,LEVL IV          5. Major depression in complete remission  Stable on prozac  - FLUoxetine (PROZAC) 20 MG capsule; Take 3 capsules (60  mg) by mouth daily  Dispense: 270 capsule; Refill: 1  - OFFICE/OUTPT VISIT,EST,LEVL IV    6. Gastroesophageal reflux disease with esophagitis  Continue with prilosec as is  - omeprazole (PRILOSEC) 20 MG DR capsule; 1 capsule M, W, F  Dispense: 90 capsule; Refill: 1  - OFFICE/OUTPT VISIT,EST,LEVL IV    8. Senile cataract of left eye, unspecified age-related cataract type  F/b eye clinic once per year.  She is not interested in surgery quite yet  - OFFICE/OUTPT VISIT,EST,LEVL IV    9. Decreased appetite  I am concerned about these symptoms that seem to be worsening.  This may be secondary to poor diet vs uncontrolled GERD vs hiatal hernia vs tumor vs other.      Make sure you are eating healthy foods (fruits and veggies).  Try to incorporate them into your diet, this is important!    Follow-up with [facility] in regards to your bloating/decreased appetite and hiatal hernia.   They may be able to help with these symptoms    - GASTROENTEROLOGY ADULT REF CONSULT ONLY  - OFFICE/OUTPT VISIT,EST,LEVL IV    10. Abdominal bloating  As above  - GASTROENTEROLOGY ADULT REF CONSULT ONLY  - OFFICE/OUTPT VISIT,EST,LEVL IV    11. Hiatal hernia  As above  - GASTROENTEROLOGY ADULT REF CONSULT ONLY  - OFFICE/OUTPT VISIT,EST,LEVL IV    12. Orthostatic hypotension  Home blood pressures seem to be running low.  Will cut back on lisinopril from 20 mg to 10 mg (as last time we stopped maxide, BP was not at goal)  - CBC with platelets  - OFFICE/OUTPT VISIT,EST,LEVL IV    13. Chronic obstructive pulmonary disease, unspecified COPD type (H)  Stable, has an inhaler at home.   - OFFICE/OUTPT VISIT,EST,LEVL IV    14. Vulvar cancer (H)  No symptoms and vulvar exam normal.   - OFFICE/OUTPT VISIT,EST,LEVL IV    15. Encounter for screening mammogram for breast cancer  I discussed in detail with Marcela Allen the importance of breast cancer screening through monthly self breast exams and annual breast exams in the clinic.   - *MA Screening Digital  "Bilateral; Future    16. HSV (herpes simplex virus) infection  Irritation/rash on sacrum most consistent with an HSV infection given the looks of it and her history of genital herpes.  I am not going to swab for it today, as the rash has been present for over 72 hours and anti-virals at this point and not going to help.  I suggest she continue with the barrier cream (A&D), may switch to a lotrimin if irritation persists.  F/u with GI if it does not resolve.    Pilonidal cyst is not new.       End of Life Planning:  Patient currently has an advanced directive: Yes.  Practitioner is supportive of decision.    COUNSELING:  Reviewed preventive health counseling, as reflected in patient instructions       Regular exercise       Healthy diet/nutrition       Vision screening       Fall risk prevention    Estimated body mass index is 27.32 kg/m  as calculated from the following:    Height as of this encounter: 1.594 m (5' 2.75\").    Weight as of this encounter: 69.4 kg (153 lb).         reports that she quit smoking about 7 years ago. Her smoking use included cigarettes. She has a 45.00 pack-year smoking history. She has never used smokeless tobacco.      Appropriate preventive services were discussed with this patient, including applicable screening as appropriate for cardiovascular disease, diabetes, osteopenia/osteoporosis, and glaucoma.  As appropriate for age/gender, discussed screening for colorectal cancer, prostate cancer, breast cancer, and cervical cancer. Checklist reviewing preventive services available has been given to the patient.    Reviewed patients plan of care and provided an AVS. The Intermediate Care Plan ( asthma action plan, low back pain action plan, and migraine action plan) for Marcela meets the Care Plan requirement. This Care Plan has been established and reviewed with the Patient.    Counseling Resources:  ATP IV Guidelines  Pooled Cohorts Equation Calculator  Breast Cancer Risk Calculator  FRAX " Risk Assessment  ICSI Preventive Guidelines  Dietary Guidelines for Americans, 2010  USDA's MyPlate  ASA Prophylaxis  Lung CA Screening    Honey García PA-C  [facility]

## 2019-10-04 NOTE — PATIENT INSTRUCTIONS
Cut back on lisinopril from 20 mg to 10 mg and let's see if this helps with the dizziness.   Make sure you are eating healthy foods (fruits and veggies).  Try to incorporate them into your diet, this is important!    Follow-up with [facility] in regards to your bloating/decreased appetite and hiatal hernia.   They may be able to help with these symptoms    Discussed Shingrex vaccine, please inquire at pharmacy for coverage and administration.        Patient Education   Personalized Prevention Plan  You are due for the preventive services outlined below.  Your care team is available to assist you in scheduling these services.  If you have already completed any of these items, please share that information with your care team to update in your medical record.  Health Maintenance Due   Topic Date Due     COPD Action Plan  1950     Zoster (Shingles) Vaccine (2 of 3) 04/09/2014     Flu Vaccine (1) 09/01/2019     Annual Wellness Visit  10/05/2019     Depression Assessment  10/08/2019     Preventive Health Recommendations    See your health care provider every year to    Review health changes.     Discuss preventive care.      Review your medicines if your doctor has prescribed any.    You no longer need a yearly Pap test unless you've had an abnormal Pap test in the past 10 years. If you have vaginal symptoms, such as bleeding or discharge, be sure to talk with your provider about a Pap test.    Every 1 to 2 years, have a mammogram.  If you are over 69, talk with your health care provider about whether or not you want to continue having screening mammograms.    Every 10 years, have a colonoscopy. Or, have a yearly FIT test (stool test). These exams will check for colon cancer.     Have a cholesterol test every 5 years, or more often if your doctor advises it.     Have a diabetes test (fasting glucose) every three years. If you are at risk for diabetes, you should have this test more often.     At age 65, have a bone  density scan (DEXA) to check for osteoporosis (brittle bone disease).    Shots:    Get a flu shot each year.    Get a tetanus shot every 10 years.    Talk to your doctor about your pneumonia vaccines. There are now two you should receive - Pneumovax (PPSV 23) and Prevnar (PCV 13).    Talk to your pharmacist about the shingles vaccine.    Talk to your doctor about the hepatitis B vaccine.    Nutrition:     Eat at least 5 servings of fruits and vegetables each day.    Eat whole-grain bread, whole-wheat pasta and brown rice instead of white grains and rice.    Get adequate Calcium and Vitamin D.     Lifestyle    Exercise at least 150 minutes a week (30 minutes a day, 5 days a week). This will help you control your weight and prevent disease.    Limit alcohol to one drink per day.    No smoking.     Wear sunscreen to prevent skin cancer.     See your dentist twice a year for an exam and cleaning.    See your eye doctor every 1 to 2 years to screen for conditions such as glaucoma, macular degeneration and cataracts.    Personalized Prevention Plan  You are due for the preventive services outlined below.  Your care team is available to assist you in scheduling these services.  If you have already completed any of these items, please share that information with your care team to update in your medical record.  Health Maintenance Due   Topic Date Due     COPD Action Plan  1950     Zoster (Shingles) Vaccine (2 of 3) 04/09/2014     Flu Vaccine (1) 09/01/2019     Annual Wellness Visit  10/05/2019     Depression Assessment  10/08/2019

## 2019-10-07 ENCOUNTER — OFFICE VISIT (OUTPATIENT)
Dept: FAMILY MEDICINE | Facility: CLINIC | Age: 69
End: 2019-10-07
Payer: MEDICARE

## 2019-10-07 VITALS
DIASTOLIC BLOOD PRESSURE: 68 MMHG | WEIGHT: 153 LBS | HEART RATE: 72 BPM | HEIGHT: 63 IN | SYSTOLIC BLOOD PRESSURE: 118 MMHG | TEMPERATURE: 98.1 F | RESPIRATION RATE: 15 BRPM | OXYGEN SATURATION: 99 % | BODY MASS INDEX: 27.11 KG/M2

## 2019-10-07 DIAGNOSIS — N18.30 CKD (CHRONIC KIDNEY DISEASE) STAGE 3, GFR 30-59 ML/MIN (H): ICD-10-CM

## 2019-10-07 DIAGNOSIS — J44.9 CHRONIC OBSTRUCTIVE PULMONARY DISEASE, UNSPECIFIED COPD TYPE (H): ICD-10-CM

## 2019-10-07 DIAGNOSIS — K21.00 GASTROESOPHAGEAL REFLUX DISEASE WITH ESOPHAGITIS: ICD-10-CM

## 2019-10-07 DIAGNOSIS — K44.9 HIATAL HERNIA: ICD-10-CM

## 2019-10-07 DIAGNOSIS — I10 HYPERTENSION GOAL BP (BLOOD PRESSURE) < 140/90: ICD-10-CM

## 2019-10-07 DIAGNOSIS — F32.5 MAJOR DEPRESSION IN COMPLETE REMISSION (H): ICD-10-CM

## 2019-10-07 DIAGNOSIS — R63.0 DECREASED APPETITE: ICD-10-CM

## 2019-10-07 DIAGNOSIS — H25.9 SENILE CATARACT OF LEFT EYE, UNSPECIFIED AGE-RELATED CATARACT TYPE: ICD-10-CM

## 2019-10-07 DIAGNOSIS — L05.91 PILONIDAL CYST WITHOUT INFECTION: ICD-10-CM

## 2019-10-07 DIAGNOSIS — E87.6 HYPOKALEMIA: ICD-10-CM

## 2019-10-07 DIAGNOSIS — I95.1 ORTHOSTATIC HYPOTENSION: ICD-10-CM

## 2019-10-07 DIAGNOSIS — R14.0 ABDOMINAL BLOATING: ICD-10-CM

## 2019-10-07 DIAGNOSIS — Z00.00 ENCOUNTER FOR MEDICARE ANNUAL WELLNESS EXAM: Primary | ICD-10-CM

## 2019-10-07 DIAGNOSIS — E78.5 HYPERLIPIDEMIA LDL GOAL <130: ICD-10-CM

## 2019-10-07 DIAGNOSIS — Z12.31 ENCOUNTER FOR SCREENING MAMMOGRAM FOR BREAST CANCER: ICD-10-CM

## 2019-10-07 DIAGNOSIS — C51.9 VULVAR CANCER (H): ICD-10-CM

## 2019-10-07 DIAGNOSIS — B00.9 HSV (HERPES SIMPLEX VIRUS) INFECTION: ICD-10-CM

## 2019-10-07 LAB
ALBUMIN SERPL-MCNC: 4 G/DL (ref 3.4–5)
ALP SERPL-CCNC: 86 U/L (ref 40–150)
ALT SERPL W P-5'-P-CCNC: 40 U/L (ref 0–50)
ANION GAP SERPL CALCULATED.3IONS-SCNC: 4 MMOL/L (ref 3–14)
AST SERPL W P-5'-P-CCNC: 45 U/L (ref 0–45)
BILIRUB SERPL-MCNC: 0.6 MG/DL (ref 0.2–1.3)
BUN SERPL-MCNC: 17 MG/DL (ref 7–30)
CALCIUM SERPL-MCNC: 9.5 MG/DL (ref 8.5–10.1)
CHLORIDE SERPL-SCNC: 103 MMOL/L (ref 94–109)
CHOLEST SERPL-MCNC: 172 MG/DL
CO2 SERPL-SCNC: 30 MMOL/L (ref 20–32)
CREAT SERPL-MCNC: 1.11 MG/DL (ref 0.52–1.04)
CREAT UR-MCNC: 91 MG/DL
ERYTHROCYTE [DISTWIDTH] IN BLOOD BY AUTOMATED COUNT: 13.8 % (ref 10–15)
GFR SERPL CREATININE-BSD FRML MDRD: 51 ML/MIN/{1.73_M2}
GLUCOSE SERPL-MCNC: 92 MG/DL (ref 70–99)
HCT VFR BLD AUTO: 41.7 % (ref 35–47)
HDLC SERPL-MCNC: 53 MG/DL
HGB BLD-MCNC: 14.2 G/DL (ref 11.7–15.7)
LDLC SERPL CALC-MCNC: 95 MG/DL
MCH RBC QN AUTO: 29.5 PG (ref 26.5–33)
MCHC RBC AUTO-ENTMCNC: 34.1 G/DL (ref 31.5–36.5)
MCV RBC AUTO: 87 FL (ref 78–100)
MICROALBUMIN UR-MCNC: <5 MG/L
MICROALBUMIN/CREAT UR: NORMAL MG/G CR (ref 0–25)
NONHDLC SERPL-MCNC: 119 MG/DL
PLATELET # BLD AUTO: 364 10E9/L (ref 150–450)
POTASSIUM SERPL-SCNC: 5.6 MMOL/L (ref 3.4–5.3)
PROT SERPL-MCNC: 7.7 G/DL (ref 6.8–8.8)
RBC # BLD AUTO: 4.82 10E12/L (ref 3.8–5.2)
SODIUM SERPL-SCNC: 137 MMOL/L (ref 133–144)
TRIGL SERPL-MCNC: 120 MG/DL
WBC # BLD AUTO: 7 10E9/L (ref 4–11)

## 2019-10-07 PROCEDURE — G0008 ADMIN INFLUENZA VIRUS VAC: HCPCS | Performed by: PHYSICIAN ASSISTANT

## 2019-10-07 PROCEDURE — 90662 IIV NO PRSV INCREASED AG IM: CPT | Performed by: PHYSICIAN ASSISTANT

## 2019-10-07 PROCEDURE — 80053 COMPREHEN METABOLIC PANEL: CPT | Performed by: PHYSICIAN ASSISTANT

## 2019-10-07 PROCEDURE — 85027 COMPLETE CBC AUTOMATED: CPT | Performed by: PHYSICIAN ASSISTANT

## 2019-10-07 PROCEDURE — 36415 COLL VENOUS BLD VENIPUNCTURE: CPT | Performed by: PHYSICIAN ASSISTANT

## 2019-10-07 PROCEDURE — 99214 OFFICE O/P EST MOD 30 MIN: CPT | Mod: 25 | Performed by: PHYSICIAN ASSISTANT

## 2019-10-07 PROCEDURE — 82043 UR ALBUMIN QUANTITATIVE: CPT | Performed by: PHYSICIAN ASSISTANT

## 2019-10-07 PROCEDURE — G0439 PPPS, SUBSEQ VISIT: HCPCS | Performed by: PHYSICIAN ASSISTANT

## 2019-10-07 PROCEDURE — 80061 LIPID PANEL: CPT | Performed by: PHYSICIAN ASSISTANT

## 2019-10-07 RX ORDER — LISINOPRIL 20 MG/1
10 TABLET ORAL DAILY
Qty: 90 TABLET | Refills: 1 | COMMUNITY
Start: 2019-10-07 | End: 2020-03-23

## 2019-10-07 RX ORDER — LISINOPRIL 20 MG/1
20 TABLET ORAL DAILY
Qty: 90 TABLET | Refills: 1 | Status: SHIPPED | OUTPATIENT
Start: 2019-10-07 | End: 2019-10-07

## 2019-10-07 RX ORDER — TRIAMTERENE/HYDROCHLOROTHIAZID 37.5-25 MG
0.5 TABLET ORAL DAILY
Qty: 45 TABLET | Refills: 0 | COMMUNITY
Start: 2019-10-07 | End: 2020-03-04

## 2019-10-07 ASSESSMENT — MIFFLIN-ST. JEOR: SCORE: 1194.16

## 2019-10-07 ASSESSMENT — PATIENT HEALTH QUESTIONNAIRE - PHQ9: SUM OF ALL RESPONSES TO PHQ QUESTIONS 1-9: 5

## 2019-10-10 ENCOUNTER — TELEPHONE (OUTPATIENT)
Dept: ENDOCRINOLOGY | Facility: CLINIC | Age: 69
End: 2019-10-10

## 2019-10-10 DIAGNOSIS — C73 PAPILLARY THYROID CARCINOMA (H): Primary | Chronic | ICD-10-CM

## 2019-10-10 DIAGNOSIS — E89.0 POSTSURGICAL HYPOTHYROIDISM: Chronic | ICD-10-CM

## 2019-10-10 DIAGNOSIS — C73 PAPILLARY THYROID CARCINOMA (H): Chronic | ICD-10-CM

## 2019-10-10 DIAGNOSIS — E87.6 HYPOKALEMIA: ICD-10-CM

## 2019-10-10 LAB
POTASSIUM SERPL-SCNC: 4.5 MMOL/L (ref 3.4–5.3)
T4 FREE SERPL-MCNC: 1.19 NG/DL (ref 0.76–1.46)
TSH SERPL DL<=0.005 MIU/L-ACNC: 0.22 MU/L (ref 0.4–4)

## 2019-10-10 PROCEDURE — 36415 COLL VENOUS BLD VENIPUNCTURE: CPT | Performed by: PHYSICIAN ASSISTANT

## 2019-10-10 PROCEDURE — 84443 ASSAY THYROID STIM HORMONE: CPT | Performed by: PHYSICIAN ASSISTANT

## 2019-10-10 PROCEDURE — 84439 ASSAY OF FREE THYROXINE: CPT | Performed by: PHYSICIAN ASSISTANT

## 2019-10-10 PROCEDURE — 84132 ASSAY OF SERUM POTASSIUM: CPT | Performed by: PHYSICIAN ASSISTANT

## 2019-10-11 NOTE — TELEPHONE ENCOUNTER
Please call the lab and see if they can add on thyroglobulin to the existing specimen. I have already placed the order.   Thanks  Geovanna Chatterjee MD

## 2019-10-29 ENCOUNTER — ANCILLARY PROCEDURE (OUTPATIENT)
Dept: ULTRASOUND IMAGING | Facility: CLINIC | Age: 69
End: 2019-10-29
Payer: OTHER GOVERNMENT

## 2019-10-29 DIAGNOSIS — E89.0 POSTSURGICAL HYPOTHYROIDISM: Chronic | ICD-10-CM

## 2019-10-29 DIAGNOSIS — C73 PAPILLARY THYROID CARCINOMA (H): Chronic | ICD-10-CM

## 2019-11-03 ASSESSMENT — ENCOUNTER SYMPTOMS
NECK MASS: 0
BLOOD IN STOOL: 0
DIARRHEA: 0
MUSCLE WEAKNESS: 0
JAUNDICE: 0
HEARTBURN: 1
NECK PAIN: 1
SINUS PAIN: 0
DYSPNEA ON EXERTION: 0
COUGH DISTURBING SLEEP: 0
RECTAL PAIN: 0
HOARSE VOICE: 0
ABDOMINAL PAIN: 1
SNORES LOUDLY: 0
ARTHRALGIAS: 1
COUGH: 1
BACK PAIN: 1
HEMOPTYSIS: 0
MYALGIAS: 1
SKIN CHANGES: 0
NAIL CHANGES: 0
MUSCLE CRAMPS: 1
SINUS CONGESTION: 0
TROUBLE SWALLOWING: 0
JOINT SWELLING: 1
BLOATING: 1
SMELL DISTURBANCE: 0
CONSTIPATION: 1
WHEEZING: 0
NAUSEA: 1
SORE THROAT: 0
SHORTNESS OF BREATH: 0
POOR WOUND HEALING: 0
SPUTUM PRODUCTION: 1
POSTURAL DYSPNEA: 0
STIFFNESS: 1
BOWEL INCONTINENCE: 0

## 2019-11-05 ENCOUNTER — OFFICE VISIT (OUTPATIENT)
Dept: ENDOCRINOLOGY | Facility: CLINIC | Age: 69
End: 2019-11-05
Payer: MEDICARE

## 2019-11-05 ENCOUNTER — TELEPHONE (OUTPATIENT)
Dept: ENDOCRINOLOGY | Facility: CLINIC | Age: 69
End: 2019-11-05

## 2019-11-05 VITALS
WEIGHT: 154.2 LBS | DIASTOLIC BLOOD PRESSURE: 68 MMHG | HEART RATE: 75 BPM | HEIGHT: 63 IN | SYSTOLIC BLOOD PRESSURE: 107 MMHG | BODY MASS INDEX: 27.32 KG/M2

## 2019-11-05 DIAGNOSIS — C73 PAPILLARY THYROID CARCINOMA (H): Chronic | ICD-10-CM

## 2019-11-05 DIAGNOSIS — E89.0 POSTSURGICAL HYPOTHYROIDISM: Chronic | ICD-10-CM

## 2019-11-05 DIAGNOSIS — R59.0 CERVICAL ADENOPATHY: ICD-10-CM

## 2019-11-05 DIAGNOSIS — C73 PAPILLARY THYROID CARCINOMA (H): Primary | Chronic | ICD-10-CM

## 2019-11-05 DIAGNOSIS — Z78.0 POST-MENOPAUSAL: ICD-10-CM

## 2019-11-05 LAB
T4 FREE SERPL-MCNC: 1.16 NG/DL (ref 0.76–1.46)
TSH SERPL DL<=0.005 MIU/L-ACNC: 0.22 MU/L (ref 0.4–4)

## 2019-11-05 RX ORDER — LEVOTHYROXINE SODIUM 100 UG/1
TABLET ORAL
Qty: 90 TABLET | Refills: 4 | Status: SHIPPED | OUTPATIENT
Start: 2019-11-05 | End: 2020-12-11

## 2019-11-05 ASSESSMENT — PAIN SCALES - GENERAL: PAINLEVEL: MODERATE PAIN (4)

## 2019-11-05 ASSESSMENT — MIFFLIN-ST. JEOR: SCORE: 1194.61

## 2019-11-05 NOTE — PATIENT INSTRUCTIONS
The tumor marker thyroglobulin was missed with the 10/10/19 labs.  You need a redraw for this.

## 2019-11-05 NOTE — PROGRESS NOTES
" Assessment   1.  Papillary thyroid cancer, bilateral, multifocal, no nodes removed. She has been treated with thyroidectomy in 2 procedures.  She has had 29 mCi 131I  in 2000.   We don't have TSH stimulated Tg data on her.   Labs today to include  Tg ---  RTC 1 year    2.  Hypothyroidism due to thyroidectomy -She is likely to be cured.  Target TSH < 1.  On LT4 100 * 6/week (mean 86 mcg/day)  Reordered LT4 refill    3. Cervical adenopathy  On US - we have been focusing on left level 6/(called level 3 in 2010, 2011) node  Which has remained stable for years.  Because of \"new\" level 4 superficial mass,  Next US in 2-3 yeasrs ([id number])    post menopausal -  Her bone risk factors include TSH suppression therapy, past smoking and post menopausal state. Past alendronate.   2018 DXA suggests bone gain at the bilateral total hips.      Geovanna Chatterjee MD.    JESUS Medrano presents for follow up of thyroid cancer and post surgical  hypothyroidism.   I last saw her 11/18. At that time she was on 100 * 6.5/week and we reduced to 100 * 6/week (86 mcg/day).  She already had labs and neck US in anticipation of the appt.  The 10/10/19 labs missed the ordered thyroglobulin.      She was diagnosed with PCT  in 2000 after she had partial thyroid surgery.   One week later she had completion thyroidectomy.  Both operations were performed at [facility] by Dr Antonina Peralta.  See my 1/28/09 note for the operative reports.  Primary tumor size is not specified in the path reports, multifocal, bilateral..\" She got 29.3 mCi 131I, 5/30/00) and one year later she had a TBS at [facility], which was negative.    I have reviewed all available tumor marker data to date:  5/12/00: .72, ROSARIO in lab  5/18/00: 5.16 mCi 131I TBS: \"intense activity in the thyroid bed.  No evidence of activity outside of the neck\"  5/30/00: 29.3 mCi 131I ([facility])  8/7/00: TSH 48.26, free T4 0.87  8/24/01 4.03 mCi 131I TBS ([facility]): negative study  6/6/04: " TSH 3.10,  Tg < 0.3 ([facility]), ROSARIO < 1  10/6/04: ROSARIO < 2 IU/ml, free T4 1.2, TSH 16.07  3/26/08: Tg 0.18, ROSARIO < 1, TSH 0.02  1/28/09: Tg < 0.1, ROSARIO < 0.4, TSH 0.01   11/17/09: Tg < 0.1, ROSARIO < 0.4, TSH 0.02  US guided FNAB of left level 2 LN in 12/09, with benign cytology.  Needle wash Tg was < 0.1.   2/23/10: Tg < 0.1, ROSARIO < 0.4, TSH 0.04  5/18/10: Tg < 0.1, ROSARIO < 0.4, TSH 0.01  6/1/11: Tg < 0.1, ROSARIO < 0.4, TSH 0.06  4/30/13: Tg < 0.1, ROSARIO < 0.4, TSH 0.03  10/23/13: Tg < 0.1, ROSARIO < 0.4, TSH 0.1  5/7/14: Tg <0.1, ROSARIO < 0.4, TSH   6/8/15: Tg < 0.1, ROSARIO < 0.4, TSH  8/2/16: Tg < 0.1, ROSARIO < 0.4,  TSH 0.09- after this we reduced LT4 dose by 1/2 tablet per week  4/3/17: TSH 0.49  11/6/17: Tg < 0.1, ROSARIO < 0.4, TSH 0.6, free T4 1.10  11/6/18; Tg < 0.1, ROSARIO < 0.4, TSH 0.06, free T4 1.l  10/10/19: TSH 0.22, free T4 1.19 , Tg not done  11/5/19: Tg < 0.1, ROSARIO < 0.4 - results followed appt, not discussed at appt.       I have reviewed images on PACS  In 7/13 she had a normal CXR.   10/19/19 neck US compared with 5/7/14 neck US:   Left level 6 carotid sheath 0.9 x 0.4 x 1.1  (was 0.8 x 0.4 x 1.2 cm; was  1 x 0.5 x 1.1 2011)  Left level 4 # 1 0.4 x 0.4 x 0.6 cm very superficial    DXA 11/26/18 (JAN Chicas) as read by me:   4/3/15: (as read by me) lowest T-score -0.7 at the right femoral neck   Right total hp 0.950 (was 0.930 2015  Left total hip 0.990 (was 0.942 2015)  L spine 1.341 (was 1.374 2015)     ROS     Answers for HPI/ROS submitted by the patient on 11/3/2019   General Symptoms: No  Skin Symptoms: Yes  HENT Symptoms: Yes  EYE SYMPTOMS: No  HEART SYMPTOMS: No  LUNG SYMPTOMS: Yes  INTESTINAL SYMPTOMS: Yes  URINARY SYMPTOMS: No  GYNECOLOGIC SYMPTOMS: No  BREAST SYMPTOMS: No  SKELETAL SYMPTOMS: Yes  BLOOD SYMPTOMS: No  NERVOUS SYSTEM SYMPTOMS: No  MENTAL HEALTH SYMPTOMS: No  Changes in hair: No  Changes in moles/birth marks: No  Itching: Yes  Rashes: Yes  Changes in nails: No  Acne: No  Hair in places you don't want it: No  Change  in facial hair: No  Warts: No  Non-healing sores: No  Scarring: No  Flaking of skin: Yes  Color changes of hands/feet in cold : No  Sun sensitivity: No  Skin thickening: No  Ear pain: No  Ear discharge: No  Hearing loss: Yes  Tinnitus: No  Nosebleeds: No  Congestion: No  Sinus pain: No  Trouble swallowing: No   Voice hoarseness: No  Mouth sores: No  Sore throat: No  Tooth pain: No  Gum tenderness: No  Bleeding gums: No  Change in sense of smell: No  Dry mouth: No  Hearing aid used: No  Neck lump: No  Cough: Yes  Sputum or phlegm: Yes  Coughing up blood: No  Difficulty breating or shortness of breath: No  Snoring: No  Wheezing: No  Difficulty breathing on exertion: No  Nighttime Cough: No  Difficulty breathing when lying flat: No  Heart burn or indigestion: Yes  Nausea: Yes  Abdominal pain: Yes  Bloating: Yes  Constipation: Yes  Diarrhea: No  Blood in stool: No  Black stools: No  Rectal or Anal pain: No  Fecal incontinence: No  Yellowing of skin or eyes: No  Vomit with blood: No  Back pain: Yes  Muscle aches: Yes  Neck pain: Yes  Swollen joints: Yes  Joint pain: Yes  Bone pain: Yes  Muscle cramps: Yes  Muscle weakness: No  Joint stiffness: Yes  Bone fracture: No    PMH   Past Medical History:   Diagnosis Date     BULMARO II (cervical intraepithelial neoplasia II) 2007    on colp - leep path WNL     COPD (chronic obstructive pulmonary disease) (H)      Depression 1990     Depressive disorder 25 yrs     Eczema      GERD (gastroesophageal reflux disease)      Hiatal hernia      History of blood transfusion 2013    Post op     HTN (hypertension)      Hypercholesterolemia      Liver hemangioma 9/4/2012     OA (osteoarthritis)      Osteopenia      Papillary thyroid carcinoma (H) 2000    BL, MF; Tx in 2 operations, RRA     PONV (postoperative nausea and vomiting)      Postsurgical hypothyroidism 2000     FELICIA III (vulvar intraepithelial neoplasia III) 2006    wide local exc x 2     Chlamydia    genital herpes  HPV of cervix.    cryotherapy times two     eczema and contact dermatitis.  Chronic neck pain    Arnold-Chiari malformation.    Past Surgical History:   Procedure Laterality Date     ABDOMEN SURGERY      Teenager     APPENDECTOMY OPEN  1968     ARTHROPLASTY MINIMALLY INVASIVE KNEE BILATERAL  6/27/2013    Procedure: ARTHROPLASTY MINIMALLY INVASIVE KNEE BILATERAL;  Minimally Invasive Bilateral Total Knee Arthroplasty;  Surgeon: Christophe Welch MD;  Location: [facility]     BIOPSY  Ongoing    Lymph nodes neck     BONE MARROW ASPIRATION ONLY Left 12/7/2015    Procedure: BONE MARROW ASPIRATION ONLY;  Surgeon: Aguilar Roldan MD;  Location: [facility] OR     CARPAL TUNNEL RELEASE RT/LT       COLONOSCOPY  2007    neg     COLPOSCOPY CERVIX, BIOPSY CERVIX, ENDOCERVICAL CURETTAGE, COMBINED  2003, 2007     completion thyroidectomy Right 3/14/00     CYSTECTOMY OVARIAN BENIGN  1969     ESOPHAGOSCOPY, GASTROSCOPY, DUODENOSCOPY (EGD), COMBINED N/A 8/28/2014    Procedure: COMBINED ESOPHAGOSCOPY, GASTROSCOPY, DUODENOSCOPY (EGD), BIOPSY SINGLE OR MULTIPLE;  Surgeon: Kelvin Montgomery MD;  Location: [facility] OR     FUSION CERVICAL ANTERIOR THREE+ LEVELS WITH BONE ALLOGRAFT N/A 12/7/2015    Procedure: FUSION CERVICAL ANTERIOR THREE+ LEVELS WITH BONE ALLOGRAFT;  Surgeon: Aguilar Roldan MD;  Location:  OR     HAND SURGERY       HEAD & NECK SURGERY  2000    Thyroidectomy     KNEE SURGERY  2001 & 2005    bilat     LAPAROSCOPIC CHOLECYSTECTOMY  1998     LEEP TX, CERVICAL  2007    BULMARO II on colp, leep path WNL     left thyroid total lobectomy, isthmusectomy and 50% right thyroid lobectomy Left 3/7/00     TUBAL LIGATION  1988     VULVECTOMY SIMPLE  2006    FELICIA 3, wide local excision x 2     VULVECTOMY SIMPLE         Finger ligament repair in 2000.  left arm ligament repair.    Current Outpatient Medications   Medication Sig Dispense Refill     aspirin 81 MG tablet Take 81 mg by mouth daily        atorvastatin (LIPITOR) 20 MG tablet Take 1 tablet (20  "mg) by mouth daily 90 tablet 0     CALCIUM CITRATE PO Take 1,200 mg by mouth daily        cetirizine (ZYRTEC) 10 MG tablet Take 1 tablet (10 mg) by mouth daily 90 tablet 1     Cholecalciferol (VITAMIN D) 1000 UNIT capsule Take 1 capsule by mouth daily.       FLUoxetine (PROZAC) 20 MG capsule Take 3 capsules (60 mg) by mouth daily 270 capsule 1     hypromellose (ARTIFICIAL TEARS) 0.5 % SOLN Place 1 drop into both eyes every 4 hours as needed        levothyroxine (SYNTHROID/LEVOTHROID) 100 MCG tablet Monday-Saturday: 1 tablet/day,  : skip 90 tablet 4     lisinopril (PRINIVIL/ZESTRIL) 20 MG tablet Take 0.5 tablets (10 mg) by mouth daily 90 tablet 1     multivitamin, therapeutic with minerals (MULTI-VITAMIN) TABS Take 1 tablet by mouth daily       Omega-3 Fatty Acids (FISH OIL) 1200 MG CAPS Take 1 capsule by mouth daily        omeprazole (PRILOSEC) 20 MG DR capsule 1 capsule M, W, F 90 capsule 1     sennosides (SENOKOT) 8.6 MG tablet Take 2 tablets by mouth daily        triamcinolone (KENALOG) 0.1 % ointment Apply to affected area of the skin twice a day after using a moisturizer. Can do wet wraps with this. 454 g 1     triamterene-HCTZ (MAXZIDE-25) 37.5-25 MG tablet Take 0.5 tablets by mouth daily 45 tablet 0       Social History     Tobacco Use     Smoking status: Former Smoker     Packs/day: 1.00     Years: 45.00     Pack years: 45.00     Types: Cigarettes     Last attempt to quit: 2012     Years since quittin.2     Smokeless tobacco: Never Used     Tobacco comment: quitting with e cigarette   Substance Use Topics     Alcohol use: Yes     Comment: less than weekly     Drug use: No     Retired nurse; ;  Bowls; volunteers at [employer];  dog    Physical Exam   GENERAL: middle aged woman in no apparent distress.   /68   Pulse 75   Ht 1.594 m (5' 2.75\")   Wt 69.9 kg (154 lb 3.2 oz)   LMP  (LMP Unknown)   BMI 27.53 kg/m    SKIN: normal color, temperature  HEENT: PER, no scleral " icterus,  NECK: no masses;   LUNGS: clear bilaterally  CARDIAC: RRR S1 S2  NEURO: Alert, responds appropriately to questions, moves all extremities    DATA REVIEW:    EXAMINATION: US HEAD NECK SOFT TISSUE, 10/29/2019 11:06 AM      COMPARISON: Ultrasound head neck soft tissue 5/7/2014     HISTORY: Postsurgical hypothyroidism; papillary thyroid carcinoma     TECHNIQUE: Sonographic imaging performed of the neck to evaluate for  lymph nodes using grey scale and limited Doppler technique.     FINDINGS:     Lymph nodes are measured bilaterally with measurements given in  transverse, AP, and craniocaudal dimensions as follows:     Right:     Level 2: Lymph nodes:  -Lymph node with fatty hilum measuring and no internal vascularity  measuring 0.9 x 0.5 x 0.8 cm, previously measuring 0.9 x 0.5 x 0.7 cm.  - Lymph node with fatty hilum measuring 1.7 x 0.5 x 0.8 cm, no  internal vascularity; previously measuring 1.6 x 0.5 x 1.1 cm.  Level 3: No lymph node  Level 4: No lymph node  Level 5: No lymph node  Level 6: No lymph node  Level 7: No lymph node     Left:     Level 2: Ovoid shaped lymph node measuring 1.1 x 0.5 x 0.8 cm with no  internal increased vascularity; previously measuring 1.2 x 0.5 x 0.9  cm.  Level 3: No lymph node  Level 4: Lymph node measuring 0.4 x 0.4 x 0.6 cm; new compared to  previous ultrasound.  Level 5: No lymph node  Level 6: Lymph node measuring 0.9 x 0.4 x 1.1 cm without suspicious  features; previously measured 0.8 x 0.4 x 1.2  cm.  Level 7: No lymph node                                                                       IMPRESSION:  1.  Soft tissue neck ultrasound with lymph node measurements as  described above.     2. New 6 mm lymph node at left level 4, compared to ultrasound dated  5/7/2014.     I have personally reviewed the examination and initial interpretation  and I agree with the findings.     MITCH CABRERA MD

## 2019-11-05 NOTE — LETTER
"11/5/2019       RE: Marcela Allen  [address] [place] [address]     Dear Colleague,    Thank you for referring your patient, Marcela Allen, to the [facility] ENDOCRINOLOGY at [facility]. Please see a copy of my visit note below.     Assessment   1.  Papillary thyroid cancer, bilateral, multifocal, no nodes removed. She has been treated with thyroidectomy in 2 procedures.  She has had 29 mCi 131I  in 2000.   We don't have TSH stimulated Tg data on her.   Labs today to include  Tg ---  RTC 1 year    2.  Hypothyroidism due to thyroidectomy -She is likely to be cured.  Target TSH < 1.  On LT4 100 * 6/week (mean 86 mcg/day)  Reordered LT4 refill    3. Cervical adenopathy  On US - we have been focusing on left level 6/(called level 3 in 2010, 2011) node  Which has remained stable for years.  Because of \"new\" level 4 superficial mass,  Next US in 2-3 yeasrs ([id number])    post menopausal -  Her bone risk factors include TSH suppression therapy, past smoking and post menopausal state. Past alendronate.   2018 DXA suggests bone gain at the bilateral total hips.      Geovanna Chatterjee MD.    JESUS Medrano presents for follow up of thyroid cancer and post surgical  hypothyroidism.   I last saw her 11/18. At that time she was on 100 * 6.5/week and we reduced to 100 * 6/week (86 mcg/day).  She already had labs and neck US in anticipation of the appt.  The 10/10/19 labs missed the ordered thyroglobulin.      She was diagnosed with PCT  in 2000 after she had partial thyroid surgery.   One week later she had completion thyroidectomy.  Both operations were performed at [facility] by Dr Antonina Peralta.  See my 1/28/09 note for the operative reports.  Primary tumor size is not specified in the path reports, multifocal, bilateral..\" She got 29.3 mCi 131I, 5/30/00) and one year later she had a TBS at [facility], which was negative.    I have reviewed all available tumor marker data to " "date:  5/12/00: .72, ROSARIO in lab  5/18/00: 5.16 mCi 131I TBS: \"intense activity in the thyroid bed.  No evidence of activity outside of the neck\"  5/30/00: 29.3 mCi 131I ([facility])  8/7/00: TSH 48.26, free T4 0.87  8/24/01 4.03 mCi 131I TBS ([facility]): negative study  6/6/04: TSH 3.10,  Tg < 0.3 ([facility]), ROSARIO < 1  10/6/04: ROSARIO < 2 IU/ml, free T4 1.2, TSH 16.07  3/26/08: Tg 0.18, ROSARIO < 1, TSH 0.02  1/28/09: Tg < 0.1, ROSARIO < 0.4, TSH 0.01   11/17/09: Tg < 0.1, ROSARIO < 0.4, TSH 0.02  US guided FNAB of left level 2 LN in 12/09, with benign cytology.  Needle wash Tg was < 0.1.   2/23/10: Tg < 0.1, ROSARIO < 0.4, TSH 0.04  5/18/10: Tg < 0.1, ROSARIO < 0.4, TSH 0.01  6/1/11: Tg < 0.1, ROSARIO < 0.4, TSH 0.06  4/30/13: Tg < 0.1, ROSARIO < 0.4, TSH 0.03  10/23/13: Tg < 0.1, ROSARIO < 0.4, TSH 0.1  5/7/14: Tg <0.1, ROSRAIO < 0.4, TSH   6/8/15: Tg < 0.1, ROSARIO < 0.4, TSH  8/2/16: Tg < 0.1, ROSARIO < 0.4,  TSH 0.09- after this we reduced LT4 dose by 1/2 tablet per week  4/3/17: TSH 0.49  11/6/17: Tg < 0.1, ROSARIO < 0.4, TSH 0.6, free T4 1.10  11/6/18; Tg < 0.1, ROSARIO < 0.4, TSH 0.06, free T4 1.l  10/10/19: TSH 0.22, free T4 1.19 , Tg not done     I have reviewed images on PACS  In 7/13 she had a normal CXR.   10/19/19 neck US compared with 5/7/14 neck US:   Left level 6 carotid sheath 0.9 x 0.4 x 1.1  (was 0.8 x 0.4 x 1.2 cm; was  1 x 0.5 x 1.1 2011)  Left level 4 # 1 0.4 x 0.4 x 0.6 cm very superficial    DXA 11/26/18 (JAN Chicas) as read by me:   4/3/15: (as read by me) lowest T-score -0.7 at the right femoral neck   Right total hp 0.950 (was 0.930 2015  Left total hip 0.990 (was 0.942 2015)  L spine 1.341 (was 1.374 2015)     ROS     Answers for HPI/ROS submitted by the patient on 11/3/2019   General Symptoms: No  Skin Symptoms: Yes  HENT Symptoms: Yes  EYE SYMPTOMS: No  HEART SYMPTOMS: No  LUNG SYMPTOMS: Yes  INTESTINAL SYMPTOMS: Yes  URINARY SYMPTOMS: No  GYNECOLOGIC SYMPTOMS: No  BREAST SYMPTOMS: No  SKELETAL SYMPTOMS: Yes  BLOOD SYMPTOMS: No  NERVOUS " SYSTEM SYMPTOMS: No  MENTAL HEALTH SYMPTOMS: No  Changes in hair: No  Changes in moles/birth marks: No  Itching: Yes  Rashes: Yes  Changes in nails: No  Acne: No  Hair in places you don't want it: No  Change in facial hair: No  Warts: No  Non-healing sores: No  Scarring: No  Flaking of skin: Yes  Color changes of hands/feet in cold : No  Sun sensitivity: No  Skin thickening: No  Ear pain: No  Ear discharge: No  Hearing loss: Yes  Tinnitus: No  Nosebleeds: No  Congestion: No  Sinus pain: No  Trouble swallowing: No   Voice hoarseness: No  Mouth sores: No  Sore throat: No  Tooth pain: No  Gum tenderness: No  Bleeding gums: No  Change in sense of smell: No  Dry mouth: No  Hearing aid used: No  Neck lump: No  Cough: Yes  Sputum or phlegm: Yes  Coughing up blood: No  Difficulty breating or shortness of breath: No  Snoring: No  Wheezing: No  Difficulty breathing on exertion: No  Nighttime Cough: No  Difficulty breathing when lying flat: No  Heart burn or indigestion: Yes  Nausea: Yes  Abdominal pain: Yes  Bloating: Yes  Constipation: Yes  Diarrhea: No  Blood in stool: No  Black stools: No  Rectal or Anal pain: No  Fecal incontinence: No  Yellowing of skin or eyes: No  Vomit with blood: No  Back pain: Yes  Muscle aches: Yes  Neck pain: Yes  Swollen joints: Yes  Joint pain: Yes  Bone pain: Yes  Muscle cramps: Yes  Muscle weakness: No  Joint stiffness: Yes  Bone fracture: No    PMH   Past Medical History:   Diagnosis Date     BULMARO II (cervical intraepithelial neoplasia II) 2007    on colp - leep path WNL     COPD (chronic obstructive pulmonary disease) (H)      Depression 1990     Depressive disorder 25 yrs     Eczema      GERD (gastroesophageal reflux disease)      Hiatal hernia      History of blood transfusion 2013    Post op     HTN (hypertension)      Hypercholesterolemia      Liver hemangioma 9/4/2012     OA (osteoarthritis)      Osteopenia      Papillary thyroid carcinoma (H) 2000    BL, MF; Tx in 2 operations, RRA      PONV (postoperative nausea and vomiting)      Postsurgical hypothyroidism 2000     FELICIA III (vulvar intraepithelial neoplasia III) 2006    wide local exc x 2     Chlamydia    genital herpes  HPV of cervix.   cryotherapy times two     eczema and contact dermatitis.  Chronic neck pain    Arnold-Chiari malformation.    Past Surgical History:   Procedure Laterality Date     ABDOMEN SURGERY      Teenager     APPENDECTOMY OPEN  1968     ARTHROPLASTY MINIMALLY INVASIVE KNEE BILATERAL  6/27/2013    Procedure: ARTHROPLASTY MINIMALLY INVASIVE KNEE BILATERAL;  Minimally Invasive Bilateral Total Knee Arthroplasty;  Surgeon: Christophe Welch MD;  [facility]     BIOPSY  Ongoing    Lymph nodes neck     BONE MARROW ASPIRATION ONLY Left 12/7/2015    Procedure: BONE MARROW ASPIRATION ONLY;  Surgeon: Aguilar Roldan MD;  Location:  OR     CARPAL TUNNEL RELEASE RT/LT       COLONOSCOPY  2007    neg     COLPOSCOPY CERVIX, BIOPSY CERVIX, ENDOCERVICAL CURETTAGE, COMBINED  2003, 2007     completion thyroidectomy Right 3/14/00     CYSTECTOMY OVARIAN BENIGN  1969     ESOPHAGOSCOPY, GASTROSCOPY, DUODENOSCOPY (EGD), COMBINED N/A 8/28/2014    Procedure: COMBINED ESOPHAGOSCOPY, GASTROSCOPY, DUODENOSCOPY (EGD), BIOPSY SINGLE OR MULTIPLE;  Surgeon: Kelvin Montgomery MD;  Location: [facility]     FUSION CERVICAL ANTERIOR THREE+ LEVELS WITH BONE ALLOGRAFT N/A 12/7/2015    Procedure: FUSION CERVICAL ANTERIOR THREE+ LEVELS WITH BONE ALLOGRAFT;  Surgeon: Aguilar Roldan MD;  Location:  OR     HAND SURGERY       HEAD & NECK SURGERY  2000    Thyroidectomy     KNEE SURGERY  2001 & 2005    bilat     LAPAROSCOPIC CHOLECYSTECTOMY  1998     LEEP TX, CERVICAL  2007    BULMARO II on colp, leep path WNL     left thyroid total lobectomy, isthmusectomy and 50% right thyroid lobectomy Left 3/7/00     TUBAL LIGATION  1988     VULVECTOMY SIMPLE  2006    FELICIA 3, wide local excision x 2     VULVECTOMY SIMPLE         Finger ligament repair in  .  left arm ligament repair.    Current Outpatient Medications   Medication Sig Dispense Refill     aspirin 81 MG tablet Take 81 mg by mouth daily        atorvastatin (LIPITOR) 20 MG tablet Take 1 tablet (20 mg) by mouth daily 90 tablet 0     CALCIUM CITRATE PO Take 1,200 mg by mouth daily        cetirizine (ZYRTEC) 10 MG tablet Take 1 tablet (10 mg) by mouth daily 90 tablet 1     Cholecalciferol (VITAMIN D) 1000 UNIT capsule Take 1 capsule by mouth daily.       FLUoxetine (PROZAC) 20 MG capsule Take 3 capsules (60 mg) by mouth daily 270 capsule 1     hypromellose (ARTIFICIAL TEARS) 0.5 % SOLN Place 1 drop into both eyes every 4 hours as needed        levothyroxine (SYNTHROID/LEVOTHROID) 100 MCG tablet Monday-Saturday: 1 tablet/day,  : skip 90 tablet 4     lisinopril (PRINIVIL/ZESTRIL) 20 MG tablet Take 0.5 tablets (10 mg) by mouth daily 90 tablet 1     multivitamin, therapeutic with minerals (MULTI-VITAMIN) TABS Take 1 tablet by mouth daily       Omega-3 Fatty Acids (FISH OIL) 1200 MG CAPS Take 1 capsule by mouth daily        omeprazole (PRILOSEC) 20 MG DR capsule 1 capsule M, W, F 90 capsule 1     sennosides (SENOKOT) 8.6 MG tablet Take 2 tablets by mouth daily        triamcinolone (KENALOG) 0.1 % ointment Apply to affected area of the skin twice a day after using a moisturizer. Can do wet wraps with this. 454 g 1     triamterene-HCTZ (MAXZIDE-25) 37.5-25 MG tablet Take 0.5 tablets by mouth daily 45 tablet 0       Social History     Tobacco Use     Smoking status: Former Smoker     Packs/day: 1.00     Years: 45.00     Pack years: 45.00     Types: Cigarettes     Last attempt to quit: 2012     Years since quittin.2     Smokeless tobacco: Never Used     Tobacco comment: quitting with e cigarette   Substance Use Topics     Alcohol use: Yes     Comment: less than weekly     Drug use: No     Retired nurse; ;  Bowls; volunteers at [employer];  dog    Physical Exam   GENERAL: middle aged woman  "in no apparent distress.   /68   Pulse 75   Ht 1.594 m (5' 2.75\")   Wt 69.9 kg (154 lb 3.2 oz)   LMP  (LMP Unknown)   BMI 27.53 kg/m     SKIN: normal color, temperature  HEENT: PER, no scleral icterus,  NECK: no masses;   LUNGS: clear bilaterally  CARDIAC: RRR S1 S2  NEURO: Alert, responds appropriately to questions, moves all extremities    DATA REVIEW:    EXAMINATION: US HEAD NECK SOFT TISSUE, 10/29/2019 11:06 AM      COMPARISON: Ultrasound head neck soft tissue 5/7/2014     HISTORY: Postsurgical hypothyroidism; papillary thyroid carcinoma     TECHNIQUE: Sonographic imaging performed of the neck to evaluate for  lymph nodes using grey scale and limited Doppler technique.     FINDINGS:     Lymph nodes are measured bilaterally with measurements given in  transverse, AP, and craniocaudal dimensions as follows:     Right:     Level 2: Lymph nodes:  -Lymph node with fatty hilum measuring and no internal vascularity  measuring 0.9 x 0.5 x 0.8 cm, previously measuring 0.9 x 0.5 x 0.7 cm.  - Lymph node with fatty hilum measuring 1.7 x 0.5 x 0.8 cm, no  internal vascularity; previously measuring 1.6 x 0.5 x 1.1 cm.  Level 3: No lymph node  Level 4: No lymph node  Level 5: No lymph node  Level 6: No lymph node  Level 7: No lymph node     Left:     Level 2: Ovoid shaped lymph node measuring 1.1 x 0.5 x 0.8 cm with no  internal increased vascularity; previously measuring 1.2 x 0.5 x 0.9  cm.  Level 3: No lymph node  Level 4: Lymph node measuring 0.4 x 0.4 x 0.6 cm; new compared to  previous ultrasound.  Level 5: No lymph node  Level 6: Lymph node measuring 0.9 x 0.4 x 1.1 cm without suspicious  features; previously measured 0.8 x 0.4 x 1.2  cm.  Level 7: No lymph node                                                                       IMPRESSION:  1.  Soft tissue neck ultrasound with lymph node measurements as  described above.     2. New 6 mm lymph node at left level 4, compared to ultrasound " dated  5/7/2014.     I have personally reviewed the examination and initial interpretation  and I agree with the findings.     MITCH CABRERA MD    Again, thank you for allowing me to participate in the care of your patient.      Sincerely,    Geovanna Chatterjee MD

## 2019-11-06 NOTE — TELEPHONE ENCOUNTER
Please fill ACT or COMPASS report for lab error.  The free T4 that was drawn today was ordered for future one ONE YEAR, not today.  Please get her credited for this test performed one year early.  Thanks  Geovanna Chatterjee MD

## 2019-11-13 LAB — LAB SCANNED RESULT: NORMAL

## 2019-11-26 ENCOUNTER — ANCILLARY PROCEDURE (OUTPATIENT)
Dept: MAMMOGRAPHY | Facility: CLINIC | Age: 69
End: 2019-11-26
Payer: MEDICARE

## 2019-11-26 DIAGNOSIS — Z12.31 ENCOUNTER FOR SCREENING MAMMOGRAM FOR BREAST CANCER: ICD-10-CM

## 2019-11-26 PROCEDURE — 77063 BREAST TOMOSYNTHESIS BI: CPT | Mod: TC

## 2019-11-26 PROCEDURE — 77067 SCR MAMMO BI INCL CAD: CPT | Mod: TC

## 2019-11-29 ENCOUNTER — MYC MEDICAL ADVICE (OUTPATIENT)
Dept: FAMILY MEDICINE | Facility: CLINIC | Age: 69
End: 2019-11-29

## 2019-11-29 DIAGNOSIS — E78.5 HYPERLIPIDEMIA LDL GOAL <130: ICD-10-CM

## 2019-11-29 RX ORDER — ATORVASTATIN CALCIUM 20 MG/1
20 TABLET, FILM COATED ORAL DAILY
Qty: 90 TABLET | Refills: 3 | Status: SHIPPED | OUTPATIENT
Start: 2019-11-29 | End: 2020-12-08

## 2020-01-30 ENCOUNTER — TRANSFERRED RECORDS (OUTPATIENT)
Dept: HEALTH INFORMATION MANAGEMENT | Facility: CLINIC | Age: 70
End: 2020-01-30

## 2020-03-03 ENCOUNTER — MYC MEDICAL ADVICE (OUTPATIENT)
Dept: FAMILY MEDICINE | Facility: CLINIC | Age: 70
End: 2020-03-03

## 2020-03-03 DIAGNOSIS — I10 HYPERTENSION GOAL BP (BLOOD PRESSURE) < 140/90: Primary | ICD-10-CM

## 2020-03-04 RX ORDER — TRIAMTERENE/HYDROCHLOROTHIAZID 37.5-25 MG
0.5 TABLET ORAL DAILY
Qty: 45 TABLET | Refills: 0 | Status: SHIPPED | OUTPATIENT
Start: 2020-03-04 | End: 2020-06-02

## 2020-03-20 ENCOUNTER — MYC MEDICAL ADVICE (OUTPATIENT)
Dept: FAMILY MEDICINE | Facility: CLINIC | Age: 70
End: 2020-03-20

## 2020-03-20 DIAGNOSIS — I10 HYPERTENSION GOAL BP (BLOOD PRESSURE) < 140/90: Primary | ICD-10-CM

## 2020-03-23 RX ORDER — LISINOPRIL 20 MG/1
10 TABLET ORAL DAILY
Qty: 90 TABLET | Refills: 0 | Status: SHIPPED | OUTPATIENT
Start: 2020-03-23 | End: 2020-08-25

## 2020-03-23 NOTE — TELEPHONE ENCOUNTER
Prescription approved per [facility].  Opal Pineda RN

## 2020-04-22 ENCOUNTER — MYC MEDICAL ADVICE (OUTPATIENT)
Dept: FAMILY MEDICINE | Facility: CLINIC | Age: 70
End: 2020-04-22

## 2020-04-22 DIAGNOSIS — F32.5 MAJOR DEPRESSION IN COMPLETE REMISSION (H): ICD-10-CM

## 2020-04-22 ASSESSMENT — PATIENT HEALTH QUESTIONNAIRE - PHQ9
SUM OF ALL RESPONSES TO PHQ QUESTIONS 1-9: 3
10. IF YOU CHECKED OFF ANY PROBLEMS, HOW DIFFICULT HAVE THESE PROBLEMS MADE IT FOR YOU TO DO YOUR WORK, TAKE CARE OF THINGS AT HOME, OR GET ALONG WITH OTHER PEOPLE: NOT DIFFICULT AT ALL
SUM OF ALL RESPONSES TO PHQ QUESTIONS 1-9: 3

## 2020-04-22 NOTE — TELEPHONE ENCOUNTER
Patient my charted back with response, will review Prozac refill. Short refill provided, will my chart patient back to schedule a video visit with PCP for future refills.    AWA Hernandez

## 2020-04-23 ASSESSMENT — PATIENT HEALTH QUESTIONNAIRE - PHQ9: SUM OF ALL RESPONSES TO PHQ QUESTIONS 1-9: 3

## 2020-05-01 ENCOUNTER — VIRTUAL VISIT (OUTPATIENT)
Dept: FAMILY MEDICINE | Facility: CLINIC | Age: 70
End: 2020-05-01
Payer: MEDICARE

## 2020-05-01 DIAGNOSIS — R42 DIZZINESS: Primary | ICD-10-CM

## 2020-05-01 PROCEDURE — 99207 ZZC NON-BILLABLE SERV PER CHARTING: CPT | Mod: TEL | Performed by: PHYSICIAN ASSISTANT

## 2020-05-01 NOTE — PROGRESS NOTES
"Marcela Allen is a 69 year old female who is being evaluated via a billable telephone visit.      The patient has been notified of following:     \"This telephone visit will be conducted via a call between you and your physician/provider. We have found that certain health care needs can be provided without the need for a physical exam.  This service lets us provide the care you need with a short phone conversation.  If a prescription is necessary we can send it directly to your pharmacy.  If lab work is needed we can place an order for that and you can then stop by our lab to have the test done at a later time.    Telephone visits are billed at different rates depending on your insurance coverage. During this emergency period, for some insurers they may be billed the same as an in-person visit.  Please reach out to your insurance provider with any questions.    If during the course of the call the physician/provider feels a telephone visit is not appropriate, you will not be charged for this service.\"    Patient has given verbal consent for Telephone visit?  Yes    How would you like to obtain your AVS? Aracely Judd     Marcela Allen is a 69 year old female who presents to clinic today for the following health issues:    HPI     Hypertension Follow-up      Do you check your blood pressure regularly outside of the clinic? Yes     Are you following a low salt diet? No    Are your blood pressures ever more than 140 on the top number (systolic) OR more   than 90 on the bottom number (diastolic), for example 140/90? No -  4/22 started to have some dizzy spells BP was 107/59, stopped lisinopril on 4/23;   4/25 132/61;   4/26 105/80;   4/27 104/71;   4/28 113/58 (AM) 121/74 (PM);   4/29 115/75 (AM) 122/80 (PM);   4/30 110/61 (AM) 105/80(PM);  5/01 124/80(AM) 127/79(PM);    Patient is also noting that she seems to be extremely aware of her heart beat which is not typical for her.    She feels some irregularity " with her HR.    She stopped the lisinopril about a week ago, BP numbers are stable.     Dizziness has been off and on for the past 1 year.  Dizziness comes a few times per week.    Will last a few minutes.  Does not feel room spinning.  Feels lightheaded.  No vomiting, sometimes with mild nausea.  Seems to come on out of no where.          Patient Active Problem List   Diagnosis     GERD (gastroesophageal reflux disease)     Eczema     Hiatal hernia     OA (osteoarthritis)     Osteopenia     Chronic neck pain     Advanced directives, counseling/discussion     Major depression in complete remission (H)     Hyperlipidemia LDL goal <130     Hypertension goal BP (blood pressure) < 140/90     Seasonal allergies     CKD (chronic kidney disease) stage 3, GFR 30-59 ml/min (H)     Liver hemangioma     S/P LEEP of cervix     Former smoker     Elevated platelet count     s/p B TKA 6/27     S/P knee replacement     Postsurgical hypothyroidism     Papillary thyroid carcinoma (H)     Enlarged lymph nodes     Cervical cancer (H)     Cervical radiculopathy     Malignant neoplasm of cervix, unspecified site (H)     Vulvar cancer (H)     Chronic obstructive pulmonary disease, unspecified COPD type (H)     History of colonic polyps     Post-menopausal     Cervical stenosis of spinal canal     Blood pressure check     Senile cataract of left eye, unspecified age-related cataract type     Orthostatic hypotension     HSV (herpes simplex virus) infection     Pilonidal cyst without infection     Cervical adenopathy     Past Surgical History:   Procedure Laterality Date     ABDOMEN SURGERY      Teenager     APPENDECTOMY OPEN  1968     ARTHROPLASTY MINIMALLY INVASIVE KNEE BILATERAL  6/27/2013    Procedure: ARTHROPLASTY MINIMALLY INVASIVE KNEE BILATERAL;  Minimally Invasive Bilateral Total Knee Arthroplasty;  Surgeon: Christophe Welch MD;  Location: [facility] OR     BIOPSY  Ongoing    Lymph nodes neck     BONE MARROW ASPIRATION ONLY Left  2015    Procedure: BONE MARROW ASPIRATION ONLY;  Surgeon: Aguilar Roldan MD;  Location: [facility] OR     CARPAL TUNNEL RELEASE RT/LT       COLONOSCOPY      neg     COLPOSCOPY CERVIX, BIOPSY CERVIX, ENDOCERVICAL CURETTAGE, COMBINED  ,      completion thyroidectomy Right 3/14/00     CYSTECTOMY OVARIAN BENIGN  1969     ESOPHAGOSCOPY, GASTROSCOPY, DUODENOSCOPY (EGD), COMBINED N/A 2014    Procedure: COMBINED ESOPHAGOSCOPY, GASTROSCOPY, DUODENOSCOPY (EGD), BIOPSY SINGLE OR MULTIPLE;  Surgeon: Kelvin Montgomery MD;  Location: [facility] OR     FUSION CERVICAL ANTERIOR THREE+ LEVELS WITH BONE ALLOGRAFT N/A 2015    Procedure: FUSION CERVICAL ANTERIOR THREE+ LEVELS WITH BONE ALLOGRAFT;  Surgeon: Aguilar Roldan MD;  Location:  OR     HAND SURGERY       HEAD & NECK SURGERY      Thyroidectomy     KNEE SURGERY   &     bilat     LAPAROSCOPIC CHOLECYSTECTOMY  1998     LEEP TX, CERVICAL  2007    BULMARO II on colp, leep path WNL     left thyroid total lobectomy, isthmusectomy and 50% right thyroid lobectomy Left 3/7/00     TUBAL LIGATION  1988     VULVECTOMY SIMPLE  2006    FELICIA 3, wide local excision x 2     VULVECTOMY SIMPLE         Social History     Tobacco Use     Smoking status: Former Smoker     Packs/day: 1.00     Years: 45.00     Pack years: 45.00     Types: Cigarettes     Last attempt to quit: 2012     Years since quittin.7     Smokeless tobacco: Never Used     Tobacco comment: quitting with e cigarette   Substance Use Topics     Alcohol use: Yes     Comment: less than weekly     Family History   Problem Relation Age of Onset     Blood Disease Mother 75        thrombocythemia on hydrea     Hypertension Mother      Cerebrovascular Disease Mother         TIA     Anesthesia Reaction Mother         N/V     Osteoporosis Mother      C.A.D. Father 61        three MI's, smoker     Hypertension Father      Coronary Artery Disease Father      Diabetes Paternal Aunt      Cancer -  colorectal Maternal Grandmother         unsure of her age.     Breast Cancer No family hx of      Asthma No family hx of          Current Outpatient Medications   Medication Sig Dispense Refill     aspirin 81 MG tablet Take 81 mg by mouth daily        atorvastatin (LIPITOR) 20 MG tablet Take 1 tablet (20 mg) by mouth daily 90 tablet 3     CALCIUM CITRATE PO Take 1,200 mg by mouth daily        cetirizine (ZYRTEC) 10 MG tablet Take 1 tablet (10 mg) by mouth daily 90 tablet 1     Cholecalciferol (VITAMIN D) 1000 UNIT capsule Take 1 capsule by mouth daily.       FLUoxetine (PROZAC) 20 MG capsule Take 3 capsules (60 mg) by mouth daily 90 capsule 0     hypromellose (ARTIFICIAL TEARS) 0.5 % SOLN Place 1 drop into both eyes every 4 hours as needed        levothyroxine (SYNTHROID/LEVOTHROID) 100 MCG tablet Monday-Saturday: 1 tablet/day,  Sunday: skip 90 tablet 4     multivitamin, therapeutic with minerals (MULTI-VITAMIN) TABS Take 1 tablet by mouth daily       omeprazole (PRILOSEC) 20 MG DR capsule 1 capsule M, W, F 90 capsule 1     sennosides (SENOKOT) 8.6 MG tablet Take 2 tablets by mouth daily        triamcinolone (KENALOG) 0.1 % ointment Apply to affected area of the skin twice a day after using a moisturizer. Can do wet wraps with this. 454 g 1     triamterene-HCTZ (MAXZIDE-25) 37.5-25 MG tablet Take 0.5 tablets by mouth daily 45 tablet 0     lisinopril (ZESTRIL) 20 MG tablet Take 0.5 tablets (10 mg) by mouth daily (Patient not taking: Reported on 5/1/2020) 90 tablet 0     BP Readings from Last 3 Encounters:   11/05/19 107/68   10/07/19 118/68   05/06/19 112/71    Wt Readings from Last 3 Encounters:   11/05/19 69.9 kg (154 lb 3.2 oz)   10/07/19 69.4 kg (153 lb)   04/08/19 72.1 kg (159 lb)                    Reviewed and updated as needed this visit by Provider         Review of Systems   ROS COMP: Constitutional, HEENT, cardiovascular, pulmonary, GI, , musculoskeletal, neuro, skin, endocrine and psych systems are  negative, except as otherwise noted.       Objective   Reported vitals:  LMP  (LMP Unknown)    healthy, alert and no distress  PSYCH: Alert and oriented times 3; coherent speech, normal   rate and volume, able to articulate logical thoughts, able   to abstract reason, no tangential thoughts, no hallucinations   or delusions  Her affect is normal  RESP: No cough, no audible wheezing, able to talk in full sentences  Remainder of exam unable to be completed due to telephone visits    Diagnostic Test Results:  none         Assessment/Plan:  1. Dizziness  This is not a new symptom for Ramonita and she thought it was related to her blood pressure, however cutting back on the medications has not helped with her symptoms.  Her father has a h/o heart disease and she does report some irregularity, therefore an EKG would be warranted to ensure there is no concerning arrhythmia.  May also consider checking a hemoglobin and electrolyte levels.  A face to face appointment was made for next week for further evaluation of her symptoms.     Return in about 4 days (around 5/5/2020) for dizziness check.    Start: 1:35 pm  Phone call duration:  10 minutes    No charge for visit today as she was referred into office next week for face to face.     Honey García PA-C

## 2020-05-05 ENCOUNTER — OFFICE VISIT (OUTPATIENT)
Dept: FAMILY MEDICINE | Facility: CLINIC | Age: 70
End: 2020-05-05
Payer: MEDICARE

## 2020-05-05 VITALS
DIASTOLIC BLOOD PRESSURE: 78 MMHG | SYSTOLIC BLOOD PRESSURE: 116 MMHG | HEIGHT: 62 IN | WEIGHT: 156 LBS | HEART RATE: 80 BPM | BODY MASS INDEX: 28.71 KG/M2 | OXYGEN SATURATION: 99 % | TEMPERATURE: 97.6 F

## 2020-05-05 DIAGNOSIS — C73 PAPILLARY THYROID CARCINOMA (H): Chronic | ICD-10-CM

## 2020-05-05 DIAGNOSIS — R42 LIGHTHEADEDNESS: Primary | ICD-10-CM

## 2020-05-05 DIAGNOSIS — R94.31 ABNORMAL ELECTROCARDIOGRAM: ICD-10-CM

## 2020-05-05 DIAGNOSIS — E89.0 POSTSURGICAL HYPOTHYROIDISM: Chronic | ICD-10-CM

## 2020-05-05 LAB
ALBUMIN SERPL-MCNC: 3.8 G/DL (ref 3.4–5)
ALP SERPL-CCNC: 83 U/L (ref 40–150)
ALT SERPL W P-5'-P-CCNC: 38 U/L (ref 0–50)
ANION GAP SERPL CALCULATED.3IONS-SCNC: 2 MMOL/L (ref 3–14)
AST SERPL W P-5'-P-CCNC: 36 U/L (ref 0–45)
BILIRUB SERPL-MCNC: 0.5 MG/DL (ref 0.2–1.3)
BUN SERPL-MCNC: 10 MG/DL (ref 7–30)
CALCIUM SERPL-MCNC: 8.5 MG/DL (ref 8.5–10.1)
CHLORIDE SERPL-SCNC: 105 MMOL/L (ref 94–109)
CO2 SERPL-SCNC: 32 MMOL/L (ref 20–32)
CREAT SERPL-MCNC: 0.94 MG/DL (ref 0.52–1.04)
ERYTHROCYTE [DISTWIDTH] IN BLOOD BY AUTOMATED COUNT: 14.9 % (ref 10–15)
GFR SERPL CREATININE-BSD FRML MDRD: 62 ML/MIN/{1.73_M2}
GLUCOSE SERPL-MCNC: 92 MG/DL (ref 70–99)
HCT VFR BLD AUTO: 42.2 % (ref 35–47)
HGB BLD-MCNC: 14.2 G/DL (ref 11.7–15.7)
MCH RBC QN AUTO: 28.3 PG (ref 26.5–33)
MCHC RBC AUTO-ENTMCNC: 33.6 G/DL (ref 31.5–36.5)
MCV RBC AUTO: 84 FL (ref 78–100)
PLATELET # BLD AUTO: 350 10E9/L (ref 150–450)
POTASSIUM SERPL-SCNC: 4 MMOL/L (ref 3.4–5.3)
PROT SERPL-MCNC: 7.6 G/DL (ref 6.8–8.8)
RBC # BLD AUTO: 5.01 10E12/L (ref 3.8–5.2)
SODIUM SERPL-SCNC: 139 MMOL/L (ref 133–144)
T4 FREE SERPL-MCNC: 1.27 NG/DL (ref 0.76–1.46)
TSH SERPL DL<=0.005 MIU/L-ACNC: 0.33 MU/L (ref 0.4–4)
WBC # BLD AUTO: 7.3 10E9/L (ref 4–11)

## 2020-05-05 PROCEDURE — 36415 COLL VENOUS BLD VENIPUNCTURE: CPT | Performed by: FAMILY MEDICINE

## 2020-05-05 PROCEDURE — 93000 ELECTROCARDIOGRAM COMPLETE: CPT | Performed by: FAMILY MEDICINE

## 2020-05-05 PROCEDURE — 84439 ASSAY OF FREE THYROXINE: CPT | Performed by: FAMILY MEDICINE

## 2020-05-05 PROCEDURE — 80053 COMPREHEN METABOLIC PANEL: CPT | Performed by: FAMILY MEDICINE

## 2020-05-05 PROCEDURE — 84443 ASSAY THYROID STIM HORMONE: CPT | Performed by: FAMILY MEDICINE

## 2020-05-05 PROCEDURE — 99214 OFFICE O/P EST MOD 30 MIN: CPT | Performed by: FAMILY MEDICINE

## 2020-05-05 PROCEDURE — 85027 COMPLETE CBC AUTOMATED: CPT | Performed by: FAMILY MEDICINE

## 2020-05-05 ASSESSMENT — MIFFLIN-ST. JEOR: SCORE: 1190.86

## 2020-05-05 NOTE — PATIENT INSTRUCTIONS
[facility] call [facility] to [facility]:    [phone]

## 2020-05-05 NOTE — NURSING NOTE
"Chief Complaint   Patient presents with     Dizziness     virtual visit 5/1, off and on for about 1 year.     Health Maintenance     COPD Plan       Initial /78   Pulse 80   Temp 97.6  F (36.4  C) (Tympanic)   Ht 1.575 m (5' 2\")   Wt 70.8 kg (156 lb)   LMP  (LMP Unknown)   SpO2 99%   BMI 28.53 kg/m   Estimated body mass index is 28.53 kg/m  as calculated from the following:    Height as of this encounter: 1.575 m (5' 2\").    Weight as of this encounter: 70.8 kg (156 lb).  Medication Reconciliation: complete  Delia Mares CMA  "

## 2020-05-05 NOTE — PROGRESS NOTES
"ekg  SUBJECTIVE:  Marcela Allen is a 69 year old female who presents to clinic for evaluation of symptoms of dizziness which she describes as feeling light-headedness.    She reports that she feels lightheadedness and a little off balance  It happens periodically and is sually when standing up       This/these symptoms were first noticed about 1year ago.   This symptom(s) usually last about less than  1minute      She has some palpitations which she reports feels like her heart is beating harder.  Occasionally it feels irregular  She was seen by her primary medical provider and was told to do an holter but she declined.  She worked as an RN in a hospital.    The patient denies any ringing in her ears and denies any pain or pressure in her ears.  The patient denies that she also get symptoms of nausea when she experiences the dizziness.  The patient denies dysarthria, weakness, double vision.  The patient reports some numbness or tingling in her feet. She has some spine symptom(s) with radicular symptom(s)   She had had a spinal fusion      She has a hiatal hernia  She eats very little at a time   Mostly she eats cheese and crackers  She takes a MVI with iron   Does not eat much protein        OBJECTIVE:  Blood pressure 116/78, pulse 80, temperature 97.6  F (36.4  C), temperature source Tympanic, height 1.575 m (5' 2\"), weight 70.8 kg (156 lb), SpO2 99 %, not currently breastfeeding.  /78   Pulse 80   Temp 97.6  F (36.4  C) (Tympanic)   Ht 1.575 m (5' 2\")   Wt 70.8 kg (156 lb)   LMP  (LMP Unknown)   SpO2 99%   BMI 28.53 kg/m    (Perform Orthostatic blood pressures if the patient complains of lightheadedness)    Orthostatic Vitals from 05/03/20 1247 to 05/05/20 1247    Date and Time Orthostatic BP Orthostatic Pulse Patient Position BP   Location Cuff Size   05/05/20 1231 118/83 80 Standing Right arm Adult Regular   05/05/20 1230 117/77 76 Sitting Right arm Adult Regular   05/05/20 1229 116/73 66 Supine " Right arm Adult Regular          General appearance - healthy, alert and no distress  Skin - Skin color, texture, turgor normal. No rashes or lesions.  Head - Normocephalic. No masses, lesions, tenderness or abnormalities  Eyes - conjunctivae/corneas clear. PERRL, EOM's intact. Fundi benign  Ears - External ears normal. Canals clear. TM's normal.  No Rashes or Vesicles on the ear or in the ear canal.  Nose/Sinuses - Nares normal. Septum midline. Mucosa normal. No drainage or sinus tenderness.  Oropharynx - Lips, mucosa, and tongue normal. Teeth and gums normal.  Neck - scar on anterior neck otherwise Neck supple. No adenopathy.     Lungs - Percussion normal. Good diaphragmatic excursion. Lungs clear  Heart - PMI normal. No lifts, heaves, or thrills. RRR. No murmurs, clicks gallops or rub  Abdomen - Abdomen soft, non-tender. BS normal. No masses, organomegaly    Musculoskeletal - Spine ROM normal. Muscular strength intact.      Sinus  Rhythm   -RSR(V1) -nondiagnostic.    -  Negative T-waves  - Anterior ischemia.     ABNORMAL       Results for orders placed or performed in visit on 05/05/20   CBC with platelets     Status: None   Result Value Ref Range    WBC 7.3 4.0 - 11.0 10e9/L    RBC Count 5.01 3.8 - 5.2 10e12/L    Hemoglobin 14.2 11.7 - 15.7 g/dL    Hematocrit 42.2 35.0 - 47.0 %    MCV 84 78 - 100 fl    MCH 28.3 26.5 - 33.0 pg    MCHC 33.6 31.5 - 36.5 g/dL    RDW 14.9 10.0 - 15.0 %    Platelet Count 350 150 - 450 10e9/L          ASSESSMENT/PLAN      The patient's symptom(s) are most consistent with orthostatic hypotension while standing upright.  Her EKG has some concernig changes and we will have her undergo cardiac stress testing.

## 2020-05-05 NOTE — LETTER
" My COPD Action Plan     Name: Marcela Allen    YOB: 1950   Date: 5/5/2020    My doctor: Dionisio Raya MD   My clinic: [address] [address] [phone]  My Controller Medicine: { :375158}   Dose: ***     My Rescue Medicine: { :024879}   Dose: ***     My Flare Up Medicine: { :567142}   Dose: *** FEV-1 (no units)   Date Value   09/04/2012 2.61     FEV1/FVC (no units)   Date Value   09/04/2012 0.83      My COPD Severity: { :991967}      Use of Oxygen: { :805508::\"Oxygen Not Prescribed \"}     Make sure you've had your pneumonia   vaccines.          GREEN ZONE       Doing well today      Usual level of activity and exercise    Usual amount of cough and mucus    No shortness of breath    Usual level of health (thinking clearly, sleeping well, feel like eating) Actions:      Take daily medicines    Use oxygen as prescribed    Follow regular exercise and diet plan    Avoid cigarette smoke and other irritants that harm the lungs           YELLOW ZONE          Having a bad day or flare up      Short of breath more than usual    A lot more sputum (mucus) than usual    Sputum looks yellow, green, tan, brown or bloody    More coughing or wheezing    Fever or chills    Less energy; trouble completing activities    Trouble thinking or focusing    Using quick relief inhaler or nebulizer more often    Poor sleep; symptoms wake me up    Do not feel like eating Actions:      Get plenty of rest    Take daily medicines    Use quick relief inhaler every *** hours    If you use oxygen, call you doctor to see if you should adjust your oxygen    Do breathing exercises or other things to help you relax    Let a loved one, friend or neighbor know you are feeling worse    Call your care team if you have 2 or more symptoms.  Start taking steroids or antibiotics if directed by your care team           RED ZONE       Need medical care now      Severe shortness of breath " (feel you can't breathe)    Fever, chills    Not enough breath to do any activity    Trouble coughing up mucus, walking or talking    Blood in mucus    Frequent coughing   Rescue medicines are not working    Not able to sleep because of breathing    Feel confused or drowsy    Chest pain    Actions:      Call your health care team.  If you cannot reach your care team, call 911 or go to the emergency room.        Annual Reminders:  Meet with Care Team, Flu Shot every Fall  Pharmacy:    [facility] - [facility] [place] - [address], [id number]  [facility]ESSEN[facility]  [address] [facility]

## 2020-05-07 ENCOUNTER — MYC MEDICAL ADVICE (OUTPATIENT)
Dept: FAMILY MEDICINE | Facility: CLINIC | Age: 70
End: 2020-05-07

## 2020-05-08 NOTE — TELEPHONE ENCOUNTER
"Elizabeth,   I already let the patient know what her EKG showed.   No \"official reading\" occurs that I know of unless you do things differently at your clinic.    Dionisoi Raya MD   "

## 2020-05-12 ENCOUNTER — ANCILLARY PROCEDURE (OUTPATIENT)
Dept: NUCLEAR MEDICINE | Facility: CLINIC | Age: 70
End: 2020-05-12
Attending: FAMILY MEDICINE
Payer: MEDICARE

## 2020-05-12 DIAGNOSIS — R42 LIGHTHEADEDNESS: Primary | ICD-10-CM

## 2020-05-12 DIAGNOSIS — E78.5 HYPERLIPIDEMIA LDL GOAL <130: ICD-10-CM

## 2020-05-12 DIAGNOSIS — I49.1 PREMATURE ATRIAL CONTRACTION: ICD-10-CM

## 2020-05-12 DIAGNOSIS — R42 LIGHTHEADEDNESS: ICD-10-CM

## 2020-05-12 DIAGNOSIS — R94.31 ABNORMAL ELECTROCARDIOGRAM: ICD-10-CM

## 2020-05-12 DIAGNOSIS — I95.1 ORTHOSTATIC HYPOTENSION: Primary | ICD-10-CM

## 2020-05-12 LAB
CV STRESS MAX HR HE: 110
RATE PRESSURE PRODUCT: NORMAL
STRESS ECHO BASELINE DIASTOLIC HE: 84
STRESS ECHO BASELINE HR: 75
STRESS ECHO BASELINE SYSTOLIC BP: 138
STRESS ECHO CALCULATED PERCENT HR: 72 %
STRESS ECHO LAST STRESS DIASTOLIC BP: 82
STRESS ECHO LAST STRESS SYSTOLIC BP: 132
STRESS ECHO TARGET HR: 152

## 2020-05-12 PROCEDURE — 93016 CV STRESS TEST SUPVJ ONLY: CPT | Performed by: FAMILY MEDICINE

## 2020-05-12 PROCEDURE — 93017 CV STRESS TEST TRACING ONLY: CPT | Performed by: FAMILY MEDICINE

## 2020-05-12 PROCEDURE — 78452 HT MUSCLE IMAGE SPECT MULT: CPT | Performed by: INTERNAL MEDICINE

## 2020-05-12 PROCEDURE — A9502 TC99M TETROFOSMIN: HCPCS | Performed by: INTERNAL MEDICINE

## 2020-05-12 PROCEDURE — 93018 CV STRESS TEST I&R ONLY: CPT | Performed by: INTERNAL MEDICINE

## 2020-05-12 RX ORDER — REGADENOSON 0.08 MG/ML
0.4 INJECTION, SOLUTION INTRAVENOUS ONCE
Status: COMPLETED | OUTPATIENT
Start: 2020-05-12 | End: 2020-05-12

## 2020-05-12 RX ADMIN — REGADENOSON 0.4 MG: 0.08 INJECTION, SOLUTION INTRAVENOUS at 10:40

## 2020-05-12 NOTE — RESULT ENCOUNTER NOTE
Marcela,  I have reviewed the report of the imaging test or tests that we recently ordered. The results were normal or considered normal for you. The EKG monitoring did show some premature atrial contractions.  The next thing I would recommend is a heart monitor for 48 hours. Please call the clinic to schedule getting that set up.    Sincerely,   Dionisio Raya MD

## 2020-05-16 ENCOUNTER — MYC MEDICAL ADVICE (OUTPATIENT)
Dept: FAMILY MEDICINE | Facility: CLINIC | Age: 70
End: 2020-05-16

## 2020-05-19 NOTE — TELEPHONE ENCOUNTER
Please ask her to review(ed) the result note/message I sent her on May 12th via [employer].  Dionisio Raya MD

## 2020-05-21 ENCOUNTER — ANCILLARY PROCEDURE (OUTPATIENT)
Dept: CARDIOLOGY | Facility: CLINIC | Age: 70
End: 2020-05-21
Attending: FAMILY MEDICINE
Payer: MEDICARE

## 2020-05-21 ENCOUNTER — ALLIED HEALTH/NURSE VISIT (OUTPATIENT)
Dept: NURSING | Facility: CLINIC | Age: 70
End: 2020-05-21
Payer: MEDICARE

## 2020-05-21 ENCOUNTER — TELEPHONE (OUTPATIENT)
Dept: FAMILY MEDICINE | Facility: CLINIC | Age: 70
End: 2020-05-21

## 2020-05-21 DIAGNOSIS — R00.2 PALPITATIONS: Primary | ICD-10-CM

## 2020-05-21 DIAGNOSIS — R00.2 PALPITATIONS: ICD-10-CM

## 2020-05-21 DIAGNOSIS — R42 LIGHTHEADEDNESS: ICD-10-CM

## 2020-05-21 DIAGNOSIS — I49.1 PREMATURE ATRIAL CONTRACTION: ICD-10-CM

## 2020-05-21 PROCEDURE — 99207 ZZC NO CHARGE NURSE ONLY: CPT

## 2020-06-01 ENCOUNTER — ANCILLARY PROCEDURE (OUTPATIENT)
Dept: CARDIOLOGY | Facility: CLINIC | Age: 70
End: 2020-06-01
Attending: FAMILY MEDICINE
Payer: MEDICARE

## 2020-06-01 DIAGNOSIS — R42 LIGHTHEADEDNESS: ICD-10-CM

## 2020-06-01 DIAGNOSIS — I49.1 PREMATURE ATRIAL CONTRACTION: ICD-10-CM

## 2020-06-01 PROCEDURE — 93225 XTRNL ECG REC<48 HRS REC: CPT | Performed by: FAMILY MEDICINE

## 2020-06-01 NOTE — NURSING NOTE
I have place a 48 Hour Zio Patch on this patient. Patient was given Zio Patch instructions and [employer] contact information. Patient understands when they should remove and return their monitor to [employer].     Maria D Cohn, CMA

## 2020-06-02 ENCOUNTER — MYC MEDICAL ADVICE (OUTPATIENT)
Dept: FAMILY MEDICINE | Facility: CLINIC | Age: 70
End: 2020-06-02

## 2020-06-02 DIAGNOSIS — F32.5 MAJOR DEPRESSION IN COMPLETE REMISSION (H): ICD-10-CM

## 2020-06-02 DIAGNOSIS — I10 HYPERTENSION GOAL BP (BLOOD PRESSURE) < 140/90: ICD-10-CM

## 2020-06-02 NOTE — TELEPHONE ENCOUNTER
Patient was last seen on 5.1.2020 virtual visit with Ricky.   Will route to provider to address as patient had dizziness and is currently referred to cardiology. Scripts pended.  Elizabeth Christy RN

## 2020-06-04 RX ORDER — TRIAMTERENE/HYDROCHLOROTHIAZID 37.5-25 MG
0.5 TABLET ORAL DAILY
Qty: 45 TABLET | Refills: 0 | Status: SHIPPED | OUTPATIENT
Start: 2020-06-04 | End: 2020-08-25

## 2020-06-08 DIAGNOSIS — I47.10 SVT (SUPRAVENTRICULAR TACHYCARDIA) (H): Primary | ICD-10-CM

## 2020-06-08 DIAGNOSIS — I10 HYPERTENSION GOAL BP (BLOOD PRESSURE) < 140/90: ICD-10-CM

## 2020-06-10 NOTE — TELEPHONE ENCOUNTER
RECORDS RECEIVED FROM: Internal   DATE RECEIVED: 6-16   NOTES STATUS DETAILS   OFFICE NOTE from referring provider    Internal Dr. Raya OV 5-5-20  Referral 6-8-20 after testing   OFFICE NOTE from other cardiologists   and neurologists N/A    DISCHARGE SUMMARY from hospital    N/A    DISCHARGE REPORT from the ER   N/A    OPERATIVE REPORT    N/A    MEDICATION LIST   Internal    LABS     BMP   Internal 5-6-19   CBC   Internal 5-5-20   CMP   Internal 5-5-20   Lipids   Internal 10-7-19   TSH   Internal 5-5-20   DIAGNOSTIC PROCEDURES     EKG  Strips *important Internal 5-5-20   Monitor Reports  Strips *important Internal 5-21-20   Cardioversions   N/A    ICD/pacemaker implant       Tilt table studies   N/A    IMAGING (DISC & REPORT)      ECHO's   N/A    Stress Tests   Internal 5-12-20   Cath   N/A    CT/CTA   N/A    MRI/MRA   N/A

## 2020-06-15 NOTE — PROGRESS NOTES
"Electrophysiology Clinic Telephone Visit    Marcela Allen is a 69 year old female who is being evaluated via a billable telephone visit.      The patient has been notified of following:     \"This telephone visit will be conducted via a call between you and your physician/provider. We have found that certain health care needs can be provided without the need for a physical exam.  This service lets us provide the care you need with a short phone conversation.  If a prescription is necessary we can send it directly to your pharmacy.  If lab work is needed we can place an order for that and you can then stop by our lab to have the test done at a later time.    If during the course of the call the physician/provider feels a telephone visit is not appropriate, you will not be charged for this service.\"     Patient has given verbal consent for Telephone visit?  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    Saw PCP 5/5/2020, reported some lightheadedness when standing up. She was at that time taking lisinopril and Maxzide, thought her BP was on the lower side and had been gradually changing meds. She has since stopped lisinopril and reduced maxzide in half, and dizziness has completely resolved.    She has also noted some Irregular, fast heart beats in the last 3 months, describes as a fluttering and \"tickling\" sensation that makes her cough, occurs periodically up to 10 times daily, no associated chest discomfort, dizziness, dyspnea, most noticeable when lying down but can occur during the day. A ziopatch monitor was ordered showing \"SVT\" for which she was referred.    She normally volunteers, goes bowling twice a week, stayed pretty active. Since pandemic she's not been doing much. Walks her dog 20-30 minutes a day. She lives with a roommate who is 91 yo but who is fairly independent.     Her BP today at home is 120/60, HR  bpm which she says is her baseline.    PAST MEDICAL " HISTORY:  Hypertension  Hypercholesterolemia  Hypothyroidism    CURRENT MEDICATIONS:  Aspirin 81 every day  Triameterene-hydrochlorothiazide 37.5/25 mg, half a tab per day  Atorvastatin 20 every day  Levothyroxine   Omeprazole  Prozac  Zyrtec    ALLERGIES:     Allergies   Allergen Reactions     Dye [Contrast Dye] Anaphylaxis     Echo dye       FAMILY HISTORY:   Father  age 61, multiple cardiac arrests,  in 1970s.      Family History   Problem Relation Age of Onset     Blood Disease Mother 75        thrombocythemia on hydrea     Hypertension Mother      Cerebrovascular Disease Mother         TIA     Anesthesia Reaction Mother         N/V     Osteoporosis Mother      C.A.D. Father 61        three MI's, smoker     Hypertension Father      Coronary Artery Disease Father      Diabetes Paternal Aunt      Cancer - colorectal Maternal Grandmother         unsure of her age.     Breast Cancer No family hx of      Asthma No family hx of        SOCIAL HISTORY: Former smoker, quit 8 years ago  Social History     Tobacco Use     Smoking status: Former Smoker     Packs/day: 1.00     Years: 45.00     Pack years: 45.00     Types: Cigarettes     Last attempt to quit: 2012     Years since quittin.8     Smokeless tobacco: Never Used     Tobacco comment: quitting with e cigarette   Substance Use Topics     Alcohol use: Yes     Comment: less than weekly     Drug use: No       ROS:  10 point ROS neg other than the symptoms noted above in the HPI.    Labs:  2020: K 4.0, ct 0.94, hgb 14, plt 350K, TSH 0.33    Testing/Procedures:  EKG 2020: sinus, RsR', normal intervals    Ziopatch -2020:   Sinus , average 77 bpm; PACs 3.6%, PVC <1%  Episodes of PACs, fastest 142 bpm for 6 beats, no symptoms (3pm); longest episode 14 beats at 129 bpm at 4:30pm was associated with symptoms. She reports that she felt all of those but only triggered button twice. No atrial fibrillation detected.    Assessment and Plan:    1. Palpitations, likely symptomatic PACs and short runs of atrial tachycardia. Discussed with her that these are benign, no specific treatment needed unless it becomes very bothersome to her. If so, can switch her Maxzide to a beta blocker. She says that she is not that bothered by these episodes to want to change her meds, especially now that she is normotensive and no longer has dizziness.   2. Hypertension. Stable. BP management important to reduce risk of future development of atrial fibrillation.      Telephone start time 3:05 PM  Telephone stop time: 3:18 PM    I have spent 13 minutes of medical discussion. Over 50% of the time was spent on describing findings of ziopatch monitor, offering reassurance.    In addition to telephone time documented above, I spent an additional 10 minutes on data review and documentation.    Suyapa Garrett MD  Cardiology    CC  [facility]TT[facility]

## 2020-06-16 ENCOUNTER — VIRTUAL VISIT (OUTPATIENT)
Dept: CARDIOLOGY | Facility: CLINIC | Age: 70
End: 2020-06-16
Attending: FAMILY MEDICINE
Payer: MEDICARE

## 2020-06-16 ENCOUNTER — PRE VISIT (OUTPATIENT)
Dept: CARDIOLOGY | Facility: CLINIC | Age: 70
End: 2020-06-16

## 2020-06-16 VITALS — DIASTOLIC BLOOD PRESSURE: 61 MMHG | HEART RATE: 89 BPM | SYSTOLIC BLOOD PRESSURE: 122 MMHG

## 2020-06-16 DIAGNOSIS — I10 ESSENTIAL HYPERTENSION: ICD-10-CM

## 2020-06-16 DIAGNOSIS — I49.1 PAC (PREMATURE ATRIAL CONTRACTION): Primary | ICD-10-CM

## 2020-06-16 DIAGNOSIS — I47.19 ATRIAL TACHYCARDIA, PAROXYSMAL (H): ICD-10-CM

## 2020-06-16 PROCEDURE — 99443 ZZC PHYSICIAN TELEPHONE EVALUATION 21-30 MIN: CPT | Performed by: INTERNAL MEDICINE

## 2020-06-16 ASSESSMENT — PAIN SCALES - GENERAL: PAINLEVEL: NO PAIN (0)

## 2020-06-16 NOTE — LETTER
"6/16/2020    RE: Marcela Allen  [address] [facility] [place] [address]       Dear Colleague,    Thank you for the opportunity to participate in the care of your patient, Marcela Allen, at the [facility] at [facility]. Please see a copy of my visit note below.    Electrophysiology Clinic Telephone Visit    Marcela Allen is a 69 year old female who is being evaluated via a billable telephone visit.      The patient has been notified of following:     \"This telephone visit will be conducted via a call between you and your physician/provider. We have found that certain health care needs can be provided without the need for a physical exam.  This service lets us provide the care you need with a short phone conversation.  If a prescription is necessary we can send it directly to your pharmacy.  If lab work is needed we can place an order for that and you can then stop by our lab to have the test done at a later time.    If during the course of the call the physician/provider feels a telephone visit is not appropriate, you will not be charged for this service.\"     Patient has given verbal consent for Telephone visit?  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    Saw PCP 5/5/2020, reported some lightheadedness when standing up. She was at that time taking lisinopril and Maxzide, thought her BP was on the lower side and had been gradually changing meds. She has since stopped lisinopril and reduced maxzide in half, and dizziness has completely resolved.    She has also noted some Irregular, fast heart beats in the last 3 months, describes as a fluttering and \"tickling\" sensation that makes her cough, occurs periodically up to 10 times daily, no associated chest discomfort, dizziness, dyspnea, most noticeable when lying down but can occur during the day. A ziopatch monitor was ordered showing \"SVT\" for which she was referred.    She normally " volunteers, goes bowling twice a week, stayed pretty active. Since pandemic she's not been doing much. Walks her dog 20-30 minutes a day. She lives with a roommate who is 93 yo but who is fairly independent.     Her BP today at home is 120/60, HR  bpm which she says is her baseline.    PAST MEDICAL HISTORY:  Hypertension  Hypercholesterolemia  Hypothyroidism    CURRENT MEDICATIONS:  Aspirin 81 every day  Triameterene-hydrochlorothiazide 37.5/25 mg, half a tab per day  Atorvastatin 20 every day  Levothyroxine   Omeprazole  Prozac  Zyrtec    ALLERGIES:     Allergies   Allergen Reactions     Dye [Contrast Dye] Anaphylaxis     Echo dye       FAMILY HISTORY:   Father  age 61, multiple cardiac arrests,  in 1970s.      Family History   Problem Relation Age of Onset     Blood Disease Mother 75        thrombocythemia on hydrea     Hypertension Mother      Cerebrovascular Disease Mother         TIA     Anesthesia Reaction Mother         N/V     Osteoporosis Mother      C.A.D. Father 61        three MI's, smoker     Hypertension Father      Coronary Artery Disease Father      Diabetes Paternal Aunt      Cancer - colorectal Maternal Grandmother         unsure of her age.     Breast Cancer No family hx of      Asthma No family hx of        SOCIAL HISTORY: Former smoker, quit 8 years ago  Social History     Tobacco Use     Smoking status: Former Smoker     Packs/day: 1.00     Years: 45.00     Pack years: 45.00     Types: Cigarettes     Last attempt to quit: 2012     Years since quittin.8     Smokeless tobacco: Never Used     Tobacco comment: quitting with e cigarette   Substance Use Topics     Alcohol use: Yes     Comment: less than weekly     Drug use: No       ROS:  10 point ROS neg other than the symptoms noted above in the HPI.    Labs:  2020: K 4.0, ct 0.94, hgb 14, plt 350K, TSH 0.33    Testing/Procedures:  EKG 2020: sinus, RsR', normal intervals    Ziopatch -2020:   Sinus ,  average 77 bpm; PACs 3.6%, PVC <1%  Episodes of PACs, fastest 142 bpm for 6 beats, no symptoms (3pm); longest episode 14 beats at 129 bpm at 4:30pm was associated with symptoms. She reports that she felt all of those but only triggered button twice. No atrial fibrillation detected.    Assessment and Plan:   1. Palpitations, likely symptomatic PACs and short runs of atrial tachycardia. Discussed with her that these are benign, no specific treatment needed unless it becomes very bothersome to her. If so, can switch her Maxzide to a beta blocker. She says that she is not that bothered by these episodes to want to change her meds, especially now that she is normotensive and no longer has dizziness.   2. Hypertension. Stable. BP management important to reduce risk of future development of atrial fibrillation.      Telephone start time 3:05 PM  Telephone stop time: 3:18 PM    I have spent 13 minutes of medical discussion. Over 50% of the time was spent on describing findings of ziopatch monitor, offering reassurance.    In addition to telephone time documented above, I spent an additional 10 minutes on data review and documentation.    Suyapa Garrett MD  Cardiology    CC  [facility]

## 2020-06-16 NOTE — PATIENT INSTRUCTIONS
"You were evaluated today in the Cardiovascular Telemedicine Clinic at the [facility].     Cardiology Provider during your visit: Dr. Suyapa Garrett    Diagnosis:  Premature atrial complexes and short runs of atrial tachycardia    Results: discussed with patient    Orders:   None    Medication Changes:   None    Recommendations:   Offered reassurance that these are benign.   If they become more bothersome, can consider switching Maxzide to a beta blocker like metoprolol.    Follow-up:   As needed  Please follow up with primary care provider for medication refills         Please feel free to call me with any questions or concerns.       Faby So RN     Questions and [phone].   First press #1 for the [facility] and then press #3 for \"Medical Questions\" to reach us Cardiology Nurses.      On Call Cardiologist for after hours or on weekends: [phone]   option #4 and ask to speak to the on-call Cardiologist.          If you need a medication refill please contact your pharmacy.  Please allow 3 business days for your refill to be completed.   "

## 2020-08-25 ENCOUNTER — MYC MEDICAL ADVICE (OUTPATIENT)
Dept: FAMILY MEDICINE | Facility: CLINIC | Age: 70
End: 2020-08-25

## 2020-08-25 DIAGNOSIS — F32.5 MAJOR DEPRESSION IN COMPLETE REMISSION (H): ICD-10-CM

## 2020-08-25 DIAGNOSIS — K21.00 GASTROESOPHAGEAL REFLUX DISEASE WITH ESOPHAGITIS: ICD-10-CM

## 2020-08-25 DIAGNOSIS — I10 HYPERTENSION GOAL BP (BLOOD PRESSURE) < 140/90: ICD-10-CM

## 2020-08-25 RX ORDER — TRIAMTERENE/HYDROCHLOROTHIAZID 37.5-25 MG
0.5 TABLET ORAL DAILY
Qty: 45 TABLET | Refills: 1 | Status: SHIPPED | OUTPATIENT
Start: 2020-08-25 | End: 2021-03-02

## 2020-08-25 NOTE — TELEPHONE ENCOUNTER
Patient had a virtual cardiology visit on 6.16.2020. Patient is requesting refills. Refills pended. Routed to provider.  Elizabeth Christy RN

## 2020-09-08 ENCOUNTER — MYC MEDICAL ADVICE (OUTPATIENT)
Dept: FAMILY MEDICINE | Facility: CLINIC | Age: 70
End: 2020-09-08

## 2020-09-08 DIAGNOSIS — F32.5 MAJOR DEPRESSION IN COMPLETE REMISSION (H): ICD-10-CM

## 2020-10-10 ASSESSMENT — ENCOUNTER SYMPTOMS
FREQUENCY: 0
SORE THROAT: 1
PALPITATIONS: 1
SHORTNESS OF BREATH: 0
COUGH: 1
NERVOUS/ANXIOUS: 0
EYE PAIN: 0
BREAST MASS: 0
HEADACHES: 1
HEMATURIA: 0
CHILLS: 0
HEARTBURN: 1
CONSTIPATION: 1
NAUSEA: 0
JOINT SWELLING: 1
FEVER: 0
DIARRHEA: 0
PARESTHESIAS: 0
DYSURIA: 0
ABDOMINAL PAIN: 0
WEAKNESS: 0
HEMATOCHEZIA: 0
MYALGIAS: 1
ARTHRALGIAS: 1
DIZZINESS: 0

## 2020-10-10 ASSESSMENT — ACTIVITIES OF DAILY LIVING (ADL): CURRENT_FUNCTION: NO ASSISTANCE NEEDED

## 2020-10-12 ENCOUNTER — OFFICE VISIT (OUTPATIENT)
Dept: FAMILY MEDICINE | Facility: CLINIC | Age: 70
End: 2020-10-12
Payer: MEDICARE

## 2020-10-12 VITALS
HEIGHT: 62 IN | HEART RATE: 85 BPM | RESPIRATION RATE: 14 BRPM | WEIGHT: 155 LBS | OXYGEN SATURATION: 98 % | DIASTOLIC BLOOD PRESSURE: 82 MMHG | BODY MASS INDEX: 28.52 KG/M2 | TEMPERATURE: 98 F | SYSTOLIC BLOOD PRESSURE: 133 MMHG

## 2020-10-12 DIAGNOSIS — Z23 NEED FOR VACCINATION: ICD-10-CM

## 2020-10-12 DIAGNOSIS — N18.30 STAGE 3 CHRONIC KIDNEY DISEASE, UNSPECIFIED WHETHER STAGE 3A OR 3B CKD (H): ICD-10-CM

## 2020-10-12 DIAGNOSIS — Z00.00 ENCOUNTER FOR MEDICARE ANNUAL WELLNESS EXAM: Primary | ICD-10-CM

## 2020-10-12 DIAGNOSIS — M67.432 GANGLION CYST OF WRIST, LEFT: ICD-10-CM

## 2020-10-12 DIAGNOSIS — J44.9 CHRONIC OBSTRUCTIVE PULMONARY DISEASE, UNSPECIFIED COPD TYPE (H): ICD-10-CM

## 2020-10-12 DIAGNOSIS — G44.209 TENSION HEADACHE: ICD-10-CM

## 2020-10-12 DIAGNOSIS — M54.12 CERVICAL RADICULOPATHY: ICD-10-CM

## 2020-10-12 DIAGNOSIS — C51.9 VULVAR CANCER (H): ICD-10-CM

## 2020-10-12 DIAGNOSIS — F32.5 MAJOR DEPRESSION IN COMPLETE REMISSION (H): ICD-10-CM

## 2020-10-12 DIAGNOSIS — E78.5 HYPERLIPIDEMIA LDL GOAL <130: ICD-10-CM

## 2020-10-12 LAB
ALBUMIN SERPL-MCNC: 3.9 G/DL (ref 3.4–5)
ALP SERPL-CCNC: 85 U/L (ref 40–150)
ALT SERPL W P-5'-P-CCNC: 42 U/L (ref 0–50)
ANION GAP SERPL CALCULATED.3IONS-SCNC: 4 MMOL/L (ref 3–14)
AST SERPL W P-5'-P-CCNC: 45 U/L (ref 0–45)
BILIRUB SERPL-MCNC: 0.6 MG/DL (ref 0.2–1.3)
BUN SERPL-MCNC: 9 MG/DL (ref 7–30)
CALCIUM SERPL-MCNC: 9.3 MG/DL (ref 8.5–10.1)
CHLORIDE SERPL-SCNC: 103 MMOL/L (ref 94–109)
CHOLEST SERPL-MCNC: 173 MG/DL
CO2 SERPL-SCNC: 31 MMOL/L (ref 20–32)
CREAT SERPL-MCNC: 0.93 MG/DL (ref 0.52–1.04)
GFR SERPL CREATININE-BSD FRML MDRD: 63 ML/MIN/{1.73_M2}
GLUCOSE SERPL-MCNC: 90 MG/DL (ref 70–99)
HDLC SERPL-MCNC: 62 MG/DL
LDLC SERPL CALC-MCNC: 85 MG/DL
NONHDLC SERPL-MCNC: 111 MG/DL
POTASSIUM SERPL-SCNC: 4.6 MMOL/L (ref 3.4–5.3)
PROT SERPL-MCNC: 7.6 G/DL (ref 6.8–8.8)
SODIUM SERPL-SCNC: 138 MMOL/L (ref 133–144)
TRIGL SERPL-MCNC: 128 MG/DL

## 2020-10-12 PROCEDURE — 36415 COLL VENOUS BLD VENIPUNCTURE: CPT | Performed by: PHYSICIAN ASSISTANT

## 2020-10-12 PROCEDURE — 90662 IIV NO PRSV INCREASED AG IM: CPT | Performed by: PHYSICIAN ASSISTANT

## 2020-10-12 PROCEDURE — 99213 OFFICE O/P EST LOW 20 MIN: CPT | Mod: 25 | Performed by: PHYSICIAN ASSISTANT

## 2020-10-12 PROCEDURE — 80053 COMPREHEN METABOLIC PANEL: CPT | Performed by: PHYSICIAN ASSISTANT

## 2020-10-12 PROCEDURE — G0008 ADMIN INFLUENZA VIRUS VAC: HCPCS | Performed by: PHYSICIAN ASSISTANT

## 2020-10-12 PROCEDURE — G0439 PPPS, SUBSEQ VISIT: HCPCS | Performed by: PHYSICIAN ASSISTANT

## 2020-10-12 PROCEDURE — 80061 LIPID PANEL: CPT | Performed by: PHYSICIAN ASSISTANT

## 2020-10-12 RX ORDER — CYCLOBENZAPRINE HCL 5 MG
5 TABLET ORAL
Qty: 20 TABLET | Refills: 0 | Status: SHIPPED | OUTPATIENT
Start: 2020-10-12 | End: 2021-10-14

## 2020-10-12 ASSESSMENT — ANXIETY QUESTIONNAIRES
6. BECOMING EASILY ANNOYED OR IRRITABLE: SEVERAL DAYS
2. NOT BEING ABLE TO STOP OR CONTROL WORRYING: NOT AT ALL
7. FEELING AFRAID AS IF SOMETHING AWFUL MIGHT HAPPEN: NOT AT ALL
IF YOU CHECKED OFF ANY PROBLEMS ON THIS QUESTIONNAIRE, HOW DIFFICULT HAVE THESE PROBLEMS MADE IT FOR YOU TO DO YOUR WORK, TAKE CARE OF THINGS AT HOME, OR GET ALONG WITH OTHER PEOPLE: NOT DIFFICULT AT ALL
3. WORRYING TOO MUCH ABOUT DIFFERENT THINGS: NOT AT ALL
5. BEING SO RESTLESS THAT IT IS HARD TO SIT STILL: NOT AT ALL
GAD7 TOTAL SCORE: 1
1. FEELING NERVOUS, ANXIOUS, OR ON EDGE: NOT AT ALL

## 2020-10-12 ASSESSMENT — PATIENT HEALTH QUESTIONNAIRE - PHQ9
SUM OF ALL RESPONSES TO PHQ QUESTIONS 1-9: 4
5. POOR APPETITE OR OVEREATING: NOT AT ALL

## 2020-10-12 ASSESSMENT — MIFFLIN-ST. JEOR: SCORE: 1181.33

## 2020-10-12 NOTE — PATIENT INSTRUCTIONS
Patient Education   Personalized Prevention Plan  You are due for the preventive services outlined below.  Your care team is available to assist you in scheduling these services.  If you have already completed any of these items, please share that information with your care team to update in your medical record.  Health Maintenance Due   Topic Date Due     COPD Action Plan  1950     Zoster (Shingles) Vaccine (2 of 3) 04/09/2014     Flu Vaccine (1) 09/01/2020     FALL RISK ASSESSMENT  10/07/2020     Depression Assessment  10/22/2020     Preventive Health Recommendations    See your health care provider every year to    Review health changes.     Discuss preventive care.      Review your medicines if your doctor has prescribed any.    You no longer need a yearly Pap test unless you've had an abnormal Pap test in the past 10 years. If you have vaginal symptoms, such as bleeding or discharge, be sure to talk with your provider about a Pap test.    Every 1 to 2 years, have a mammogram.  If you are over 69, talk with your health care provider about whether or not you want to continue having screening mammograms.    Every 10 years, have a colonoscopy. Or, have a yearly FIT test (stool test). These exams will check for colon cancer.     Have a cholesterol test every 5 years, or more often if your doctor advises it.     Have a diabetes test (fasting glucose) every three years. If you are at risk for diabetes, you should have this test more often.     At age 65, have a bone density scan (DEXA) to check for osteoporosis (brittle bone disease).    Shots:    Get a flu shot each year.    Get a tetanus shot every 10 years.    Talk to your doctor about your pneumonia vaccines. There are now two you should receive - Pneumovax (PPSV 23) and Prevnar (PCV 13).    Talk to your pharmacist about the shingles vaccine.    Talk to your doctor about the hepatitis B vaccine.    Nutrition:     Eat at least 5 servings of fruits and  vegetables each day.    Eat whole-grain bread, whole-wheat pasta and brown rice instead of white grains and rice.    Get adequate Calcium and Vitamin D.     Lifestyle    Exercise at least 150 minutes a week (30 minutes a day, 5 days a week). This will help you control your weight and prevent disease.    Limit alcohol to one drink per day.    No smoking.     Wear sunscreen to prevent skin cancer.     See your dentist twice a year for an exam and cleaning.    See your eye doctor every 1 to 2 years to screen for conditions such as glaucoma, macular degeneration and cataracts.    Personalized Prevention Plan  You are due for the preventive services outlined below.  Your care team is available to assist you in scheduling these services.  If you have already completed any of these items, please share that information with your care team to update in your medical record.  Health Maintenance   Topic Date Due     COPD ACTION PLAN  1950     ZOSTER IMMUNIZATION (2 of 3) 04/09/2014     INFLUENZA VACCINE (1) 09/01/2020     FALL RISK ASSESSMENT  10/07/2020     PHQ-9  10/22/2020     MAMMO SCREENING  11/26/2020     MEDICARE ANNUAL WELLNESS VISIT  10/12/2021     COLORECTAL CANCER SCREENING  10/06/2022     DEXA  11/26/2023     ADVANCE CARE PLANNING  04/08/2024     LIPID  10/07/2024     DTAP/TDAP/TD IMMUNIZATION (3 - Td) 04/08/2029     SPIROMETRY  Completed     HEPATITIS C SCREENING  Completed     DEPRESSION ACTION PLAN  Completed     Pneumococcal Vaccine: 65+ Years  Completed     Pneumococcal Vaccine: Pediatrics (0 to 5 Years) and At-Risk Patients (6 to 64 Years)  Aged Out     IPV IMMUNIZATION  Aged Out     MENINGITIS IMMUNIZATION  Aged Out     HEPATITIS B IMMUNIZATION  Aged Out

## 2020-10-12 NOTE — PROGRESS NOTES
"  SUBJECTIVE:   Marcela Allen is a 70 year old female who presents for Preventive Visit.    Patient has been advised of split billing requirements and indicates understanding: Yes  Are you in the first 12 months of your Medicare Part B coverage?  No    Answers for HPI/ROS submitted by the patient on 10/10/2020   Annual Exam:  In general, how would you rate your overall physical health?: good  Frequency of exercise:: 2-3 days/week  Do you usually eat at least 4 servings of fruit and vegetables a day, include whole grains & fiber, and avoid regularly eating high fat or \"junk\" foods? : No  Taking medications regularly:: Yes  Medication side effects:: None  Activities of Daily Living: no assistance needed  Home safety: no safety concerns identified  Hearing Impairment:: difficulty following a conversation in a noisy restaurant or crowded room, need to ask people to speak up or repeat themselves  In the past 6 months, have you been bothered by leaking of urine?: No  abdominal pain: No  Blood in stool: No  Blood in urine: No  chest pain: No  chills: No  congestion: No  constipation: Yes  cough: Yes  diarrhea: No  dizziness: No  ear pain: No  eye pain: No  nervous/anxious: No  fever: No  frequency: No  genital sores: No  headaches: Yes  hearing loss: Yes  heartburn: Yes  arthralgias: Yes  joint swelling: Yes  peripheral edema: Yes  mood changes: No  myalgias: Yes  nausea: No  dysuria: No  palpitations: Yes  Skin sensation changes: No  sore throat: Yes  urgency: No  rash: No  shortness of breath: No  visual disturbance: No  weakness: No  pelvic pain: No  vaginal bleeding: No  vaginal discharge: No  tenderness: No  breast mass: No  breast discharge: No  In general, how would you rate your overall mental or emotional health?: good  Additional concerns today:: No  Duration of exercise:: 15-30 minutes    Mental Health:    In general, how would you rate your overall mental or emotional health? good  PHQ-2 Score: (P) 2    Do you " feel safe in your environment? Yes    Have you ever done Advance Care Planning? (For example, a Health Directive, POLST, or a discussion with a medical provider or your loved ones about your wishes): Yes, advance care planning is on file.    Additional concerns to address?  No    Fall risk:  Fallen 2 or more times in the past year?: No  Any fall with injury in the past year?: No    Cognitive Screenin) Repeat 3 items (Leader, Season, Table)    2) Clock draw: NORMAL  3) 3 item recall: Recalls 3 objects  Results: NORMAL clock, 1-2 items recalled: COGNITIVE IMPAIRMENT LESS LIKELY    Mini-CogTM Copyright S Do. Licensed by the author for use in [facility]; reprinted with permission (sogio@[facility]). All rights reserved.      Do you have sleep apnea, excessive snoring or daytime drowsiness?: no     Cyst on left wrist for months, it has enlarged at times.  No pain and no clicking.     Sore throat for the past month (feels scratchy).  Not severe.   She does have some POST NASAL DRIP from wearing a mask.    No changes in voice.  No fevers.      She recently had a Rhizotomy for headaches.  Takes excedrin for headaches.   She see's a spine specialist.  Starts in the occipital area and radiates up.  No migraine headache.          Reviewed and updated as needed this visit by clinical staff  Yarely Garsia CMA    Reviewed and updated as needed this visit by Provider                Social History     Tobacco Use     Smoking status: Former Smoker     Packs/day: 1.00     Years: 45.00     Pack years: 45.00     Types: Cigarettes     Quit date: 2012     Years since quittin.2     Smokeless tobacco: Never Used     Tobacco comment: quitting with e cigarette   Substance Use Topics     Alcohol use: Yes     Comment: less than weekly                           Current providers sharing in care for this patient include:   Patient Care Team:  Honey García PA-C as PCP - General (Family  Practice)  Geovanna Chatterjee MD as MD (INTERNAL MEDICINE - ENDOCRINOLOGY, DIABETES & METABOLISM)  Honey García PA-C as Assigned PCP  Faby So RN as Specialty Care Coordinator (Clinical Cardiac Electrophysiology)  Suyapa Garrett MD as MD (Clinical Cardiac Electrophysiology)    The following health maintenance items are reviewed in Epic and correct as of today:  Health Maintenance   Topic Date Due     COPD ACTION PLAN  1950     ZOSTER IMMUNIZATION (2 of 3) 04/09/2014     MAMMO SCREENING  11/26/2020     PHQ-9  04/12/2021     MEDICARE ANNUAL WELLNESS VISIT  10/12/2021     FALL RISK ASSESSMENT  10/12/2021     COLORECTAL CANCER SCREENING  10/06/2022     DEXA  11/26/2023     LIPID  10/12/2025     ADVANCE CARE PLANNING  10/12/2025     DTAP/TDAP/TD IMMUNIZATION (3 - Td) 04/08/2029     SPIROMETRY  Completed     HEPATITIS C SCREENING  Completed     DEPRESSION ACTION PLAN  Completed     INFLUENZA VACCINE  Completed     Pneumococcal Vaccine: 65+ Years  Completed     Pneumococcal Vaccine: Pediatrics (0 to 5 Years) and At-Risk Patients (6 to 64 Years)  Aged Out     IPV IMMUNIZATION  Aged Out     MENINGITIS IMMUNIZATION  Aged Out     HEPATITIS B IMMUNIZATION  Aged Out     Labs reviewed in EPIC  BP Readings from Last 3 Encounters:   10/12/20 133/82   06/16/20 122/61   05/05/20 116/78    Wt Readings from Last 3 Encounters:   10/12/20 70.3 kg (155 lb)   05/05/20 70.8 kg (156 lb)   11/05/19 69.9 kg (154 lb 3.2 oz)                  Patient Active Problem List   Diagnosis     GERD (gastroesophageal reflux disease)     Eczema     Hiatal hernia     OA (osteoarthritis)     Osteopenia     Chronic neck pain     Advanced directives, counseling/discussion     Major depression in complete remission (H)     Hyperlipidemia LDL goal <130     Hypertension goal BP (blood pressure) < 140/90     Seasonal allergies     CKD (chronic kidney disease) stage 3, GFR 30-59 ml/min     Liver hemangioma     S/P LEEP of cervix     Former  smoker     Elevated platelet count     s/p B TKA 6/27     S/P knee replacement     Postsurgical hypothyroidism     Papillary thyroid carcinoma (H)     Enlarged lymph nodes     Cervical cancer (H)     Cervical radiculopathy     Malignant neoplasm of cervix, unspecified site (H)     Vulvar cancer (H)     Chronic obstructive pulmonary disease, unspecified COPD type (H)     History of colonic polyps     Post-menopausal     Cervical stenosis of spinal canal     Blood pressure check     Senile cataract of left eye, unspecified age-related cataract type     Orthostatic hypotension     HSV (herpes simplex virus) infection     Pilonidal cyst without infection     Cervical adenopathy     Tension headache     Ganglion cyst of wrist, left     Past Surgical History:   Procedure Laterality Date     ABDOMEN SURGERY      Teenager     APPENDECTOMY OPEN  01/01/1968     ARTHROPLASTY MINIMALLY INVASIVE KNEE BILATERAL  06/27/2013    Procedure: ARTHROPLASTY MINIMALLY INVASIVE KNEE BILATERAL;  Minimally Invasive Bilateral Total Knee Arthroplasty;  Surgeon: Christophe Welch MD;  Location: [facility]     BALLOON COMPRESSION RHIZOTOMY      by spine specialist     BIOPSY  Ongoing    Lymph nodes neck     BONE MARROW ASPIRATION ONLY Left 12/07/2015    Procedure: BONE MARROW ASPIRATION ONLY;  Surgeon: Aguilar Roldan MD;  Location: [facility] OR     CARPAL TUNNEL RELEASE RT/LT       COLONOSCOPY  01/01/2007    neg     COLPOSCOPY CERVIX, BIOPSY CERVIX, ENDOCERVICAL CURETTAGE, COMBINED  2003, 2007     completion thyroidectomy Right 03/14/2000     CYSTECTOMY OVARIAN BENIGN  01/01/1969     ESOPHAGOSCOPY, GASTROSCOPY, DUODENOSCOPY (EGD), COMBINED N/A 08/28/2014    Procedure: COMBINED ESOPHAGOSCOPY, GASTROSCOPY, DUODENOSCOPY (EGD), BIOPSY SINGLE OR MULTIPLE;  Surgeon: Kelvin Montgomery MD;  Location: [facility] OR     FUSION CERVICAL ANTERIOR THREE+ LEVELS WITH BONE ALLOGRAFT N/A 12/07/2015    Procedure: FUSION CERVICAL ANTERIOR THREE+ LEVELS WITH BONE  "ALLOGRAFT;  Surgeon: Aguilar Roldan MD;  Location: [facility] OR     HAND SURGERY       HEAD & NECK SURGERY  2000    Thyroidectomy     KNEE SURGERY   &     bilat     LAPAROSCOPIC CHOLECYSTECTOMY  1998     LEEP TX, CERVICAL  2007    BULMARO II on colp, leep path WNL     left thyroid total lobectomy, isthmusectomy and 50% right thyroid lobectomy Left 2000     TUBAL LIGATION  1988     VULVECTOMY SIMPLE  2006    FELICIA 3, wide local excision x 2     VULVECTOMY SIMPLE         Social History     Tobacco Use     Smoking status: Former Smoker     Packs/day: 1.00     Years: 45.00     Pack years: 45.00     Types: Cigarettes     Quit date: 2012     Years since quittin.2     Smokeless tobacco: Never Used     Tobacco comment: quitting with e cigarette   Substance Use Topics     Alcohol use: Yes     Comment: less than weekly     Family History   Problem Relation Age of Onset     Blood Disease Mother 75        thrombocythemia on hydrea     Hypertension Mother      Cerebrovascular Disease Mother         TIA     Anesthesia Reaction Mother         N/V     Osteoporosis Mother      C.A.D. Father 61        three MI's, smoker     Hypertension Father      Coronary Artery Disease Father      Diabetes Paternal Aunt      Cancer - colorectal Maternal Grandmother         unsure of her age.     Breast Cancer No family hx of      Asthma No family hx of              ROS:  Constitutional, HEENT, cardiovascular, pulmonary, GI, , musculoskeletal, neuro, skin, endocrine and psych systems are negative, except as otherwise noted.    OBJECTIVE:   /82   Pulse 85   Temp 98  F (36.7  C) (Tympanic)   Resp 14   Ht 1.575 m (5' 2\")   Wt 70.3 kg (155 lb)   LMP  (LMP Unknown)   SpO2 98%   Breastfeeding No   BMI 28.35 kg/m   Estimated body mass index is 28.35 kg/m  as calculated from the following:    Height as of this encounter: 1.575 m (5' 2\").    Weight as of this encounter: 70.3 kg (155 lb).  EXAM: "   GENERAL APPEARANCE: healthy, alert and no distress  EYES: Eyes grossly normal to inspection, PERRL and conjunctivae and sclerae normal  HENT: ear canals and TM's normal, nose and mouth without ulcers or lesions, oropharynx clear and oral mucous membranes moist  NECK: no adenopathy, no asymmetry, masses, or scars and thyroid normal to palpation  RESP: lungs clear to auscultation - no rales, rhonchi or wheezes  BREAST: normal without masses, tenderness or nipple discharge and no palpable axillary masses or adenopathy  CV: regular rate and rhythm, normal S1 S2, no S3 or S4, no murmur, click or rub, no peripheral edema and peripheral pulses strong  ABDOMEN: soft, nontender, no hepatosplenomegaly, no masses and bowel sounds normal  MS: no musculoskeletal defects are noted and gait is age appropriate without ataxia  SKIN: no suspicious lesions or rashes  NEURO: Normal strength and tone, sensory exam grossly normal, mentation intact and speech normal  PSYCH: mentation appears normal and affect normal/bright  Cystic mass measuring 1 cm noted on dorsal aspect of left wrist.  Cyst transilluminates with light.  Diagnostic Test Results:  Labs reviewed in Epic  Results for orders placed or performed in visit on 10/12/20   Comprehensive metabolic panel (BMP + Alb, Alk Phos, ALT, AST, Total. Bili, TP)     Status: None   Result Value Ref Range    Sodium 138 133 - 144 mmol/L    Potassium 4.6 3.4 - 5.3 mmol/L    Chloride 103 94 - 109 mmol/L    Carbon Dioxide 31 20 - 32 mmol/L    Anion Gap 4 3 - 14 mmol/L    Glucose 90 70 - 99 mg/dL    Urea Nitrogen 9 7 - 30 mg/dL    Creatinine 0.93 0.52 - 1.04 mg/dL    GFR Estimate 63 >60 mL/min/[1.73_m2]    GFR Estimate If Black 73 >60 mL/min/[1.73_m2]    Calcium 9.3 8.5 - 10.1 mg/dL    Bilirubin Total 0.6 0.2 - 1.3 mg/dL    Albumin 3.9 3.4 - 5.0 g/dL    Protein Total 7.6 6.8 - 8.8 g/dL    Alkaline Phosphatase 85 40 - 150 U/L    ALT 42 0 - 50 U/L    AST 45 0 - 45 U/L   Lipid panel reflex to direct  LDL Fasting     Status: None   Result Value Ref Range    Cholesterol 173 <200 mg/dL    Triglycerides 128 <150 mg/dL    HDL Cholesterol 62 >49 mg/dL    LDL Cholesterol Calculated 85 <100 mg/dL    Non HDL Cholesterol 111 <130 mg/dL       ASSESSMENT / PLAN:   1. Encounter for Medicare annual wellness exam       HEALTH CARE MAINTENANCE              Reviewed USP recommendations and anticipatory guidance.              See orders.     2. Need for vaccination  due  - FLUZONE HIGH DOSE 65+  [[id number]]  - VACCINE ADMINISTRATION, INITIAL    3. Cervical radiculopathy  Managed by spine specialist, recently had rhizotomy   - cyclobenzaprine (FLEXERIL) 5 MG tablet; Take 1 tablet (5 mg) by mouth nightly as needed for muscle spasms ((tension headaches))  Dispense: 20 tablet; Refill: 0    4. Tension headache  Tension Headache    Medication per .    Patient was given educational materials on tension headaches.  Patient advised to do ROM exercises 2-3 times per day and apply heat to the neck as needed (patient warned not to sleep with heating pad on).  Follow-up if headache persists despite these treatments, or if symptoms change.      Reviewed that flexeril is on the Beer's List and may cause drowsiness and increase risk of falls.  She is aware of this risk and would like to try a low dose muscle relaxer.  She states she will only use in the evening and be cautious if she needs to get up at night.   - cyclobenzaprine (FLEXERIL) 5 MG tablet; Take 1 tablet (5 mg) by mouth nightly as needed for muscle spasms ((tension headaches))  Dispense: 20 tablet; Refill: 0    5. Ganglion cyst of wrist, left      ASSESSMENT/PLAN:  Ganglion Cyst     I discussed ganglion pathophysiology and treatment options, including watchful waiting, aspiration/injection and surgical removal.  I discussed with patient the ganglia may regress or recur.  Pt opted to monitor.       6. Stage 3 chronic kidney disease, unspecified whether stage 3a or 3b  "CKD  Will continue to monitor  - Comprehensive metabolic panel (BMP + Alb, Alk Phos, ALT, AST, Total. Bili, TP)    7. Hyperlipidemia LDL goal <130  Stable and tolerating statin.   - Lipid panel reflex to direct LDL Fasting    8. Chronic obstructive pulmonary disease, unspecified COPD type (H)  Stable.     9. Major depression in complete remission (H)  Stable.      10. Vulvar cancer (H)  No reoccurrence      Patient has been advised of split billing requirements and indicates understanding: Yes    COUNSELING:  Reviewed preventive health counseling, as reflected in patient instructions       Regular exercise       Healthy diet/nutrition       Fall risk prevention    Estimated body mass index is 28.35 kg/m  as calculated from the following:    Height as of this encounter: 1.575 m (5' 2\").    Weight as of this encounter: 70.3 kg (155 lb).        She reports that she quit smoking about 8 years ago. Her smoking use included cigarettes. She has a 45.00 pack-year smoking history. She has never used smokeless tobacco.    Appropriate preventive services were discussed with this patient, including applicable screening as appropriate for cardiovascular disease, diabetes, osteopenia/osteoporosis, and glaucoma.  As appropriate for age/gender, discussed screening for colorectal cancer, prostate cancer, breast cancer, and cervical cancer. Checklist reviewing preventive services available has been given to the patient.    Reviewed patients plan of care and provided an AVS. The Intermediate Care Plan ( asthma action plan, low back pain action plan, and migraine action plan) for Marcela meets the Care Plan requirement. This Care Plan has been established and reviewed with the Patient.    Counseling Resources:  ATP IV Guidelines  Pooled Cohorts Equation Calculator  Breast Cancer Risk Calculator  BRCA-Related Cancer Risk Assessment: FHS-7 Tool  FRAX Risk Assessment  ICSI Preventive Guidelines  Dietary Guidelines for Americans, " 2010  USDA's MyPlate  ASA Prophylaxis  Lung CA Screening    FRANC Zuleta [facility] MAEGAN

## 2020-10-13 PROBLEM — M67.432 GANGLION CYST OF WRIST, LEFT: Status: ACTIVE | Noted: 2020-10-13

## 2020-10-13 PROBLEM — G44.209 TENSION HEADACHE: Status: ACTIVE | Noted: 2020-10-13

## 2020-10-13 ASSESSMENT — ANXIETY QUESTIONNAIRES: GAD7 TOTAL SCORE: 1

## 2020-12-08 ENCOUNTER — MYC MEDICAL ADVICE (OUTPATIENT)
Dept: FAMILY MEDICINE | Facility: CLINIC | Age: 70
End: 2020-12-08

## 2020-12-08 DIAGNOSIS — E78.5 HYPERLIPIDEMIA LDL GOAL <130: ICD-10-CM

## 2020-12-08 RX ORDER — ATORVASTATIN CALCIUM 20 MG/1
20 TABLET, FILM COATED ORAL DAILY
Qty: 90 TABLET | Refills: 3 | Status: SHIPPED | OUTPATIENT
Start: 2020-12-08 | End: 2022-01-05

## 2020-12-08 NOTE — TELEPHONE ENCOUNTER
Last OV 11/12/20 with Honey García PA-C with advised yearly F/U.    Routing to provider to authorize years supply.    Jocelin Pratt, RN, BSN

## 2020-12-08 NOTE — TELEPHONE ENCOUNTER
Last Written Prescription Date:  11/29/2019  Last Fill Quantity: 90,  # refills: 3   Last office visit: 10/12/2020 with prescribing provider:  Honey García PA-C   Future Office Visit: none    Jocelin Pratt, RN, BSN

## 2020-12-10 DIAGNOSIS — E89.0 POSTSURGICAL HYPOTHYROIDISM: Chronic | ICD-10-CM

## 2020-12-10 DIAGNOSIS — C73 PAPILLARY THYROID CARCINOMA (H): Chronic | ICD-10-CM

## 2020-12-11 RX ORDER — LEVOTHYROXINE SODIUM 100 UG/1
TABLET ORAL
Qty: 90 TABLET | Refills: 4 | Status: SHIPPED | OUTPATIENT
Start: 2020-12-11 | End: 2022-03-09

## 2021-01-05 NOTE — PROGRESS NOTES
Marcela Allen is a 70 year old female who is being evaluated via a billable telephone visit.      What phone number would you like to be contacted at? [phone]    How would you like to obtain your AVS? Aracely Kapadia MA

## 2021-01-06 ENCOUNTER — VIRTUAL VISIT (OUTPATIENT)
Dept: ENDOCRINOLOGY | Facility: CLINIC | Age: 71
End: 2021-01-06
Payer: MEDICARE

## 2021-01-06 DIAGNOSIS — C73 PAPILLARY THYROID CARCINOMA (H): ICD-10-CM

## 2021-01-06 DIAGNOSIS — E89.0 POSTSURGICAL HYPOTHYROIDISM: Primary | ICD-10-CM

## 2021-01-06 PROCEDURE — 99441 PR PHYSICIAN TELEPHONE EVALUATION 5-10 MIN: CPT | Mod: 95

## 2021-01-06 NOTE — PATIENT INSTRUCTIONS
Yearly labs standing orders are on the system      Neck US can be after covid in 2021 or 2022 -- order is on the system

## 2021-01-06 NOTE — PROGRESS NOTES
"Endocrinology telephone  visit Progress note    Assessment   1.  Papillary thyroid cancer, bilateral, multifocal, no nodes removed. She has been treated with thyroidectomy in 2 procedures.  She has had 29 mCi 131I  in .   We don't have TSH stimulated Tg data on her.   Labs today to include  Tg ---  RTC 1 year    2021 Tg < 0.1, ROSARIO < 0.4, TSH 0.29, free T4 0.99    2.  Hypothyroidism due to thyroidectomy -She is likely to be cured.  Target TSH < 1.  On LT4 100 * 6/week (mean 86 mcg/day)  Reordered LT4 refill    3. Cervical adenopathy  On US - we have been focusing on left level 6/(called level 3 in , ) node  Which has remained stable for years.  Because of \"new\" level 4 superficial mass,  Next US in 2-3 yeasrs ([id number])    post menopausal -  Her bone risk factors include TSH suppression therapy, past smoking and post menopausal state. Past alendronate.    DXA suggests bone gain at the bilateral total hips. Since her bone density is normal she can go more than 2 years between DXA studies.     Due to the COVID 19 pandemic this visit was a telephone visit in order to help prevent spread of infection in this high risk patient and the general population. The patient gave verbal consent for the visit today.    Start time 1521  Stop time 1529  Total time 8 minutes     Geovanna Chatterjee MD.    JESUS Shipman  presents for follow up of thyroid cancer and post surgical  hypothyroidism.   I last saw her . At that time she was on 100 * 6/week (86 mcg/day).  Yearly labs were ordered by me 2019. She had labs drawn on 10/12/2020 which did not include the yearly labs I had ordered which have now .     She was diagnosed with PCT  in  after she had partial thyroid surgery.   One week later she had completion thyroidectomy.  Both operations were performed at [facility] by Dr Antonina Peralta.  See my 09 note for the operative reports.  Primary tumor size is not specified in the path " "reports, multifocal, bilateral..\" She got 29.3 mCi 131I, 5/30/00) and one year later she had a TBS at [facility], which was negative.    I have reviewed all available tumor marker data to date:  5/12/00: .72, ROSARIO in lab  5/18/00: 5.16 mCi 131I TBS: \"intense activity in the thyroid bed.  No evidence of activity outside of the neck\"  5/30/00: 29.3 mCi 131I ([facility])  8/7/00: TSH 48.26, free T4 0.87  8/24/01 4.03 mCi 131I TBS ([facility]): negative study  6/6/04: TSH 3.10,  Tg < 0.3 ([facility]), ROSARIO < 1  10/6/04: ROSARIO < 2 IU/ml, free T4 1.2, TSH 16.07  3/26/08: Tg 0.18, ROSARIO < 1, TSH 0.02  1/28/09: Tg < 0.1, ROSARIO < 0.4, TSH 0.01   11/17/09: Tg < 0.1, ROSARIO < 0.4, TSH 0.02  US guided FNAB of left level 2 LN in 12/09, with benign cytology.  Needle wash Tg was < 0.1.   2/23/10: Tg < 0.1, ROSARIO < 0.4, TSH 0.04  5/18/10: Tg < 0.1, ROSARIO < 0.4, TSH 0.01  6/1/11: Tg < 0.1, ROSARIO < 0.4, TSH 0.06  4/30/13: Tg < 0.1, ROSARIO < 0.4, TSH 0.03  10/23/13: Tg < 0.1, ROSARIO < 0.4, TSH 0.1  5/7/14: Tg <0.1, ROSARIO < 0.4, TSH   6/8/15: Tg < 0.1, ROSARIO < 0.4, TSH  8/2/16: Tg < 0.1, ROSARIO < 0.4,  TSH 0.09- after this we reduced LT4 dose by 1/2 tablet per week  4/3/17: TSH 0.49  11/6/17: Tg < 0.1, ROSARIO < 0.4, TSH 0.6, free T4 1.10  11/6/18; Tg < 0.1, ROSARIO < 0.4, TSH 0.06, free T4 1.l  10/10/19: TSH 0.22, free T4 1.19 , Tg not done  11/5/19: Tg < 0.1, ROSARIO < 0.4 -     5/3/2020 TSH 0.33, free T4 1.27     I have reviewed images on PACS  In 7/13 she had a normal CXR.   10/19/19 neck US compared with 5/7/14 neck US:   Left level 6 carotid sheath 0.9 x 0.4 x 1.1  (was 0.8 x 0.4 x 1.2 cm; was  1 x 0.5 x 1.1 2011)  Left level 4 # 1 0.4 x 0.4 x 0.6 cm very superficial    DXA 11/26/18 ([facility]) as read by me:   4/3/15: (as read by me) lowest T-score -0.7 at the right femoral neck   Right total hp 0.950 (was 0.930 2015  Left total hip 0.990 (was 0.942 2015)  L spine 1.341 (was 1.374 2015)     ROS   Sleep is OK  She has not had covid  She is feeling well  Cardiac: had heart monitor " for a few days in late spring -was having some SVT then.   She recalls her BP was low and she was having some dizzy spells;   Respiratory negative  GI: constipation  No dizziness recently    PMH   Past Medical History:   Diagnosis Date     BULMARO II (cervical intraepithelial neoplasia II) 2007    on colp - leep path WNL     COPD (chronic obstructive pulmonary disease) (H)      Depression 1990     Depressive disorder 25 yrs     Eczema      GERD (gastroesophageal reflux disease)      Hiatal hernia      History of blood transfusion 2013    Post op     HTN (hypertension)      Hypercholesterolemia      Liver hemangioma 9/4/2012     OA (osteoarthritis)      Osteopenia      Papillary thyroid carcinoma (H) 2000    BL, MF; Tx in 2 operations, RRA     PONV (postoperative nausea and vomiting)      Postsurgical hypothyroidism 2000     FELICIA III (vulvar intraepithelial neoplasia III) 2006    wide local exc x 2     Chlamydia    genital herpes  HPV of cervix.   cryotherapy times two     eczema and contact dermatitis.  Chronic neck pain    Arnold-Chiari malformation.    Past Surgical History:   Procedure Laterality Date     ABDOMEN SURGERY      Teenager     APPENDECTOMY OPEN  01/01/1968     ARTHROPLASTY MINIMALLY INVASIVE KNEE BILATERAL  06/27/2013    Procedure: ARTHROPLASTY MINIMALLY INVASIVE KNEE BILATERAL;  Minimally Invasive Bilateral Total Knee Arthroplasty;  Surgeon: Christophe Welch MD;  [facility]     BALLOON COMPRESSION RHIZOTOMY      by spine specialist     BIOPSY  Ongoing    Lymph nodes neck     BONE MARROW ASPIRATION ONLY Left 12/07/2015    Procedure: BONE MARROW ASPIRATION ONLY;  Surgeon: Aguilar Roldan MD;  Location: [facility] OR     CARPAL TUNNEL RELEASE RT/LT       COLONOSCOPY  01/01/2007    neg     COLPOSCOPY CERVIX, BIOPSY CERVIX, ENDOCERVICAL CURETTAGE, COMBINED  2003, 2007     completion thyroidectomy Right 03/14/2000     CYSTECTOMY OVARIAN BENIGN  01/01/1969     ESOPHAGOSCOPY, GASTROSCOPY, DUODENOSCOPY  (EGD), COMBINED N/A 08/28/2014    Procedure: COMBINED ESOPHAGOSCOPY, GASTROSCOPY, DUODENOSCOPY (EGD), BIOPSY SINGLE OR MULTIPLE;  Surgeon: Kelvin Montgomery MD;  Location: [facility]     FUSION CERVICAL ANTERIOR THREE+ LEVELS WITH BONE ALLOGRAFT N/A 12/07/2015    Procedure: FUSION CERVICAL ANTERIOR THREE+ LEVELS WITH BONE ALLOGRAFT;  Surgeon: Aguilar Roldan MD;  Location: [facility] OR     HAND SURGERY       HEAD & NECK SURGERY  01/01/2000    Thyroidectomy     KNEE SURGERY  2001 & 2005    bilat     LAPAROSCOPIC CHOLECYSTECTOMY  01/01/1998     LEEP TX, CERVICAL  01/01/2007    BULMARO II on colp, leep path WNL     left thyroid total lobectomy, isthmusectomy and 50% right thyroid lobectomy Left 03/07/2000     TUBAL LIGATION  01/01/1988     VULVECTOMY SIMPLE  01/01/2006    FELICIA 3, wide local excision x 2     VULVECTOMY SIMPLE       Finger ligament repair in 2000.  left arm ligament repair.    Current Outpatient Medications   Medication Sig Dispense Refill     aspirin 81 MG tablet Take 81 mg by mouth daily        atorvastatin (LIPITOR) 20 MG tablet Take 1 tablet (20 mg) by mouth daily 90 tablet 3     CALCIUM CITRATE PO Take 1,200 mg by mouth daily        cetirizine (ZYRTEC) 10 MG tablet Take 1 tablet (10 mg) by mouth daily 90 tablet 1     Cholecalciferol (VITAMIN D) 1000 UNIT capsule Take 1 capsule by mouth daily.       cyclobenzaprine (FLEXERIL) 5 MG tablet Take 1 tablet (5 mg) by mouth nightly as needed for muscle spasms ((tension headaches)) 20 tablet 0     FLUoxetine (PROZAC) 20 MG capsule Take 3 capsules (60 mg) by mouth daily 90 capsule 0     hypromellose (ARTIFICIAL TEARS) 0.5 % SOLN Place 1 drop into both eyes every 4 hours as needed        levothyroxine (SYNTHROID/LEVOTHROID) 100 MCG tablet Monday-Saturday: 1 tablet/day,  Sunday: skip 90 tablet 4     multivitamin, therapeutic with minerals (MULTI-VITAMIN) TABS Take 1 tablet by mouth daily       omeprazole (PRILOSEC) 20 MG DR capsule 1 capsule M, W, F 90 capsule 1  "    sennosides (SENOKOT) 8.6 MG tablet Take 2 tablets by mouth daily        triamcinolone (KENALOG) 0.1 % ointment Apply to affected area of the skin twice a day after using a moisturizer. Can do wet wraps with this. 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; hasn't been bowling but plans to start on Monday - senior league;     Physical Exam   GENERAL: no distress  BP Readings from Last 1 Encounters:   10/12/20 133/82      Pulse Readings from Last 1 Encounters:   10/12/20 85      Resp Readings from Last 1 Encounters:   10/12/20 14      Temp Readings from Last 1 Encounters:   10/12/20 98  F (36.7  C) (Tympanic)      SpO2 Readings from Last 1 Encounters:   10/12/20 98%      Wt Readings from Last 1 Encounters:   10/12/20 70.3 kg (155 lb)      Ht Readings from Last 1 Encounters:   10/12/20 1.575 m (5' 2\")   .RESP: No audible wheeze, cough, or visible cyanosis.  No visible retractions or increased work of breathing.    NEURO  Awake, alert, responds appropriately to questions.  Mentation and speech fluent.  PSYCH:affect normal, judgement and insight intact    DATA REVIEW:    Results for EDGAR VENTURA (MRN [id number]) as of 2021 12:59   Ref. Range 10/12/2020 09:56   Sodium Latest Ref Range: 133 - 144 mmol/L 138   Potassium Latest Ref Range: 3.4 - 5.3 mmol/L 4.6   Chloride Latest Ref Range: 94 - 109 mmol/L 103   Carbon Dioxide Latest Ref Range: 20 - 32 mmol/L 31   Urea Nitrogen Latest Ref " Range: 7 - 30 mg/dL 9   Creatinine Latest Ref Range: 0.52 - 1.04 mg/dL 0.93   GFR Estimate Latest Ref Range: >60 mL/min/1.73_m2 63   GFR Estimate If Black Latest Ref Range: >60 mL/min/1.73_m2 73   Calcium Latest Ref Range: 8.5 - 10.1 mg/dL 9.3   Anion Gap Latest Ref Range: 3 - 14 mmol/L 4   Albumin Latest Ref Range: 3.4 - 5.0 g/dL 3.9   Protein Total Latest Ref Range: 6.8 - 8.8 g/dL 7.6   Bilirubin Total Latest Ref Range: 0.2 - 1.3 mg/dL 0.6   Alkaline Phosphatase Latest Ref Range: 40 - 150 U/L 85   ALT Latest Ref Range: 0 - 50 U/L 42   AST Latest Ref Range: 0 - 45 U/L 45   Cholesterol Latest Ref Range: <200 mg/dL 173   HDL Cholesterol Latest Ref Range: >49 mg/dL 62   LDL Cholesterol Calculated Latest Ref Range: <100 mg/dL 85   Non HDL Cholesterol Latest Ref Range: <130 mg/dL 111   Triglycerides Latest Ref Range: <150 mg/dL 128   Glucose Latest Ref Range: 70 - 99 mg/dL 90

## 2021-01-06 NOTE — LETTER
"1/6/2021       RE: Marcela Allen  [address] [facility] [address]     Dear Colleague,    Thank you for referring your patient, Marcela Allen, to the [facility] [place] at [facility]. Please see a copy of my visit note below.    Marcela Allen is a 70 year old female who is being evaluated via a billable telephone visit.      What phone number would you like to be contacted at? [phone]    How would you like to obtain your AVS? Aracely Kapadia MA        Endocrinology telephone  visit Progress note    Assessment   1.  Papillary thyroid cancer, bilateral, multifocal, no nodes removed. She has been treated with thyroidectomy in 2 procedures.  She has had 29 mCi 131I  in 2000.   We don't have TSH stimulated Tg data on her.   Labs today to include  Tg ---  RTC 1 year    2.  Hypothyroidism due to thyroidectomy -She is likely to be cured.  Target TSH < 1.  On LT4 100 * 6/week (mean 86 mcg/day)  Reordered LT4 refill    3. Cervical adenopathy  On US - we have been focusing on left level 6/(called level 3 in 2010, 2011) node  Which has remained stable for years.  Because of \"new\" level 4 superficial mass,  Next US in 2-3 yeasrs ([id number])    post menopausal -  Her bone risk factors include TSH suppression therapy, past smoking and post menopausal state. Past alendronate.   2018 DXA suggests bone gain at the bilateral total hips. Since her bone density is normal she can go more than 2 years between DXA studies.     Due to the COVID 19 pandemic this visit was a telephone visit in order to help prevent spread of infection in this high risk patient and the general population. The patient gave verbal consent for the visit today.    Start time 1521  Stop time 1529  Total time 8 minutes     Geovanna Chatterjee MD.    JESUS Shipman  presents for follow up of thyroid cancer and post surgical  hypothyroidism.   I last saw her 11/19. At that time " "she was on 100 * 6/week (86 mcg/day).  Yearly labs were ordered by me 2019. She had labs drawn on 10/12/2020 which did not include the yearly labs I had ordered which have now .     She was diagnosed with PCT  in  after she had partial thyroid surgery.   One week later she had completion thyroidectomy.  Both operations were performed at [facility] by Dr Antonina Peralta.  See my 09 note for the operative reports.  Primary tumor size is not specified in the path reports, multifocal, bilateral..\" She got 29.3 mCi 131I, 00) and one year later she had a TBS at [facility], which was negative.    I have reviewed all available tumor marker data to date:  00: .72, ROSARIO in lab  00: 5.16 mCi 131I TBS: \"intense activity in the thyroid bed.  No evidence of activity outside of the neck\"  00: 29.3 mCi 131I ([facility])  00: TSH 48.26, free T4 0.87  01 4.03 mCi 131I TBS ([facility]): negative study  04: TSH 3.10,  Tg < 0.3 (US), ROSARIO < 1  10/6/04: ROSARIO < 2 IU/ml, free T4 1.2, TSH 16.07  3/26/08: Tg 0.18, ROSARIO < 1, TSH 0.02  09: Tg < 0.1, ROSARIO < 0.4, TSH 0.01   09: Tg < 0.1, ROSARIO < 0.4, TSH 0.02  US guided FNAB of left level 2 LN in , with benign cytology.  Needle wash Tg was < 0.1.   2/23/10: Tg < 0.1, ROSARIO < 0.4, TSH 0.04  5/18/10: Tg < 0.1, ROSARIO < 0.4, TSH 0.01  11: Tg < 0.1, ROSARIO < 0.4, TSH 0.06  13: Tg < 0.1, ROSARIO < 0.4, TSH 0.03  10/23/13: Tg < 0.1, ROSARIO < 0.4, TSH 0.1  14: Tg <0.1, ROSARIO < 0.4, TSH   6/8/15: Tg < 0.1, ROSARIO < 0.4, TSH  16: Tg < 0.1, ROSARIO < 0.4,  TSH 0.09- after this we reduced LT4 dose by 1/2 tablet per week  4/3/17: TSH 0.49  17: Tg < 0.1, ROSARIO < 0.4, TSH 0.6, free T4 1.10  18; Tg < 0.1, ROSARIO < 0.4, TSH 0.06, free T4 1.l  10/10/19: TSH 0.22, free T4 1.19 , Tg not done  19: Tg < 0.1, ROSARIO < 0.4 -     5/3/2020 TSH 0.33, free T4 1.27     I have reviewed images on PACS  In  she had a normal CXR.   10/19/19 neck US compared " with 5/7/14 neck US:   Left level 6 carotid sheath 0.9 x 0.4 x 1.1  (was 0.8 x 0.4 x 1.2 cm; was  1 x 0.5 x 1.1 2011)  Left level 4 # 1 0.4 x 0.4 x 0.6 cm very superficial    DXA 11/26/18 ([facility]) as read by me:   4/3/15: (as read by me) lowest T-score -0.7 at the right femoral neck   Right total hp 0.950 (was 0.930 2015  Left total hip 0.990 (was 0.942 2015)  L spine 1.341 (was 1.374 2015)     ROS   Sleep is OK  She has not had covid  She is feeling well  Cardiac: had heart monitor for a few days in late spring -was having some SVT then.   She recalls her BP was low and she was having some dizzy spells;   Respiratory negative  GI: constipation  No dizziness recently    PMH   Past Medical History:   Diagnosis Date     BULMARO II (cervical intraepithelial neoplasia II) 2007    on colp - leep path WNL     COPD (chronic obstructive pulmonary disease) (H)      Depression 1990     Depressive disorder 25 yrs     Eczema      GERD (gastroesophageal reflux disease)      Hiatal hernia      History of blood transfusion 2013    Post op     HTN (hypertension)      Hypercholesterolemia      Liver hemangioma 9/4/2012     OA (osteoarthritis)      Osteopenia      Papillary thyroid carcinoma (H) 2000    BL, MF; Tx in 2 operations, RRA     PONV (postoperative nausea and vomiting)      Postsurgical hypothyroidism 2000     FELICIA III (vulvar intraepithelial neoplasia III) 2006    wide local exc x 2     Chlamydia    genital herpes  HPV of cervix.   cryotherapy times two     eczema and contact dermatitis.  Chronic neck pain    Arnold-Chiari malformation.    Past Surgical History:   Procedure Laterality Date     ABDOMEN SURGERY      Teenager     APPENDECTOMY OPEN  01/01/1968     ARTHROPLASTY MINIMALLY INVASIVE KNEE BILATERAL  06/27/2013    Procedure: ARTHROPLASTY MINIMALLY INVASIVE KNEE BILATERAL;  Minimally Invasive Bilateral Total Knee Arthroplasty;  Surgeon: Christophe Welch MD;  [facility]     BALLOON COMPRESSION RHIZOTOMY       by spine specialist     BIOPSY  Ongoing    Lymph nodes neck     BONE MARROW ASPIRATION ONLY Left 12/07/2015    Procedure: BONE MARROW ASPIRATION ONLY;  Surgeon: Aguilar Roldan MD;  Location: [facility] OR     CARPAL TUNNEL RELEASE RT/LT       COLONOSCOPY  01/01/2007    neg     COLPOSCOPY CERVIX, BIOPSY CERVIX, ENDOCERVICAL CURETTAGE, COMBINED  2003, 2007     completion thyroidectomy Right 03/14/2000     CYSTECTOMY OVARIAN BENIGN  01/01/1969     ESOPHAGOSCOPY, GASTROSCOPY, DUODENOSCOPY (EGD), COMBINED N/A 08/28/2014    Procedure: COMBINED ESOPHAGOSCOPY, GASTROSCOPY, DUODENOSCOPY (EGD), BIOPSY SINGLE OR MULTIPLE;  Surgeon: Kelvin Montgomery MD;  Location: [facility] OR     FUSION CERVICAL ANTERIOR THREE+ LEVELS WITH BONE ALLOGRAFT N/A 12/07/2015    Procedure: FUSION CERVICAL ANTERIOR THREE+ LEVELS WITH BONE ALLOGRAFT;  Surgeon: Aguilar Roldan MD;  Location:  OR     HAND SURGERY       HEAD & NECK SURGERY  01/01/2000    Thyroidectomy     KNEE SURGERY  2001 & 2005    bilat     LAPAROSCOPIC CHOLECYSTECTOMY  01/01/1998     LEEP TX, CERVICAL  01/01/2007    BULMARO II on colp, leep path WNL     left thyroid total lobectomy, isthmusectomy and 50% right thyroid lobectomy Left 03/07/2000     TUBAL LIGATION  01/01/1988     VULVECTOMY SIMPLE  01/01/2006    FELICIA 3, wide local excision x 2     VULVECTOMY SIMPLE       Finger ligament repair in 2000.  left arm ligament repair.    Current Outpatient Medications   Medication Sig Dispense Refill     aspirin 81 MG tablet Take 81 mg by mouth daily        atorvastatin (LIPITOR) 20 MG tablet Take 1 tablet (20 mg) by mouth daily 90 tablet 3     CALCIUM CITRATE PO Take 1,200 mg by mouth daily        cetirizine (ZYRTEC) 10 MG tablet Take 1 tablet (10 mg) by mouth daily 90 tablet 1     Cholecalciferol (VITAMIN D) 1000 UNIT capsule Take 1 capsule by mouth daily.       cyclobenzaprine (FLEXERIL) 5 MG tablet Take 1 tablet (5 mg) by mouth nightly as needed for muscle spasms ((tension headaches))  "20 tablet 0     FLUoxetine (PROZAC) 20 MG capsule Take 3 capsules (60 mg) by mouth daily 90 capsule 0     hypromellose (ARTIFICIAL TEARS) 0.5 % SOLN Place 1 drop into both eyes every 4 hours as needed        levothyroxine (SYNTHROID/LEVOTHROID) 100 MCG tablet Monday-Saturday: 1 tablet/day,  : skip 90 tablet 4     multivitamin, therapeutic with minerals (MULTI-VITAMIN) TABS Take 1 tablet by mouth daily       omeprazole (PRILOSEC) 20 MG DR capsule 1 capsule M, W, F 90 capsule 1     sennosides (SENOKOT) 8.6 MG tablet Take 2 tablets by mouth daily        triamcinolone (KENALOG) 0.1 % ointment Apply to affected area of the skin twice a day after using a moisturizer. Can do wet wraps with this. 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; hasn't been bowling but plans to start on Monday - senior league;     Physical Exam   GENERAL: no distress  BP Readings from Last 1 Encounters:   10/12/20 133/82      Pulse Readings from Last 1 Encounters:   10/12/20 85      Resp Readings from Last 1 Encounters:   10/12/20 14      Temp Readings from Last 1 Encounters:   10/12/20 98  F (36.7  C) (Tympanic)      SpO2 Readings from Last 1 Encounters:   10/12/20 98%      Wt Readings from Last 1 Encounters:   10/12/20 70.3 kg (155 lb)      Ht Readings from Last 1 Encounters:   10/12/20 1.575 m (5' 2\")   .RESP: No audible wheeze, cough, or visible cyanosis.  No visible retractions or increased work of " breathing.    NEURO  Awake, alert, responds appropriately to questions.  Mentation and speech fluent.  PSYCH:affect normal, judgement and insight intact    DATA REVIEW:    Results for EDGAR VENTURA (MRN [id number]) as of 1/6/2021 12:59   Ref. Range 10/12/2020 09:56   Sodium Latest Ref Range: 133 - 144 mmol/L 138   Potassium Latest Ref Range: 3.4 - 5.3 mmol/L 4.6   Chloride Latest Ref Range: 94 - 109 mmol/L 103   Carbon Dioxide Latest Ref Range: 20 - 32 mmol/L 31   Urea Nitrogen Latest Ref Range: 7 - 30 mg/dL 9   Creatinine Latest Ref Range: 0.52 - 1.04 mg/dL 0.93   GFR Estimate Latest Ref Range: >60 mL/min/1.73_m2 63   GFR Estimate If Black Latest Ref Range: >60 mL/min/1.73_m2 73   Calcium Latest Ref Range: 8.5 - 10.1 mg/dL 9.3   Anion Gap Latest Ref Range: 3 - 14 mmol/L 4   Albumin Latest Ref Range: 3.4 - 5.0 g/dL 3.9   Protein Total Latest Ref Range: 6.8 - 8.8 g/dL 7.6   Bilirubin Total Latest Ref Range: 0.2 - 1.3 mg/dL 0.6   Alkaline Phosphatase Latest Ref Range: 40 - 150 U/L 85   ALT Latest Ref Range: 0 - 50 U/L 42   AST Latest Ref Range: 0 - 45 U/L 45   Cholesterol Latest Ref Range: <200 mg/dL 173   HDL Cholesterol Latest Ref Range: >49 mg/dL 62   LDL Cholesterol Calculated Latest Ref Range: <100 mg/dL 85   Non HDL Cholesterol Latest Ref Range: <130 mg/dL 111   Triglycerides Latest Ref Range: <150 mg/dL 128   Glucose Latest Ref Range: 70 - 99 mg/dL 90         Geovanna Chatterjee MD

## 2021-01-18 DIAGNOSIS — E89.0 POSTSURGICAL HYPOTHYROIDISM: ICD-10-CM

## 2021-01-18 DIAGNOSIS — C73 PAPILLARY THYROID CARCINOMA (H): ICD-10-CM

## 2021-01-18 LAB
T4 FREE SERPL-MCNC: 0.99 NG/DL (ref 0.76–1.46)
TSH SERPL DL<=0.005 MIU/L-ACNC: 0.29 MU/L (ref 0.4–4)

## 2021-01-18 PROCEDURE — 99N1030 PR STATISTIC REMEASURE THYROGLOBULIN: Mod: 90

## 2021-01-18 PROCEDURE — 84439 ASSAY OF FREE THYROXINE: CPT

## 2021-01-18 PROCEDURE — 99000 SPECIMEN HANDLING OFFICE-LAB: CPT

## 2021-01-18 PROCEDURE — 86800 THYROGLOBULIN ANTIBODY: CPT | Mod: 90

## 2021-01-18 PROCEDURE — 84443 ASSAY THYROID STIM HORMONE: CPT

## 2021-01-18 PROCEDURE — 36415 COLL VENOUS BLD VENIPUNCTURE: CPT

## 2021-01-18 PROCEDURE — 84432 ASSAY OF THYROGLOBULIN: CPT | Mod: 90

## 2021-01-25 LAB — LAB SCANNED RESULT: NORMAL

## 2021-02-12 DIAGNOSIS — Z12.31 VISIT FOR SCREENING MAMMOGRAM: ICD-10-CM

## 2021-02-12 PROCEDURE — 77063 BREAST TOMOSYNTHESIS BI: CPT | Mod: TC | Performed by: RADIOLOGY

## 2021-02-12 PROCEDURE — 77067 SCR MAMMO BI INCL CAD: CPT | Mod: TC | Performed by: RADIOLOGY

## 2021-03-02 ENCOUNTER — MYC MEDICAL ADVICE (OUTPATIENT)
Dept: FAMILY MEDICINE | Facility: CLINIC | Age: 71
End: 2021-03-02

## 2021-03-02 DIAGNOSIS — I10 HYPERTENSION GOAL BP (BLOOD PRESSURE) < 140/90: ICD-10-CM

## 2021-03-02 RX ORDER — TRIAMTERENE/HYDROCHLOROTHIAZID 37.5-25 MG
0.5 TABLET ORAL DAILY
Qty: 45 TABLET | Refills: 1 | Status: SHIPPED | OUTPATIENT
Start: 2021-03-02 | End: 2021-08-13

## 2021-03-02 NOTE — TELEPHONE ENCOUNTER
Routing refill request to provider for review/approval because:  Needs provider approval.  Lianet Patiño RN  [facility]

## 2021-03-06 ENCOUNTER — IMMUNIZATION (OUTPATIENT)
Dept: NURSING | Facility: CLINIC | Age: 71
End: 2021-03-06
Payer: MEDICARE

## 2021-03-06 PROCEDURE — 91303 PR COVID VAC JANSSEN AD26 0.5ML: CPT

## 2021-03-06 PROCEDURE — 0031A PR COVID VAC JANSSEN AD26 0.5ML: CPT

## 2021-03-25 ENCOUNTER — MYC MEDICAL ADVICE (OUTPATIENT)
Dept: FAMILY MEDICINE | Facility: CLINIC | Age: 71
End: 2021-03-25

## 2021-03-29 ENCOUNTER — OFFICE VISIT (OUTPATIENT)
Dept: FAMILY MEDICINE | Facility: CLINIC | Age: 71
End: 2021-03-29
Payer: MEDICARE

## 2021-03-29 VITALS
HEART RATE: 79 BPM | SYSTOLIC BLOOD PRESSURE: 138 MMHG | TEMPERATURE: 98 F | BODY MASS INDEX: 28.31 KG/M2 | DIASTOLIC BLOOD PRESSURE: 85 MMHG | OXYGEN SATURATION: 97 % | WEIGHT: 154.8 LBS

## 2021-03-29 DIAGNOSIS — J44.9 CHRONIC OBSTRUCTIVE PULMONARY DISEASE, UNSPECIFIED COPD TYPE (H): ICD-10-CM

## 2021-03-29 DIAGNOSIS — N18.31 STAGE 3A CHRONIC KIDNEY DISEASE (H): ICD-10-CM

## 2021-03-29 DIAGNOSIS — E89.0 POSTSURGICAL HYPOTHYROIDISM: Chronic | ICD-10-CM

## 2021-03-29 DIAGNOSIS — I49.1 PAC (PREMATURE ATRIAL CONTRACTION): ICD-10-CM

## 2021-03-29 DIAGNOSIS — I47.19 ATRIAL TACHYCARDIA, PAROXYSMAL (H): ICD-10-CM

## 2021-03-29 DIAGNOSIS — F32.5 MAJOR DEPRESSION IN COMPLETE REMISSION (H): ICD-10-CM

## 2021-03-29 DIAGNOSIS — I10 HYPERTENSION GOAL BP (BLOOD PRESSURE) < 140/90: ICD-10-CM

## 2021-03-29 DIAGNOSIS — Z01.818 PREOP GENERAL PHYSICAL EXAM: Primary | ICD-10-CM

## 2021-03-29 DIAGNOSIS — C73 PAPILLARY THYROID CARCINOMA (H): Chronic | ICD-10-CM

## 2021-03-29 DIAGNOSIS — H25.9 AGE-RELATED CATARACT OF BOTH EYES, UNSPECIFIED AGE-RELATED CATARACT TYPE: ICD-10-CM

## 2021-03-29 DIAGNOSIS — I95.1 ORTHOSTATIC HYPOTENSION: ICD-10-CM

## 2021-03-29 DIAGNOSIS — C51.9 VULVAR CANCER (H): ICD-10-CM

## 2021-03-29 LAB
ANION GAP SERPL CALCULATED.3IONS-SCNC: 3 MMOL/L (ref 3–14)
BUN SERPL-MCNC: 13 MG/DL (ref 7–30)
CALCIUM SERPL-MCNC: 9.6 MG/DL (ref 8.5–10.1)
CHLORIDE SERPL-SCNC: 101 MMOL/L (ref 94–109)
CO2 SERPL-SCNC: 33 MMOL/L (ref 20–32)
CREAT SERPL-MCNC: 0.9 MG/DL (ref 0.52–1.04)
CREAT UR-MCNC: 89 MG/DL
GFR SERPL CREATININE-BSD FRML MDRD: 65 ML/MIN/{1.73_M2}
GLUCOSE SERPL-MCNC: 93 MG/DL (ref 70–99)
MICROALBUMIN UR-MCNC: 8 MG/L
MICROALBUMIN/CREAT UR: 8.49 MG/G CR (ref 0–25)
POTASSIUM SERPL-SCNC: 4.4 MMOL/L (ref 3.4–5.3)
SODIUM SERPL-SCNC: 137 MMOL/L (ref 133–144)

## 2021-03-29 PROCEDURE — 36415 COLL VENOUS BLD VENIPUNCTURE: CPT | Performed by: PHYSICIAN ASSISTANT

## 2021-03-29 PROCEDURE — 99214 OFFICE O/P EST MOD 30 MIN: CPT | Performed by: PHYSICIAN ASSISTANT

## 2021-03-29 PROCEDURE — 80048 BASIC METABOLIC PNL TOTAL CA: CPT | Performed by: PHYSICIAN ASSISTANT

## 2021-03-29 PROCEDURE — 82043 UR ALBUMIN QUANTITATIVE: CPT | Performed by: PHYSICIAN ASSISTANT

## 2021-03-29 RX ORDER — LANOLIN ALCOHOL/MO/W.PET/CERES
400 CREAM (GRAM) TOPICAL DAILY
COMMUNITY
End: 2023-09-21

## 2021-03-29 NOTE — PROGRESS NOTES
"[facility] [place]  [address]  [place] [address]  Phone: [phone]  Primary Provider: Honey Vázquez  Pre-op Performing Provider: HONEY VÁZQUEZ      PREOPERATIVE EVALUATION:  Today's date: 3/29/2021    Marcela Allen is a 70 year old female who presents for a preoperative evaluation.    Surgical Information:  Surgery/Procedure: Cataract Repair Left 4/5/21 and Right 4/13/21  Surgery Location: [facility]  Surgeon:   Surgery Date: 4/5/21 and 4/13/21  Time of Surgery: Check in 8:50am  Where patient plans to recover: At home with family  Fax number for surgical facility: [phone]    Type of Anesthesia Anticipated: to be determined    Assessment & Plan     The proposed surgical procedure is considered LOW risk.    Preop general physical exam      Orthostatic hypotension  Was an issue in the past, however since removing lisinopril and taking half dose of maxide no issues.  She is interested in possibly changing from the half dose of maxide to a beta blocker instead to help with symptomatic PAC's.  If we pursue this, I would want to monitor very closely for the return of orthostatic hypotension.     Hypertension goal BP (blood pressure) < 140/90  BP stable, home readings typically lower.    - Basic metabolic panel  - Albumin Random Urine Quantitative with Creat Ratio    Age-related cataract of both eyes, unspecified age-related cataract type  Surgery indicated.      PAC (premature atrial contraction)  Recently had a holter monitor which showed PAC's, she f/u cardiology and plan is below:     \"Assessment and Plan:   1. Palpitations, likely symptomatic PACs and short runs of atrial tachycardia. Discussed with her that these are benign, no specific treatment needed unless it becomes very bothersome to her. If so, can switch her Maxzide to a beta blocker. She says that she is not that bothered by these episodes to want to change her meds, especially now " "that she is normotensive and no longer has dizziness.   2. Hypertension. Stable. BP management important to reduce risk of future development of atrial fibrillation.\"    After surgery, she can send a message to me if she would like to stop maxide and instead start a beta blocker.     Stage 3a chronic kidney disease  Stable.     Chronic obstructive pulmonary disease, unspecified COPD type (H)  Stable.     Major depression in complete remission (H)  Stable.     Vulvar cancer (H)  No recurrence.     Atrial tachycardia, paroxysmal (H)  Stable.     Postsurgical hypothyroidism  Managed by endo.  Last TSH checked 1/18/2021.     Papillary thyroid carcinoma (H)  Managed by endo.             Risks and Recommendations:  The patient has the following additional risks and recommendations for perioperative complications:   - No identified additional risk factors other than previously addressed    Medication Instructions:  Patient is to take all scheduled medications on the day of surgery    RECOMMENDATION:  APPROVAL GIVEN to proceed with proposed procedure, without further diagnostic evaluation.    Review of external notes as documented above                   Subjective     HPI related to upcoming procedure: cataracts in both eyes.     Preop Questions 3/25/2021   1. Have you ever had a heart attack or stroke? No   2. Have you ever had surgery on your heart or blood vessels, such as a stent placement, a coronary artery bypass, or surgery on an artery in your head, neck, heart, or legs? No   3. Do you have chest pain with activity? No   4. Do you have a history of  heart failure? No   5. Do you currently have a cold, bronchitis or symptoms of other infection? No   6. Do you have a cough, shortness of breath, or wheezing? No   7. Do you or anyone in your family have previous history of blood clots? No   8. Do you or does anyone in your family have a serious bleeding problem such as prolonged bleeding following surgeries or cuts? No "   9. Have you ever had problems with anemia or been told to take iron pills? No   10. Have you had any abnormal blood loss such as black, tarry or bloody stools, or abnormal vaginal bleeding? No   11. Have you ever had a blood transfusion? YES - No Complications, platelet problems-platelets spike   11a. Have you ever had a transfusion reaction? No   12. Are you willing to have a blood transfusion if it is medically needed before, during, or after your surgery? Yes   13. Have you or any of your relatives ever had problems with anesthesia? No   14. Do you have sleep apnea, excessive snoring or daytime drowsiness? No   15. Do you have any artifical heart valves or other implanted medical devices like a pacemaker, defibrillator, or continuous glucose monitor? No   16. Do you have artificial joints? YES - Bilateral KNees   17. Are you allergic to latex? No   18. Is there any chance that you may be pregnant? -       Health Care Directive:  Patient has a Health Care Directive on file      Preoperative Review of :   reviewed - no record of controlled substances prescribed.      Status of Chronic Conditions:  See problem list for active medical problems.  Problems all longstanding and stable, except as noted/documented.  See ROS for pertinent symptoms related to these conditions.      Review of Systems  Constitutional, neuro, ENT, endocrine, pulmonary, cardiac, gastrointestinal, genitourinary, musculoskeletal, integument and psychiatric systems are negative, except as otherwise noted.    Patient Active Problem List    Diagnosis Date Noted     Malignant neoplasm of cervix, unspecified site (H) 10/10/2016     Priority: High     Vulvar cancer (H) 10/10/2016     Priority: High     Cervical cancer (H) 03/23/2015     Priority: High     If neg, pt should continue Pap/HRHPV q3-5 yrs until 2027 (age 77) and yearly pelvic exams.        Chronic obstructive pulmonary disease, unspecified COPD type (H) 10/10/2013     Priority: High      Elevated platelet count 03/27/2013     Priority: High     Advanced directives, counseling/discussion 02/21/2012     Priority: High     Patient has completed an Advance/Health Care Directive (HCD), to bring in copy to be scanned into Epic.    Nora Delgado  February 21, 2012       Atrial tachycardia, paroxysmal (H) 03/29/2021     Priority: Medium     Tension headache 10/13/2020     Priority: Medium     Ganglion cyst of wrist, left 10/13/2020     Priority: Medium     Cervical adenopathy 11/05/2019     Priority: Medium     Senile cataract of left eye, unspecified age-related cataract type 10/07/2019     Priority: Medium     Orthostatic hypotension 10/07/2019     Priority: Medium     HSV (herpes simplex virus) infection 10/07/2019     Priority: Medium     Pilonidal cyst without infection 10/07/2019     Priority: Medium     Blood pressure check 05/06/2019     Priority: Medium     Cervical stenosis of spinal canal 04/08/2019     Priority: Medium     Post-menopausal 11/06/2018     Priority: Medium     History of colonic polyps 10/11/2017     Priority: Medium     Repeat colonoscopy 5 years, 10/6/2022       Cervical radiculopathy 12/07/2015     Priority: Medium     Enlarged lymph nodes 05/07/2014     Priority: Medium     Problem list name updated by automated process. Provider to review       s/p B TKA 6/27 07/01/2013     Priority: Medium     S/P knee replacement 07/01/2013     Priority: Medium     Former smoker 03/13/2013     Priority: Medium     Liver hemangioma 09/04/2012     Priority: Medium     CKD (chronic kidney disease) stage 3, GFR 30-59 ml/min 02/23/2012     Priority: Medium     Major depression in complete remission (H) 02/21/2012     Priority: Medium     Hyperlipidemia LDL goal <130 02/21/2012     Priority: Medium     Hypertension goal BP (blood pressure) < 140/90 02/21/2012     Priority: Medium     Seasonal allergies 02/21/2012     Priority: Medium     Chronic neck pain 05/25/2011     Priority: Medium      GERD (gastroesophageal reflux disease)      Priority: Medium     Eczema      Priority: Medium     Hiatal hernia      Priority: Medium     OA (osteoarthritis)      Priority: Medium     Osteopenia      Priority: Medium     Fosamax discontinued 3/23/15, drug holiday.  Will recheck DEXA in 2-3 years ([id number]).        S/P LEEP of cervix 06/06/2007     Priority: Medium     4/03: LSIL, 6/03: ECC WNL  NIL paps: 6/04, 8/05 12/5: FELICIA II. Referred to gyn onc 1/06: FELICIA II & III  12/06: ASCUS pap  3/07: ASCUS, + HPV 16. 5/07; [place] - BULMARO II  6/07: LEEP - no dysplasia  NIL paps: 11/07, 5/08, 11/08, 12/09  6/10: Neg vulvar bx  NIL paps: 1/11, 1/12, 2/13.  Plan pap in 1 yr.  2/21/14 pap NIL/neg HPV. Plan-- repeat pap and HPV in 1 year.   3/23/15 pap NIL/neg HPV. Plan-- repeat pap/HPV test q 3 yrs         Postsurgical hypothyroidism 03/07/2000     Priority: Medium     Papillary thyroid carcinoma (H) 03/07/2000     Priority: Medium      Past Medical History:   Diagnosis Date     Atrial tachycardia, paroxysmal (H) 3/29/2021     BULMARO II (cervical intraepithelial neoplasia II) 2007    on colp - leep path WNL     COPD (chronic obstructive pulmonary disease) (H)      Depression 1990     Depressive disorder 25 yrs     Eczema      GERD (gastroesophageal reflux disease)      Hiatal hernia      History of blood transfusion 2013    Post op     HTN (hypertension)      Hypercholesterolemia      Liver hemangioma 9/4/2012     OA (osteoarthritis)      Osteopenia      Papillary thyroid carcinoma (H) 2000    BL, MF; Tx in 2 operations, RRA     PONV (postoperative nausea and vomiting)      Postsurgical hypothyroidism 2000     FELICIA III (vulvar intraepithelial neoplasia III) 2006    wide local exc x 2     Past Surgical History:   Procedure Laterality Date     ABDOMEN SURGERY      Teenager     APPENDECTOMY OPEN  01/01/1968     ARTHROPLASTY MINIMALLY INVASIVE KNEE BILATERAL  06/27/2013    Procedure: ARTHROPLASTY MINIMALLY INVASIVE KNEE BILATERAL;   Minimally Invasive Bilateral Total Knee Arthroplasty;  Surgeon: Christophe Welch MD;  Location: [facility] OR     BALLOON COMPRESSION RHIZOTOMY      by spine specialist     BIOPSY  Ongoing    Lymph nodes neck     BONE MARROW ASPIRATION ONLY Left 12/07/2015    Procedure: BONE MARROW ASPIRATION ONLY;  Surgeon: Aguilar Roldan MD;  Location: [facility] OR     CARPAL TUNNEL RELEASE RT/LT       COLONOSCOPY  01/01/2007    neg     COLPOSCOPY CERVIX, BIOPSY CERVIX, ENDOCERVICAL CURETTAGE, COMBINED  2003, 2007     completion thyroidectomy Right 03/14/2000     CYSTECTOMY OVARIAN BENIGN  01/01/1969     ESOPHAGOSCOPY, GASTROSCOPY, DUODENOSCOPY (EGD), COMBINED N/A 08/28/2014    Procedure: COMBINED ESOPHAGOSCOPY, GASTROSCOPY, DUODENOSCOPY (EGD), BIOPSY SINGLE OR MULTIPLE;  Surgeon: Kelvin Montgomery MD;  Location: [facility] OR     FUSION CERVICAL ANTERIOR THREE+ LEVELS N/A 02/26/2015    C4-C7     FUSION CERVICAL ANTERIOR THREE+ LEVELS WITH BONE ALLOGRAFT N/A 12/07/2015    Procedure: FUSION CERVICAL ANTERIOR THREE+ LEVELS WITH BONE ALLOGRAFT;  Surgeon: Aguilar Roldan MD;  Location:  OR     HAND SURGERY       HEAD & NECK SURGERY  01/01/2000    Thyroidectomy     KNEE SURGERY  2001 & 2005    bilat     LAPAROSCOPIC CHOLECYSTECTOMY  01/01/1998     LEEP TX, CERVICAL  01/01/2007    BULMARO II on colp, leep path WNL     left thyroid total lobectomy, isthmusectomy and 50% right thyroid lobectomy Left 03/07/2000     TUBAL LIGATION  01/01/1988     VULVECTOMY SIMPLE  01/01/2006    FELICIA 3, wide local excision x 2     VULVECTOMY SIMPLE       Current Outpatient Medications   Medication Sig Dispense Refill     aspirin 81 MG tablet Take 81 mg by mouth daily        atorvastatin (LIPITOR) 20 MG tablet Take 1 tablet (20 mg) by mouth daily 90 tablet 3     CALCIUM CITRATE PO Take 1,200 mg by mouth daily        cetirizine (ZYRTEC) 10 MG tablet Take 1 tablet (10 mg) by mouth daily 90 tablet 1     Cholecalciferol (VITAMIN D) 1000 UNIT capsule Take 1  capsule by mouth daily.       FLUoxetine (PROZAC) 20 MG capsule Take 3 capsules (60 mg) by mouth daily 90 capsule 0     levothyroxine (SYNTHROID/LEVOTHROID) 100 MCG tablet Monday-Saturday: 1 tablet/day,  : skip 90 tablet 4     magnesium oxide (MAG-OX) 400 (240 Mg) MG tablet Take 400 mg by mouth daily       multivitamin, therapeutic with minerals (MULTI-VITAMIN) TABS Take 1 tablet by mouth daily       omeprazole (PRILOSEC) 20 MG DR capsule 1 capsule M, W, F 90 capsule 1     sennosides (SENOKOT) 8.6 MG tablet Take 2 tablets by mouth daily        triamcinolone (KENALOG) 0.1 % ointment Apply to affected area of the skin twice a day after using a moisturizer. Can do wet wraps with this. 454 g 1     triamterene-HCTZ (MAXZIDE-25) 37.5-25 MG tablet Take 0.5 tablets by mouth daily 45 tablet 1     cyclobenzaprine (FLEXERIL) 5 MG tablet Take 1 tablet (5 mg) by mouth nightly as needed for muscle spasms ((tension headaches)) (Patient not taking: Reported on 3/29/2021) 20 tablet 0     hypromellose (ARTIFICIAL TEARS) 0.5 % SOLN Place 1 drop into both eyes every 4 hours as needed          No Known Allergies     Social History     Tobacco Use     Smoking status: Former Smoker     Packs/day: 1.00     Years: 45.00     Pack years: 45.00     Types: Cigarettes     Quit date: 2012     Years since quittin.6     Smokeless tobacco: Never Used     Tobacco comment: quitting with e cigarette   Substance Use Topics     Alcohol use: Yes     Comment: less than weekly     Family History   Problem Relation Age of Onset     Blood Disease Mother 75        thrombocythemia on hydrea     Hypertension Mother      Cerebrovascular Disease Mother         TIA     Anesthesia Reaction Mother         N/V     Osteoporosis Mother      C.A.D. Father 61        three MI's, smoker     Hypertension Father      Coronary Artery Disease Father      Diabetes Paternal Aunt      Cancer - colorectal Maternal Grandmother         unsure of her age.     Breast  Cancer No family hx of      Asthma No family hx of      History   Drug Use No         Objective     /85   Pulse 79   Temp 98  F (36.7  C) (Tympanic)   Wt 70.2 kg (154 lb 12.8 oz)   LMP  (LMP Unknown)   SpO2 97%   BMI 28.31 kg/m      Physical Exam    GENERAL APPEARANCE: healthy, alert and no distress     EYES: EOMI, PERRL     HENT: ear canals and TM's normal and nose and mouth without ulcers or lesions     NECK: no adenopathy, no asymmetry, masses, or scars and thyroid normal to palpation     RESP: lungs clear to auscultation - no rales, rhonchi or wheezes     CV: regular rates and rhythm, normal S1 S2, no S3 or S4 and no murmur, click or rub     ABDOMEN:  soft, nontender, no HSM or masses and bowel sounds normal     MS: extremities normal- no gross deformities noted, no evidence of inflammation in joints, FROM in all extremities.     SKIN: no suspicious lesions or rashes     NEURO: Normal strength and tone, sensory exam grossly normal, mentation intact and speech normal     PSYCH: mentation appears normal. and affect normal/bright     LYMPHATICS: No cervical adenopathy    Recent Labs   Lab Test 10/12/20  0956 05/05/20  1241 10/07/19  1039 10/07/19  1039   HGB  --  14.2  --  14.2   PLT  --  350  --  364    139  --  137   POTASSIUM 4.6 4.0   < > 5.6*   CR 0.93 0.94  --  1.11*    < > = values in this interval not displayed.        Diagnostics:  Labs pending at this time.  Results will be reviewed when available.   No EKG required for low risk surgery (cataract, skin procedure, breast biopsy, etc).    Revised Cardiac Risk Index (RCRI):  The patient has the following serious cardiovascular risks for perioperative complications:   - No serious cardiac risks = 0 points     RCRI Interpretation: 0 points: Class I (very low risk - 0.4% complication rate)           Signed Electronically by: Honey García PA-C  Copy of this evaluation report is provided to requesting physician.

## 2021-03-29 NOTE — PATIENT INSTRUCTIONS
Send me a [employer] message after your surgeries and we can switch your blood pressure medication to a beta blocker (metoprolol) to help with the SVT's.    Preparing for Your Surgery  Getting started  A nurse will call you to review your health history and instructions. They will give you an arrival time based on your scheduled surgery time.  Please be ready to share the following:    Your doctor's clinic name and phone number    Your medical, surgical and anesthesia history    A list of allergies and sensitivities    A list of medicines, including herbal treatments and over-the-counter drugs    Whether the patient has a legal guardian (ask how to send us the papers in advance)  If you have a child who's having surgery, please ask for a copy of Preparing for Your Child's Surgery.    Preparing for surgery    Within 30 days of surgery: Have a pre-op exam (sometimes called an H&P, or History and Physical). This can be done at a clinic or pre-operative center.  ? If you're having a , you may not need this exam. Talk to your care team    At your pre-op exam, talk to your care team about all medicines you take. If you need to stop any medicines before surgery, ask when to start taking them again.  ? We do this for your safety. Many medicines can make you bleed too much during surgery. Some change how well surgery (anesthesia) drugs work.    Call your insurance company to let them know you're having surgery. (If you don't have insurance, call [phone].)    Call your clinic if there's any change in your health. This includes signs of a cold or flu (sore throat, runny nose, cough, rash, fever). It also includes a scrape or scratch near the surgery site.    If you have questions on the day of surgery, call your hospital or surgery center.  Eating and drinking guidelines  For your safety: Unless your surgeon tells you otherwise, follow the guidelines below.    Eat and drink as usual until 8 hours before surgery.  After that, no food or milk.    Drink clear liquids until 2 hours before surgery. These are liquids you can see through, like water, Gatorade and Propel Water. You may also have black coffee and tea (no cream or milk).    Nothing by mouth within 2 hours of surgery. This includes gum, candy and breath mints.    If you drink, stop drinking alcohol the night before surgery.    If your care team tells you to take medicine on the morning of surgery, it's okay to take it with a sip of water.  Preventing infection    Shower or bathe the night before and morning of your surgery. Follow the instructions your clinic gave you. (If no instructions, use regular soap.)    Don't shave or clip hair near your surgery site. We'll remove the hair if needed.    Don't smoke or vape the morning of surgery. You may chew nicotine gum up to 2 hours before surgery. A nicotine patch is okay.  ? Note: Some surgeries require you to completely quit smoking and nicotine. Check with your surgeon.    Your care team will make every effort to keep you safe from infection. We will:  ? Clean our hands often with soap and water (or an alcohol-based hand rub).  ? Clean the skin at your surgery site with a special soap that kills germs.  ? Give you a special gown to keep you warm. (Cold raises the risk of infection.)  ? Wear special hair covers, masks, gowns and gloves during surgery.  ? Give antibiotic medicine, if prescribed. Not all surgeries need antibiotics.  What to bring on the day of surgery    Photo ID and insurance card    Copy of your health care directive, if you have one    Glasses and hearing aides (bring cases)  ? You can't wear contacts during surgery    Inhaler and eye drops, if you use them (tell us about these when you arrive)    CPAP machine or breathing device, if you use them    A few personal items, if spending the night    If you have . . .  ? A pacemaker or ICD (cardiac defibrillator): Bring the ID card.  ? An implanted  stimulator: Bring the remote control.  ? A legal guardian: Bring a copy of the certified (court-stamped) guardianship papers.  Please remove any jewelry, including body piercings. Leave jewelry and other valuables at home.  If you're going home the day of surgery  Important: If you don't follow the rules below, we must cancel your surgery.     Arrange for someone to drive you home after surgery. You may not drive, take a taxi or take public transportation by yourself (unless you'll have local anesthesia only).    Arrange for a responsible adult to stay with you overnight. If you don't, we may keep you in the hospital overnight, and you may need to pay the costs yourself.  Questions?   If you have any questions for your care team, list them here: _________________________________________________________________________________________________________________________________________________________________________________________________________________________________________________________________________________________________________________________  For informational purposes only. Not to replace the advice of your health care provider. Copyright   2003, 2019 [facility]. All rights reserved. Clinically reviewed by Alessia Hilliard MD. [employer] [id number] - REV 4/20.

## 2021-04-20 ENCOUNTER — MYC MEDICAL ADVICE (OUTPATIENT)
Dept: FAMILY MEDICINE | Facility: CLINIC | Age: 71
End: 2021-04-20

## 2021-04-20 DIAGNOSIS — F32.5 MAJOR DEPRESSION IN COMPLETE REMISSION (H): ICD-10-CM

## 2021-08-10 ENCOUNTER — MYC MEDICAL ADVICE (OUTPATIENT)
Dept: FAMILY MEDICINE | Facility: CLINIC | Age: 71
End: 2021-08-10

## 2021-08-10 DIAGNOSIS — I10 HYPERTENSION GOAL BP (BLOOD PRESSURE) < 140/90: ICD-10-CM

## 2021-08-10 DIAGNOSIS — L20.84 INTRINSIC ATOPIC DERMATITIS: ICD-10-CM

## 2021-08-10 DIAGNOSIS — K21.00 GASTROESOPHAGEAL REFLUX DISEASE WITH ESOPHAGITIS: ICD-10-CM

## 2021-08-10 NOTE — TELEPHONE ENCOUNTER
"See patient's mychart message, erin'd the \"[facility]\" meds in this encounter.    Omeprazole and triam-hydrochlorothiazide erin'd in this encounter and routed to refill pool to address in protocol.    Created another encounter for the triamcinolone request, routed both to refill pool to address in protocol.    Bee Kimble RN  [facility]      "

## 2021-08-13 RX ORDER — TRIAMCINOLONE ACETONIDE 1 MG/G
OINTMENT TOPICAL
Qty: 454 G | Refills: 1 | Status: SHIPPED | OUTPATIENT
Start: 2021-08-13

## 2021-08-13 RX ORDER — TRIAMTERENE/HYDROCHLOROTHIAZID 37.5-25 MG
0.5 TABLET ORAL DAILY
Qty: 45 TABLET | Refills: 0 | Status: SHIPPED | OUTPATIENT
Start: 2021-08-13 | End: 2021-12-07

## 2021-10-14 ENCOUNTER — OFFICE VISIT (OUTPATIENT)
Dept: FAMILY MEDICINE | Facility: CLINIC | Age: 71
End: 2021-10-14
Payer: MEDICARE

## 2021-10-14 VITALS
TEMPERATURE: 96.2 F | WEIGHT: 155 LBS | SYSTOLIC BLOOD PRESSURE: 128 MMHG | DIASTOLIC BLOOD PRESSURE: 83 MMHG | BODY MASS INDEX: 28.35 KG/M2 | OXYGEN SATURATION: 98 % | HEART RATE: 73 BPM

## 2021-10-14 DIAGNOSIS — K02.9 DENTAL CARIES: ICD-10-CM

## 2021-10-14 DIAGNOSIS — Z23 NEED FOR PROPHYLACTIC VACCINATION AND INOCULATION AGAINST INFLUENZA: ICD-10-CM

## 2021-10-14 DIAGNOSIS — E78.5 HYPERLIPIDEMIA LDL GOAL <130: ICD-10-CM

## 2021-10-14 DIAGNOSIS — N18.31 STAGE 3A CHRONIC KIDNEY DISEASE (H): ICD-10-CM

## 2021-10-14 DIAGNOSIS — Z12.31 ENCOUNTER FOR SCREENING MAMMOGRAM FOR BREAST CANCER: ICD-10-CM

## 2021-10-14 DIAGNOSIS — C73 PAPILLARY THYROID CARCINOMA (H): ICD-10-CM

## 2021-10-14 DIAGNOSIS — Z00.00 MEDICARE ANNUAL WELLNESS VISIT, SUBSEQUENT: Primary | ICD-10-CM

## 2021-10-14 DIAGNOSIS — E89.0 POSTSURGICAL HYPOTHYROIDISM: ICD-10-CM

## 2021-10-14 DIAGNOSIS — I10 HYPERTENSION GOAL BP (BLOOD PRESSURE) < 140/90: ICD-10-CM

## 2021-10-14 LAB
ALBUMIN SERPL-MCNC: 3.7 G/DL (ref 3.4–5)
ALP SERPL-CCNC: 88 U/L (ref 40–150)
ALT SERPL W P-5'-P-CCNC: 46 U/L (ref 0–50)
ANION GAP SERPL CALCULATED.3IONS-SCNC: 5 MMOL/L (ref 3–14)
AST SERPL W P-5'-P-CCNC: 42 U/L (ref 0–45)
BILIRUB SERPL-MCNC: 0.7 MG/DL (ref 0.2–1.3)
BUN SERPL-MCNC: 10 MG/DL (ref 7–30)
CALCIUM SERPL-MCNC: 9 MG/DL (ref 8.5–10.1)
CHLORIDE BLD-SCNC: 105 MMOL/L (ref 94–109)
CO2 SERPL-SCNC: 29 MMOL/L (ref 20–32)
CREAT SERPL-MCNC: 0.91 MG/DL (ref 0.52–1.04)
GFR SERPL CREATININE-BSD FRML MDRD: 64 ML/MIN/1.73M2
GLUCOSE BLD-MCNC: 92 MG/DL (ref 70–99)
POTASSIUM BLD-SCNC: 4.2 MMOL/L (ref 3.4–5.3)
PROT SERPL-MCNC: 7.5 G/DL (ref 6.8–8.8)
SODIUM SERPL-SCNC: 139 MMOL/L (ref 133–144)

## 2021-10-14 PROCEDURE — G0008 ADMIN INFLUENZA VIRUS VAC: HCPCS | Performed by: PHYSICIAN ASSISTANT

## 2021-10-14 PROCEDURE — 80061 LIPID PANEL: CPT | Performed by: PHYSICIAN ASSISTANT

## 2021-10-14 PROCEDURE — 90662 IIV NO PRSV INCREASED AG IM: CPT | Performed by: PHYSICIAN ASSISTANT

## 2021-10-14 PROCEDURE — 36415 COLL VENOUS BLD VENIPUNCTURE: CPT | Performed by: PHYSICIAN ASSISTANT

## 2021-10-14 PROCEDURE — G0439 PPPS, SUBSEQ VISIT: HCPCS | Performed by: PHYSICIAN ASSISTANT

## 2021-10-14 PROCEDURE — 80053 COMPREHEN METABOLIC PANEL: CPT | Performed by: PHYSICIAN ASSISTANT

## 2021-10-14 RX ORDER — SODIUM FLUORIDE 5 MG/G
GEL, DENTIFRICE DENTAL AT BEDTIME
Qty: 100 ML | Refills: 3 | Status: SHIPPED | OUTPATIENT
Start: 2021-10-14

## 2021-10-14 RX ORDER — SODIUM FLUORIDE 5 MG/G
1 GEL, DENTIFRICE DENTAL AT BEDTIME
COMMUNITY
Start: 2021-08-26 | End: 2021-10-14

## 2021-10-14 ASSESSMENT — PATIENT HEALTH QUESTIONNAIRE - PHQ9: SUM OF ALL RESPONSES TO PHQ QUESTIONS 1-9: 3

## 2021-10-14 NOTE — PROGRESS NOTES
Assessment & Plan     Medicare annual wellness visit, subsequent       HEALTH CARE MAINTENANCE              Reviewed [facility] recommendations and anticipatory guidance.              See orders.         Hypertension goal BP (blood pressure) < 140/90  BP at goal, due to recheck labs.  She does not need refills at this time, but will call when she does and will send for a year.   - Comprehensive metabolic panel (BMP + Alb, Alk Phos, ALT, AST, Total. Bili, TP); Future  - Comprehensive metabolic panel (BMP + Alb, Alk Phos, ALT, AST, Total. Bili, TP)    Hyperlipidemia LDL goal <130  Tolerating statin, will recheck fasting labs.   - Lipid panel reflex to direct LDL Fasting; Future  - Lipid panel reflex to direct LDL Fasting    Dental caries  Her dentist prescribed this as she does eat a lot of sweets.  She needs a script signed for this.   - sodium fluoride dental gel (PREVIDENT) 1.1 % GEL topical gel; Apply to affected area At Bedtime    Need for prophylactic vaccination and inoculation against influenza  due  - INFLUENZA, QUAD, HIGH DOSE, PF, 65YR + (FLUZONE HD)    Stage 3a chronic kidney disease (H)  Will monitor    Postsurgical hypothyroidism  Managed by Endo    Papillary thyroid carcinoma (H)  Managed by endo               Depression Screening Follow Up    PHQ 10/14/2021   PHQ-9 Total Score 3   Q9: Thoughts of better off dead/self-harm past 2 weeks Several days     Last PHQ-9 10/14/2021   1.  Little interest or pleasure in doing things 1   2.  Feeling down, depressed, or hopeless 0   3.  Trouble falling or staying asleep, or sleeping too much 0   4.  Feeling tired or having little energy 1   5.  Poor appetite or overeating 0   6.  Feeling bad about yourself 0   7.  Trouble concentrating 0   8.  Moving slowly or restless 0   Q9: Thoughts of better off dead/self-harm past 2 weeks 1   PHQ-9 Total Score 3   Difficulty at work, home, or with people Not difficult at all             Return in about 1 year (around  10/14/2022) for Physical Exam.    FRANC Zuleta [facility] MAEGAN Shipman is a 71 year old who presents for the following health issues     HPI     Hyperlipidemia Follow-Up      Are you regularly taking any medication or supplement to lower your cholesterol?   Yes- atorvastatin (LIPITOR) 20 MG tablet    Are you having muscle aches or other side effects that you think could be caused by your cholesterol lowering medication?  No    Depression Followup  FLUoxetine (PROZAC) 20 MG capsule    How are you doing with your depression since your last visit? Stable although fall is a hard time for pt as her  passed away in the fall     Are you having other symptoms that might be associated with depression? No    Have you had a significant life event?  No     Are you feeling anxious or having panic attacks?   No    Do you have any concerns with your use of alcohol or other drugs? No    Social History     Tobacco Use     Smoking status: Former Smoker     Packs/day: 1.00     Years: 45.00     Pack years: 45.00     Types: Cigarettes     Quit date: 2012     Years since quittin.2     Smokeless tobacco: Never Used     Tobacco comment: quitting with e cigarette   Substance Use Topics     Alcohol use: Yes     Comment: less than weekly     Drug use: No     PHQ 2020 10/12/2020 10/14/2021   PHQ-9 Total Score 3 4 3   Q9: Thoughts of better off dead/self-harm past 2 weeks Not at all Not at all Several days     SLOANE-7 SCORE 10/10/2016 4/3/2017 10/12/2020   Total Score 0 0 1     Last PHQ-9 10/14/2021   1.  Little interest or pleasure in doing things 1   2.  Feeling down, depressed, or hopeless 0   3.  Trouble falling or staying asleep, or sleeping too much 0   4.  Feeling tired or having little energy 1   5.  Poor appetite or overeating 0   6.  Feeling bad about yourself 0   7.  Trouble concentrating 0   8.  Moving slowly or restless 0   Q9: Thoughts of better off dead/self-harm past 2  weeks 1   PHQ-9 Total Score 3   Difficulty at work, home, or with people Not difficult at all     SLOANE-7  10/12/2020   1. Feeling nervous, anxious, or on edge 0   2. Not being able to stop or control worrying 0   3. Worrying too much about different things 0   4. Trouble relaxing 0   5. Being so restless that it is hard to sit still 0   6. Becoming easily annoyed or irritable 1   7. Feeling afraid, as if something awful might happen 0   SLOANE-7 Total Score 1   If you checked any problems, how difficult have they made it for you to do your work, take care of things at home, or get along with other people? Not difficult at all       Suicide Assessment Five-step Evaluation and Treatment (SAFE-T)    Hypothyroidism Follow-up  levothyroxine (SYNTHROID/LEVOTHROID) 100 MCG tablet    Since last visit, patient describes the following symptoms: Weight stable, no hair loss, no skin changes, no constipation, no loose stools      How many servings of fruits and vegetables do you eat daily?  0-1    On average, how many sweetened beverages do you drink each day (Examples: soda, juice, sweet tea, etc.  Do NOT count diet or artificially sweetened beverages)?   0    How many days per week do you exercise enough to make your heart beat faster? 3 or less    How many minutes a day do you exercise enough to make your heart beat faster? 9 or less    How many days per week do you miss taking your medication? 0    Annual Wellness Visit    Patient has been advised of split billing requirements and indicates understanding: Yes     Are you in the first 12 months of your Medicare Part B coverage?  No    Physical Health:    In general, how would you rate your overall physical health? good    Outside of work, how many days during the week do you exercise?2-3 days/week    Outside of work, approximately how many minutes a day do you exercise?15-30 minutes    If you drink alcohol do you typically have >3 drinks per day or >7 drinks per week? No    Do  "you usually eat at least 4 servings of fruit and vegetables a day, include whole grains & fiber and avoid regularly eating high fat or \"junk\" foods? NO    Do you have any problems taking medications regularly? No    Do you have any side effects from medications? none    Needs assistance for the following daily activities: no assistance needed    Which of the following safety concerns are present in your home?  none identified     Hearing impairment: Wears hearing aids     In the past 6 months, have you been bothered by leaking of urine? no    Mental Health:    In general, how would you rate your overall mental or emotional health? good  PHQ-2 Score:      Do you feel safe in your environment? Yes    cognitive testing (done over phone):     Patient memorized all 3 words. Leader, Season, Table.     Patient verbalized drawing a clock.      Fall risk:  Fallen 2 or more times in the past year?: No  Any fall with injury in the past year?: No      Do you have sleep apnea, excessive snoring or daytime drowsiness?: no    Current providers sharing in care for this patient include:   Patient Care Team:  Honey García PA-C as PCP - General (Family Practice)  Geovanna Chatterjee MD as MD (INTERNAL MEDICINE - ENDOCRINOLOGY, DIABETES & METABOLISM)  Faby So, RN as Specialty Care Coordinator (Clinical Cardiac Electrophysiology)  Suyapa Garrett MD as MD (Clinical Cardiac Electrophysiology)  Suyapa Garrett MD as Assigned Heart and Vascular Provider  Geovanna Chatterjee MD as Assigned Endocrinology Provider  Honey García PA-C as Assigned PCP    Patient has been advised of split billing requirements and indicates understanding: Yes    Review of Systems   Constitutional, HEENT, cardiovascular, pulmonary, GI, , musculoskeletal, neuro, skin, endocrine and psych systems are negative, except as otherwise noted.      Objective    /83   Pulse 73   Temp (!) 96.2  F (35.7  C) (Tympanic)   Wt 70.3 kg (155 lb)   LMP  " (LMP Unknown)   SpO2 98%   BMI 28.35 kg/m    Body mass index is 28.35 kg/m .  Physical Exam   GENERAL: healthy, alert and no distress  EYES: Eyes grossly normal to inspection, PERRL and conjunctivae and sclerae normal  HENT: ear canals and TM's normal, nose and mouth without ulcers or lesions  NECK: no adenopathy, no asymmetry, masses, or scars and thyroid normal to palpation  RESP: lungs clear to auscultation - no rales, rhonchi or wheezes  CV: regular rate and rhythm, normal S1 S2, no S3 or S4, no murmur, click or rub, no peripheral edema and peripheral pulses strong  ABDOMEN: soft, nontender, no hepatosplenomegaly, no masses and bowel sounds normal  MS: no gross musculoskeletal defects noted, no edema  SKIN: no suspicious lesions or rashes  NEURO: Normal strength and tone, mentation intact and speech normal  PSYCH: mentation appears normal, affect normal/bright    No results found for this or any previous visit (from the past 24 hour(s)).

## 2021-10-19 LAB
CHOLEST SERPL-MCNC: 167 MG/DL
HDLC SERPL-MCNC: 47 MG/DL
LDLC SERPL CALC-MCNC: 93 MG/DL
NONHDLC SERPL-MCNC: 120 MG/DL
TRIGL SERPL-MCNC: 135 MG/DL

## 2021-12-07 ENCOUNTER — MYC MEDICAL ADVICE (OUTPATIENT)
Dept: FAMILY MEDICINE | Facility: CLINIC | Age: 71
End: 2021-12-07
Payer: MEDICARE

## 2021-12-07 DIAGNOSIS — I10 HYPERTENSION GOAL BP (BLOOD PRESSURE) < 140/90: ICD-10-CM

## 2021-12-07 RX ORDER — TRIAMTERENE/HYDROCHLOROTHIAZID 37.5-25 MG
0.5 TABLET ORAL DAILY
Qty: 45 TABLET | Refills: 3 | Status: SHIPPED | OUTPATIENT
Start: 2021-12-07 | End: 2022-12-06

## 2021-12-07 NOTE — TELEPHONE ENCOUNTER
Routed to PCP to please advise.    Last refill 8/13/21 # 45.    Please see below message from patient.    Mallorie RN,BSN  Triage Nurse  [facility]: [facility]  Ph: [phone]

## 2022-01-04 ENCOUNTER — MYC MEDICAL ADVICE (OUTPATIENT)
Dept: FAMILY MEDICINE | Facility: CLINIC | Age: 72
End: 2022-01-04
Payer: MEDICARE

## 2022-01-04 DIAGNOSIS — F32.5 MAJOR DEPRESSION IN COMPLETE REMISSION (H): ICD-10-CM

## 2022-01-04 DIAGNOSIS — K21.00 GASTROESOPHAGEAL REFLUX DISEASE WITH ESOPHAGITIS WITHOUT HEMORRHAGE: ICD-10-CM

## 2022-01-04 DIAGNOSIS — E78.5 HYPERLIPIDEMIA LDL GOAL <130: ICD-10-CM

## 2022-01-05 RX ORDER — ATORVASTATIN CALCIUM 20 MG/1
20 TABLET, FILM COATED ORAL DAILY
Qty: 90 TABLET | Refills: 3 | Status: SHIPPED | OUTPATIENT
Start: 2022-01-05 | End: 2023-01-17

## 2022-02-25 ENCOUNTER — ANCILLARY PROCEDURE (OUTPATIENT)
Dept: MAMMOGRAPHY | Facility: CLINIC | Age: 72
End: 2022-02-25
Attending: PHYSICIAN ASSISTANT
Payer: MEDICARE

## 2022-02-25 DIAGNOSIS — Z12.31 ENCOUNTER FOR SCREENING MAMMOGRAM FOR BREAST CANCER: ICD-10-CM

## 2022-02-25 PROCEDURE — 77063 BREAST TOMOSYNTHESIS BI: CPT | Mod: TC | Performed by: RADIOLOGY

## 2022-02-25 PROCEDURE — 77067 SCR MAMMO BI INCL CAD: CPT | Mod: TC | Performed by: RADIOLOGY

## 2022-03-03 ENCOUNTER — TRANSFERRED RECORDS (OUTPATIENT)
Dept: HEALTH INFORMATION MANAGEMENT | Facility: CLINIC | Age: 72
End: 2022-03-03
Payer: MEDICARE

## 2022-03-08 ENCOUNTER — TRANSFERRED RECORDS (OUTPATIENT)
Dept: HEALTH INFORMATION MANAGEMENT | Facility: CLINIC | Age: 72
End: 2022-03-08
Payer: MEDICARE

## 2022-03-09 DIAGNOSIS — E89.0 POSTSURGICAL HYPOTHYROIDISM: Primary | ICD-10-CM

## 2022-03-09 DIAGNOSIS — C73 PAPILLARY THYROID CARCINOMA (H): Chronic | ICD-10-CM

## 2022-03-09 DIAGNOSIS — E89.0 POSTSURGICAL HYPOTHYROIDISM: Chronic | ICD-10-CM

## 2022-03-09 DIAGNOSIS — C73 PAPILLARY THYROID CARCINOMA (H): ICD-10-CM

## 2022-03-09 RX ORDER — LEVOTHYROXINE SODIUM 100 UG/1
TABLET ORAL
Qty: 90 TABLET | Refills: 4 | Status: SHIPPED | OUTPATIENT
Start: 2022-03-09 | End: 2023-02-03

## 2022-04-07 ENCOUNTER — MYC MEDICAL ADVICE (OUTPATIENT)
Dept: FAMILY MEDICINE | Facility: CLINIC | Age: 72
End: 2022-04-07
Payer: MEDICARE

## 2022-04-12 ENCOUNTER — TRANSFERRED RECORDS (OUTPATIENT)
Dept: HEALTH INFORMATION MANAGEMENT | Facility: CLINIC | Age: 72
End: 2022-04-12
Payer: MEDICARE

## 2022-04-21 ENCOUNTER — TRANSFERRED RECORDS (OUTPATIENT)
Dept: HEALTH INFORMATION MANAGEMENT | Facility: CLINIC | Age: 72
End: 2022-04-21
Payer: MEDICARE

## 2022-05-12 ENCOUNTER — TRANSFERRED RECORDS (OUTPATIENT)
Dept: HEALTH INFORMATION MANAGEMENT | Facility: CLINIC | Age: 72
End: 2022-05-12
Payer: MEDICARE

## 2022-06-03 ASSESSMENT — ENCOUNTER SYMPTOMS
NECK PAIN: 1
BACK PAIN: 1
ARTHRALGIAS: 1

## 2022-06-06 ENCOUNTER — VIRTUAL VISIT (OUTPATIENT)
Dept: ENDOCRINOLOGY | Facility: CLINIC | Age: 72
End: 2022-06-06
Payer: MEDICARE

## 2022-06-06 DIAGNOSIS — E89.0 POSTSURGICAL HYPOTHYROIDISM: Chronic | ICD-10-CM

## 2022-06-06 DIAGNOSIS — C73 PAPILLARY THYROID CARCINOMA (H): Chronic | ICD-10-CM

## 2022-06-06 DIAGNOSIS — R59.0 CERVICAL ADENOPATHY: ICD-10-CM

## 2022-06-06 DIAGNOSIS — Z78.0 POST-MENOPAUSAL: Primary | ICD-10-CM

## 2022-06-06 PROCEDURE — 99443 PR PHYSICIAN TELEPHONE EVALUATION 21-30 MIN: CPT | Mod: 95

## 2022-06-06 NOTE — PATIENT INSTRUCTIONS
Yearly labs - standing orders are on the system    Bone density [place]    Neck ultrasound    See me approximately yearly

## 2022-06-06 NOTE — PROGRESS NOTES
Ramonita is a 71 year old who is being evaluated via a billable telephone visit.      What phone number would you like to be contacted at? [phone]  How would you like to obtain your AVS? [employer]  Phone call duration:  minutes

## 2022-06-06 NOTE — LETTER
"6/6/2022       RE: Marcela Allen  [address] [place] [facility] [address]     Dear Colleague,    Thank you for referring your patient, Marcela Allen, to the [facility] [place] at [facility]. Please see a copy of my visit note below.    Endocrinology telephone  visit Progress note    Assessment   1.  Papillary thyroid cancer, bilateral, multifocal, no nodes removed. She has been treated with thyroidectomy in 2 procedures.  She has had 29 mCi 131I  in 2000.   We don't have TSH stimulated Tg data on her.   Labs today to include  Tg ---  RTC 1 year    2.  Hypothyroidism due to thyroidectomy -She is likely to be cured.  Target TSH < 1.  On LT4 100 * 6/week (mean 86 mcg/day)    3. Cervical adenopathy  On US - we have been focusing on left level 6/(called level 3 in 2010, 2011) node  Which has remained stable for years.  Because of \"new\" level 4 superficial mass,  Repeat US     post menopausal -  Her bone risk factors include TSH suppression therapy, past smoking and post menopausal state. Past alendronate.   2018 DXA suggests bone gain at the bilateral total hips.     Due to the COVID 19 pandemic this visit was a telephone visit. The patient gave verbal consent for the visit today.    I have independently reviewed and interpreted labs, imaging as indicated.     Chart review/prep time 1  [id number]  Visit Start time doximity phone 1516 ;1517   Visit Stop time  1531   __ minutes spent on the date of the encounter doing chart review, history and exam, documentation and further activities as noted above.  E& M [id number]       Geovanna Chatterjee MD.    JESUS Shipman  presents for follow up of thyroid cancer and post surgical  hypothyroidism.   I last saw her 1/2021. At that time she was on 100 * 6/week (86 mcg/day).     She was diagnosed with PCT  in 2000 after she had partial thyroid surgery.   One week later she had completion thyroidectomy.  Both " "operations were performed at [facility] by Dr Antonina Peralta.  See my 1/28/09 note for the operative reports.  Primary tumor size is not specified in the path reports, multifocal, bilateral..\" She got 29.3 mCi 131I, 5/30/00) and one year later she had a TBS at [facility], which was negative.    I have reviewed all available tumor marker data to date:  5/12/00: .72, ROSARIO in lab  5/18/00: 5.16 mCi 131I TBS: \"intense activity in the thyroid bed.  No evidence of activity outside of the neck\"  5/30/00: 29.3 mCi 131I ([facility])  8/7/00: TSH 48.26, free T4 0.87  8/24/01 4.03 mCi 131I TBS ([facility]): negative study  6/6/04: TSH 3.10,  Tg < 0.3 (US), ROSARIO < 1  10/6/04: ROSARIO < 2 IU/ml, free T4 1.2, TSH 16.07  3/26/08: Tg 0.18, ROSARIO < 1, TSH 0.02  1/28/09: Tg < 0.1, ROSARIO < 0.4, TSH 0.01   11/17/09: Tg < 0.1, ROSARIO < 0.4, TSH 0.02  US guided FNAB of left level 2 LN in 12/09, with benign cytology.  Needle wash Tg was < 0.1.   2/23/10: Tg < 0.1, ROSARIO < 0.4, TSH 0.04  5/18/10: Tg < 0.1, ROSARIO < 0.4, TSH 0.01  6/1/11: Tg < 0.1, ROSARIO < 0.4, TSH 0.06  4/30/13: Tg < 0.1, ROSARIO < 0.4, TSH 0.03  10/23/13: Tg < 0.1, ROSARIO < 0.4, TSH 0.1  5/7/14: Tg <0.1, ROSARIO < 0.4, TSH   6/8/15: Tg < 0.1, ROSARIO < 0.4, TSH  8/2/16: Tg < 0.1, ROSARIO < 0.4,  TSH 0.09- after this we reduced LT4 dose by 1/2 tablet per week  4/3/17: TSH 0.49  11/6/17: Tg < 0.1, ROSARIO < 0.4, TSH 0.6, free T4 1.10  11/6/18; Tg < 0.1, ROSARIO < 0.4, TSH 0.06, free T4 1.l  10/10/19: TSH 0.22, free T4 1.19 , Tg not done  11/5/19: Tg < 0.1, ROSARIO < 0.4 -     5/3/2020 TSH 0.33, free T4 1.27  1/6/2021 Tg < 0.1, ROSARIO < 0.4, TSH 0.29, free T4 0.99    I have reviewed images on PACS  In 7/13 she had a normal CXR.   10/19/19 neck US compared with 5/7/14 neck US:   Left level 6 carotid sheath 0.9 x 0.4 x 1.1  (was 0.8 x 0.4 x 1.2 cm; was  1 x 0.5 x 1.1 2011)  Left level 4 # 1 0.4 x 0.4 x 0.6 cm very superficial    DXA 11/26/18 ([facility]) :  Lowest T-score -0.7 ; stable.       ROS   Had covid in March-- very " sore throat.    Fracture knee cap in March - fell -   Had injection in April which worked.   needs a new shoulder   Fingers arthritic     Answers for HPI/ROS submitted by the patient on 6/3/2022  General Symptoms: No  Skin Symptoms: No  HENT Symptoms: No  EYE SYMPTOMS: No  HEART SYMPTOMS: No  LUNG SYMPTOMS: No  INTESTINAL SYMPTOMS: No  URINARY SYMPTOMS: No  GYNECOLOGIC SYMPTOMS: No  BREAST SYMPTOMS: No  SKELETAL SYMPTOMS: Yes  BLOOD SYMPTOMS: No  NERVOUS SYSTEM SYMPTOMS: No  MENTAL HEALTH SYMPTOMS: No  Back pain: Yes  Neck pain: Yes  Joint pain: Yes      PMH   Past Medical History:   Diagnosis Date     Atrial tachycardia, paroxysmal (H) 03/29/2021     BULMARO II (cervical intraepithelial neoplasia II) 01/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    on colp - leep path WNL     COPD (chronic obstructive pulmonary disease) (H)      Depression 01/01/1990     Depressive disorder 25 yrs     Eczema      GERD (gastroesophageal reflux disease)      Hiatal hernia      History of blood transfusion 01/01/2013    Post op     HTN (hypertension)      Hypercholesterolemia      Infection due to 2019 novel coronavirus 03/2022     Liver hemangioma 09/04/2012     OA (osteoarthritis)      Osteopenia      Papillary thyroid carcinoma (H) 01/01/2000    BL, MF; Tx in 2 operations, RRA     PONV (postoperative nausea and vomiting)      Postsurgical hypothyroidism 01/01/2000     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   cryotherapy times two     eczema and contact dermatitis.  Chronic neck pain    Arnold-Chiari malformation.    Past Surgical History:   Procedure Laterality Date     ABDOMEN SURGERY      Teenager     APPENDECTOMY OPEN  01/01/1968     ARTHROPLASTY MINIMALLY INVASIVE KNEE BILATERAL  06/27/2013    Procedure: ARTHROPLASTY MINIMALLY INVASIVE KNEE BILATERAL;  Minimally Invasive Bilateral Total Knee Arthroplasty;  Surgeon: Christophe Welch MD;  Location:  OR     [facility]  COMPRESSION RHIZOTOMY      by spine specialist     BIOPSY  Ongoing    Lymph nodes neck     BONE MARROW ASPIRATION ONLY Left 12/07/2015    Procedure: BONE MARROW ASPIRATION ONLY;  Surgeon: Aguilar Roldan MD;  Location: [facility] OR     CARPAL TUNNEL RELEASE RT/LT       COLONOSCOPY  01/01/2007    neg     COLPOSCOPY CERVIX, BIOPSY CERVIX, ENDOCERVICAL CURETTAGE, COMBINED  2003, 2007     completion thyroidectomy Right 03/14/2000     CYSTECTOMY OVARIAN BENIGN  01/01/1969     ESOPHAGOSCOPY, GASTROSCOPY, DUODENOSCOPY (EGD), COMBINED N/A 08/28/2014    Procedure: COMBINED ESOPHAGOSCOPY, GASTROSCOPY, DUODENOSCOPY (EGD), BIOPSY SINGLE OR MULTIPLE;  Surgeon: Kelvin Montgomery MD;  Location: [facility] OR     FUSION CERVICAL ANTERIOR THREE+ LEVELS N/A 02/26/2015    C4-C7     FUSION CERVICAL ANTERIOR THREE+ LEVELS WITH BONE ALLOGRAFT N/A 12/07/2015    Procedure: FUSION CERVICAL ANTERIOR THREE+ LEVELS WITH BONE ALLOGRAFT;  Surgeon: Aguilar Roldan MD;  Location:  OR     HAND SURGERY       HEAD & NECK SURGERY  01/01/2000    Thyroidectomy     KNEE SURGERY  2001 & 2005    bilat     LAPAROSCOPIC CHOLECYSTECTOMY  01/01/1998     LEEP TX, CERVICAL  01/01/2007    BULMARO II on colp, leep path WNL     left thyroid total lobectomy, isthmusectomy and 50% right thyroid lobectomy Left 03/07/2000     TUBAL LIGATION  01/01/1988     VULVECTOMY SIMPLE  01/01/2006    FELICIA 3, wide local excision x 2     VULVECTOMY SIMPLE       Finger ligament repair in 2000.  left arm ligament repair.    Current Outpatient Medications   Medication Sig Dispense Refill     aspirin 81 MG tablet Take 81 mg by mouth daily        atorvastatin (LIPITOR) 20 MG tablet Take 1 tablet (20 mg) by mouth daily 90 tablet 3     CALCIUM CITRATE PO Take 1,200 mg by mouth daily        cetirizine (ZYRTEC) 10 MG tablet Take 1 tablet (10 mg) by mouth daily 90 tablet 1     Cholecalciferol (VITAMIN D) 1000 UNIT capsule Take 1 capsule by mouth daily.       FLUoxetine (PROZAC) 20 MG  capsule Take 3 capsules (60 mg) by mouth daily 270 capsule 3     hypromellose (ARTIFICIAL TEARS) 0.5 % SOLN Place 1 drop into both eyes every 4 hours as needed        levothyroxine (SYNTHROID/LEVOTHROID) 100 MCG tablet Monday-Saturday: 1 tablet/day,  : skip 90 tablet 4     magnesium oxide (MAG-OX) 400 (240 Mg) MG tablet Take 400 mg by mouth daily       multivitamin w/minerals (THERA-VIT-M) tablet Take 1 tablet by mouth daily       omeprazole (PRILOSEC) 20 MG DR capsule 1 capsule M, W, F 90 capsule 3     sennosides (SENOKOT) 8.6 MG tablet Take 2 tablets by mouth daily        sodium fluoride dental gel (PREVIDENT) 1.1 % GEL topical gel Apply to affected area At Bedtime 100 mL 3     triamcinolone (KENALOG) 0.1 % external ointment Apply to affected area of the skin twice a day after using a moisturizer. Can do wet wraps with this. 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  volunteers at [employer]-- in surgery there, cleans autoclaves, ventilator tubing, ;  Quit bowling ;  Dogs; dog stroller  Roommate x 3.5 years   in February, he was age 89-- adjusting to living alone again  Daughter;     Physical Exam   GENERAL: no distress  BP Readings from Last 1 Encounters:   10/14/21 128/83      Pulse Readings from Last 1 Encounters:   10/14/21 73      Resp Readings from Last 1 Encounters:   10/12/20 14      Temp Readings from Last 1 Encounters:   10/14/21 (!) 96.2  F (35.7  C) (Tympanic)      SpO2 Readings from Last 1 Encounters:  "  10/14/21 98%      Wt Readings from Last 1 Encounters:   10/14/21 70.3 kg (155 lb)      Ht Readings from Last 1 Encounters:   10/12/20 1.575 m (5' 2\")   .    NEURO  Awake, alert, responds appropriately to questions.  Mentation and speech fluent.  PSYCH:affect normal,     DATA REVIEW:    ENDO THYROID LABS-P Latest Ref Rng & Units 1/18/2021 5/5/2020   TSH 0.40 - 4.00 mU/L 0.29 (L) 0.33 (L)   T3 60 - 181 ng/dL     FREE T4 0.76 - 1.46 ng/dL 0.99 1.27     ENDO THYROID LABS-[facility] Latest Ref Rng & Units 11/5/2019   TSH 0.40 - 4.00 mU/L 0.22 (L)   T3 60 - 181 ng/dL    FREE T4 0.76 - 1.46 ng/dL 1.16     ENDO CALCIUM LABS-P Latest Ref Rng & Units 10/14/2021 3/29/2021   ALBUMIN 3.4 - 5.0 g/dL 3.7    ALKPHOS 40 - 150 U/L 88    AMYLASE 30 - 110 U/L     CALCIUM 8.5 - 10.1 mg/dL 9.0 9.6   CREATININE 0.52 - 1.04 mg/dL 0.91 0.90     ENDO CALCIUM LABS-[facility] Latest Ref Rng & Units 10/12/2020   ALBUMIN 3.4 - 5.0 g/dL 3.9   ALKPHOS 40 - 150 U/L 85   AMYLASE 30 - 110 U/L    CALCIUM 8.5 - 10.1 mg/dL 9.3   CREATININE 0.52 - 1.04 mg/dL 0.93       Ramonita is a 71 year old who is being evaluated via a billable telephone visit.      What phone number would you like to be contacted at? [phone]  How would you like to obtain your AVS? [employer]  Phone call duration:  minutes    "

## 2022-06-06 NOTE — PROGRESS NOTES
"Endocrinology telephone  visit Progress note    Assessment   1.  Papillary thyroid cancer, bilateral, multifocal, no nodes removed. She has been treated with thyroidectomy in 2 procedures.  She has had 29 mCi 131I  in 2000.   We don't have TSH stimulated Tg data on her.   Labs today to include  Tg ---  RTC 1 year    6/20/2022 Tg < 0.1, ROSARIO < 0.4, TSH 0.21, free T4 1.22    2.  Hypothyroidism due to thyroidectomy -She is likely to be cured.  Target TSH < 1.  On LT4 100 * 6/week (mean 86 mcg/day)    3. Cervical adenopathy  On US - we have been focusing on left level 6/(called level 3 in 2010, 2011) node  Which has remained stable for years.  Because of \"new\" level 4 superficial mass,  Repeat US     Addendum  6/13/2022 neck US compared with 10/19/19 , 5/7/14:   Right level 4 0.8 x 0.4 x 0.7 cm   Left level 6 carotid sheath 0.8 x 0.4 x 1 was 0.9 x 0.4 x 1.1  (was 0.8 x 0.4 x 1.2 cm; was  1 x 0.5 x 1.1 2011)  Left level 4 # 1 not seen  0.4 x 0.4 x 0.6 cm very superficial    post menopausal -  Her bone risk factors include TSH suppression therapy, past smoking and post menopausal state. Past alendronate.   2018 DXA suggests bone gain at the bilateral total hips. + 3.7%    Addendum  6/20/2022 DXA lowest T-score -0.6 right femoral neck;  No change hips compared with 2018. L apine increased from 1.341 to 1.387    Due to the COVID 19 pandemic this visit was a telephone visit. The patient gave verbal consent for the visit today.    I have independently reviewed and interpreted labs, imaging as indicated.     Chart review/prep time 1  [id number]  Visit Start time doximity phone 1516 ;1148   Visit Stop time  153   __ minutes spent on the date of the encounter doing chart review, history and exam, documentation and further activities as noted above.  E& M 10513       Geovanna Chatterjee MD.    JESUS Shipman  presents for follow up of thyroid cancer and post surgical  hypothyroidism.   I last saw her 1/2021. At that time she was on 100 " "* 6/week (86 mcg/day).     She was diagnosed with PCT  in 2000 after she had partial thyroid surgery.   One week later she had completion thyroidectomy.  Both operations were performed at [facility] by Dr Antonina Peralta.  See my 1/28/09 note for the operative reports.  Primary tumor size is not specified in the path reports, multifocal, bilateral..\" She got 29.3 mCi 131I, 5/30/00) and one year later she had a TBS at [facility], which was negative.    I have reviewed all available tumor marker data to date:  5/12/00: .72, ROSARIO in lab  5/18/00: 5.16 mCi 131I TBS: \"intense activity in the thyroid bed.  No evidence of activity outside of the neck\"  5/30/00: 29.3 mCi 131I ([facility])  8/7/00: TSH 48.26, free T4 0.87  8/24/01 4.03 mCi 131I TBS ([facility]): negative study  6/6/04: TSH 3.10,  Tg < 0.3 ([facility]), ROSARIO < 1  10/6/04: ROSARIO < 2 IU/ml, free T4 1.2, TSH 16.07  3/26/08: Tg 0.18, ROSARIO < 1, TSH 0.02  1/28/09: Tg < 0.1, ROSARIO < 0.4, TSH 0.01   11/17/09: Tg < 0.1, ROSARIO < 0.4, TSH 0.02  US guided FNAB of left level 2 LN in 12/09, with benign cytology.  Needle wash Tg was < 0.1.   2/23/10: Tg < 0.1, ROSARIO < 0.4, TSH 0.04  5/18/10: Tg < 0.1, ROSARIO < 0.4, TSH 0.01  6/1/11: Tg < 0.1, ROSARIO < 0.4, TSH 0.06  4/30/13: Tg < 0.1, ROSARIO < 0.4, TSH 0.03  10/23/13: Tg < 0.1, ROSARIO < 0.4, TSH 0.1  5/7/14: Tg <0.1, ROSARIO < 0.4, TSH   6/8/15: Tg < 0.1, ROSARIO < 0.4, TSH  8/2/16: Tg < 0.1, ROSARIO < 0.4,  TSH 0.09- after this we reduced LT4 dose by 1/2 tablet per week  4/3/17: TSH 0.49  11/6/17: Tg < 0.1, ROSARIO < 0.4, TSH 0.6, free T4 1.10  11/6/18; Tg < 0.1, ROSARIO < 0.4, TSH 0.06, free T4 1.l  10/10/19: TSH 0.22, free T4 1.19 , Tg not done  11/5/19: Tg < 0.1, ROSARIO < 0.4 -     5/3/2020 TSH 0.33, free T4 1.27  1/6/2021 Tg < 0.1, ROSARIO < 0.4, TSH 0.29, free T4 0.99    I have reviewed images on PACS  In 7/13 she had a normal CXR.   10/19/19 neck US compared with 5/7/14 neck US:   Left level 6 carotid sheath 0.9 x 0.4 x 1.1  (was 0.8 x 0.4 x 1.2 cm; was  1 x 0.5 x 1.1 " 2011)  Left level 4 # 1 0.4 x 0.4 x 0.6 cm very superficial    DXA 11/26/18 ([facility] Chicas) :  Lowest T-score -0.7 ; stable.       ROS   Had covid in March-- very sore throat.    Fracture knee cap in March - fell -   Had injection in April which worked.   needs a new shoulder   Fingers arthritic     Answers for HPI/ROS submitted by the patient on 6/3/2022  General Symptoms: No  Skin Symptoms: No  HENT Symptoms: No  EYE SYMPTOMS: No  HEART SYMPTOMS: No  LUNG SYMPTOMS: No  INTESTINAL SYMPTOMS: No  URINARY SYMPTOMS: No  GYNECOLOGIC SYMPTOMS: No  BREAST SYMPTOMS: No  SKELETAL SYMPTOMS: Yes  BLOOD SYMPTOMS: No  NERVOUS SYSTEM SYMPTOMS: No  MENTAL HEALTH SYMPTOMS: No  Back pain: Yes  Neck pain: Yes  Joint pain: Yes      PMH   Past Medical History:   Diagnosis Date     Atrial tachycardia, paroxysmal (H) 03/29/2021     BULMARO II (cervical intraepithelial neoplasia II) 01/01/2007    on colp - leep path WNL     COPD (chronic obstructive pulmonary disease) (H)      Depression 01/01/1990     Depressive disorder 25 yrs     Eczema      GERD (gastroesophageal reflux disease)      Hiatal hernia      History of blood transfusion 01/01/2013    Post op     HTN (hypertension)      Hypercholesterolemia      Infection due to 2019 novel coronavirus 03/2022     Liver hemangioma 09/04/2012     OA (osteoarthritis)      Osteopenia      Papillary thyroid carcinoma (H) 01/01/2000    BL, MF; Tx in 2 operations, RRA     PONV (postoperative nausea and vomiting)      Postsurgical hypothyroidism 01/01/2000     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   cryotherapy times two     eczema and contact dermatitis.  Chronic neck pain    Arnold-Chiari malformation.    Past Surgical History:   Procedure Laterality Date     ABDOMEN SURGERY      Teenager     APPENDECTOMY OPEN  01/01/1968     ARTHROPLASTY MINIMALLY INVASIVE KNEE BILATERAL  06/27/2013    Procedure: ARTHROPLASTY  MINIMALLY INVASIVE KNEE BILATERAL;  Minimally Invasive Bilateral Total Knee Arthroplasty;  Surgeon: Christophe Welch MD;  Location: [facility] OR     BALLOON COMPRESSION RHIZOTOMY      by spine specialist     BIOPSY  Ongoing    Lymph nodes neck     BONE MARROW ASPIRATION ONLY Left 12/07/2015    Procedure: BONE MARROW ASPIRATION ONLY;  Surgeon: Aguilar Roldan MD;  Location:  OR     CARPAL TUNNEL RELEASE RT/LT       COLONOSCOPY  01/01/2007    neg     COLPOSCOPY CERVIX, BIOPSY CERVIX, ENDOCERVICAL CURETTAGE, COMBINED  2003, 2007     completion thyroidectomy Right 03/14/2000     CYSTECTOMY OVARIAN BENIGN  01/01/1969     ESOPHAGOSCOPY, GASTROSCOPY, DUODENOSCOPY (EGD), COMBINED N/A 08/28/2014    Procedure: COMBINED ESOPHAGOSCOPY, GASTROSCOPY, DUODENOSCOPY (EGD), BIOPSY SINGLE OR MULTIPLE;  Surgeon: Kelvin Montgomery MD;  Location: [facility] OR     FUSION CERVICAL ANTERIOR THREE+ LEVELS N/A 02/26/2015    C4-C7     FUSION CERVICAL ANTERIOR THREE+ LEVELS WITH BONE ALLOGRAFT N/A 12/07/2015    Procedure: FUSION CERVICAL ANTERIOR THREE+ LEVELS WITH BONE ALLOGRAFT;  Surgeon: Aguilar Roldan MD;  Location:  OR     HAND SURGERY       HEAD & NECK SURGERY  01/01/2000    Thyroidectomy     KNEE SURGERY  2001 & 2005    bilat     LAPAROSCOPIC CHOLECYSTECTOMY  01/01/1998     LEEP TX, CERVICAL  01/01/2007    BULMARO II on colp, leep path WNL     left thyroid total lobectomy, isthmusectomy and 50% right thyroid lobectomy Left 03/07/2000     TUBAL LIGATION  01/01/1988     VULVECTOMY SIMPLE  01/01/2006    FELCIIA 3, wide local excision x 2     VULVECTOMY SIMPLE       Finger ligament repair in 2000.  left arm ligament repair.    Current Outpatient Medications   Medication Sig Dispense Refill     aspirin 81 MG tablet Take 81 mg by mouth daily        atorvastatin (LIPITOR) 20 MG tablet Take 1 tablet (20 mg) by mouth daily 90 tablet 3     CALCIUM CITRATE PO Take 1,200 mg by mouth daily        cetirizine (ZYRTEC) 10 MG tablet Take 1 tablet  (10 mg) by mouth daily 90 tablet 1     Cholecalciferol (VITAMIN D) 1000 UNIT capsule Take 1 capsule by mouth daily.       FLUoxetine (PROZAC) 20 MG capsule Take 3 capsules (60 mg) by mouth daily 270 capsule 3     hypromellose (ARTIFICIAL TEARS) 0.5 % SOLN Place 1 drop into both eyes every 4 hours as needed        levothyroxine (SYNTHROID/LEVOTHROID) 100 MCG tablet Monday-Saturday: 1 tablet/day,  : skip 90 tablet 4     magnesium oxide (MAG-OX) 400 (240 Mg) MG tablet Take 400 mg by mouth daily       multivitamin w/minerals (THERA-VIT-M) tablet Take 1 tablet by mouth daily       omeprazole (PRILOSEC) 20 MG DR capsule 1 capsule M, W, F 90 capsule 3     sennosides (SENOKOT) 8.6 MG tablet Take 2 tablets by mouth daily        sodium fluoride dental gel (PREVIDENT) 1.1 % GEL topical gel Apply to affected area At Bedtime 100 mL 3     triamcinolone (KENALOG) 0.1 % external ointment Apply to affected area of the skin twice a day after using a moisturizer. Can do wet wraps with this. 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  volunteers at [employer]-- in surgery there, cleans autoclaves, ventilator tubing, ;  Quit bowling ;  Dogs; dog stroller  Roommate x 3.5 years   in February, he was age 89-- adjusting to living alone again  Daughter;     Physical Exam   GENERAL: no distress  BP Readings from Last 1 Encounters:   10/14/21 128/83      Pulse Readings from Last 1 Encounters:   10/14/21 73      Resp Readings from Last 1 Encounters:  "  10/12/20 14      Temp Readings from Last 1 Encounters:   10/14/21 (!) 96.2  F (35.7  C) (Tympanic)      SpO2 Readings from Last 1 Encounters:   10/14/21 98%      Wt Readings from Last 1 Encounters:   10/14/21 70.3 kg (155 lb)      Ht Readings from Last 1 Encounters:   10/12/20 1.575 m (5' 2\")   .    NEURO  Awake, alert, responds appropriately to questions.  Mentation and speech fluent.  PSYCH:affect normal,     DATA REVIEW:    ENDO THYROID LABS-[facility] Latest Ref Rng & Units 1/18/2021 5/5/2020   TSH 0.40 - 4.00 mU/L 0.29 (L) 0.33 (L)   T3 60 - 181 ng/dL     FREE T4 0.76 - 1.46 ng/dL 0.99 1.27     ENDO THYROID LABS-[facility] Latest Ref Rng & Units 11/5/2019   TSH 0.40 - 4.00 mU/L 0.22 (L)   T3 60 - 181 ng/dL    FREE T4 0.76 - 1.46 ng/dL 1.16     ENDO CALCIUM LABS-[facility] Latest Ref Rng & Units 10/14/2021 3/29/2021   ALBUMIN 3.4 - 5.0 g/dL 3.7    ALKPHOS 40 - 150 U/L 88    AMYLASE 30 - 110 U/L     CALCIUM 8.5 - 10.1 mg/dL 9.0 9.6   CREATININE 0.52 - 1.04 mg/dL 0.91 0.90     ENDO CALCIUM LABS-[facility] Latest Ref Rng & Units 10/12/2020   ALBUMIN 3.4 - 5.0 g/dL 3.9   ALKPHOS 40 - 150 U/L 85   AMYLASE 30 - 110 U/L    CALCIUM 8.5 - 10.1 mg/dL 9.3   CREATININE 0.52 - 1.04 mg/dL 0.93       EXAM: Ultrasound soft tissue neck on 6/13/2022     COMPARISON: 10/29/2019     HISTORY: Postsurgical hypothyroidism, papillary thyroid carcinoma.     FINDINGS: Lymph nodes are measured bilaterally with measurements given  in craniocaudal, transverse and AP dimensions as follows:     Right:     Level 2: Benign-appearing lymph nodes measuring 7 x 5 x 8 mm and 16 x  4 x 14 mm  Level 3: No suspicious lymph nodes  Level 4: No suspicious lymph nodes.  Level 5: No suspicious lymph nodes.  Level 6: No suspicious lymph nodes.  Level 7: No suspicious lymph nodes.     Left:  Level 2: Benign-appearing lymph node measuring 11 x 4 x 8 mm.  Level 3: No suspicious lymph nodes.  Level 4: No suspicious lymph nodes.  Level 5: No suspicious lymph nodes.  Level 6: " Benign-appearing lymph node measuring 8 x 4 x 10 mm.  Level 7: No suspicious nodes.                                                                      IMPRESSION: Soft tissue neck ultrasound with lymph node measurements  as described above. No suspicious cervical lymph nodes.     I have personally reviewed the examination and initial interpretation  and I agree with the findings.     NAY HERNANDEZ, DO      BONE DENSITOMETRY  [facility]  [address] [place] [address]  [place] [address]  2022      PATIENT: Marcela Allen  CHART: [id number]   :  1950  AGE:  71 year old  SEX:  female   REFERRING PROVIDER:  Geovanna Chatterjee MD  PROCEDURE:  Bone density scanning was performed using DXA technology of the lumbar spine and hip.  Scanning was performed on a Lunar Prodigy scanner.  Reporting is completed in the form of a T-score.  The T-score represents the standard deviation from peak bone mass based on a young healthy adult.  REFERENCE T-SCORES:       Normal                -1.0 and greater                                 Osteopenia         Between -1.0 and -2.5                                           Osteoporosis     -2.5 and less                                   RISK FACTORS:  Post-menopausal, Height loss of 2 inches, Parent history of osteoporosis, Fracture of patella   CURRENT TREATMENT:  Calcium, Vitamin D  FINDINGS:               Lumbar Spine (L1-L3)      T-score:  1.2,marked degenerative changes present, most marked at L4, so only L1-3 are evaluated.                Left Femoral Neck            T-score:  -0.2               Right Femoral Neck          T-score:  -0.6                      Lumbar (L1-L3) BMD: 1.312  Previous: 1.245                          Total Hip Mean BMD: 0.977  Previous: 0.970  Comparison is made to another DXA performed on the same Lunar Prodigy  machine on 2018.    IMPRESSION  Normal bone mineral density., Degenerative changes of the lumbar spine which may falsely  "elevate results.     There has been significant increase in bone density of the lumbar spine. There has been no significant change in bone density of the hip(s).      Recommendations include ensuring adequate Calcium and Vitamin D.     The current NOF Guidelines recommend treatment for patients with prior hip or vertebral fracture, T-score -2.5 or below, or 10 year risk of any major osteoporotic fracture 20% or greater, or 10 year risk of hip fracture 3% or greater as calculated using the FRAX calculator (www.shef.ac.uk/FRAX or you can google \"FRAX\").    This patient's risks based on available information, with the use of FRAX, are 13 % for major osteoporotic fracture and 0.9 % for hip fracture.   Based on these guidelines, treatment (in addition to calcium and vitamin D) is not recommended for this patient, after ruling out other causes of osteoporosis.  This is meant as an aid to clinical decision making; one must still use clinical judgement.     Follow up can be considered in 5 years.   ___________________  Shanita Ruby M.D.  Electronically signed        "

## 2022-06-13 ENCOUNTER — ANCILLARY PROCEDURE (OUTPATIENT)
Dept: ULTRASOUND IMAGING | Facility: CLINIC | Age: 72
End: 2022-06-13
Payer: MEDICARE

## 2022-06-13 DIAGNOSIS — E89.0 POSTSURGICAL HYPOTHYROIDISM: ICD-10-CM

## 2022-06-13 DIAGNOSIS — C73 PAPILLARY THYROID CARCINOMA (H): ICD-10-CM

## 2022-06-13 PROCEDURE — 76536 US EXAM OF HEAD AND NECK: CPT | Performed by: RADIOLOGY

## 2022-06-20 ENCOUNTER — LAB (OUTPATIENT)
Dept: LAB | Facility: CLINIC | Age: 72
End: 2022-06-20
Payer: MEDICARE

## 2022-06-20 ENCOUNTER — ANCILLARY PROCEDURE (OUTPATIENT)
Dept: BONE DENSITY | Facility: CLINIC | Age: 72
End: 2022-06-20
Payer: MEDICARE

## 2022-06-20 DIAGNOSIS — C73 PAPILLARY THYROID CARCINOMA (H): ICD-10-CM

## 2022-06-20 DIAGNOSIS — E89.0 POSTSURGICAL HYPOTHYROIDISM: ICD-10-CM

## 2022-06-20 DIAGNOSIS — Z78.0 POST-MENOPAUSAL: ICD-10-CM

## 2022-06-20 LAB
T4 FREE SERPL-MCNC: 1.22 NG/DL (ref 0.76–1.46)
TSH SERPL DL<=0.005 MIU/L-ACNC: 0.21 MU/L (ref 0.4–4)

## 2022-06-20 PROCEDURE — 36415 COLL VENOUS BLD VENIPUNCTURE: CPT

## 2022-06-20 PROCEDURE — 84432 ASSAY OF THYROGLOBULIN: CPT | Mod: 90

## 2022-06-20 PROCEDURE — 77080 DXA BONE DENSITY AXIAL: CPT | Performed by: INTERNAL MEDICINE

## 2022-06-20 PROCEDURE — 86800 THYROGLOBULIN ANTIBODY: CPT | Mod: 90

## 2022-06-20 PROCEDURE — 84439 ASSAY OF FREE THYROXINE: CPT

## 2022-06-20 PROCEDURE — 84443 ASSAY THYROID STIM HORMONE: CPT

## 2022-06-20 PROCEDURE — 99000 SPECIMEN HANDLING OFFICE-LAB: CPT

## 2022-06-27 LAB — SCANNED LAB RESULT: NORMAL

## 2022-07-14 NOTE — MR AVS SNAPSHOT
After Visit Summary   7/17/2017    Marcela Allen    MRN: [id number]           Patient Information     Date Of Birth          1950        Visit Information        Provider Department      7/17/2017 1:00 PM Jonah Keller MD [facility]        Care Instructions    Be off each med for 1 month at least:  HCTZ first  Lisinopril second  Triamterene third.  Omeprazole fourth          Follow-ups after your visit        Your next 10 appointments already scheduled     Aug 21, 2017  9:15 AM CDT   Return Visit with Jonah Keller MD   [facility]    [address] [place] [address] [phone]            Nov 06, 2017  2:00 PM CST   (Arrive by 1:45 PM)   RETURN ENDOCRINE with Geovanna Chatterjee MD   [facility] ([facility])    [address] [address] [phone]              Who to contact     If you have questions or need follow up information about today's clinic visit or your schedule please contact [facility] directly at [phone].  Normal or non-critical lab and imaging results will be communicated to you by MyChart, letter or phone within 4 business days after the clinic has received the results. If you do not hear from us within 7 days, please contact the clinic through MyChart or phone. If you have a critical or abnormal lab result, we will notify you by phone as soon as possible.  Submit refill requests through [employer] or call your pharmacy and they will forward the refill request to us. Please allow 3 business days for your refill to be completed.          Additional Information About Your Visit        MyChart Information     [employer] gives you secure access to your electronic health record. If you see a primary care provider, you can also send messages to your care team and make appointments. If you have questions,  please call your primary care clinic.  If you do not have a primary care provider, please call [phone] and they will assist you.      Mel[employer] is an electronic gateway that provides easy, online access to your medical records. With [employer], you can request a clinic appointment, read your test results, renew a prescription or communicate with your care team.     To access your existing account, please contact your [facility] or call [phone] for assistance.        Care EveryWhere ID     This is your Care EveryWhere ID. This could be used by other organizations to access your [place] medical records  [id number]         Blood Pressure from Last 3 Encounters:   04/03/17 138/80   10/10/16 93/64   08/02/16 113/75    Weight from Last 3 Encounters:   04/03/17 75.9 kg (167 lb 6.4 oz)   10/10/16 70.8 kg (156 lb)   08/02/16 72.1 kg (159 lb)              Today, you had the following     No orders found for display         Today's Medication Changes          These changes are accurate as of: 7/17/17  1:23 PM.  If you have any questions, ask your nurse or doctor.               Stop taking these medicines if you haven't already. Please contact your care team if you have questions.     HYDROcodone-acetaminophen 5-325 MG per tablet   Commonly known as:  NORCO           hydrOXYzine 25 MG tablet   Commonly known as:  ATARAX                    Primary Care Provider Office Phone # Fax #    Honey Manisha García PA-C [phone]       [facility] [place] [address] [facility] [address]        Equal Access to Services     [facility]: Hadii aad ku hadasho Soomaali, waaxda luqadaha, qaybta kaalmada adeegyada, stefan river . So [facility] [phone].    ATENCIÓN: Si habla español, tiene a trammell disposición servicios gratuitos de asistencia lingüística. Llame al [phone].    We comply with applicable federal civil rights laws and [place] laws. We  do not discriminate on the basis of race, color, national origin, age, disability sex, sexual orientation or gender identity.            Thank you!     Thank you for choosing [facility]  for your care. Our goal is always to provide you with excellent care. Hearing back from our patients is one way we can continue to improve our services. Please take a few minutes to complete the written survey that you may receive in the mail after your visit with us. Thank you!             Your Updated Medication List - Protect others around you: Learn how to safely use, store and throw away your medicines at www.disposemymeds.org.          This list is accurate as of: 7/17/17  1:23 PM.  Always use your most recent med list.                   Brand Name Dispense Instructions for use Diagnosis    aspirin 81 MG tablet      Take 81 mg by mouth daily        atorvastatin 20 MG tablet    LIPITOR    90 tablet    Take 1 tablet (20 mg) by mouth daily    Hyperlipidemia LDL goal <130       CALCIUM CITRATE PO      Take 1,200 mg by mouth daily        cetirizine 10 MG tablet    zyrTEC    90 tablet    Take 1 tablet (10 mg) by mouth daily    Seasonal allergies       clobetasol 0.05 % ointment    TEMOVATE    120 g    Apply twice a day to the affected area of the skin until follow up appointment.    Rash       Fish Oil 1200 MG Caps      Take 1 capsule by mouth daily        fluocinonide 0.05 % ointment    LIDEX    60 g    Apply twice a day to affected areas of the arms, legs, trunk for up to 4 weeks.    Intrinsic eczema       FLUoxetine 20 MG capsule    PROzac    270 capsule    Take 3 capsules (60 mg) by mouth daily    Major depression in complete remission (H)       hypromellose 0.5 % Soln ophthalmic solution    ARTIFICIAL TEARS     Place 1 drop into both eyes every 4 hours as needed        levothyroxine 100 MCG tablet    SYNTHROID/LEVOTHROID    90 tablet    Monday-Saturday: (1 tablet/day);  Sunday: (0.5 tablet)    Postsurgical  hypothyroidism, Papillary thyroid carcinoma (H)       lisinopril 10 MG tablet    PRINIVIL/ZESTRIL    90 tablet    Take 1 tablet (10 mg) by mouth daily    Essential hypertension with goal blood pressure less than 140/90       Multi-vitamin Tabs tablet      Take 1 tablet by mouth daily        omeprazole 20 MG CR capsule    priLOSEC    90 capsule    Take 1 capsule (20 mg) by mouth every 3 days    Gastroesophageal reflux disease with esophagitis       predniSONE 10 MG tablet    DELTASONE    19 tablet    Take 2 pills x 5 days, 1 pill x 5 days, then 1 pill every other day for 8 days.    Intrinsic atopic dermatitis       sennosides 8.6 MG tablet    SENOKOT     Take 2 tablets by mouth daily        * triamcinolone 0.1 % ointment    KENALOG    60 g    Apply twice daily as needed to affected area, do not use for more than 10 days.    Flexural eczema       * triamcinolone 0.1 % ointment    KENALOG    454 g    Apply to affected area of the skin twice a day after using a moisturizer. Can do wet wraps with this.    Intrinsic atopic dermatitis       triamterene-hydrochlorothiazide 37.5-25 MG per capsule    DYAZIDE    90 capsule    Take 1 capsule by mouth daily    Essential hypertension with goal blood pressure less than 140/90       vitamin D 1000 UNITS capsule      Take 1 capsule by mouth daily.        * Notice:  This list has 2 medication(s) that are the same as other medications prescribed for you. Read the directions carefully, and ask your doctor or other care provider to review them with you.       no

## 2022-10-06 ASSESSMENT — ENCOUNTER SYMPTOMS
PARESTHESIAS: 0
BREAST MASS: 0
MYALGIAS: 0
PALPITATIONS: 0
ARTHRALGIAS: 1
DIZZINESS: 0
DIARRHEA: 0
HEMATOCHEZIA: 0
CONSTIPATION: 1
NERVOUS/ANXIOUS: 0
HEARTBURN: 1
EYE PAIN: 0
FEVER: 0
JOINT SWELLING: 0
WEAKNESS: 0
DYSURIA: 0
ABDOMINAL PAIN: 0
COUGH: 1
SHORTNESS OF BREATH: 0
FREQUENCY: 0
CHILLS: 0
HEMATURIA: 0
HEADACHES: 1
SORE THROAT: 0
NAUSEA: 0

## 2022-10-06 ASSESSMENT — ACTIVITIES OF DAILY LIVING (ADL): CURRENT_FUNCTION: NO ASSISTANCE NEEDED

## 2022-10-10 ENCOUNTER — OFFICE VISIT (OUTPATIENT)
Dept: FAMILY MEDICINE | Facility: CLINIC | Age: 72
End: 2022-10-10
Payer: MEDICARE

## 2022-10-10 VITALS
HEART RATE: 83 BPM | RESPIRATION RATE: 16 BRPM | SYSTOLIC BLOOD PRESSURE: 133 MMHG | DIASTOLIC BLOOD PRESSURE: 77 MMHG | BODY MASS INDEX: 27.8 KG/M2 | OXYGEN SATURATION: 99 % | TEMPERATURE: 97.4 F | WEIGHT: 152 LBS

## 2022-10-10 DIAGNOSIS — N18.31 STAGE 3A CHRONIC KIDNEY DISEASE (H): ICD-10-CM

## 2022-10-10 DIAGNOSIS — Z87.891 PERSONAL HISTORY OF TOBACCO USE: ICD-10-CM

## 2022-10-10 DIAGNOSIS — E78.5 HYPERLIPIDEMIA LDL GOAL <130: ICD-10-CM

## 2022-10-10 DIAGNOSIS — Z12.11 SCREEN FOR COLON CANCER: ICD-10-CM

## 2022-10-10 DIAGNOSIS — Z00.00 ENCOUNTER FOR MEDICARE ANNUAL WELLNESS EXAM: Primary | ICD-10-CM

## 2022-10-10 DIAGNOSIS — Z23 NEED FOR PROPHYLACTIC VACCINATION AND INOCULATION AGAINST INFLUENZA: ICD-10-CM

## 2022-10-10 DIAGNOSIS — I10 HYPERTENSION GOAL BP (BLOOD PRESSURE) < 140/90: ICD-10-CM

## 2022-10-10 LAB — HGB BLD-MCNC: 14.1 G/DL (ref 11.7–15.7)

## 2022-10-10 PROCEDURE — G0296 VISIT TO DETERM LDCT ELIG: HCPCS | Performed by: PHYSICIAN ASSISTANT

## 2022-10-10 PROCEDURE — 80061 LIPID PANEL: CPT | Performed by: PHYSICIAN ASSISTANT

## 2022-10-10 PROCEDURE — 36415 COLL VENOUS BLD VENIPUNCTURE: CPT | Performed by: PHYSICIAN ASSISTANT

## 2022-10-10 PROCEDURE — G0008 ADMIN INFLUENZA VIRUS VAC: HCPCS | Performed by: PHYSICIAN ASSISTANT

## 2022-10-10 PROCEDURE — 80053 COMPREHEN METABOLIC PANEL: CPT | Performed by: PHYSICIAN ASSISTANT

## 2022-10-10 PROCEDURE — G0439 PPPS, SUBSEQ VISIT: HCPCS | Performed by: PHYSICIAN ASSISTANT

## 2022-10-10 PROCEDURE — 82043 UR ALBUMIN QUANTITATIVE: CPT | Performed by: PHYSICIAN ASSISTANT

## 2022-10-10 PROCEDURE — 85018 HEMOGLOBIN: CPT | Performed by: PHYSICIAN ASSISTANT

## 2022-10-10 PROCEDURE — 90662 IIV NO PRSV INCREASED AG IM: CPT | Performed by: PHYSICIAN ASSISTANT

## 2022-10-10 ASSESSMENT — ENCOUNTER SYMPTOMS
HEADACHES: 1
HEMATOCHEZIA: 0
SORE THROAT: 0
HEARTBURN: 1
FEVER: 0
EYE PAIN: 0
SHORTNESS OF BREATH: 0
NERVOUS/ANXIOUS: 0
BREAST MASS: 0
PARESTHESIAS: 0
CONSTIPATION: 1
NAUSEA: 0
ABDOMINAL PAIN: 0
WEAKNESS: 0
ARTHRALGIAS: 1
FREQUENCY: 0
PALPITATIONS: 0
MYALGIAS: 0
DIZZINESS: 0
HEMATURIA: 0
JOINT SWELLING: 0
COUGH: 1
DYSURIA: 0
CHILLS: 0
DIARRHEA: 0

## 2022-10-10 ASSESSMENT — PATIENT HEALTH QUESTIONNAIRE - PHQ9
SUM OF ALL RESPONSES TO PHQ QUESTIONS 1-9: 3
SUM OF ALL RESPONSES TO PHQ QUESTIONS 1-9: 3
10. IF YOU CHECKED OFF ANY PROBLEMS, HOW DIFFICULT HAVE THESE PROBLEMS MADE IT FOR YOU TO DO YOUR WORK, TAKE CARE OF THINGS AT HOME, OR GET ALONG WITH OTHER PEOPLE: NOT DIFFICULT AT ALL

## 2022-10-10 ASSESSMENT — ACTIVITIES OF DAILY LIVING (ADL): CURRENT_FUNCTION: NO ASSISTANCE NEEDED

## 2022-10-10 NOTE — PROGRESS NOTES
"SUBJECTIVE:   Ramonita is a 72 year old who presents for Preventive Visit.      Patient has been advised of split billing requirements and indicates understanding: Yes  Are you in the first 12 months of your Medicare coverage?  No    Healthy Habits:     In general, how would you rate your overall health?  Good    Frequency of exercise:  2-3 days/week    Duration of exercise:  N/A    Do you usually eat at least 4 servings of fruit and vegetables a day, include whole grains    & fiber and avoid regularly eating high fat or \"junk\" foods?  No    Taking medications regularly:  Yes    Medication side effects:  Not applicable    Ability to successfully perform activities of daily living:  No assistance needed    Home Safety:  Throw rugs in the hallway    Hearing Impairment:  No hearing concerns    In the past 6 months, have you been bothered by leaking of urine?  No    In general, how would you rate your overall mental or emotional health?  Good      PHQ-2 Total Score: 2    Additional concerns today:  No    Patient arrived for Annual Medicare Physical, no questions or concerns at todays visit.     Has colonoscopy scheduled Nov 21, 2022.      Do you feel safe in your environment? Yes    Have you ever done Advance Care Planning? (For example, a Health Directive, POLST, or a discussion with a medical provider or your loved ones about your wishes): Yes, advance care planning is on file.       Fall risk  Fallen 2 or more times in the past year?: Yes  Any fall with injury in the past year?: Yes    Cognitive Screening   1) Repeat 3 items (Leader, Season, Table)    2) Clock draw: NORMAL  3) 3 item recall: Recalls 3 objects  Results: 3 items recalled: COGNITIVE IMPAIRMENT LESS LIKELY      Vaping currently.   Smoked 10 years ago.     Mini-CogTM Copyright S Do. Licensed by the author for use in [employer]; reprinted with permission (arleen@.[facility]). All rights reserved.      Do you have sleep apnea, excessive snoring or " daytime drowsiness?: no    Reviewed and updated as needed this visit by clinical staff   Tobacco  Allergies  Meds              Reviewed and updated as needed this visit by Provider                 Social History     Tobacco Use     Smoking status: Former     Packs/day: 1.00     Years: 45.00     Pack years: 45.00     Types: Cigarettes     Quit date: 8/1/2012     Years since quitting: 10.1     Smokeless tobacco: Never     Tobacco comments:     quitting with e cigarette   Substance Use Topics     Alcohol use: Yes     Comment: less than weekly         Alcohol Use 10/6/2022   Prescreen: >3 drinks/day or >7 drinks/week? No   Prescreen: >3 drinks/day or >7 drinks/week? -               Current providers sharing in care for this patient include:   Patient Care Team:  Honey García PA-C as PCP - General (Family Practice)  Geovanna Chatterjee MD as MD (INTERNAL MEDICINE - ENDOCRINOLOGY, DIABETES & METABOLISM)  Faby So RN as Specialty Care Coordinator (Clinical Cardiac Electrophysiology)  Suyapa Garrett MD as MD (Clinical Cardiac Electrophysiology)  Geovanna Chatterjee MD as Assigned Endocrinology Provider  Honey García PA-C as Assigned PCP    The following health maintenance items are reviewed in Epic and correct as of today:  Health Maintenance   Topic Date Due     COPD ACTION PLAN  Never done     HEPATITIS B IMMUNIZATION (1 of 3 - 3-dose series) Never done     LUNG CANCER SCREENING  Never done     ZOSTER IMMUNIZATION (2 of 3) 04/09/2014     MICROALBUMIN  03/29/2022     COVID-19 Vaccine (4 - Booster for Wilder series) 09/27/2022     COLORECTAL CANCER SCREENING  10/06/2022     BMP  10/14/2022     LIPID  10/14/2022     MAMMO SCREENING  02/25/2023     PHQ-9  04/10/2023     MEDICARE ANNUAL WELLNESS VISIT  10/10/2023     ANNUAL REVIEW OF HM ORDERS  10/10/2023     FALL RISK ASSESSMENT  10/10/2023     HEMOGLOBIN  10/10/2023     DEXA  06/20/2027     ADVANCE CARE PLANNING  10/10/2027     DTAP/TDAP/TD  IMMUNIZATION (3 - Td or Tdap) 04/08/2029     SPIROMETRY  Completed     HEPATITIS C SCREENING  Completed     DEPRESSION ACTION PLAN  Completed     INFLUENZA VACCINE  Completed     Pneumococcal Vaccine: 65+ Years  Completed     URINALYSIS  Completed     IPV IMMUNIZATION  Aged Out     MENINGITIS IMMUNIZATION  Aged Out     Labs reviewed in EPIC  BP Readings from Last 3 Encounters:   10/10/22 133/77   10/14/21 128/83   03/29/21 138/85    Wt Readings from Last 3 Encounters:   10/10/22 68.9 kg (152 lb)   10/14/21 70.3 kg (155 lb)   03/29/21 70.2 kg (154 lb 12.8 oz)                          Review of Systems   Constitutional: Negative for chills and fever.   HENT: Positive for hearing loss. Negative for congestion, ear pain and sore throat.    Eyes: Negative for pain and visual disturbance.   Respiratory: Positive for cough. Negative for shortness of breath.    Cardiovascular: Negative for chest pain, palpitations and peripheral edema.   Gastrointestinal: Positive for constipation and heartburn. Negative for abdominal pain, diarrhea, hematochezia and nausea.   Breasts:  Negative for tenderness, breast mass and discharge.   Genitourinary: Negative for dysuria, frequency, genital sores, hematuria, pelvic pain, urgency, vaginal bleeding and vaginal discharge.   Musculoskeletal: Positive for arthralgias. Negative for joint swelling and myalgias.   Skin: Positive for rash.   Neurological: Positive for headaches. Negative for dizziness, weakness and paresthesias.   Psychiatric/Behavioral: Negative for mood changes. The patient is not nervous/anxious.      Constitutional, HEENT, cardiovascular, pulmonary, GI, , musculoskeletal, neuro, skin, endocrine and psych systems are negative, except as otherwise noted.    OBJECTIVE:   /77 (BP Location: Right arm, Patient Position: Chair, Cuff Size: Adult Regular)   Pulse 83   Temp 97.4  F (36.3  C) (Tympanic)   Resp 16   Wt 68.9 kg (152 lb)   LMP  (LMP Unknown)   SpO2 99%    "BMI 27.80 kg/m   Estimated body mass index is 27.8 kg/m  as calculated from the following:    Height as of 10/12/20: 1.575 m (5' 2\").    Weight as of this encounter: 68.9 kg (152 lb).  Physical Exam  GENERAL APPEARANCE: healthy, alert and no distress  EYES: Eyes grossly normal to inspection, PERRL and conjunctivae and sclerae normal  HENT: ear canals and TM's normal, nose and mouth without ulcers or lesions, oropharynx clear and oral mucous membranes moist  NECK: no adenopathy, no asymmetry, masses, or scars and thyroid normal to palpation  RESP: lungs clear to auscultation - no rales, rhonchi or wheezes  CV: regular rate and rhythm, normal S1 S2, no S3 or S4, no murmur, click or rub, no peripheral edema and peripheral pulses strong  ABDOMEN: soft, nontender, no hepatosplenomegaly, no masses and bowel sounds normal  MS: no musculoskeletal defects are noted and gait is age appropriate without ataxia  SKIN: no suspicious lesions or rashes  NEURO: Normal strength and tone, sensory exam grossly normal, mentation intact and speech normal  PSYCH: mentation appears normal and affect normal/bright    Diagnostic Test Results:  Labs reviewed in Epic  Results for orders placed or performed in visit on 10/10/22 (from the past 24 hour(s))   Hemoglobin   Result Value Ref Range    Hemoglobin 14.1 11.7 - 15.7 g/dL       ASSESSMENT / PLAN:   (Z00.00) Encounter for Medicare annual wellness exam  (primary encounter diagnosis)  Comment:      HEALTH CARE MAINTENANCE              Reviewed USPTF recommendations and anticipatory guidance.              See orders.   Flu shot given.     Last booster was not bivalent, recommend updated bivalent covid booster in about 1-2 months.     (N18.31) Stage 3a chronic kidney disease (H)  Comment: will monitor.   Plan: Hemoglobin, Albumin Random Urine Quantitative         with Creat Ratio            (E78.5) Hyperlipidemia LDL goal <130  Comment: doing well, she will reach out when she needs refills. " "  Plan: Lipid panel reflex to direct LDL Non-fasting,         Comprehensive metabolic panel (BMP + Alb, Alk         Phos, ALT, AST, Total. Bili, TP)            (Z12.11) Screen for colon cancer  Comment: She has appt with [facility] scheduled for colonoscopy in November.  She is contemplating if she wants to do this or not, quite concerned about the prep as she nearly passed out last time.  I recommend she hold BP medication 1-2 days prior to colonoscopy and reach out to [facility] to discuss concerns.   Plan:     Hyperlipidemia LDL goal < 130.   Comment: due for recheck, on statin.   Plan: Lipid panel reflex to direct LDL Non-fasting            (Z87.891) Personal history of tobacco use  Comment: discussed Lung cancer screening.  Reviewed risk calculator and she is high risk 5.1%  Plan: Prof fee: Shared Decision Making for Lung         Cancer Screening, CT Chest Lung Cancer Scrn Low        Dose wo            (Z23) Need for prophylactic vaccination and inoculation against influenza  Comment: due for flu shot today.  Plan: INFLUENZA, QUAD, HIGH DOSE, PF, 65YR + (FLUZONE        HD)          Hypertension: stable.           COUNSELING:  Reviewed preventive health counseling, as reflected in patient instructions       Regular exercise       Healthy diet/nutrition       Fall risk prevention       Consider lung cancer screening for ages 55-80 years (77 for Medicare) and 20 pack-year smoking history      Estimated body mass index is 27.8 kg/m  as calculated from the following:    Height as of 10/12/20: 1.575 m (5' 2\").    Weight as of this encounter: 68.9 kg (152 lb).        She reports that she quit smoking about 10 years ago. Her smoking use included cigarettes. She has a 45.00 pack-year smoking history. She has never used smokeless tobacco.      Appropriate preventive services were discussed with this patient, including applicable screening as appropriate for cardiovascular disease, diabetes, osteopenia/osteoporosis, and glaucoma.  As " appropriate for age/gender, discussed screening for colorectal cancer, prostate cancer, breast cancer, and cervical cancer. Checklist reviewing preventive services available has been given to the patient.    Reviewed patients plan of care and provided an AVS. The Intermediate Care Plan ( asthma action plan, low back pain action plan, and migraine action plan) for Marcela meets the Care Plan requirement. This Care Plan has been established and reviewed with the Patient.    Counseling Resources:  ATP IV Guidelines  Pooled Cohorts Equation Calculator  Breast Cancer Risk Calculator  Breast Cancer: Medication to Reduce Risk  FRAX Risk Assessment  ICSI Preventive Guidelines  Dietary Guidelines for Americans, 2010  [employer]'s MyPlate  ASA Prophylaxis  Lung CA Screening    FRANC Zuleta [facility]    Identified Health Risks:  Answers for HPI/ROS submitted by the patient on 10/10/2022  If you checked off any problems, how difficult have these problems made it for you to do your work, take care of things at home, or get along with other people?: Not difficult at all  PHQ9 TOTAL SCORE: 3      Lung Cancer Screening Shared Decision Making Visit     Marcela Allen, a 72 year old female, is eligible for lung cancer screening    History   Smoking Status     Former     Packs/day: 1.00     Years: 45.00     Types: Cigarettes     Quit date: 8/1/2012   Smokeless Tobacco     Never       I have discussed with patient the risks and benefits of screening for lung cancer with low-dose CT.     The risks include:    radiation exposure: one low dose chest CT has as much ionizing radiation as about 15 chest x-rays, or 6 months of background radiation living in [place]      false positives: most findings/nodules are NOT cancer, but some might still require additional diagnostic evaluation, including biopsy    over-diagnosis: some slow growing cancers that might never have been clinically significant will be detected  and treated unnecessarily     The benefit of early detection of lung cancer is contingent upon adherence to annual screening or more frequent follow up if indicated.     Furthermore, to benefit from screening, Marcela must be willing and able to undergo diagnostic procedures, if indicated. Although no specific guide is available for determining severity of comorbidities, it is reasonable to withhold screening in patients who have greater mortality risk from other diseases.     We did discuss that the best way to prevent lung cancer is to not smoke.    Some patients may value a numeric estimation of lung cancer risk when evaluating if lung cancer screening is right for them, here is one calculator:    ShouldIScreen

## 2022-10-10 NOTE — PATIENT INSTRUCTIONS
Please call [facility] at [phone]  to set up the Low dose CT scan for lung cancer screening.    Reach out to [facility] and discuss colon prep concerns.  If you do the colonoscopy, I suggest you hold your BP med 1-2 days prior to the procedure.       Patient Education   Personalized Prevention Plan  You are due for the preventive services outlined below.  Your care team is available to assist you in scheduling these services.  If you have already completed any of these items, please share that information with your care team to update in your medical record.  Health Maintenance Due   Topic Date Due    COPD Action Plan  Never done    Hepatitis B Vaccine (1 of 3 - 3-dose series) Never done    LUNG CANCER SCREENING  Never done    Zoster (Shingles) Vaccine (2 of 3) 04/09/2014    Hemoglobin  05/05/2021    Kidney Microalbumin Urine Test  03/29/2022    Flu Vaccine (1) 09/01/2022    COVID-19 Vaccine (4 - Booster for Wilder series) 09/27/2022    Colorectal Cancer Screening  10/06/2022    Annual Wellness Visit  10/14/2022    Basic Metabolic Panel  10/14/2022    Cholesterol Lab  10/14/2022    ANNUAL REVIEW OF HM ORDERS  10/14/2022        Lung Cancer Screening   Frequently Asked Questions  If you are at high-risk for lung cancer, getting screened with low-dose computed tomography (LDCT) every year can help save your life. This handout offers answers to some of the most common questions about lung cancer screening. If you have other questions, please call [phone] ([phone]).     What is it?  Lung cancer screening uses special X-ray technology to create an image of your lung tissue. The exam is quick and easy and takes less than 10 seconds. We don t give you any medicine or use any needles. You can eat before and after the exam. You don t need to change your clothes as long as the clothing on your chest doesn t contain metal. But, you do need to be able to hold your breath for at least 6 seconds  during the exam.    What is the goal of lung cancer screening?  The goal of lung cancer screening is to save lives. Many times, lung cancer is not found until a person starts having physical symptoms. Lung cancer screening can help detect lung cancer in the earliest stages when it may be easier to treat.    Who should be screened for lung cancer?  We suggest lung cancer screening for anyone who is at high-risk for lung cancer. You are in the high-risk group if you:     are between the ages of 55 and 79, and   have smoked at least 1 pack of cigarettes a day for 20 or more years, and   still smoke or have quit within the past 15 years.    However, if you have a new cough or shortness of breath, you should talk to your doctor before being screened.    Why does it matter if I have symptoms?  Certain symptoms can be a sign that you have a condition in your lungs that should be checked and treated by your doctor. These symptoms include fever, chest pain, a new or changing cough, shortness of breath that you have never felt before, coughing up blood or unexplained weight loss. Having any of these symptoms can greatly affect the results of lung cancer screening.       Should all smokers get an LDCT lung cancer screening exam?  It depends. Lung cancer screening is for a very specific group of men and women who have a history of heavy smoking over a long period of time (see  Who should be screened for lung cancer  above).  I am in the high-risk group, but have been diagnosed with cancer in the past. Is LDCT lung cancer screening right for me?  In some cases, you should not have LDCT lung screening, such as when your doctor is already following your cancer with CT scan studies. Your doctor will help you decide if LDCT lung screening is right for you.  Do I need to have a screening exam every year?  Yes. If you are in the high-risk group described earlier, you should get an LDCT lung cancer screening exam every year until you  are 79, or are no longer willing or able to undergo screening and possible procedures to diagnose and treat lung cancer.  How effective is LDCT at preventing death from lung cancer?  Studies have shown that LDCT lung cancer screening can lower the risk of death from lung cancer by 20 percent in people who are at high-risk.  What are the risks?  There are some risks and limitations of LDCT lung cancer screening. We want to make sure you understand the risks and benefits, so please let us know if you have any questions. Your doctor may want to talk with you more about these risks.   Radiation exposure: As with any exam that uses radiation, there is a very small increased risk of cancer. The amount of radiation in LDCT is small--about the same amount a person would get from a mammogram. Your doctor orders the exam when he or she feels the potential benefits outweigh the risks.   False negatives: No test is perfect, including LDCT. It is possible that you may have a medical condition, including lung cancer, that is not found during your exam. This is called a false negative result.   False positives and more testing: LDCT very often finds something in the lung that could be cancer, but in fact is not. This is called a false positive result. False positive tests often cause anxiety. To make sure these findings are not cancer, you may need to have more tests. These tests will be done only if you give us permission. Sometimes patients need a treatment that can have side effects, such as a biopsy. For more information on false positives, see  What can I expect from the results?    Findings not related to lung cancer: Your LDCT exam also takes pictures of areas of your body next to your lungs. In a very small number of cases, the CT scan will show an abnormal finding in one of these areas, such as your kidneys, adrenal glands, liver or thyroid. This finding may not be serious, but you may need more tests. Your doctor can  help you decide what other tests you may need, if any.  What can I expect from the results?  About 1 out of 4 LDCT exams will find something that may need more tests. Most of the time, these findings are lung nodules. Lung nodules are very small collections of tissue in the lung. These nodules are very common, and the vast majority--more than 97 percent--are not cancer (benign). Most are normal lymph nodes or small areas of scarring from past infections.  But, if a small lung nodule is found to be cancer, the cancer can be cured more than 90 percent of the time. To know if the nodule is cancer, we may need to get more images before your next yearly screening exam. If the nodule has suspicious features (for example, it is large, has an odd shape or grows over time), we will refer you to a specialist for further testing.  Will my doctor also get the results?  Yes. Your doctor will get a copy of your results.  Is it okay to keep smoking now that there s a cancer screening exam?  No. Tobacco is one of the strongest cancer-causing agents. It causes not only lung cancer, but other cancers and cardiovascular (heart) diseases as well. The damage caused by smoking builds over time. This means that the longer you smoke, the higher your risk of disease. While it is never too late to quit, the sooner you quit, the better.  Where can I find help to quit smoking?  The best way to prevent lung cancer is to stop smoking. If you have already quit smoking, congratulations and keep it up! For help on quitting smoking, please call [employer] at 7-239-QUITNOW ([phone]) or the American Cancer Society at 1-961.315.3146 to find local resources near you.  One-on-one health coaching:  If you d prefer to work individually with a health care provider on tobacco cessation, we offer:     Medication Therapy Management:  Our specially trained pharmacists work closely with you and your doctor to help you quit smoking.  Call [phone] or  [phone] (toll free).

## 2022-10-11 LAB
ALBUMIN SERPL-MCNC: 3.7 G/DL (ref 3.4–5)
ALP SERPL-CCNC: 76 U/L (ref 40–150)
ALT SERPL W P-5'-P-CCNC: 33 U/L (ref 0–50)
ANION GAP SERPL CALCULATED.3IONS-SCNC: 2 MMOL/L (ref 3–14)
AST SERPL W P-5'-P-CCNC: 36 U/L (ref 0–45)
BILIRUB SERPL-MCNC: 0.8 MG/DL (ref 0.2–1.3)
BUN SERPL-MCNC: 8 MG/DL (ref 7–30)
CALCIUM SERPL-MCNC: 9.4 MG/DL (ref 8.5–10.1)
CHLORIDE BLD-SCNC: 105 MMOL/L (ref 94–109)
CHOLEST SERPL-MCNC: 158 MG/DL
CO2 SERPL-SCNC: 32 MMOL/L (ref 20–32)
CREAT SERPL-MCNC: 0.87 MG/DL (ref 0.52–1.04)
CREAT UR-MCNC: 75 MG/DL
FASTING STATUS PATIENT QL REPORTED: YES
GFR SERPL CREATININE-BSD FRML MDRD: 71 ML/MIN/1.73M2
GLUCOSE BLD-MCNC: 96 MG/DL (ref 70–99)
HDLC SERPL-MCNC: 52 MG/DL
LDLC SERPL CALC-MCNC: 82 MG/DL
MICROALBUMIN UR-MCNC: 6 MG/L
MICROALBUMIN/CREAT UR: 8 MG/G CR (ref 0–25)
NONHDLC SERPL-MCNC: 106 MG/DL
POTASSIUM BLD-SCNC: 4.7 MMOL/L (ref 3.4–5.3)
PROT SERPL-MCNC: 7.3 G/DL (ref 6.8–8.8)
SODIUM SERPL-SCNC: 139 MMOL/L (ref 133–144)
TRIGL SERPL-MCNC: 122 MG/DL

## 2022-10-28 ENCOUNTER — ANCILLARY PROCEDURE (OUTPATIENT)
Dept: CT IMAGING | Facility: CLINIC | Age: 72
End: 2022-10-28
Attending: PHYSICIAN ASSISTANT
Payer: MEDICARE

## 2022-10-28 DIAGNOSIS — Z87.891 PERSONAL HISTORY OF TOBACCO USE: ICD-10-CM

## 2022-10-28 PROCEDURE — 71271 CT THORAX LUNG CANCER SCR C-: CPT | Mod: TC | Performed by: RADIOLOGY

## 2022-12-06 ENCOUNTER — MYC MEDICAL ADVICE (OUTPATIENT)
Dept: FAMILY MEDICINE | Facility: CLINIC | Age: 72
End: 2022-12-06

## 2022-12-06 DIAGNOSIS — I10 HYPERTENSION GOAL BP (BLOOD PRESSURE) < 140/90: ICD-10-CM

## 2022-12-06 RX ORDER — TRIAMTERENE/HYDROCHLOROTHIAZID 37.5-25 MG
0.5 TABLET ORAL DAILY
Qty: 45 TABLET | Refills: 0 | Status: SHIPPED | OUTPATIENT
Start: 2022-12-06 | End: 2023-03-07

## 2022-12-06 NOTE — TELEPHONE ENCOUNTER
Prescription approved per [facility] Refill Protocol.  Will send to Honey as FYNORA

## 2023-01-03 ENCOUNTER — MYC MEDICAL ADVICE (OUTPATIENT)
Dept: FAMILY MEDICINE | Facility: CLINIC | Age: 73
End: 2023-01-03

## 2023-01-03 DIAGNOSIS — K21.00 GASTROESOPHAGEAL REFLUX DISEASE WITH ESOPHAGITIS WITHOUT HEMORRHAGE: ICD-10-CM

## 2023-01-03 DIAGNOSIS — F33.42 RECURRENT MAJOR DEPRESSIVE DISORDER, IN FULL REMISSION (H): ICD-10-CM

## 2023-01-03 NOTE — TELEPHONE ENCOUNTER
"Requested Prescriptions   Pending Prescriptions Disp Refills     FLUoxetine (PROZAC) 20 MG capsule 270 capsule 3     Sig: Take 3 capsules (60 mg) by mouth daily       [facility] Protocol Passed - 1/3/2023 10:52 AM        Passed - PHQ-9 score less than 5 in past 6 months     Please review last PHQ-9 score.           Passed - Medication is active on med list        Passed - Patient is age 18 or older        Passed - No active pregnancy on record        Passed - No positive pregnancy test in last 12 months        Passed - Recent (6 mo) or future (30 days) visit within the authorizing provider's specialty     Patient had office visit in the last 6 months or has a visit in the next 30 days with authorizing provider or within the authorizing provider's specialty.  See \"Patient Info\" tab in inbasket, or \"Choose Columns\" in Meds & Orders section of the refill encounter.               omeprazole (PRILOSEC) 20 MG DR capsule 90 capsule 3     Si capsule M, W, F       PPI Protocol Passed - 1/3/2023 10:52 AM        Passed - Not on Clopidogrel (unless Pantoprazole ordered)        Passed - No diagnosis of osteoporosis on record        Passed - Recent (12 mo) or future (30 days) visit within the authorizing provider's specialty     Patient has had an office visit with the authorizing provider or a provider within the authorizing providers department within the previous 12 mos or has a future within next 30 days. See \"Patient Info\" tab in inbasket, or \"Choose Columns\" in Meds & Orders section of the refill encounter.              Passed - Medication is active on med list        Passed - Patient is age 18 or older        Passed - No active pregnacy on record        Passed - No positive pregnancy test in past 12 months               Patient was seen 10/10/22 by PCP, see plan:   Instructions       Return in about 6 months (around 4/10/2023) for med recheck.      PHQ-9 score:    PHQ 10/10/2022   PHQ-9 Total Score 3   Q9: Thoughts of " better off dead/self-harm past 2 weeks Not at all       Prescriptions approved per [facility] Refill Protocol.    Bee Kmible RN  [facility]

## 2023-01-16 ENCOUNTER — MYC MEDICAL ADVICE (OUTPATIENT)
Dept: FAMILY MEDICINE | Facility: CLINIC | Age: 73
End: 2023-01-16
Payer: MEDICARE

## 2023-01-16 DIAGNOSIS — E78.5 HYPERLIPIDEMIA LDL GOAL <130: ICD-10-CM

## 2023-01-17 RX ORDER — ATORVASTATIN CALCIUM 20 MG/1
20 TABLET, FILM COATED ORAL DAILY
Qty: 90 TABLET | Refills: 1 | Status: SHIPPED | OUTPATIENT
Start: 2023-01-17 | End: 2023-07-13

## 2023-02-01 NOTE — TELEPHONE ENCOUNTER
Also, my eczema has been flaring a little so need new RX for TCN oint.  If I can get two 80gm tubes at a time it will last me 2-3 months so will only need 3 refills.  [facility] 15 gm [facility]  [place] [facility] is fine.         Thanks.  See you in Oct!             Ramonita      Note above is copied out of Mychart request, erin'd additional encounter as different pharmacy wanted for other meds.    Routed to refill pool to address in protocol.    Bee Kimble, RN  [facility]      
Prescription approved per [facility] Refill Protocol.  Debora Potter RN    
.

## 2023-02-03 ENCOUNTER — MYC MEDICAL ADVICE (OUTPATIENT)
Dept: ENDOCRINOLOGY | Facility: CLINIC | Age: 73
End: 2023-02-03
Payer: MEDICARE

## 2023-02-03 DIAGNOSIS — C73 PAPILLARY THYROID CARCINOMA (H): Chronic | ICD-10-CM

## 2023-02-03 DIAGNOSIS — E89.0 POSTSURGICAL HYPOTHYROIDISM: Chronic | ICD-10-CM

## 2023-02-03 RX ORDER — LEVOTHYROXINE SODIUM 100 UG/1
TABLET ORAL
Qty: 90 TABLET | Refills: 4 | Status: SHIPPED | OUTPATIENT
Start: 2023-02-03 | End: 2024-01-09

## 2023-03-17 ENCOUNTER — ANCILLARY PROCEDURE (OUTPATIENT)
Dept: MAMMOGRAPHY | Facility: CLINIC | Age: 73
End: 2023-03-17
Attending: PHYSICIAN ASSISTANT
Payer: MEDICARE

## 2023-03-17 DIAGNOSIS — Z12.31 VISIT FOR SCREENING MAMMOGRAM: ICD-10-CM

## 2023-03-17 PROCEDURE — 77067 SCR MAMMO BI INCL CAD: CPT | Mod: TC | Performed by: RADIOLOGY

## 2023-03-17 PROCEDURE — 77063 BREAST TOMOSYNTHESIS BI: CPT | Mod: TC | Performed by: RADIOLOGY

## 2023-06-28 ENCOUNTER — LAB (OUTPATIENT)
Dept: LAB | Facility: CLINIC | Age: 73
End: 2023-06-28
Payer: MEDICARE

## 2023-06-28 ENCOUNTER — OFFICE VISIT (OUTPATIENT)
Dept: ENDOCRINOLOGY | Facility: CLINIC | Age: 73
End: 2023-06-28
Payer: MEDICARE

## 2023-06-28 VITALS
BODY MASS INDEX: 28.53 KG/M2 | WEIGHT: 156 LBS | HEART RATE: 83 BPM | DIASTOLIC BLOOD PRESSURE: 80 MMHG | OXYGEN SATURATION: 96 % | SYSTOLIC BLOOD PRESSURE: 119 MMHG

## 2023-06-28 DIAGNOSIS — Z78.0 POST-MENOPAUSAL: ICD-10-CM

## 2023-06-28 DIAGNOSIS — E89.0 POSTSURGICAL HYPOTHYROIDISM: Primary | ICD-10-CM

## 2023-06-28 DIAGNOSIS — C73 PAPILLARY THYROID CARCINOMA (H): ICD-10-CM

## 2023-06-28 DIAGNOSIS — E89.0 POSTSURGICAL HYPOTHYROIDISM: ICD-10-CM

## 2023-06-28 LAB
T4 FREE SERPL-MCNC: 1.21 NG/DL (ref 0.9–1.7)
TSH SERPL DL<=0.005 MIU/L-ACNC: 1.31 UIU/ML (ref 0.3–4.2)

## 2023-06-28 PROCEDURE — 36415 COLL VENOUS BLD VENIPUNCTURE: CPT | Performed by: PATHOLOGY

## 2023-06-28 PROCEDURE — 86800 THYROGLOBULIN ANTIBODY: CPT

## 2023-06-28 PROCEDURE — 84439 ASSAY OF FREE THYROXINE: CPT | Performed by: PATHOLOGY

## 2023-06-28 PROCEDURE — 99214 OFFICE O/P EST MOD 30 MIN: CPT

## 2023-06-28 PROCEDURE — 84443 ASSAY THYROID STIM HORMONE: CPT | Performed by: PATHOLOGY

## 2023-06-28 ASSESSMENT — PAIN SCALES - GENERAL: PAINLEVEL: MODERATE PAIN (5)

## 2023-06-28 NOTE — LETTER
"6/28/2023       RE: Marcela Allen  [address] [facility] [place] [address]     Dear Colleague,    Thank you for referring your patient, Marcela Allen, to the [facility] [place] at [facility]. Please see a copy of my visit note below.    Endocrinology  note    Assessment   1.  Papillary thyroid cancer, bilateral, multifocal, no nodes removed. She has been treated with thyroidectomy in 2 procedures.  She has had 29 mCi 131I  in 2000.   We don't have TSH stimulated Tg data on her.   Yearly Labs today to include  Tg ---  RTC 1 year  Next neck US 2027    2.  Hypothyroidism due to thyroidectomy -She is likely to be cured.  Target TSH < 1.  On LT4 100 * 6/week (mean 86 mcg/day)  We will try to lighten the LT4 dose and increase the TSH target after review of labs from today    post menopausal -  Her bone risk factors include TSH suppression therapy, past smoking and post menopausal state. Past alendronate.     Next Bone density [facility]  6/20/2024 or after -- ideally this would be before your 2024 appt with me    Chart review/prep time [id number]  39__ minutes spent on the date of the encounter doing chart review, history and exam, documentation and further activities as noted above.    Geovanna Chatterjee MD.    JESUS Shipman  presents for follow up of thyroid cancer and post surgical  hypothyroidism.   I last saw her 6/2022. At that time she was on 100 * 6/week (86 mcg/day).     She was diagnosed with PCT  in 2000 after she had partial thyroid surgery.   One week later she had completion thyroidectomy.  Both operations were performed at [facility] by Dr Antonina Peralta.  See my 1/28/09 note for the operative reports.  Primary tumor size is not specified in the path reports, multifocal, bilateral..\" She got 29.3 mCi 131I, 5/30/00) and one year later she had a TBS at [facility], which was negative.    I have reviewed all available tumor marker " "data to date:  5/12/00: .72, ROSARIO in lab  5/18/00: 5.16 mCi 131I TBS: \"intense activity in the thyroid bed.  No evidence of activity outside of the neck\"  5/30/00: 29.3 mCi 131I ([facility])  8/7/00: TSH 48.26, free T4 0.87  8/24/01 4.03 mCi 131I TBS ([facility]): negative study  6/6/04: TSH 3.10,  Tg < 0.3 ([facility]), ROSARIO < 1  10/6/04: ROSARIO < 2 IU/ml, free T4 1.2, TSH 16.07  3/26/08: Tg 0.18, ROSARIO < 1, TSH 0.02  1/28/09: Tg < 0.1, ROSARIO < 0.4, TSH 0.01   11/17/09: Tg < 0.1, ROSARIO < 0.4, TSH 0.02  US guided FNAB of left level 2 LN in 12/09, with benign cytology.  Needle wash Tg was < 0.1.   2/23/10: Tg < 0.1, ROSARIO < 0.4, TSH 0.04  5/18/10: Tg < 0.1, ROSARIO < 0.4, TSH 0.01  6/1/11: Tg < 0.1, ROSARIO < 0.4, TSH 0.06  4/30/13: Tg < 0.1, ROSARIO < 0.4, TSH 0.03  10/23/13: Tg < 0.1, ROSARIO < 0.4, TSH 0.1  5/7/14: Tg <0.1, ROSARIO < 0.4, TSH   6/8/15: Tg < 0.1, ROSARIO < 0.4, TSH  8/2/16: Tg < 0.1, ROSARIO < 0.4,  TSH 0.09- after this we reduced LT4 dose by 1/2 tablet per week  4/3/17: TSH 0.49  11/6/17: Tg < 0.1, ROSARIO < 0.4, TSH 0.6, free T4 1.10  11/6/18; Tg < 0.1, ROSARIO < 0.4, TSH 0.06, free T4 1.l  10/10/19: TSH 0.22, free T4 1.19 , Tg not done  11/5/19: Tg < 0.1, ROSARIO < 0.4 -     5/3/2020 TSH 0.33, free T4 1.27  1/6/2021 Tg < 0.1, ROSARIO < 0.4, TSH 0.29, free T4 0.99  6/20/2022 Tg < 0.1, ROSARIO < 0.4, TSH 0.21, free T4 1.22    I have reviewed images on PACS   7/13  CXR.  normal   6/13/2022 neck US compared with 10/19/19 , 5/7/14:   Right level 4 0.8 x 0.4 x 0.7 cm   Left level 6 carotid sheath 0.8 x 0.4 x 1 was 0.9 x 0.4 x 1.1  (was 0.8 x 0.4 x 1.2 cm; was  1 x 0.5 x 1.1 2011)  Left level 4 # 1 not seen  0.4 x 0.4 x 0.6 cm very superficial  6/20/2022 DXA lowest T-score -0.6 right femoral neck;  No change hips compared with 2018. L apine increased from 1.341 to 1.387     ROS     Height was not meausred today    Answers for HPI/ROS submitted by the patient on 6/25/2023  General Symptoms: No  Skin Symptoms: No  HENT Symptoms: No  EYE SYMPTOMS: No  HEART SYMPTOMS: " No- SVT here and there;   LUNG SYMPTOMS: No  INTESTINAL SYMPTOMS: No-- constipatoin .    URINARY SYMPTOMS: No  GYNECOLOGIC SYMPTOMS: No  BREAST SYMPTOMS: No  SKELETAL SYMPTOMS: No  BLOOD SYMPTOMS: No  NERVOUS SYSTEM SYMPTOMS: No  MENTAL HEALTH SYMPTOMS: No      PMH   Past Medical History:   Diagnosis Date    Atrial tachycardia, paroxysmal (H) 03/29/2021    BULMARO II (cervical intraepithelial neoplasia II) 01/01/2007    on colp - leep path WNL    COPD (chronic obstructive pulmonary disease) (H)     Depression 01/01/1990    Depressive disorder 25 yrs    Eczema     GERD (gastroesophageal reflux disease)     Hiatal hernia     History of blood transfusion 01/01/2013    Post op    HTN (hypertension)     Hypercholesterolemia     Infection due to 2019 novel coronavirus 03/2022    Liver hemangioma 09/04/2012    OA (osteoarthritis)     Osteopenia     Papillary thyroid carcinoma (H) 01/01/2000    BL, MF; Tx in 2 operations, RRA    PONV (postoperative nausea and vomiting)     Postsurgical hypothyroidism 01/01/2000    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   cryotherapy times two     eczema and contact dermatitis.  Chronic neck pain    Arnold-Chiari malformation.    Past Surgical History:   Procedure Laterality Date    ABDOMEN SURGERY      Teenager    APPENDECTOMY OPEN  01/01/1968    ARTHROPLASTY MINIMALLY INVASIVE KNEE BILATERAL  06/27/2013    Procedure: ARTHROPLASTY MINIMALLY INVASIVE KNEE BILATERAL;  Minimally Invasive Bilateral Total Knee Arthroplasty;  Surgeon: Christophe Welch MD;  [facility]    BALLOON COMPRESSION RHIZOTOMY      by spine specialist    BIOPSY  Ongoing    Lymph nodes neck    BONE MARROW ASPIRATION ONLY Left 12/07/2015    Procedure: BONE MARROW ASPIRATION ONLY;  Surgeon: Aguilar Roldan MD;  Location: [facility] OR    CARPAL TUNNEL RELEASE RT/LT      COLONOSCOPY  01/01/2007    neg    COLPOSCOPY CERVIX, BIOPSY  CERVIX, ENDOCERVICAL CURETTAGE, COMBINED  2003, 2007    completion thyroidectomy Right 03/14/2000    CYSTECTOMY OVARIAN BENIGN  01/01/1969    ESOPHAGOSCOPY, GASTROSCOPY, DUODENOSCOPY (EGD), COMBINED N/A 08/28/2014    Procedure: COMBINED ESOPHAGOSCOPY, GASTROSCOPY, DUODENOSCOPY (EGD), BIOPSY SINGLE OR MULTIPLE;  Surgeon: Kelvin Montgomery MD;  Location: [facility]    FUSION CERVICAL ANTERIOR THREE+ LEVELS N/A 02/26/2015    C4-C7    FUSION CERVICAL ANTERIOR THREE+ LEVELS WITH BONE ALLOGRAFT N/A 12/07/2015    Procedure: FUSION CERVICAL ANTERIOR THREE+ LEVELS WITH BONE ALLOGRAFT;  Surgeon: Aguilar Roldan MD;  Location: [facility] OR    HAND SURGERY      HEAD & NECK SURGERY  01/01/2000    Thyroidectomy    KNEE SURGERY  2001 & 2005    bilat    LAPAROSCOPIC CHOLECYSTECTOMY  01/01/1998    LEEP TX, CERVICAL  01/01/2007    BULMARO II on colp, leep path WNL    left thyroid total lobectomy, isthmusectomy and 50% right thyroid lobectomy Left 03/07/2000    TUBAL LIGATION  01/01/1988    VULVECTOMY SIMPLE  01/01/2006    FELICIA 3, wide local excision x 2    VULVECTOMY SIMPLE       Finger ligament repair in 2000.  left arm ligament repair.    Current Outpatient Medications   Medication Sig Dispense Refill    aspirin 81 MG tablet Take 81 mg by mouth daily       atorvastatin (LIPITOR) 20 MG tablet Take 1 tablet (20 mg) by mouth daily 90 tablet 1    CALCIUM CITRATE PO Take 1,200 mg by mouth daily       cetirizine (ZYRTEC) 10 MG tablet Take 1 tablet (10 mg) by mouth daily 90 tablet 1    Cholecalciferol (VITAMIN D) 1000 UNIT capsule Take 1 capsule by mouth daily.      FLUoxetine (PROZAC) 20 MG capsule Take 3 capsules (60 mg) by mouth daily 270 capsule 2    hypromellose (ARTIFICIAL TEARS) 0.5 % SOLN Place 1 drop into both eyes every 4 hours as needed       levothyroxine (SYNTHROID/LEVOTHROID) 100 MCG tablet Monday-Saturday: 1 tablet/day,  Sunday: skip 90 tablet 4    magnesium oxide (MAG-OX) 400 (240 Mg) MG tablet Take 400 mg by mouth daily       multivitamin w/minerals (THERA-VIT-M) tablet Take 1 tablet by mouth daily      omeprazole (PRILOSEC) 20 MG DR capsule 1 capsule M, W, F 90 capsule 2    sennosides (SENOKOT) 8.6 MG tablet Take 2 tablets by mouth daily       sodium fluoride dental gel (PREVIDENT) 1.1 % GEL topical gel Apply to affected area At Bedtime 100 mL 3    triamcinolone (KENALOG) 0.1 % external ointment Apply to affected area of the skin twice a day after using a moisturizer. Can do wet wraps with this. 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  .    NECK: supple.  No visible or palpable neck masses, cervical adenopathy.   LUNGS: clear to auscultation bilaterally.   CARDIAC: RRR, S1, S2 without murmurs, rubs or gallops.    BACK: normal spinal contour.    NEURO: Alert, responds appropriately to questions,  moves all extremities, DTRs 0/4, gait normal, no tremor of the outstretched hand    DATA REVIEW:     Latest Ref Rng 1/18/2021  10:11 AM 6/20/2022  8:52 AM   ENDO THYROID LABS-[facility]      TSH 0.40 - 4.00 mU/L 0.29 (L)  0.21 (L)    Triiodothyronine (T3) 60 - 181 ng/dL     FREE T4 0.76 - 1.46 ng/dL 0.99  1.22       Legend:  (L) Low  EXAM: Ultrasound soft tissue  neck on 6/13/2022     COMPARISON: 10/29/2019     HISTORY: Postsurgical hypothyroidism, papillary thyroid carcinoma.     FINDINGS: Lymph nodes are measured bilaterally with measurements given  in craniocaudal, transverse and AP dimensions as follows:     Right:     Level 2: Benign-appearing lymph nodes measuring 7 x 5 x 8 mm and 16 x  4 x 14 mm  Level 3: No suspicious lymph nodes  Level 4: No suspicious lymph nodes.  Level 5: No suspicious lymph nodes.  Level 6: No suspicious lymph nodes.  Level 7: No suspicious lymph nodes.     Left:  Level 2: Benign-appearing lymph node measuring 11 x 4 x 8 mm.  Level 3: No suspicious lymph nodes.  Level 4: No suspicious lymph nodes.  Level 5: No suspicious lymph nodes.  Level 6: Benign-appearing lymph node measuring 8 x 4 x 10 mm.  Level 7: No suspicious nodes.                                                                      IMPRESSION: Soft tissue neck ultrasound with lymph node measurements  as described above. No suspicious cervical lymph nodes.     I have personally reviewed the examination and initial interpretation  and I agree with the findings.     NAY HERNANDEZ, DO

## 2023-06-28 NOTE — PATIENT INSTRUCTIONS
Yearly labs now and in one year about 2 weeks prior to next appt    Next Bone density [facility]  6/20/2024 or after -- ideally this would be before your 2024 appt with me    Next neck ultrasound 2027      See me approximately yearly

## 2023-06-28 NOTE — PROGRESS NOTES
"Endocrinology  note    Assessment   1.  Papillary thyroid cancer, bilateral, multifocal, no nodes removed. She has been treated with thyroidectomy in 2 procedures.  She has had 29 mCi 131I  in 2000.   We don't have TSH stimulated Tg data on her.   Yearly Labs today to include  Tg ---  RTC 1 year  Next neck US 2027    2.  Hypothyroidism due to thyroidectomy -She is likely to be cured.  Target TSH < 1.  On LT4 100 * 6/week (mean 86 mcg/day)  We will try to lighten the LT4 dose and increase the TSH target after review of labs from today    Addendum  6/28/23 Tg < 0.1 ROSARIO < 0.4, TSH 1.31, free T4 1.21-- labs are already at target on current dose which may continue  6/21/24 Tg < 0.1, ROSARIO < 0.4, TSH 3.54, free T4 1.33.  On lT4 100 x 6/week.    6/24/24 Increase LT4 to 100 x 6.5/week     post menopausal -  Her bone risk factors include TSH suppression therapy, past smoking and post menopausal state. Past alendronate.     Next Bone density [facility]  6/20/2024 or after -- ideally this would be before your 2024 appt with me    Chart review/prep time [id number]  39__ minutes spent on the date of the encounter doing chart review, history and exam, documentation and further activities as noted above.    Geovanna Chatterjee MD.    JESUS Shipman  presents for follow up of thyroid cancer and post surgical  hypothyroidism.   I last saw her 6/2022. At that time she was on 100 * 6/week (86 mcg/day).     She was diagnosed with PCT  in 2000 after she had partial thyroid surgery.   One week later she had completion thyroidectomy.  Both operations were performed at [facility] by Dr Antonina Peralta.  See my 1/28/09 note for the operative reports.  Primary tumor size is not specified in the path reports, multifocal, bilateral..\" She got 29.3 mCi 131I, 5/30/00) and one year later she had a TBS at [facility], which was negative.    I have reviewed all available tumor marker data to date:  5/12/00: .72, ROSARIO in lab  5/18/00: 5.16 mCi 131I " "TBS: \"intense activity in the thyroid bed.  No evidence of activity outside of the neck\"  5/30/00: 29.3 mCi 131I ([facility])  8/7/00: TSH 48.26, free T4 0.87  8/24/01 4.03 mCi 131I TBS ([facility]): negative study  6/6/04: TSH 3.10,  Tg < 0.3 ([facility]), ROSARIO < 1  10/6/04: ROSARIO < 2 IU/ml, free T4 1.2, TSH 16.07  3/26/08: Tg 0.18, ROSARIO < 1, TSH 0.02  1/28/09: Tg < 0.1, ROSARIO < 0.4, TSH 0.01   11/17/09: Tg < 0.1, ROSARIO < 0.4, TSH 0.02  US guided FNAB of left level 2 LN in 12/09, with benign cytology.  Needle wash Tg was < 0.1.   2/23/10: Tg < 0.1, ROSARIO < 0.4, TSH 0.04  5/18/10: Tg < 0.1, ROSARIO < 0.4, TSH 0.01  6/1/11: Tg < 0.1, ROSARIO < 0.4, TSH 0.06  4/30/13: Tg < 0.1, ROSARIO < 0.4, TSH 0.03  10/23/13: Tg < 0.1, ROSARIO < 0.4, TSH 0.1  5/7/14: Tg <0.1, ROSARIO < 0.4, TSH   6/8/15: Tg < 0.1, ROSARIO < 0.4, TSH  8/2/16: Tg < 0.1, ROSARIO < 0.4,  TSH 0.09- after this we reduced LT4 dose by 1/2 tablet per week  4/3/17: TSH 0.49  11/6/17: Tg < 0.1, ROSARIO < 0.4, TSH 0.6, free T4 1.10  11/6/18; Tg < 0.1, ROSARIO < 0.4, TSH 0.06, free T4 1.l  10/10/19: TSH 0.22, free T4 1.19 , Tg not done  11/5/19: Tg < 0.1, ROSARIO < 0.4 -     5/3/2020 TSH 0.33, free T4 1.27  1/6/2021 Tg < 0.1, ROSARIO < 0.4, TSH 0.29, free T4 0.99  6/20/2022 Tg < 0.1, ROSARIO < 0.4, TSH 0.21, free T4 1.22    I have reviewed images on PACS   7/13  CXR.  normal   6/13/2022 neck US compared with 10/19/19 , 5/7/14:   Right level 4 0.8 x 0.4 x 0.7 cm   Left level 6 carotid sheath 0.8 x 0.4 x 1 was 0.9 x 0.4 x 1.1  (was 0.8 x 0.4 x 1.2 cm; was  1 x 0.5 x 1.1 2011)  Left level 4 # 1 not seen  0.4 x 0.4 x 0.6 cm very superficial  6/20/2022 DXA lowest T-score -0.6 right femoral neck;  No change hips compared with 2018. L apine increased from 1.341 to 1.387     ROS     Height was not meausred today    Answers for HPI/ROS submitted by the patient on 6/25/2023  General Symptoms: No  Skin Symptoms: No  HENT Symptoms: No  EYE SYMPTOMS: No  HEART SYMPTOMS: No- SVT here and there;   LUNG SYMPTOMS: No  INTESTINAL SYMPTOMS: No-- " constipatoin .    URINARY SYMPTOMS: No  GYNECOLOGIC SYMPTOMS: No  BREAST SYMPTOMS: No  SKELETAL SYMPTOMS: No  BLOOD SYMPTOMS: No  NERVOUS SYSTEM SYMPTOMS: No  MENTAL HEALTH SYMPTOMS: No      PMH   Past Medical History:   Diagnosis Date    Atrial tachycardia, paroxysmal (H) 03/29/2021    BULMARO II (cervical intraepithelial neoplasia II) 01/01/2007    on colp - leep path WNL    COPD (chronic obstructive pulmonary disease) (H)     Depression 01/01/1990    Depressive disorder 25 yrs    Eczema     GERD (gastroesophageal reflux disease)     Hiatal hernia     History of blood transfusion 01/01/2013    Post op    HTN (hypertension)     Hypercholesterolemia     Infection due to 2019 novel coronavirus 03/2022    Liver hemangioma 09/04/2012    OA (osteoarthritis)     Osteopenia     Papillary thyroid carcinoma (H) 01/01/2000    BL, MF; Tx in 2 operations, RRA    PONV (postoperative nausea and vomiting)     Postsurgical hypothyroidism 01/01/2000    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   cryotherapy times two     eczema and contact dermatitis.  Chronic neck pain    Arnold-Chiari malformation.    Past Surgical History:   Procedure Laterality Date    ABDOMEN SURGERY      Teenager    APPENDECTOMY OPEN  01/01/1968    ARTHROPLASTY MINIMALLY INVASIVE KNEE BILATERAL  06/27/2013    Procedure: ARTHROPLASTY MINIMALLY INVASIVE KNEE BILATERAL;  Minimally Invasive Bilateral Total Knee Arthroplasty;  Surgeon: Christophe Welch MD;  [facility]    BALLOON COMPRESSION RHIZOTOMY      by spine specialist    BIOPSY  Ongoing    Lymph nodes neck    BONE MARROW ASPIRATION ONLY Left 12/07/2015    Procedure: BONE MARROW ASPIRATION ONLY;  Surgeon: Aguilar Roldan MD;  Location: [facility] OR    CARPAL TUNNEL RELEASE RT/LT      COLONOSCOPY  01/01/2007    neg    COLPOSCOPY CERVIX, BIOPSY CERVIX, ENDOCERVICAL CURETTAGE, COMBINED  2003, 2007    completion  thyroidectomy Right 03/14/2000    CYSTECTOMY OVARIAN BENIGN  01/01/1969    ESOPHAGOSCOPY, GASTROSCOPY, DUODENOSCOPY (EGD), COMBINED N/A 08/28/2014    Procedure: COMBINED ESOPHAGOSCOPY, GASTROSCOPY, DUODENOSCOPY (EGD), BIOPSY SINGLE OR MULTIPLE;  Surgeon: Kelvin Montgomery MD;  Location: [facility]    FUSION CERVICAL ANTERIOR THREE+ LEVELS N/A 02/26/2015    C4-C7    FUSION CERVICAL ANTERIOR THREE+ LEVELS WITH BONE ALLOGRAFT N/A 12/07/2015    Procedure: FUSION CERVICAL ANTERIOR THREE+ LEVELS WITH BONE ALLOGRAFT;  Surgeon: Aguilar Roldan MD;  Location: [facility] OR    HAND SURGERY      HEAD & NECK SURGERY  01/01/2000    Thyroidectomy    KNEE SURGERY  2001 & 2005    bilat    LAPAROSCOPIC CHOLECYSTECTOMY  01/01/1998    LEEP TX, CERVICAL  01/01/2007    BULMARO II on colp, leep path WNL    left thyroid total lobectomy, isthmusectomy and 50% right thyroid lobectomy Left 03/07/2000    TUBAL LIGATION  01/01/1988    VULVECTOMY SIMPLE  01/01/2006    FELICIA 3, wide local excision x 2    VULVECTOMY SIMPLE       Finger ligament repair in 2000.  left arm ligament repair.    Current Outpatient Medications   Medication Sig Dispense Refill    aspirin 81 MG tablet Take 81 mg by mouth daily       atorvastatin (LIPITOR) 20 MG tablet Take 1 tablet (20 mg) by mouth daily 90 tablet 1    CALCIUM CITRATE PO Take 1,200 mg by mouth daily       cetirizine (ZYRTEC) 10 MG tablet Take 1 tablet (10 mg) by mouth daily 90 tablet 1    Cholecalciferol (VITAMIN D) 1000 UNIT capsule Take 1 capsule by mouth daily.      FLUoxetine (PROZAC) 20 MG capsule Take 3 capsules (60 mg) by mouth daily 270 capsule 2    hypromellose (ARTIFICIAL TEARS) 0.5 % SOLN Place 1 drop into both eyes every 4 hours as needed       levothyroxine (SYNTHROID/LEVOTHROID) 100 MCG tablet Monday-Saturday: 1 tablet/day,  Sunday: skip 90 tablet 4    magnesium oxide (MAG-OX) 400 (240 Mg) MG tablet Take 400 mg by mouth daily      multivitamin w/minerals (THERA-VIT-M) tablet Take 1 tablet by mouth  daily      omeprazole (PRILOSEC) 20 MG DR capsule 1 capsule M, W, F 90 capsule 2    sennosides (SENOKOT) 8.6 MG tablet Take 2 tablets by mouth daily       sodium fluoride dental gel (PREVIDENT) 1.1 % GEL topical gel Apply to affected area At Bedtime 100 mL 3    triamcinolone (KENALOG) 0.1 % external ointment Apply to affected area of the skin twice a day after using a moisturizer. Can do wet wraps with this. 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  .    NECK: supple.  No visible or palpable neck masses, cervical adenopathy.   LUNGS: clear to auscultation bilaterally.   CARDIAC: RRR, S1, S2 without murmurs, rubs or gallops.    BACK: normal spinal contour.    NEURO: Alert, responds appropriately to questions,  moves all extremities, DTRs 0/4, gait normal, no tremor of the outstretched hand    DATA REVIEW:     Latest Ref Rng 1/18/2021  10:11 AM 6/20/2022  8:52 AM   ENDO THYROID LABS-[facility]      TSH 0.40 - 4.00 mU/L 0.29 (L)  0.21 (L)    Triiodothyronine (T3) 60 - 181 ng/dL     FREE T4 0.76 - 1.46 ng/dL 0.99  1.22       Legend:  (L) Low  EXAM: Ultrasound soft tissue neck on 6/13/2022     COMPARISON: 10/29/2019     HISTORY: Postsurgical  hypothyroidism, papillary thyroid carcinoma.     FINDINGS: Lymph nodes are measured bilaterally with measurements given  in craniocaudal, transverse and AP dimensions as follows:     Right:     Level 2: Benign-appearing lymph nodes measuring 7 x 5 x 8 mm and 16 x  4 x 14 mm  Level 3: No suspicious lymph nodes  Level 4: No suspicious lymph nodes.  Level 5: No suspicious lymph nodes.  Level 6: No suspicious lymph nodes.  Level 7: No suspicious lymph nodes.     Left:  Level 2: Benign-appearing lymph node measuring 11 x 4 x 8 mm.  Level 3: No suspicious lymph nodes.  Level 4: No suspicious lymph nodes.  Level 5: No suspicious lymph nodes.  Level 6: Benign-appearing lymph node measuring 8 x 4 x 10 mm.  Level 7: No suspicious nodes.                                                                      IMPRESSION: Soft tissue neck ultrasound with lymph node measurements  as described above. No suspicious cervical lymph nodes.     I have personally reviewed the examination and initial interpretation  and I agree with the findings.     NAY HERNANDEZ, DO

## 2023-07-05 ENCOUNTER — NURSE TRIAGE (OUTPATIENT)
Dept: FAMILY MEDICINE | Facility: CLINIC | Age: 73
End: 2023-07-05
Payer: MEDICARE

## 2023-07-05 NOTE — TELEPHONE ENCOUNTER
Received priority call from central scheduling.   Patient reports left groin pain for the past month- thought she pulled a groin muscle but pain has gotten worse  Pain has moved into hip, buttock, and low back pain pain radiating down patients left leg at times.     Patient reports a low grade fever 6/25- 99.0   Patient reports she has not had fever since the 25th but woke up today with a fever of 100.4. Did not have tylenol but took Ibuprofen    Patient reports it is very painful to walk and laying down is very pain ful making it hard to sleep- pain with walking and laying down is 8-9/10.    Sitting currently with no movement pain is at a 3-4/10    Patient reports she has been unable to do her back stretches like normal due to pain caused when pulling leg up to do so.     History of knee replacements (L &R)    No recent Falls  No recent injuries  No recent surgeries  No swelling  No redness  Not painful to the touch   No abdominal pain   No SOB  No chest pain   Chronic cough- former smoker    RN advised patient she should be seen in ED need to rule out septic arthritis vs. another cause of pain due to increasing pain to severe and fever. RN advsied ED vs. UC or clinic visit due to labs needed and imaging resources. Patient reports the closest hospital to her is [facility]. RN advised that any ED is appropriate and if Care Everywhere is signed than records can be viewed by PCP for hospital/ED follow up visit once patient is assessed. Patient plans to present to [facility] for assessment. Patient verbalized understanding and all questions answered.     YARELIS Smith, RN  [facility] ~ Registered Nurse  Clinic Triage ~ [place] [facility] [place]  July 5, 2023    Reason for Disposition    SEVERE pain (e.g., excruciating, unable to do any normal activities) and fever    Additional Information    Negative: Looks like a broken bone or dislocated joint (e.g., crooked or deformed)    Negative: Sounds like a life-threatening  emergency to the triager    Protocols used: HIP PAIN-A-[place]

## 2023-07-06 LAB — SCANNED LAB RESULT: NORMAL

## 2023-07-13 ENCOUNTER — MYC MEDICAL ADVICE (OUTPATIENT)
Dept: FAMILY MEDICINE | Facility: CLINIC | Age: 73
End: 2023-07-13

## 2023-07-13 ENCOUNTER — OFFICE VISIT (OUTPATIENT)
Dept: FAMILY MEDICINE | Facility: CLINIC | Age: 73
End: 2023-07-13
Payer: MEDICARE

## 2023-07-13 VITALS
RESPIRATION RATE: 16 BRPM | HEART RATE: 90 BPM | WEIGHT: 153 LBS | OXYGEN SATURATION: 100 % | BODY MASS INDEX: 27.98 KG/M2 | TEMPERATURE: 99 F | DIASTOLIC BLOOD PRESSURE: 80 MMHG | SYSTOLIC BLOOD PRESSURE: 134 MMHG

## 2023-07-13 DIAGNOSIS — R52 BODY ACHES: ICD-10-CM

## 2023-07-13 DIAGNOSIS — R10.30 INGUINAL PAIN, UNSPECIFIED LATERALITY: ICD-10-CM

## 2023-07-13 DIAGNOSIS — R50.9 LOW GRADE FEVER: ICD-10-CM

## 2023-07-13 DIAGNOSIS — R11.0 NAUSEA: ICD-10-CM

## 2023-07-13 DIAGNOSIS — Z12.11 SCREENING FOR COLON CANCER: ICD-10-CM

## 2023-07-13 DIAGNOSIS — D75.839 THROMBOCYTOSIS: Primary | ICD-10-CM

## 2023-07-13 DIAGNOSIS — R79.9 ABNORMAL FINDING OF BLOOD CHEMISTRY, UNSPECIFIED: ICD-10-CM

## 2023-07-13 DIAGNOSIS — E78.5 HYPERLIPIDEMIA LDL GOAL <130: ICD-10-CM

## 2023-07-13 DIAGNOSIS — K29.50 CHRONIC GASTRITIS WITHOUT BLEEDING, UNSPECIFIED GASTRITIS TYPE: ICD-10-CM

## 2023-07-13 DIAGNOSIS — R63.4 UNINTENTIONAL WEIGHT LOSS: ICD-10-CM

## 2023-07-13 DIAGNOSIS — I10 HYPERTENSION GOAL BP (BLOOD PRESSURE) < 140/90: ICD-10-CM

## 2023-07-13 DIAGNOSIS — F33.42 RECURRENT MAJOR DEPRESSIVE DISORDER, IN FULL REMISSION (H): ICD-10-CM

## 2023-07-13 LAB
ALBUMIN SERPL BCG-MCNC: 3.8 G/DL (ref 3.5–5.2)
ALP SERPL-CCNC: 107 U/L (ref 35–104)
ALT SERPL W P-5'-P-CCNC: 21 U/L (ref 0–50)
ANION GAP SERPL CALCULATED.3IONS-SCNC: 12 MMOL/L (ref 7–15)
AST SERPL W P-5'-P-CCNC: 41 U/L (ref 0–45)
BASOPHILS # BLD AUTO: 0.1 10E3/UL (ref 0–0.2)
BASOPHILS NFR BLD AUTO: 1 %
BILIRUB SERPL-MCNC: 0.6 MG/DL
BUN SERPL-MCNC: 6 MG/DL (ref 8–23)
CALCIUM SERPL-MCNC: 9.2 MG/DL (ref 8.8–10.2)
CHLORIDE SERPL-SCNC: 99 MMOL/L (ref 98–107)
CREAT SERPL-MCNC: 0.74 MG/DL (ref 0.51–0.95)
DEPRECATED HCO3 PLAS-SCNC: 26 MMOL/L (ref 22–29)
EOSINOPHIL # BLD AUTO: 0.2 10E3/UL (ref 0–0.7)
EOSINOPHIL NFR BLD AUTO: 2 %
ERYTHROCYTE [DISTWIDTH] IN BLOOD BY AUTOMATED COUNT: 13.2 % (ref 10–15)
FERRITIN SERPL-MCNC: 195 NG/ML (ref 11–328)
GFR SERPL CREATININE-BSD FRML MDRD: 86 ML/MIN/1.73M2
GLUCOSE SERPL-MCNC: 98 MG/DL (ref 70–99)
HCT VFR BLD AUTO: 35.9 % (ref 35–47)
HGB BLD-MCNC: 12.3 G/DL (ref 11.7–15.7)
IMM GRANULOCYTES # BLD: 0.1 10E3/UL
IMM GRANULOCYTES NFR BLD: 0 %
IRON BINDING CAPACITY (ROCHE): 217 UG/DL (ref 240–430)
IRON SATN MFR SERPL: 12 % (ref 15–46)
IRON SERPL-MCNC: 25 UG/DL (ref 37–145)
LYMPHOCYTES # BLD AUTO: 1.7 10E3/UL (ref 0.8–5.3)
LYMPHOCYTES NFR BLD AUTO: 15 %
MCH RBC QN AUTO: 28.7 PG (ref 26.5–33)
MCHC RBC AUTO-ENTMCNC: 34.3 G/DL (ref 31.5–36.5)
MCV RBC AUTO: 84 FL (ref 78–100)
MONOCYTES # BLD AUTO: 0.7 10E3/UL (ref 0–1.3)
MONOCYTES NFR BLD AUTO: 6 %
NEUTROPHILS # BLD AUTO: 8.5 10E3/UL (ref 1.6–8.3)
NEUTROPHILS NFR BLD AUTO: 76 %
PLATELET # BLD AUTO: 524 10E3/UL (ref 150–450)
POTASSIUM SERPL-SCNC: 3.9 MMOL/L (ref 3.4–5.3)
PROT SERPL-MCNC: 7.6 G/DL (ref 6.4–8.3)
RBC # BLD AUTO: 4.28 10E6/UL (ref 3.8–5.2)
SODIUM SERPL-SCNC: 137 MMOL/L (ref 136–145)
VIT B12 SERPL-MCNC: 918 PG/ML (ref 232–1245)
WBC # BLD AUTO: 11.2 10E3/UL (ref 4–11)

## 2023-07-13 PROCEDURE — 83540 ASSAY OF IRON: CPT | Performed by: PHYSICIAN ASSISTANT

## 2023-07-13 PROCEDURE — 99215 OFFICE O/P EST HI 40 MIN: CPT | Performed by: PHYSICIAN ASSISTANT

## 2023-07-13 PROCEDURE — 83550 IRON BINDING TEST: CPT | Performed by: PHYSICIAN ASSISTANT

## 2023-07-13 PROCEDURE — 86618 LYME DISEASE ANTIBODY: CPT | Performed by: PHYSICIAN ASSISTANT

## 2023-07-13 PROCEDURE — 82728 ASSAY OF FERRITIN: CPT | Performed by: PHYSICIAN ASSISTANT

## 2023-07-13 PROCEDURE — 80053 COMPREHEN METABOLIC PANEL: CPT | Performed by: PHYSICIAN ASSISTANT

## 2023-07-13 PROCEDURE — 85025 COMPLETE CBC W/AUTO DIFF WBC: CPT | Performed by: PHYSICIAN ASSISTANT

## 2023-07-13 PROCEDURE — 82607 VITAMIN B-12: CPT | Performed by: PHYSICIAN ASSISTANT

## 2023-07-13 PROCEDURE — 36415 COLL VENOUS BLD VENIPUNCTURE: CPT | Performed by: PHYSICIAN ASSISTANT

## 2023-07-13 RX ORDER — ATORVASTATIN CALCIUM 20 MG/1
20 TABLET, FILM COATED ORAL DAILY
Qty: 90 TABLET | Refills: 1 | Status: SHIPPED | OUTPATIENT
Start: 2023-07-13 | End: 2023-10-12

## 2023-07-13 RX ORDER — ATORVASTATIN CALCIUM 20 MG/1
20 TABLET, FILM COATED ORAL DAILY
Qty: 90 TABLET | Refills: 1 | Status: SHIPPED | OUTPATIENT
Start: 2023-07-13 | End: 2023-07-13

## 2023-07-13 RX ORDER — TRIAMTERENE/HYDROCHLOROTHIAZID 37.5-25 MG
0.5 TABLET ORAL DAILY
Qty: 45 TABLET | Refills: 1 | Status: SHIPPED | OUTPATIENT
Start: 2023-07-13 | End: 2023-10-12

## 2023-07-13 RX ORDER — TRIAMTERENE/HYDROCHLOROTHIAZID 37.5-25 MG
0.5 TABLET ORAL DAILY
Qty: 45 TABLET | Refills: 1 | Status: SHIPPED | OUTPATIENT
Start: 2023-07-13 | End: 2023-07-13

## 2023-07-13 ASSESSMENT — PATIENT HEALTH QUESTIONNAIRE - PHQ9
SUM OF ALL RESPONSES TO PHQ QUESTIONS 1-9: 5
SUM OF ALL RESPONSES TO PHQ QUESTIONS 1-9: 5

## 2023-07-13 NOTE — PROGRESS NOTES
Assessment & Plan     Thrombocytosis  For your elevated platelets, I suggest we repeat this along with a peripheral blood smear (and I added some iron levels).   This could be high due to an infection, inflammation or a possible malignancy.  Further testing is needed.  I also suggest scheduling with a hematologist.  [facility] will call you to coordinate your care as prescribed by the provider.  If you don t hear from a representative within 2 business days, please call [phone].   - CBC with platelets and differential; Future  - Lab Blood Morphology Pathologist Review; Future  - Adult GI  Referral - Procedure Only; Future  - Adult Oncology/Hematology  Referral; Future    Low grade fever  May be secondary to a infectious process (lymes screen checked).  She has a mild cough (which is chronic), have considered pneumonia/bronchitis however lungs are clear and oxygen level normal.    May be due to an h pylori infection (as she does have GI symptoms as well).    May be due to a malignancy (such as a blood cancer potentially given the abnormal platelets).  Further evaluation indicated and referral placed.   - Adult GI  Referral - Consult Only; Future  - Adult GI  Referral - Procedure Only; Future  - Adult Oncology/Hematology  Referral; Future    Nausea  I recommend f/u with GI, and she also needs an EGD and colonoscopy.   - Adult GI  Referral - Consult Only; Future  - Adult GI  Referral - Procedure Only; Future  - Adult Oncology/Hematology  Referral; Future    Unintentional weight loss  Further work up indicated.  Likely due to the GI symptoms  - CBC with platelets and differential  - Comprehensive metabolic panel (BMP + Alb, Alk Phos, ALT, AST, Total. Bili, TP)  - Adult GI  Referral - Consult Only; Future  - Iron and iron binding capacity  - Ferritin  - Adult GI  Referral - Procedure Only; Future  - Adult  Oncology/Hematology  Referral; Future    Inguinal pain, unspecified laterality  Has f/u with [facility] in a few days.  Recommend physical therapy/stretching.  She did have a CT scan of the abdomen/pelvis in ER and essentially normal.   - Adult Oncology/Hematology  Referral; Future    Body aches  Will check lymes.   - Lyme Disease Total Abs Bld with Reflex to Confirm CLIA    Recurrent major depressive disorder, in full remission (H)  Stable.   - FLUoxetine (PROZAC) 20 MG capsule; Take 3 capsules (60 mg) by mouth daily    Hyperlipidemia LDL goal <130  Stable.   - atorvastatin (LIPITOR) 20 MG tablet; Take 1 tablet (20 mg) by mouth daily    Hypertension goal BP (blood pressure) < 140/90  Stable.   - triamterene-HCTZ (MAXZIDE-25) 37.5-25 MG tablet; Take 0.5 tablets by mouth daily    Abnormal finding of blood chemistry, unspecified  Due for further testing.   - Iron and iron binding capacity  - Ferritin    Chronic gastritis without bleeding, unspecified gastritis type  Due to check an EGD with h pylori   - Adult GI  Referral - Procedure Only; Future    Screening for colon cancer  Due for screening colonoscopy.   - Adult GI  Referral - Procedure Only; Future      60 minutes spent by me on the date of the encounter doing chart review, history and exam, documentation and further activities per the note     MED REC REQUIRED  Post Medication Reconciliation Status: discharge medications reconciled, continue medications without change  CONSULTATION/REFERRAL to GI and hematology.    Also plans to f/u with [facility] (prefers to see ortho prior to starting physical therapy)     Honey García PA-C  [employer] MAEGAN Shipman is a 72 year old, presenting for the following health issues:  ER F/U         No data to display              HPI     Has had fever since 06/25/23.     ED/UC Followup:    Facility:  [facility]  Date of visit: 07/05/23  Reason for visit:  Groin pain and fever.   Current Status: Patient still having groin pain(which radiates into lower back and left leg), fatigue and fevers. No muscle spasms, fever usually starts at night. Last night temperature was: 101.1. Takes otc tylenol and ibuprofen as needed for fever and pain, trying to reduce taking ibuprofen.     Left groin pain that radiates to the knee in the front.  Worse when laying down.  No recent injury.   No numbness or tingling.  No pain with urination.  No vaginal irritation or rash.     She did pull her a wood tick off her a couple months ago.     Tested for covid x 2 negative.     Fever present for 3 weeks.  She woke up June 25th at 11 pm with severe chills 101.2 degrees.  Low grade since that time.  When fever comes, the groin pain worsens.   101.2 degrees before bed last night.    She has a mild cough (has a chronic cough).     Losing weight unintentionally.  Decreased appetite.  She has had GI issues for the past 5 years, has not seen GI specialist.  If she eats too much, she feels pain and bloating.   Continues to have GERD symptoms and she feels awful after eating.   Was told she has a hiatal hernia.    No blood or black in the stools.  She does tend to be constipated, takes Senna.      Mom with thrombocytopenia.     Pain is greatest in lower left lumbar area.           Review of Systems   Constitutional, HEENT, cardiovascular, pulmonary, gi and gu systems are negative, except as otherwise noted.      Objective    /80 (BP Location: Left arm, Patient Position: Chair, Cuff Size: Adult Regular)   Pulse 90   Temp 99  F (37.2  C) (Tympanic)   Resp 16   Wt 69.4 kg (153 lb)   LMP  (LMP Unknown)   SpO2 100%   BMI 27.98 kg/m    Body mass index is 27.98 kg/m .  Physical Exam   GENERAL: healthy, alert and no distress  NECK: no adenopathy, no asymmetry, masses, or scars and thyroid normal to palpation  RESP: lungs clear to auscultation - no rales, rhonchi or wheezes  CV: regular rate and  rhythm, normal S1 S2, no S3 or S4, no murmur, click or rub, no peripheral edema and peripheral pulses strong  ABDOMEN: soft, nontender, no hepatosplenomegaly, no masses and bowel sounds normal  MS: no gross musculoskeletal defects noted, no edema  Musculoskeletal:  Antalgic gait.  Full ROM and 5/5 motor strength bilateral upper and lower extremities.  No kykphosis, lordosis and full ROM of lumbar spine.     Hip examination: painful ROM of left hip > right hip (with internal/external rotation and flexion).   No erythema, inflammation or warmth noted.            Results for orders placed or performed in visit on 07/13/23 (from the past 24 hour(s))   CBC with platelets and differential    Narrative    The following orders were created for panel order CBC with platelets and differential.  Procedure                               Abnormality         Status                     ---------                               -----------         ------                     CBC with platelets and d...[[id number]]  Abnormal            Final result                 Please view results for these tests on the individual orders.   Comprehensive metabolic panel (BMP + Alb, Alk Phos, ALT, AST, Total. Bili, TP)   Result Value Ref Range    Sodium 137 136 - 145 mmol/L    Potassium 3.9 3.4 - 5.3 mmol/L    Chloride 99 98 - 107 mmol/L    Carbon Dioxide (CO2) 26 22 - 29 mmol/L    Anion Gap 12 7 - 15 mmol/L    Urea Nitrogen 6.0 (L) 8.0 - 23.0 mg/dL    Creatinine 0.74 0.51 - 0.95 mg/dL    Calcium 9.2 8.8 - 10.2 mg/dL    Glucose 98 70 - 99 mg/dL    Alkaline Phosphatase 107 (H) 35 - 104 U/L    AST 41 0 - 45 U/L    ALT 21 0 - 50 U/L    Protein Total 7.6 6.4 - 8.3 g/dL    Albumin 3.8 3.5 - 5.2 g/dL    Bilirubin Total 0.6 <=1.2 mg/dL    GFR Estimate 86 >60 mL/min/1.73m2   CBC with platelets and differential   Result Value Ref Range    WBC Count 11.2 (H) 4.0 - 11.0 10e3/uL    RBC Count 4.28 3.80 - 5.20 10e6/uL    Hemoglobin 12.3 11.7 - 15.7 g/dL     Hematocrit 35.9 35.0 - 47.0 %    MCV 84 78 - 100 fL    MCH 28.7 26.5 - 33.0 pg    MCHC 34.3 31.5 - 36.5 g/dL    RDW 13.2 10.0 - 15.0 %    Platelet Count 524 (H) 150 - 450 10e3/uL    % Neutrophils 76 %    % Lymphocytes 15 %    % Monocytes 6 %    % Eosinophils 2 %    % Basophils 1 %    % Immature Granulocytes 0 %    Absolute Neutrophils 8.5 (H) 1.6 - 8.3 10e3/uL    Absolute Lymphocytes 1.7 0.8 - 5.3 10e3/uL    Absolute Monocytes 0.7 0.0 - 1.3 10e3/uL    Absolute Eosinophils 0.2 0.0 - 0.7 10e3/uL    Absolute Basophils 0.1 0.0 - 0.2 10e3/uL    Absolute Immature Granulocytes 0.1 <=0.4 10e3/uL                   Answers for HPI/ROS submitted by the patient on 7/13/2023  PHQ9 TOTAL SCORE: 5

## 2023-07-14 ENCOUNTER — TELEPHONE (OUTPATIENT)
Dept: GASTROENTEROLOGY | Facility: CLINIC | Age: 73
End: 2023-07-14
Payer: MEDICARE

## 2023-07-14 ENCOUNTER — PATIENT OUTREACH (OUTPATIENT)
Dept: ONCOLOGY | Facility: CLINIC | Age: 73
End: 2023-07-14
Payer: MEDICARE

## 2023-07-14 LAB — B BURGDOR IGG+IGM SER QL: 0.09

## 2023-07-14 NOTE — PROGRESS NOTES
New Patient Oncology Nurse Navigator Note     Referring provider: Honey García PA-C     Referring Clinic/Organization: [facility]     Referred to (specialty:) Hematology     Requested provider (if applicable): NA     Date Referral Received: July 13, 2023     Evaluation for:  D75.839 (ICD-10-CM) - Thrombocytosis    Clinical History (per Nurse review of records provided):    Patient seen yesterday by PCP for ER follow up. Patient recently in ED for fever and abdominal pain. Was found to have thrombocytosis. Recommended for a repeat in labs and peripheral blood smear to be done as well as some iron levels. Referral for hematologist was placed for further evaluation.      Latest Reference Range & Units 07/13/23 09:37   Sodium 136 - 145 mmol/L 137   Potassium 3.4 - 5.3 mmol/L 3.9   Chloride 98 - 107 mmol/L 99   Carbon Dioxide (CO2) 22 - 29 mmol/L 26   Urea Nitrogen 8.0 - 23.0 mg/dL 6.0 (L)   Creatinine 0.51 - 0.95 mg/dL 0.74   GFR Estimate >60 mL/min/1.73m2 86   Calcium 8.8 - 10.2 mg/dL 9.2   Anion Gap 7 - 15 mmol/L 12   Albumin 3.5 - 5.2 g/dL 3.8   Protein Total 6.4 - 8.3 g/dL 7.6   Alkaline Phosphatase 35 - 104 U/L 107 (H)   ALT 0 - 50 U/L 21   AST 0 - 45 U/L 41   Bilirubin Total <=1.2 mg/dL 0.6   Ferritin 11 - 328 ng/mL 195   Glucose 70 - 99 mg/dL 98   Iron 37 - 145 ug/dL 25 (L)   Iron Binding Capacity 240 - 430 ug/dL 217 (L)   Iron Sat Index 15 - 46 % 12 (L)   Vitamin B12 232 - 1,245 pg/mL 918   WBC 4.0 - 11.0 10e3/uL 11.2 (H)   Hemoglobin 11.7 - 15.7 g/dL 12.3   Hematocrit 35.0 - 47.0 % 35.9   Platelet Count 150 - 450 10e3/uL 524 (H)   RBC Count 3.80 - 5.20 10e6/uL 4.28   MCV 78 - 100 fL 84   MCH 26.5 - 33.0 pg 28.7   MCHC 31.5 - 36.5 g/dL 34.3   RDW 10.0 - 15.0 % 13.2   % Neutrophils % 76   % Lymphocytes % 15   % Monocytes % 6   % Eosinophils % 2   % Basophils % 1   Absolute Basophils 0.0 - 0.2 10e3/uL 0.1   Absolute Eosinophils 0.0 - 0.7 10e3/uL 0.2   Absolute  Immature Granulocytes <=0.4 10e3/uL 0.1   Absolute Lymphocytes 0.8 - 5.3 10e3/uL 1.7   Absolute Monocytes 0.0 - 1.3 10e3/uL 0.7   % Immature Granulocytes % 0   Absolute Neutrophils 1.6 - 8.3 10e3/uL 8.5 (H)   Lyme Disease Antibodies Serum <0.90  0.09   (L): Data is abnormally low  (H): Data is abnormally high       Records Location: See Bookmarked material     Records Needed: NA     Additional testing needed prior to consult:   Labs being drawn on 7/18/23.     Payor: MEDICARE / Plan: MEDICARE / Product Type: Medicare /     July 14, 2023    Called patient to introduced myself and role as nurse navigator with [facility] Hematology/Oncology department and to inform them that we have received the referral for a diagnosis of thrombocytopenia from Dr.Jessica Manisha García PA-C  Patient confirms they are aware of the referral and ready to schedule. Provided them with contact information for NPS and encourage them to call with any questions. Patient verbalized understanding of plan. Transferred patient to NPS to schedule.     Nesha Estrada, RN, BSN  [facility] Hematology/Oncology Nurse Navigator  [phone]    July 18, 2023  Oncology Nurse Navigator received a message from NPS stating that patient only wanted to be seen in [place] but that she felt like she needed to be seen within a week. Referral was for next available. Patient requested a call back to further discuss. Nurse Navigator called this morning and left a detailed message for patient along with call back number.     Nesha SANTOYO, RN   Oncology Nurse Navigator   [facility]   [phone] / [phone]

## 2023-07-14 NOTE — TELEPHONE ENCOUNTER
Pre assessment completed for upcoming procedure.   (Please see previous telephone encounter notes for complete details)      Procedure details:    Arrival time and facility location reviewed    Pre op exam needed? N/A    Designated  policy reviewed. Instructed to have someone stay 6 hours post procedure.     COVID policy reviewed.      Medication review:    Medications reviewed. Please see supporting documentation below. Holding recommendations discussed (if applicable).       Prep for procedure:     Reviewed procedure prep instructions.       Additional information needed?  N/A      Patient  verbalized understanding and had no questions or concerns at this time.      Leslee Zurita RN  Endoscopy Procedure Pre Assessment RN  [phone] option 4

## 2023-07-14 NOTE — TELEPHONE ENCOUNTER
Pre visit planning completed.      Procedure details:    Patient scheduled for Upper endoscopy (EGD) on 7/18/23.     Arrival time: 1015. Procedure time 1100    Pre op exam needed? N/A    Facility location: [facility]     Sedation type: Conscious sedation     Indication for procedure: gastritis, weight loss, fever (please check H pylori)      Chart review:     Electronic implanted devices? No    Diabetic? No       Medication review:    Anticoagulants? No    NSAIDS? No NSAID medications per patient's medication list.  RN will verify with pre-assessment call.    Other medication HOLDING recommendations:  N/A      Prep for procedure:     Prep instructions sent via [employer]     Leslee Zurita RN  Endoscopy Procedure Pre Assessment RN  [phone] option 4

## 2023-07-17 ENCOUNTER — LAB (OUTPATIENT)
Dept: LAB | Facility: CLINIC | Age: 73
End: 2023-07-17
Payer: MEDICARE

## 2023-07-17 ENCOUNTER — MYC MEDICAL ADVICE (OUTPATIENT)
Dept: FAMILY MEDICINE | Facility: CLINIC | Age: 73
End: 2023-07-17

## 2023-07-17 ENCOUNTER — TRANSFERRED RECORDS (OUTPATIENT)
Dept: HEALTH INFORMATION MANAGEMENT | Facility: CLINIC | Age: 73
End: 2023-07-17

## 2023-07-17 ENCOUNTER — TELEPHONE (OUTPATIENT)
Dept: GASTROENTEROLOGY | Facility: CLINIC | Age: 73
End: 2023-07-17

## 2023-07-17 DIAGNOSIS — D75.839 THROMBOCYTOSIS: ICD-10-CM

## 2023-07-17 DIAGNOSIS — R10.2 PELVIC PAIN IN FEMALE: Primary | ICD-10-CM

## 2023-07-17 LAB
BASOPHILS # BLD AUTO: 0.1 10E3/UL (ref 0–0.2)
BASOPHILS NFR BLD AUTO: 1 %
EOSINOPHIL # BLD AUTO: 0.1 10E3/UL (ref 0–0.7)
EOSINOPHIL NFR BLD AUTO: 1 %
ERYTHROCYTE [DISTWIDTH] IN BLOOD BY AUTOMATED COUNT: 13.3 % (ref 10–15)
HCT VFR BLD AUTO: 36 % (ref 35–47)
HGB BLD-MCNC: 12.1 G/DL (ref 11.7–15.7)
IMM GRANULOCYTES # BLD: 0.1 10E3/UL
IMM GRANULOCYTES NFR BLD: 1 %
LYMPHOCYTES # BLD AUTO: 2.2 10E3/UL (ref 0.8–5.3)
LYMPHOCYTES NFR BLD AUTO: 16 %
MCH RBC QN AUTO: 28.2 PG (ref 26.5–33)
MCHC RBC AUTO-ENTMCNC: 33.6 G/DL (ref 31.5–36.5)
MCV RBC AUTO: 84 FL (ref 78–100)
MONOCYTES # BLD AUTO: 1.2 10E3/UL (ref 0–1.3)
MONOCYTES NFR BLD AUTO: 8 %
NEUTROPHILS # BLD AUTO: 10.5 10E3/UL (ref 1.6–8.3)
NEUTROPHILS NFR BLD AUTO: 73 %
NRBC # BLD AUTO: 0 10E3/UL
NRBC BLD AUTO-RTO: 0 /100
PLATELET # BLD AUTO: 684 10E3/UL (ref 150–450)
RBC # BLD AUTO: 4.29 10E6/UL (ref 3.8–5.2)
RETICS # AUTO: 0.06 10E6/UL (ref 0.03–0.1)
RETICS/RBC NFR AUTO: 1.3 % (ref 0.5–2)
WBC # BLD AUTO: 14.2 10E3/UL (ref 4–11)

## 2023-07-17 PROCEDURE — 36415 COLL VENOUS BLD VENIPUNCTURE: CPT

## 2023-07-17 PROCEDURE — 99207 BLOOD MORPHOLOGY PATHOLOGIST REVIEW: CPT | Performed by: PATHOLOGY

## 2023-07-17 PROCEDURE — 85045 AUTOMATED RETICULOCYTE COUNT: CPT

## 2023-07-17 PROCEDURE — 85025 COMPLETE CBC W/AUTO DIFF WBC: CPT

## 2023-07-17 NOTE — TELEPHONE ENCOUNTER
Caller:   Reason for Reschedule/Cancellation (please be detailed, any staff messages or encounters to note?): NEEDS TO GET MRI PER DOC      Prior to reschedule please review:    Ordering Provider: BIJAN VÁZQUEZ    Sedation per order: CS    Does patient have any ASC Exclusions, please identify?:       Notes on Cancelled Procedure:    Procedure: Upper Endoscopy [EGD]     Date: 7/18    Location: [facility]; [address], [place] [address]    Surgeon: HAM      Rescheduled: Yes    Procedure: Upper Endoscopy [EGD]     Date: 7/25    Location: [facility]; [address], [place] [id number]    Surgeon: LUZ ELENA    Sedation Level Scheduled  CS,  Reason for Sedation Level ORDER    Prep/Instructions updated and sent: Y     Send In - basket message to Panc - Marshall Pool if EUS  procedure is canceled or rescheduled: [ N/A, YES or NO]

## 2023-07-17 NOTE — TELEPHONE ENCOUNTER
Rescheduled EGD 07.27.2023    Pre assessment completed for upcoming procedure.      Procedure details:    Patient scheduled for Upper endoscopy (EGD) on 07.27.2023.     Order was for colonoscopy/EGD but patient couldn't get appointment for a ways out so will do them separately    Arrival time: 1025. Procedure time 1110    Pre op exam needed? N/A    Facility location: [facility]; [address], [place] [address]    Sedation type: Conscious sedation     Indication for procedure:   Thrombocytosis [D75.839]  - Primary       Low grade fever [R50.9]       Nausea [R11.0]       Unintentional weight loss [R63.4]       Chronic gastritis without bleeding, unspecified gastritis type [K29.50]         COVID policy reviewed.    Designated  policy reviewed. Instructed to have someone stay 6 hours post procedure.       Chart review:     Electronic implanted devices? No    Diabetic? No    Diabetic medication HOLDING recommendations: (if applicable)  Oral diabetic medications: N/A  Diabetic injectables: N/A  Insulin: N/A      Medication review:    Anticoagulants? No    NSAIDS? Yes.  Ibuprofen (Advil, Motrin).  Holding interval of 1 day before procedure.    Other medication HOLDING recommendations:  N/A      Prep for procedure:     Prep instructions sent via [employer]     Reviewed procedure prep instructions.     Patient verbalized understanding and had no questions or concerns at this time.        Flaquita Enriquez RN  Endoscopy Procedure Pre Assessment RN  [phone] option 4

## 2023-07-18 DIAGNOSIS — R79.89 ELEVATED PLATELET COUNT: Primary | ICD-10-CM

## 2023-07-18 LAB
PATH REPORT.COMMENTS IMP SPEC: NORMAL
PATH REPORT.FINAL DX SPEC: NORMAL
PATH REPORT.MICROSCOPIC SPEC OTHER STN: NORMAL
PATH REPORT.MICROSCOPIC SPEC OTHER STN: NORMAL

## 2023-07-18 NOTE — TELEPHONE ENCOUNTER
Aracely sent regarding GI referral that was placed on 7/17.    Dayton Shaffer, RN  Triage Nurse  [facility]

## 2023-07-18 NOTE — TELEPHONE ENCOUNTER
Summary of health reviewed with on call oncologist.     Low grade fevers, night sweats, significant bilateral  inguinal pain (bilaterally).  Seen in ER 7/5/23, CT abd/pelvis essentially normal.     Peripheral smear showed moderate thrombocytosis  Mild leukocystosis with abs neutrophilia    Neg for Jak2 mutation.   Mild iron def noted (iron 25)  B12 normal, LFTs normal (alk phos slightly high 107)  Platelets trending up 524 on 7/13 and 684 on 7/17/23    Pain in inguinal woke her up  Difficulty walking.  Saw ortho today but sounds more like a systemic issue    Reviewed with oncologist on call and they were not concerned that this had to be evaluated immediately.  Has appt 8/8/23 with hematology and ok to wait until then.     Honey García PA-C

## 2023-07-20 ENCOUNTER — MYC MEDICAL ADVICE (OUTPATIENT)
Dept: FAMILY MEDICINE | Facility: CLINIC | Age: 73
End: 2023-07-20
Payer: MEDICARE

## 2023-07-21 RX ORDER — HYDROCODONE BITARTRATE AND ACETAMINOPHEN 5; 325 MG/1; MG/1
1 TABLET ORAL EVERY 6 HOURS PRN
Qty: 18 TABLET | Refills: 0 | Status: SHIPPED | OUTPATIENT
Start: 2023-07-21 | End: 2023-07-24

## 2023-07-22 DIAGNOSIS — R10.2 PELVIC PAIN IN FEMALE: Primary | ICD-10-CM

## 2023-07-24 ENCOUNTER — LAB (OUTPATIENT)
Dept: LAB | Facility: CLINIC | Age: 73
End: 2023-07-24
Payer: MEDICARE

## 2023-07-24 ENCOUNTER — MYC MEDICAL ADVICE (OUTPATIENT)
Dept: FAMILY MEDICINE | Facility: CLINIC | Age: 73
End: 2023-07-24

## 2023-07-24 DIAGNOSIS — R10.2 PELVIC PAIN IN FEMALE: ICD-10-CM

## 2023-07-24 DIAGNOSIS — R79.89 ELEVATED PLATELET COUNT: ICD-10-CM

## 2023-07-24 LAB
ALBUMIN UR-MCNC: NEGATIVE MG/DL
APPEARANCE UR: CLEAR
BASOPHILS # BLD AUTO: 0.1 10E3/UL (ref 0–0.2)
BASOPHILS NFR BLD AUTO: 1 %
BILIRUB UR QL STRIP: NEGATIVE
COLOR UR AUTO: YELLOW
EOSINOPHIL # BLD AUTO: 0.2 10E3/UL (ref 0–0.7)
EOSINOPHIL NFR BLD AUTO: 2 %
ERYTHROCYTE [DISTWIDTH] IN BLOOD BY AUTOMATED COUNT: 13.4 % (ref 10–15)
GLUCOSE UR STRIP-MCNC: NEGATIVE MG/DL
HCT VFR BLD AUTO: 34.9 % (ref 35–47)
HGB BLD-MCNC: 11.7 G/DL (ref 11.7–15.7)
HGB UR QL STRIP: NEGATIVE
IMM GRANULOCYTES # BLD: 0.1 10E3/UL
IMM GRANULOCYTES NFR BLD: 1 %
KETONES UR STRIP-MCNC: ABNORMAL MG/DL
LEUKOCYTE ESTERASE UR QL STRIP: NEGATIVE
LYMPHOCYTES # BLD AUTO: 2.2 10E3/UL (ref 0.8–5.3)
LYMPHOCYTES NFR BLD AUTO: 18 %
MCH RBC QN AUTO: 28 PG (ref 26.5–33)
MCHC RBC AUTO-ENTMCNC: 33.5 G/DL (ref 31.5–36.5)
MCV RBC AUTO: 84 FL (ref 78–100)
MONOCYTES # BLD AUTO: 0.9 10E3/UL (ref 0–1.3)
MONOCYTES NFR BLD AUTO: 8 %
NEUTROPHILS # BLD AUTO: 8.6 10E3/UL (ref 1.6–8.3)
NEUTROPHILS NFR BLD AUTO: 70 %
NITRATE UR QL: NEGATIVE
NRBC # BLD AUTO: 0 10E3/UL
NRBC BLD AUTO-RTO: 0 /100
PH UR STRIP: 8 [PH] (ref 5–7)
PLATELET # BLD AUTO: 745 10E3/UL (ref 150–450)
RBC # BLD AUTO: 4.18 10E6/UL (ref 3.8–5.2)
SP GR UR STRIP: 1.01 (ref 1–1.03)
UROBILINOGEN UR STRIP-ACNC: 0.2 E.U./DL
WBC # BLD AUTO: 12.1 10E3/UL (ref 4–11)

## 2023-07-24 PROCEDURE — 81003 URINALYSIS AUTO W/O SCOPE: CPT

## 2023-07-24 PROCEDURE — 85025 COMPLETE CBC W/AUTO DIFF WBC: CPT

## 2023-07-24 PROCEDURE — 82180 ASSAY OF ASCORBIC ACID: CPT | Mod: 90

## 2023-07-24 PROCEDURE — 99000 SPECIMEN HANDLING OFFICE-LAB: CPT

## 2023-07-24 PROCEDURE — 36415 COLL VENOUS BLD VENIPUNCTURE: CPT

## 2023-07-25 ENCOUNTER — ANESTHESIA EVENT (OUTPATIENT)
Dept: SURGERY | Facility: AMBULATORY SURGERY CENTER | Age: 73
End: 2023-07-25
Payer: MEDICARE

## 2023-07-25 ENCOUNTER — HOSPITAL ENCOUNTER (OUTPATIENT)
Facility: AMBULATORY SURGERY CENTER | Age: 73
Discharge: HOME | End: 2023-07-25
Attending: INTERNAL MEDICINE
Payer: MEDICARE

## 2023-07-25 ENCOUNTER — ANESTHESIA (OUTPATIENT)
Dept: SURGERY | Facility: AMBULATORY SURGERY CENTER | Age: 73
End: 2023-07-25
Payer: MEDICARE

## 2023-07-25 VITALS — HEART RATE: 85 BPM

## 2023-07-25 VITALS
OXYGEN SATURATION: 94 % | SYSTOLIC BLOOD PRESSURE: 126 MMHG | HEART RATE: 85 BPM | TEMPERATURE: 98.6 F | RESPIRATION RATE: 24 BRPM | DIASTOLIC BLOOD PRESSURE: 90 MMHG

## 2023-07-25 LAB — UPPER GI ENDOSCOPY: NORMAL

## 2023-07-25 PROCEDURE — 88342 IMHCHEM/IMCYTCHM 1ST ANTB: CPT | Performed by: PATHOLOGY

## 2023-07-25 PROCEDURE — 43239 EGD BIOPSY SINGLE/MULTIPLE: CPT

## 2023-07-25 PROCEDURE — G8918 PT W/O PREOP ORDER IV AB PRO: HCPCS

## 2023-07-25 PROCEDURE — G8907 PT DOC NO EVENTS ON DISCHARG: HCPCS

## 2023-07-25 PROCEDURE — 88305 TISSUE EXAM BY PATHOLOGIST: CPT | Performed by: PATHOLOGY

## 2023-07-25 RX ORDER — NALOXONE HYDROCHLORIDE 0.4 MG/ML
0.2 INJECTION, SOLUTION INTRAMUSCULAR; INTRAVENOUS; SUBCUTANEOUS
Status: CANCELLED | OUTPATIENT
Start: 2023-07-25

## 2023-07-25 RX ORDER — ONDANSETRON 2 MG/ML
4 INJECTION INTRAMUSCULAR; INTRAVENOUS EVERY 6 HOURS PRN
Status: CANCELLED | OUTPATIENT
Start: 2023-07-25

## 2023-07-25 RX ORDER — PROCHLORPERAZINE MALEATE 5 MG
5 TABLET ORAL EVERY 6 HOURS PRN
Status: CANCELLED | OUTPATIENT
Start: 2023-07-25

## 2023-07-25 RX ORDER — PROPOFOL 10 MG/ML
INJECTION, EMULSION INTRAVENOUS PRN
Status: DISCONTINUED | OUTPATIENT
Start: 2023-07-25 | End: 2023-07-25

## 2023-07-25 RX ORDER — ONDANSETRON 4 MG/1
4 TABLET, ORALLY DISINTEGRATING ORAL EVERY 6 HOURS PRN
Status: CANCELLED | OUTPATIENT
Start: 2023-07-25

## 2023-07-25 RX ORDER — SODIUM CHLORIDE, SODIUM LACTATE, POTASSIUM CHLORIDE, CALCIUM CHLORIDE 600; 310; 30; 20 MG/100ML; MG/100ML; MG/100ML; MG/100ML
INJECTION, SOLUTION INTRAVENOUS CONTINUOUS
Status: DISCONTINUED | OUTPATIENT
Start: 2023-07-25 | End: 2023-07-26 | Stop reason: HOSPADM

## 2023-07-25 RX ORDER — PROPOFOL 10 MG/ML
INJECTION, EMULSION INTRAVENOUS CONTINUOUS PRN
Status: DISCONTINUED | OUTPATIENT
Start: 2023-07-25 | End: 2023-07-25

## 2023-07-25 RX ORDER — LIDOCAINE HYDROCHLORIDE 20 MG/ML
INJECTION, SOLUTION INFILTRATION; PERINEURAL PRN
Status: DISCONTINUED | OUTPATIENT
Start: 2023-07-25 | End: 2023-07-25

## 2023-07-25 RX ORDER — NALOXONE HYDROCHLORIDE 0.4 MG/ML
0.4 INJECTION, SOLUTION INTRAMUSCULAR; INTRAVENOUS; SUBCUTANEOUS
Status: CANCELLED | OUTPATIENT
Start: 2023-07-25

## 2023-07-25 RX ORDER — FLUMAZENIL 0.1 MG/ML
0.2 INJECTION, SOLUTION INTRAVENOUS
Status: CANCELLED | OUTPATIENT
Start: 2023-07-25 | End: 2023-07-25

## 2023-07-25 RX ORDER — LIDOCAINE 40 MG/G
CREAM TOPICAL
Status: DISCONTINUED | OUTPATIENT
Start: 2023-07-25 | End: 2023-07-26 | Stop reason: HOSPADM

## 2023-07-25 RX ORDER — ONDANSETRON 2 MG/ML
4 INJECTION INTRAMUSCULAR; INTRAVENOUS
Status: DISCONTINUED | OUTPATIENT
Start: 2023-07-25 | End: 2023-07-26 | Stop reason: HOSPADM

## 2023-07-25 RX ADMIN — SODIUM CHLORIDE, SODIUM LACTATE, POTASSIUM CHLORIDE, CALCIUM CHLORIDE: 600; 310; 30; 20 INJECTION, SOLUTION INTRAVENOUS at 10:52

## 2023-07-25 RX ADMIN — LIDOCAINE HYDROCHLORIDE 60 MG: 20 INJECTION, SOLUTION INFILTRATION; PERINEURAL at 11:03

## 2023-07-25 RX ADMIN — PROPOFOL 200 MCG/KG/MIN: 10 INJECTION, EMULSION INTRAVENOUS at 11:03

## 2023-07-25 RX ADMIN — PROPOFOL 70 MG: 10 INJECTION, EMULSION INTRAVENOUS at 11:03

## 2023-07-25 NOTE — ANESTHESIA PREPROCEDURE EVALUATION
Anesthesia Pre-Procedure Evaluation    Patient: Marcela Allen   MRN: [id number] : 1950        Procedure : Procedure(s):  Combined Esophagoscopy, Gastroscopy, Duodenoscopy (Egd) With Co2 Insufflation          Past Medical History:   Diagnosis Date    Atrial tachycardia, paroxysmal (H) 2021    BULMARO II (cervical intraepithelial neoplasia II) 2007    on colp - leep path WNL    COPD (chronic obstructive pulmonary disease) (H)     Depression 1990    Depressive disorder 25 yrs    Eczema     GERD (gastroesophageal reflux disease)     Hiatal hernia     History of blood transfusion 2013    Post op    HTN (hypertension)     Hypercholesterolemia     Infection due to 2019 novel coronavirus 2022    Liver hemangioma 2012    OA (osteoarthritis)     Osteopenia     Papillary thyroid carcinoma (H) 2000    BL, MF; Tx in 2 operations, RRA    PONV (postoperative nausea and vomiting)     Postsurgical hypothyroidism 2000    FELICIA III (vulvar intraepithelial neoplasia III) 2006    wide local exc x 2      Past Surgical History:   Procedure Laterality Date    ABDOMEN SURGERY      Teenager    APPENDECTOMY OPEN  1968    ARTHROPLASTY MINIMALLY INVASIVE KNEE BILATERAL  2013    Procedure: ARTHROPLASTY MINIMALLY INVASIVE KNEE BILATERAL;  Minimally Invasive Bilateral Total Knee Arthroplasty;  Surgeon: Christophe Welch MD;  Location: [facility]    BALLOON COMPRESSION RHIZOTOMY      by spine specialist    BIOPSY  Ongoing    Lymph nodes neck    BONE MARROW ASPIRATION ONLY Left 2015    Procedure: BONE MARROW ASPIRATION ONLY;  Surgeon: Aguilar Roldan MD;  Location: [facility] OR    CARPAL TUNNEL RELEASE RT/LT      COLONOSCOPY  2007    neg    COLPOSCOPY CERVIX, BIOPSY CERVIX, ENDOCERVICAL CURETTAGE, COMBINED  ,     completion thyroidectomy Right 2000    CYSTECTOMY OVARIAN BENIGN  1969    ESOPHAGOSCOPY, GASTROSCOPY, DUODENOSCOPY (EGD), COMBINED N/A  08/28/2014    Procedure: COMBINED ESOPHAGOSCOPY, GASTROSCOPY, DUODENOSCOPY (EGD), BIOPSY SINGLE OR MULTIPLE;  Surgeon: Kelvin Montgomery MD;  Location: [facility]    FUSION CERVICAL ANTERIOR THREE+ LEVELS N/A 02/26/2015    C4-C7    FUSION CERVICAL ANTERIOR THREE+ LEVELS WITH BONE ALLOGRAFT N/A 12/07/2015    Procedure: FUSION CERVICAL ANTERIOR THREE+ LEVELS WITH BONE ALLOGRAFT;  Surgeon: Aguilar Roldan MD;  Location: [facility] OR    HAND SURGERY      HEAD & NECK SURGERY  01/01/2000    Thyroidectomy    KNEE SURGERY  2001 & 2005    bilat    LAPAROSCOPIC CHOLECYSTECTOMY  01/01/1998    LEEP TX, CERVICAL  01/01/2007    BULMARO II on colp, leep path WNL    left thyroid total lobectomy, isthmusectomy and 50% right thyroid lobectomy Left 03/07/2000    TUBAL LIGATION  01/01/1988    VULVECTOMY SIMPLE  01/01/2006    FELICIA 3, wide local excision x 2    VULVECTOMY SIMPLE        No Known Allergies   Social History     Tobacco Use    Smoking status: Former     Packs/day: 1.00     Years: 45.00     Pack years: 45.00     Types: Cigarettes     Quit date: 8/1/2012     Years since quitting: 10.9    Smokeless tobacco: Never    Tobacco comments:     quitting with e cigarette   Substance Use Topics    Alcohol use: Yes     Comment: less than weekly      Wt Readings from Last 1 Encounters:   07/13/23 69.4 kg (153 lb)           Physical Exam    Airway        Mallampati: II   TM distance: > 3 FB   Neck ROM: full   Mouth opening: > 3 cm    Respiratory Devices and Support         Dental       (+) Minor Abnormalities - some fillings, tiny chips      Cardiovascular   cardiovascular exam normal          Pulmonary   pulmonary exam normal                OUTSIDE LABS:  CBC:   Lab Results   Component Value Date    WBC 12.1 (H) 07/24/2023    WBC 14.2 (H) 07/17/2023    HGB 11.7 07/24/2023    HGB 12.1 07/17/2023    HCT 34.9 (L) 07/24/2023    HCT 36.0 07/17/2023     (H) 07/24/2023     (H) 07/17/2023     BMP:   Lab Results   Component Value Date      07/13/2023     10/10/2022    POTASSIUM 3.9 07/13/2023    POTASSIUM 4.7 10/10/2022    CHLORIDE 99 07/13/2023    CHLORIDE 105 10/10/2022    CO2 26 07/13/2023    CO2 32 10/10/2022    BUN 6.0 (L) 07/13/2023    BUN 8 10/10/2022    CR 0.74 07/13/2023    CR 0.87 10/10/2022    GLC 98 07/13/2023    GLC 96 10/10/2022     COAGS:   Lab Results   Component Value Date    PTT 35 02/01/2006    INR 1.03 02/01/2006     POC:   Lab Results   Component Value Date     (H) 12/08/2015    HCG Negative 05/03/2007     HEPATIC:   Lab Results   Component Value Date    ALBUMIN 3.8 07/13/2023    PROTTOTAL 7.6 07/13/2023    ALT 21 07/13/2023    AST 41 07/13/2023    ALKPHOS 107 (H) 07/13/2023    BILITOTAL 0.6 07/13/2023     OTHER:   Lab Results   Component Value Date    JUNE 9.2 07/13/2023    PHOS 4.4 06/20/2013    MAG 1.5 (L) 02/01/2006    LIPASE 340 10/05/2015    AMYLASE 70 10/05/2015    TSH 1.31 06/28/2023    T4 1.21 06/28/2023    T3 140 02/07/2009    CRP <5.0 04/26/2013    SED 12 04/26/2013       Anesthesia Plan    ASA Status:  3    NPO Status:  NPO Appropriate    Anesthesia Type: MAC.     - Reason for MAC: straight local not clinically adequate   Induction: Intravenous, Propofol.   Maintenance: TIVA.        Consents    Anesthesia Plan(s) and associated risks, benefits, and realistic alternatives discussed. Questions answered and patient/representative(s) expressed understanding.     - Discussed:     - Discussed with:  Patient (daughter)      - Extended Intubation/Ventilatory Support Discussed: No.      - Patient is DNR/DNI Status: No     Use of blood products discussed: No .     Postoperative Care       PONV prophylaxis: Ondansetron (or other 5HT-3), Background Propofol Infusion     Comments:                Nadir Padgett MD

## 2023-07-25 NOTE — H&P
[facility] Anesthesia Pre-op History and Physical    Marcela Allen MRN# [id number]   Age: 72 year old YOB: 1950            Date of Exam 7/25/2023         Primary care provider: Honey García         Chief Complaint and/or Reason for Procedure:     Weight loss, early satiety, drop in hemoglobin.  NSAID use       Active problem list:     Patient Active Problem List    Diagnosis Date Noted    Atrial tachycardia, paroxysmal (H) 03/29/2021     Priority: Medium    Tension headache 10/13/2020     Priority: Medium    Ganglion cyst of wrist, left 10/13/2020     Priority: Medium    Cervical adenopathy 11/05/2019     Priority: Medium    Senile cataract of left eye, unspecified age-related cataract type 10/07/2019     Priority: Medium    Orthostatic hypotension 10/07/2019     Priority: Medium    HSV (herpes simplex virus) infection 10/07/2019     Priority: Medium    Pilonidal cyst without infection 10/07/2019     Priority: Medium    Blood pressure check 05/06/2019     Priority: Medium    Cervical stenosis of spinal canal 04/08/2019     Priority: Medium    Post-menopausal 11/06/2018     Priority: Medium    History of colonic polyps 10/11/2017     Priority: Medium     Repeat colonoscopy 5 years, 10/6/2022      Malignant neoplasm of cervix, unspecified site (H) 10/10/2016     Priority: Medium    Vulvar cancer (H) 10/10/2016     Priority: Medium    Cervical radiculopathy 12/07/2015     Priority: Medium    Cervical cancer (H) 03/23/2015     Priority: Medium     If neg, pt should continue Pap/HRHPV q3-5 yrs until 2027 (age 77) and yearly pelvic exams.       Enlarged lymph nodes 05/07/2014     Priority: Medium     Problem list name updated by automated process. Provider to review      Chronic obstructive pulmonary disease, unspecified COPD type (H) 10/10/2013     Priority: Medium    s/p B TKA 6/27 07/01/2013     Priority: Medium    S/P knee replacement 07/01/2013     Priority: Medium    Elevated platelet  count 03/27/2013     Priority: Medium    Former smoker 03/13/2013     Priority: Medium    Liver hemangioma 09/04/2012     Priority: Medium    CKD (chronic kidney disease) stage 3, GFR 30-59 ml/min (H) 02/23/2012     Priority: Medium    Advanced directives, counseling/discussion 02/21/2012     Priority: Medium     Patient has completed an Advance/Health Care Directive (HCD), to bring in copy to be scanned into Epic.    Nora Delgado  February 21, 2012      Major depression in complete remission (H) 02/21/2012     Priority: Medium    Hyperlipidemia LDL goal <130 02/21/2012     Priority: Medium    Hypertension goal BP (blood pressure) < 140/90 02/21/2012     Priority: Medium    Seasonal allergies 02/21/2012     Priority: Medium    Chronic neck pain 05/25/2011     Priority: Medium    GERD (gastroesophageal reflux disease)      Priority: Medium    Eczema      Priority: Medium    Hiatal hernia      Priority: Medium    OA (osteoarthritis)      Priority: Medium    Osteopenia      Priority: Medium     Fosamax discontinued 3/23/15, drug holiday.  Will recheck DEXA in 2-3 years ([id number]).       S/P LEEP of cervix 06/06/2007     Priority: Medium     4/03: LSIL, 6/03: ECC WNL  NIL paps: 6/04, 8/05 12/5: FELICIA II. Referred to gyn onc 1/06: FELICIA II & III  12/06: ASCUS pap  3/07: ASCUS, + HPV 16. 5/07; [facility] - BULMARO II  6/07: LEEP - no dysplasia  NIL paps: 11/07, 5/08, 11/08, 12/09  6/10: Neg vulvar bx  NIL paps: 1/11, 1/12, 2/13.  Plan pap in 1 yr.  2/21/14 pap NIL/neg HPV. Plan-- repeat pap and HPV in 1 year.   3/23/15 pap NIL/neg HPV. Plan-- repeat pap/HPV test q 3 yrs        Postsurgical hypothyroidism 03/07/2000     Priority: Medium    Papillary thyroid carcinoma (H) 03/07/2000     Priority: Medium            Medications (include herbals and vitamins):   Any Plavix use in the last 7 days? No     Current Outpatient Medications   Medication Sig    aspirin 81 MG tablet Take 81 mg by mouth daily     atorvastatin (LIPITOR) 20 MG  tablet Take 1 tablet (20 mg) by mouth daily    CALCIUM CITRATE PO Take 1,200 mg by mouth daily     cetirizine (ZYRTEC) 10 MG tablet Take 1 tablet (10 mg) by mouth daily    Cholecalciferol (VITAMIN D) 1000 UNIT capsule Take 1 capsule by mouth daily.    FLUoxetine (PROZAC) 20 MG capsule Take 3 capsules (60 mg) by mouth daily    hypromellose (ARTIFICIAL TEARS) 0.5 % SOLN Place 1 drop into both eyes every 4 hours as needed     levothyroxine (SYNTHROID/LEVOTHROID) 100 MCG tablet Monday-Saturday: 1 tablet/day,  Sunday: skip    magnesium oxide (MAG-OX) 400 (240 Mg) MG tablet Take 400 mg by mouth daily    multivitamin w/minerals (THERA-VIT-M) tablet Take 1 tablet by mouth daily    omeprazole (PRILOSEC) 20 MG DR capsule 1 capsule M, W, F    sennosides (SENOKOT) 8.6 MG tablet Take 2 tablets by mouth daily     triamterene-HCTZ (MAXZIDE-25) 37.5-25 MG tablet Take 0.5 tablets by mouth daily    sodium fluoride dental gel (PREVIDENT) 1.1 % GEL topical gel Apply to affected area At Bedtime    triamcinolone (KENALOG) 0.1 % external ointment Apply to affected area of the skin twice a day after using a moisturizer. Can do wet wraps with this.     Current Facility-Administered Medications   Medication    lactated ringers infusion             Allergies:    No Known Allergies  Allergy to Latex? No  Allergy to tape?   No  Intolerances:             Physical Exam:   All vitals have been reviewed  Patient Vitals for the past 8 hrs:   BP Temp Temp src Pulse Resp SpO2   07/25/23 1042 (!) 154/97 98.4  F (36.9  C) Temporal 99 16 95 %     No intake/output data recorded.  Lungs:   No increased work of breathing, good air exchange, clear to auscultation bilaterally, no crackles or wheezing     Cardiovascular:   normal S1 and S2             Lab / Radiology Results:            Anesthetic risk and/or ASA classification:     2  Elsie Cary DO

## 2023-07-25 NOTE — ANESTHESIA CARE TRANSFER NOTE
Patient: Marcela Allen    Procedure: Procedure(s):  Combined Esophagoscopy, Gastroscopy, Duodenoscopy (Egd) With Co2 Insufflation  ESOPHAGOGASTRODUODENOSCOPY, WITH BIOPSY       Diagnosis: Low grade fever [R50.9]  Nausea [R11.0]  Unintentional weight loss [R63.4]  Thrombocytosis [D75.839]  Chronic gastritis without bleeding, unspecified gastritis type [K29.50]  Diagnosis Additional Information: No value filed.    Anesthesia Type:   MAC     Note:      Level of Consciousness: awake  Oxygen Supplementation: room air    Independent Airway: airway patency satisfactory and stable  Dentition: dentition unchanged  Vital Signs Stable: post-procedure vital signs reviewed and stable  Report to RN Given: handoff report given  Patient transferred to: Phase II    Handoff Report: Identifed the Patient, Identified the Reponsible Provider, Reviewed the pertinent medical history, Discussed the surgical course, Reviewed Intra-OP anesthesia mangement and issues during anesthesia, Set expectations for post-procedure period and Allowed opportunity for questions and acknowledgement of understanding      Vitals:  Vitals Value Taken Time   /65    Temp 97    Pulse 67    Resp 18    SpO2 99        Electronically Signed By: LAMONT Sykes CRNA  July 25, 2023  11:16 AM

## 2023-07-27 ENCOUNTER — MYC MEDICAL ADVICE (OUTPATIENT)
Dept: FAMILY MEDICINE | Facility: CLINIC | Age: 73
End: 2023-07-27
Payer: MEDICARE

## 2023-07-28 ENCOUNTER — MYC MEDICAL ADVICE (OUTPATIENT)
Dept: FAMILY MEDICINE | Facility: CLINIC | Age: 73
End: 2023-07-28
Payer: MEDICARE

## 2023-07-28 LAB
PATH REPORT.ADDENDUM SPEC: NORMAL
PATH REPORT.COMMENTS IMP SPEC: NORMAL
PATH REPORT.COMMENTS IMP SPEC: NORMAL
PATH REPORT.FINAL DX SPEC: NORMAL
PATH REPORT.GROSS SPEC: NORMAL
PATH REPORT.MICROSCOPIC SPEC OTHER STN: NORMAL
PATH REPORT.RELEVANT HX SPEC: NORMAL
PHOTO IMAGE: NORMAL

## 2023-07-31 NOTE — TELEPHONE ENCOUNTER
Discharged from hospital and has hospital f/up visit in 2 days with me to review  Honey García PA-C

## 2023-07-31 NOTE — TELEPHONE ENCOUNTER
REFERRAL INFORMATION:  Referring Provider:  Honey García PA-C   Referring Clinic:  [facility]   Reason for Visit/Diagnosis:    Low grade fever   Nausea   Unintentional weight loss        FUTURE VISIT INFORMATION:  Appointment Date: 8/1/2023  Appointment Time:      NOTES STATUS DETAILS   OFFICE NOTE from Referring Provider Internal 7/13/2023, 10/7/2019, 4/3/2017 OV with TEMITOPE García   OFFICE NOTE from Other Specialist N/A    HOSPITAL DISCHARGE SUMMARY/  ED VISITS N/A    OPERATIVE REPORT N/A    MEDICATION LIST Internal         ENDOSCOPY  Internal 7/25/2023   COLONOSCOPY Care Everywhere 10/6/2017 [facility]   ERCP N/A    EUS N/A    STOOL TESTING N/A    PERTINENT LABS N/A    PATHOLOGY REPORTS (RELATED) N/A    IMAGING (CT, MRI, EGD, MRCP, Small Bowel Follow Through/SBT, MR/CT Enterography) Received  [facility]:  7/5/2023 CT ABD PEL   Request sent to [facility] for imaging fax [phone] - IN PACS

## 2023-08-01 ENCOUNTER — OFFICE VISIT (OUTPATIENT)
Dept: GASTROENTEROLOGY | Facility: CLINIC | Age: 73
End: 2023-08-01
Attending: PHYSICIAN ASSISTANT
Payer: MEDICARE

## 2023-08-01 ENCOUNTER — PRE VISIT (OUTPATIENT)
Dept: GASTROENTEROLOGY | Facility: CLINIC | Age: 73
End: 2023-08-01

## 2023-08-01 VITALS
DIASTOLIC BLOOD PRESSURE: 86 MMHG | HEIGHT: 62 IN | BODY MASS INDEX: 27.42 KG/M2 | SYSTOLIC BLOOD PRESSURE: 135 MMHG | OXYGEN SATURATION: 98 % | WEIGHT: 149 LBS | HEART RATE: 92 BPM

## 2023-08-01 DIAGNOSIS — R19.7 DIARRHEA, UNSPECIFIED TYPE: ICD-10-CM

## 2023-08-01 DIAGNOSIS — R10.12 LEFT UPPER QUADRANT PAIN: ICD-10-CM

## 2023-08-01 DIAGNOSIS — R63.4 UNINTENTIONAL WEIGHT LOSS: ICD-10-CM

## 2023-08-01 DIAGNOSIS — D64.9 ANEMIA, UNSPECIFIED TYPE: ICD-10-CM

## 2023-08-01 DIAGNOSIS — R11.0 NAUSEA: ICD-10-CM

## 2023-08-01 DIAGNOSIS — R68.81 EARLY SATIETY: Primary | ICD-10-CM

## 2023-08-01 DIAGNOSIS — K29.70 GASTRITIS WITHOUT BLEEDING, UNSPECIFIED CHRONICITY, UNSPECIFIED GASTRITIS TYPE: ICD-10-CM

## 2023-08-01 DIAGNOSIS — K21.9 GASTROESOPHAGEAL REFLUX DISEASE WITHOUT ESOPHAGITIS: ICD-10-CM

## 2023-08-01 LAB — VIT C SERPL-MCNC: 38 UMOL/L

## 2023-08-01 PROCEDURE — 99205 OFFICE O/P NEW HI 60 MIN: CPT | Performed by: PHYSICIAN ASSISTANT

## 2023-08-01 RX ORDER — FAMOTIDINE 40 MG/1
40 TABLET, FILM COATED ORAL AT BEDTIME
Qty: 90 TABLET | Refills: 1 | Status: SHIPPED | OUTPATIENT
Start: 2023-08-01 | End: 2024-04-22

## 2023-08-01 NOTE — PROGRESS NOTES
GI NEW PATIENT CLINIC VISIT     CC/REFERRING MD:    Honey García    REASON FOR CONSULTATION:   Referred by Honey García for New Patient (weight loss, nausea, abd pain)    HPI:  Marcela Allen is 72 year old female with pmhx of HTN, COPD, CKD, hypothyroidism who presents for GI consult.     Symptoms started about a month ago with low grade fever of unclear etiology. She was additionally having hip pain. An MRI noted concern for septic joint. She was admitted for evaluation and mgmt. She was started on IV abx and had a culture of aspirated fluid from joint which suprisingly didn't show any growth. She also had a rheumatological work up which was negative. Since IV abx she reports fever has resolved as well as joint pain.     She has lost weight in the last month, down about 7 lbs. She reports feeling full quickly and not able to eat much. She mostly snacks during the day. Not able to eat full meals. Does have nausea and feels queasy. No vomiting. No dysphagia. Feels like a hot coal is sitting in her stomach. Stomach ache all the time, worse with eating. The early satiety actually started years ago but has progressively worsened. She does have hx of reflux and takes omeprazole 20mg three times a week. This does not compltely control reflux. She additionally takes mylanta and tums as needed without temporary relief. She had an EGD last week which noted gastritis, tortuous esophagus and 3 cm hiatal hernia. Gastric bx noted reactive gastropathy, no H pylori. She was taking NSAIDs, ibuprofen 400mg twice daily for the joint pains. She has stopped since the EGD.     She is normally more constipated, only having a BM every 3-4 days. She typically takes 2 sennakot daily to manage. Recently however she has developed one loose watery diarrhea stools since receiving abx during recent admission for septic arthritis. No odor to stools. No hematochezia or melena. She feels stools are improving  and having more form.     She had a CT abd/pelvis scan and CT chest recently without any acute findings to explain symptoms. She does have a colonoscopy scheduled for next month. Her last colonoscopy was in 2017 with small precancerous polyp and diverticulosis. Was due for repeat at 5 years (2022).     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 Former smoker, quit 11 years ago. Stopped alcohol at this time due to her GI symptoms.     PREVIOUS ENDOSCOPY:      UPPER ENDOSCOPY 7/25/2023  Impression:        - 3 cm hiatal hernia.                             - Tortuous esophagus.                             - Gastritis. Biopsied.                             - Normal examined duodenum.     Final Diagnosis   A. STOMACH, ANTRUM, BIOPSY:  Gastric antral mucosa with reactive antral gastropathy and focus of neutrophilic inflammation; could be NSAID related; no intestinal metaplasia or dysplasia; report of H. pylori immunohistochemistry to follow      B. STOMACH, BODY, BIOPSY:   Gastric body type mucosa with focus of superficial erosion; could be NSAID related; no H. Pylori-like organisms identified on routine staining; negative also for intestinal metaplasia or dysplasia         Ramonita  has a past medical history of Atrial tachycardia, paroxysmal (H) (03/29/2021), BULMARO II (cervical intraepithelial neoplasia II) (01/01/2007), COPD (chronic obstructive pulmonary disease) (H), Depression (01/01/1990), Depressive disorder (25 yrs), Eczema, GERD (gastroesophageal reflux disease), Hiatal hernia, History of blood transfusion (01/01/2013), HTN (hypertension), Hypercholesterolemia, Infection due to 2019 novel coronavirus (03/2022), Liver hemangioma (09/04/2012), OA (osteoarthritis), Osteopenia, Papillary thyroid carcinoma (H) (01/01/2000), PONV (postoperative nausea and vomiting), Postsurgical hypothyroidism (01/01/2000), and FELICIA III (vulvar intraepithelial neoplasia III) (01/01/2006).    She  has a past surgical history that includes leep tx,  cervical (01/01/2007); Colposcopy cervix, biopsy cervix, endocervical curettage, combined (2003, 2007); laparoscopic cholecystectomy (01/01/1998); knee surgery (2001 & 2005); Appendectomy open (01/01/1968); Cystectomy ovarian benign (01/01/1969); Vulvectomy simple (01/01/2006); Arthroplasty minimally invasive knee bilateral (06/27/2013); left thyroid total lobectomy, isthmusectomy and 50% right thyroid lobectomy (Left, 03/07/2000); Hand surgery; carpal tunnel release rt/lt; colonoscopy (01/01/2007); tubal ligation (01/01/1988); Esophagoscopy, gastroscopy, duodenoscopy (EGD), combined (N/A, 08/28/2014); Fusion cervical anterior three+ levels with bone allograft (N/A, 12/07/2015); Bone marrow aspiration only (Left, 12/07/2015); completion thyroidectomy (Right, 03/14/2000); Abdomen surgery; biopsy (Ongoing); Head and neck surgery (01/01/2000); Vulvectomy simple; Balloon compression rhizotomy; Fusion cervical anterior three+ levels (N/A, 02/26/2015); Combined Esophagoscopy, Gastroscopy, Duodenoscopy (Egd) With Co2 Insufflation (N/A, 7/25/2023); and Esophagoscopy, gastroscopy, duodenoscopy (EGD), combined (N/A, 7/25/2023).    She  reports that she quit smoking about 11 years ago. Her smoking use included cigarettes. She has a 45.00 pack-year smoking history. She has never used smokeless tobacco. She reports current alcohol use. She reports that she does not use drugs.    Her family history includes Anesthesia Reaction in her mother; Blood Disease (age of onset: 75) in her mother; C.A.D. (age of onset: 61) in her father; Cancer - colorectal in her maternal grandmother; Cerebrovascular Disease in her mother; Coronary Artery Disease in her father; Diabetes in her paternal aunt; Hypertension in her father and mother; Osteoporosis in her mother.    ALLERGIES:   No Known Allergies    PERTINENT MEDICATIONS:    Current Outpatient Medications:     aspirin 81 MG tablet, Take 81 mg by mouth daily , Disp: , Rfl:     atorvastatin  (LIPITOR) 20 MG tablet, Take 1 tablet (20 mg) by mouth daily, Disp: 90 tablet, Rfl: 1    CALCIUM CITRATE PO, Take 1,200 mg by mouth daily , Disp: , Rfl:     cetirizine (ZYRTEC) 10 MG tablet, Take 1 tablet (10 mg) by mouth daily, Disp: 90 tablet, Rfl: 1    Cholecalciferol (VITAMIN D) 1000 UNIT capsule, Take 1 capsule by mouth daily., Disp: , Rfl:     famotidine (PEPCID) 40 MG tablet, Take 1 tablet (40 mg) by mouth At Bedtime, Disp: 90 tablet, Rfl: 1    FLUoxetine (PROZAC) 20 MG capsule, Take 3 capsules (60 mg) by mouth daily, Disp: 270 capsule, Rfl: 2    hypromellose (ARTIFICIAL TEARS) 0.5 % SOLN, Place 1 drop into both eyes every 4 hours as needed , Disp: , Rfl:     levothyroxine (SYNTHROID/LEVOTHROID) 100 MCG tablet, Monday-Saturday: 1 tablet/day,  Sunday: skip, Disp: 90 tablet, Rfl: 4    magnesium oxide (MAG-OX) 400 (240 Mg) MG tablet, Take 400 mg by mouth daily, Disp: , Rfl:     multivitamin w/minerals (THERA-VIT-M) tablet, Take 1 tablet by mouth daily, Disp: , Rfl:     omeprazole (PRILOSEC) 20 MG DR capsule, Take 1 capsule (20 mg) by mouth every morning On an empty stomach about 30 minutes before breakfast, Disp: 90 capsule, Rfl: 1    omeprazole (PRILOSEC) 20 MG DR capsule, 1 capsule M, W, F, Disp: 90 capsule, Rfl: 2    sennosides (SENOKOT) 8.6 MG tablet, Take 2 tablets by mouth daily , Disp: , Rfl:     sodium fluoride dental gel (PREVIDENT) 1.1 % GEL topical gel, Apply to affected area At Bedtime, Disp: 100 mL, Rfl: 3    triamcinolone (KENALOG) 0.1 % external ointment, Apply to affected area of the skin twice a day after using a moisturizer. Can do wet wraps with this., Disp: 454 g, Rfl: 1    triamterene-HCTZ (MAXZIDE-25) 37.5-25 MG tablet, Take 0.5 tablets by mouth daily, Disp: 45 tablet, Rfl: 1      PHYSICAL EXAMINATION:  Constitutional: aaox3, cooperative, pleasant, not dyspneic/diaphoretic, no acute distress  Vitals reviewed: /86 (BP Location: Left arm, Patient Position: Sitting, Cuff Size: Adult  "Regular)   Pulse 92   Ht 1.575 m (5' 2\")   Wt 67.6 kg (149 lb)   LMP  (LMP Unknown)   SpO2 98%   BMI 27.25 kg/m     Wt:   Wt Readings from Last 2 Encounters:   08/01/23 67.6 kg (149 lb)   07/13/23 69.4 kg (153 lb)        Eyes: Sclera anicteric/injected  CV: No edema, RRR  Respiratory: Unlabored breathing, CTAB  Abd: Nondistended, no masses, +bs, no hepatosplenomegaly, LUQ abd tenderness, no peritoneal signs  Skin: warm, perfused, no jaundice  Psych: Normal affect  MSK: Normal gait      PERTINENT STUDIES: (I personally reviewed these laboratory studies today)  Most recent CBC:  Recent Labs   Lab Test 07/24/23  1114 07/17/23  1132   WBC 12.1* 14.2*   HGB 11.7 12.1   HCT 34.9* 36.0   * 684*     Most recent hepatic panel:  Recent Labs   Lab Test 07/13/23  0937 10/10/22  1036   ALT 21 33   AST 41 36     Most recent creatinine:  Recent Labs   Lab Test 07/13/23  0937 10/10/22  1036   CR 0.74 0.87       TSH   Date Value Ref Range Status   06/28/2023 1.31 0.30 - 4.20 uIU/mL Final   06/20/2022 0.21 (L) 0.40 - 4.00 mU/L Final   01/18/2021 0.29 (L) 0.40 - 4.00 mU/L Final         RADIOLOGY:      EXAM: CT CHEST WO   LOCATION: [facility]   DATE: 7/26/2023     INDICATION: Cough, chronic/persisting > 8 weeks, failed empiric treatment   COMPARISON: None.   TECHNIQUE: CT chest without IV contrast. Multiplanar reformats were obtained. Dose reduction techniques were used.   CONTRAST: None.     FINDINGS:   LUNGS AND PLEURA: Patent central airways. No acute appearing airspace opacities. No evidence for bronchiectasis. Minimal basal scarring. Tiny right basal calcified granuloma. No pleural effusion. No pneumothorax.     MEDIASTINUM/AXILLAE: No intrathoracic lymphadenopathy by size criteria. Normal caliber thoracic aorta. Normal heart size. No pericardial effusion. Small hiatal hernia.     CORONARY ARTERY CALCIFICATION: Moderate.     UPPER ABDOMEN: Bilateral renal cysts for which no follow-up is recommended. " Cholecystectomy clips. Peripherally calcified 1.6 cm splenic arterial aneurysm.     MUSCULOSKELETAL: Partly visualized cervical spinal fixation. Degenerative changes of the spine.     IMPRESSION:   1. No acute findings to suggest pneumonitis.   2.  Small hiatal hernia.   3.  Peripherally calcified 1.6 cm splenic arterial aneurysm.       EXAM: CT ABDOMEN AND PELVIS WITH CONTRAST   LOCATION: [facility]   DATE: 07/05/2023     INDICATION: Left lower quadrant abdominal pain.   COMPARISON: None.   TECHNIQUE: CT scan of the abdomen and pelvis was performed following injection of IV contrast. Multiplanar reformats were obtained. Dose reduction techniques were used.   CONTRAST: Omnipaque 350, 91 cc.     FINDINGS:   LOWER CHEST: Mild bibasilar atelectasis.     HEPATOBILIARY: Postop cholecystectomy. No bile duct dilatation. Low-attenuation subcentimeter liver lesion(s) compatible with benign cysts or other benign lesions. No specific evaluation or follow-up is recommended in a low risk patient.     PANCREAS: Normal.     SPLEEN: Normal.     ADRENAL GLANDS: Normal.     KIDNEYS/BLADDER: Simple bilateral renal cysts measuring greater than 1 cm require no further evaluation. Low-attenuation subcentimeter renal lesion(s). These are compatible with small benign cysts and no specific imaging evaluation or follow-up is recommended. No hydronephrosis. The ureters and bladder are unremarkable.     BOWEL: Normal.     LYMPH NODES: Normal.     VASCULATURE: Unremarkable.     PELVIC ORGANS: Normal.     MUSCULOSKELETAL: Hypertrophic and degenerative changes thoracolumbar spine with degenerative disc disease L3-L5 interspaces. Mild anterior wedging L1 vertebral body.     IMPRESSION:   1. No acute findings.   2.  Simple renal cysts require no follow-up.   3.  Hypertrophic and degenerative changes thoracolumbar spine with degenerative disc disease L3-L5 disc interspaces and mild anterior wedging L1 vertebral body.   4.  Postop  cholecystectomy.       ASSESSMENT/PLAN:    1. Early satiety  - NM Gastric Emptying; Future  - Adult GI Clinic Follow-Up Order (Blank); Future    2. Nausea  - Adult GI  Referral - Consult Only    3. Unintentional weight loss  - Adult GI  Referral - Consult Only  - CBC with Platelets & Differential; Future  - Adult GI Clinic Follow-Up Order (Blank); Future    4. Gastritis without bleeding, unspecified chronicity, unspecified gastritis type  - omeprazole (PRILOSEC) 20 MG DR capsule; Take 1 capsule (20 mg) by mouth every morning On an empty stomach about 30 minutes before breakfast  Dispense: 90 capsule; Refill: 1  - famotidine (PEPCID) 40 MG tablet; Take 1 tablet (40 mg) by mouth At Bedtime  Dispense: 90 tablet; Refill: 1  - Adult GI Clinic Follow-Up Order (Blank); Future    5. Gastroesophageal reflux disease without esophagitis    6. Left upper quadrant pain  - Enteric Bacteria and Virus Panel PCR; Future    7. Diarrhea, unspecified type  - CRP inflammation; Future  - Erythrocyte sedimentation rate auto; Future  - Tissue transglutaminase sol IgA and IgG; Future  - IgA; Future  - C. difficile Toxin B PCR with reflex to C. difficile Antigen and Toxins A/B EIA; Future  - Calprotectin Feces; Future  - Enteric Bacteria and Virus Panel PCR; Future  - Elastase Fecal; Future    8. Anemia, unspecified type  - CBC with Platelets & Differential; Future          Marcela Allen is a 72 year old female with pmhx of HTN, COPD, CKD, hypothyroidism who presents for evaluation of unintentional weight loss, nausea, early satiety. She reports long hx of early satiety and nausea with progressively worsening symptoms over the years. In particular with a 7 lb weight loss in the last month. Recent work up notes mild anemia with low iron levels but normal ferritin. Recent EGD noted gastritis and reactive gastropathy. She was taking NSAID's recently due to hip pain but has discontinued since EGD. She does have of GERD for  which she takes omeprazole 20mg 3 times a week. This however is not controlling her reflux symptoms. Recommended increasing omeprazole to daily use. Also discussed increasing further to 40mg daily however she is concerned given hx of CKD and prefers lower dose. Will additionally add in pepcid at bedtime. Further evaluate with 4 hr GES to r/o delayed gastric emptying. Recheck cbc and inflammatory markers. Will check celiac screening, c diff, fecal elastase, enteric pathogens, stool cx and stool inflammatory marker. Continue with colonoscopy as scheduled. Take a high calorie protein nutritional shake/drink daily. Offered nutritional referral, pt politely declines.     Further recommendations after work up. Report any new or worsening symptoms. Follow up in 3 months, sooner if needed.       Thank you for this consultation.  It was a pleasure to participate in the care of this patient; please contact us with any further questions.      I spent a total of 86 minutes on the day of the visit.   Time spent by me doing chart review, history and exam, documentation and further activities per the note    This note was created with voice recognition software, and while reviewed for accuracy, typos may remain.     Jason Orozco PA-C  Division of Gastroenterology, Hepatology and Nutrition   [facility]

## 2023-08-01 NOTE — LETTER
8/1/2023         RE: Marcela Allen  [address] [facility]  [place] [address]        Dear Colleague,    Thank you for referring your patient, Marcela Allen, to the [facility]. Please see a copy of my visit note below.        GI NEW PATIENT CLINIC VISIT     CC/REFERRING MD:    Honey García    REASON FOR CONSULTATION:   Referred by Honye García for New Patient (weight loss, nausea, abd pain)    HPI:  Marcela Allen is 72 year old female with pmhx of HTN, COPD, CKD, hypothyroidism who presents for GI consult.     Symptoms started about a month ago with low grade fever of unclear etiology. She was additionally having hip pain. An MRI noted concern for septic joint. She was admitted for evaluation and mgmt. She was started on IV abx and had a culture of aspirated fluid from joint which suprisingly didn't show any growth. She also had a rheumatological work up which was negative. Since IV abx she reports fever has resolved as well as joint pain.     She has lost weight in the last month, down about 7 lbs. She reports feeling full quickly and not able to eat much. She mostly snacks during the day. Not able to eat full meals. Does have nausea and feels queasy. No vomiting. No dysphagia. Feels like a hot coal is sitting in her stomach. Stomach ache all the time, worse with eating. The early satiety actually started years ago but has progressively worsened. She does have hx of reflux and takes omeprazole 20mg three times a week. This does not compltely control reflux. She additionally takes mylanta and tums as needed without temporary relief. She had an EGD last week which noted gastritis, tortuous esophagus and 3 cm hiatal hernia. Gastric bx noted reactive gastropathy, no H pylori. She was taking NSAIDs, ibuprofen 400mg twice daily for the joint pains. She has stopped since the EGD.     She is normally more constipated, only having a BM every 3-4 days. She  typically takes 2 sennakot daily to manage. Recently however she has developed one loose watery diarrhea stools since receiving abx during recent admission for septic arthritis. No odor to stools. No hematochezia or melena. She feels stools are improving and having more form.     She had a CT abd/pelvis scan and CT chest recently without any acute findings to explain symptoms. She does have a colonoscopy scheduled for next month. Her last colonoscopy was in 2017 with small precancerous polyp and diverticulosis. Was due for repeat at 5 years (2022).     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 Former smoker, quit 11 years ago. Stopped alcohol at this time due to her GI symptoms.     PREVIOUS ENDOSCOPY:      UPPER ENDOSCOPY 7/25/2023  Impression:        - 3 cm hiatal hernia.                             - Tortuous esophagus.                             - Gastritis. Biopsied.                             - Normal examined duodenum.     Final Diagnosis   A. STOMACH, ANTRUM, BIOPSY:  Gastric antral mucosa with reactive antral gastropathy and focus of neutrophilic inflammation; could be NSAID related; no intestinal metaplasia or dysplasia; report of H. pylori immunohistochemistry to follow      B. STOMACH, BODY, BIOPSY:   Gastric body type mucosa with focus of superficial erosion; could be NSAID related; no H. Pylori-like organisms identified on routine staining; negative also for intestinal metaplasia or dysplasia         Ramonita  has a past medical history of Atrial tachycardia, paroxysmal (H) (03/29/2021), BULMARO II (cervical intraepithelial neoplasia II) (01/01/2007), COPD (chronic obstructive pulmonary disease) (H), Depression (01/01/1990), Depressive disorder (25 yrs), Eczema, GERD (gastroesophageal reflux disease), Hiatal hernia, History of blood transfusion (01/01/2013), HTN (hypertension), Hypercholesterolemia, Infection due to 2019 novel coronavirus (03/2022), Liver hemangioma (09/04/2012), OA (osteoarthritis), Osteopenia,  Papillary thyroid carcinoma (H) (01/01/2000), PONV (postoperative nausea and vomiting), Postsurgical hypothyroidism (01/01/2000), and FELICIA III (vulvar intraepithelial neoplasia III) (01/01/2006).    She  has a past surgical history that includes leep tx, cervical (01/01/2007); Colposcopy cervix, biopsy cervix, endocervical curettage, combined (2003, 2007); laparoscopic cholecystectomy (01/01/1998); knee surgery (2001 & 2005); Appendectomy open (01/01/1968); Cystectomy ovarian benign (01/01/1969); Vulvectomy simple (01/01/2006); Arthroplasty minimally invasive knee bilateral (06/27/2013); left thyroid total lobectomy, isthmusectomy and 50% right thyroid lobectomy (Left, 03/07/2000); Hand surgery; carpal tunnel release rt/lt; colonoscopy (01/01/2007); tubal ligation (01/01/1988); Esophagoscopy, gastroscopy, duodenoscopy (EGD), combined (N/A, 08/28/2014); Fusion cervical anterior three+ levels with bone allograft (N/A, 12/07/2015); Bone marrow aspiration only (Left, 12/07/2015); completion thyroidectomy (Right, 03/14/2000); Abdomen surgery; biopsy (Ongoing); Head and neck surgery (01/01/2000); Vulvectomy simple; Balloon compression rhizotomy; Fusion cervical anterior three+ levels (N/A, 02/26/2015); Combined Esophagoscopy, Gastroscopy, Duodenoscopy (Egd) With Co2 Insufflation (N/A, 7/25/2023); and Esophagoscopy, gastroscopy, duodenoscopy (EGD), combined (N/A, 7/25/2023).    She  reports that she quit smoking about 11 years ago. Her smoking use included cigarettes. She has a 45.00 pack-year smoking history. She has never used smokeless tobacco. She reports current alcohol use. She reports that she does not use drugs.    Her family history includes Anesthesia Reaction in her mother; Blood Disease (age of onset: 75) in her mother; C.A.D. (age of onset: 61) in her father; Cancer - colorectal in her maternal grandmother; Cerebrovascular Disease in her mother; Coronary Artery Disease in her father; Diabetes in her paternal  aunt; Hypertension in her father and mother; Osteoporosis in her mother.    ALLERGIES:   No Known Allergies    PERTINENT MEDICATIONS:    Current Outpatient Medications:      aspirin 81 MG tablet, Take 81 mg by mouth daily , Disp: , Rfl:      atorvastatin (LIPITOR) 20 MG tablet, Take 1 tablet (20 mg) by mouth daily, Disp: 90 tablet, Rfl: 1     CALCIUM CITRATE PO, Take 1,200 mg by mouth daily , Disp: , Rfl:      cetirizine (ZYRTEC) 10 MG tablet, Take 1 tablet (10 mg) by mouth daily, Disp: 90 tablet, Rfl: 1     Cholecalciferol (VITAMIN D) 1000 UNIT capsule, Take 1 capsule by mouth daily., Disp: , Rfl:      famotidine (PEPCID) 40 MG tablet, Take 1 tablet (40 mg) by mouth At Bedtime, Disp: 90 tablet, Rfl: 1     FLUoxetine (PROZAC) 20 MG capsule, Take 3 capsules (60 mg) by mouth daily, Disp: 270 capsule, Rfl: 2     hypromellose (ARTIFICIAL TEARS) 0.5 % SOLN, Place 1 drop into both eyes every 4 hours as needed , Disp: , Rfl:      levothyroxine (SYNTHROID/LEVOTHROID) 100 MCG tablet, Monday-Saturday: 1 tablet/day,  Sunday: skip, Disp: 90 tablet, Rfl: 4     magnesium oxide (MAG-OX) 400 (240 Mg) MG tablet, Take 400 mg by mouth daily, Disp: , Rfl:      multivitamin w/minerals (THERA-VIT-M) tablet, Take 1 tablet by mouth daily, Disp: , Rfl:      omeprazole (PRILOSEC) 20 MG DR capsule, Take 1 capsule (20 mg) by mouth every morning On an empty stomach about 30 minutes before breakfast, Disp: 90 capsule, Rfl: 1     omeprazole (PRILOSEC) 20 MG DR capsule, 1 capsule M, W, F, Disp: 90 capsule, Rfl: 2     sennosides (SENOKOT) 8.6 MG tablet, Take 2 tablets by mouth daily , Disp: , Rfl:      sodium fluoride dental gel (PREVIDENT) 1.1 % GEL topical gel, Apply to affected area At Bedtime, Disp: 100 mL, Rfl: 3     triamcinolone (KENALOG) 0.1 % external ointment, Apply to affected area of the skin twice a day after using a moisturizer. Can do wet wraps with this., Disp: 454 g, Rfl: 1     triamterene-HCTZ (MAXZIDE-25) 37.5-25 MG tablet, Take  "0.5 tablets by mouth daily, Disp: 45 tablet, Rfl: 1      PHYSICAL EXAMINATION:  Constitutional: aaox3, cooperative, pleasant, not dyspneic/diaphoretic, no acute distress  Vitals reviewed: /86 (BP Location: Left arm, Patient Position: Sitting, Cuff Size: Adult Regular)   Pulse 92   Ht 1.575 m (5' 2\")   Wt 67.6 kg (149 lb)   LMP  (LMP Unknown)   SpO2 98%   BMI 27.25 kg/m     Wt:   Wt Readings from Last 2 Encounters:   08/01/23 67.6 kg (149 lb)   07/13/23 69.4 kg (153 lb)        Eyes: Sclera anicteric/injected  CV: No edema, RRR  Respiratory: Unlabored breathing, CTAB  Abd: Nondistended, no masses, +bs, no hepatosplenomegaly, LUQ abd tenderness, no peritoneal signs  Skin: warm, perfused, no jaundice  Psych: Normal affect  MSK: Normal gait      PERTINENT STUDIES: (I personally reviewed these laboratory studies today)  Most recent CBC:  Recent Labs   Lab Test 07/24/23  1114 07/17/23  1132   WBC 12.1* 14.2*   HGB 11.7 12.1   HCT 34.9* 36.0   * 684*     Most recent hepatic panel:  Recent Labs   Lab Test 07/13/23  0937 10/10/22  1036   ALT 21 33   AST 41 36     Most recent creatinine:  Recent Labs   Lab Test 07/13/23  0937 10/10/22  1036   CR 0.74 0.87       TSH   Date Value Ref Range Status   06/28/2023 1.31 0.30 - 4.20 uIU/mL Final   06/20/2022 0.21 (L) 0.40 - 4.00 mU/L Final   01/18/2021 0.29 (L) 0.40 - 4.00 mU/L Final         RADIOLOGY:      EXAM: CT CHEST WO   LOCATION: [facility]   DATE: 7/26/2023     INDICATION: Cough, chronic/persisting > 8 weeks, failed empiric treatment   COMPARISON: None.   TECHNIQUE: CT chest without IV contrast. Multiplanar reformats were obtained. Dose reduction techniques were used.   CONTRAST: None.     FINDINGS:   LUNGS AND PLEURA: Patent central airways. No acute appearing airspace opacities. No evidence for bronchiectasis. Minimal basal scarring. Tiny right basal calcified granuloma. No pleural effusion. No pneumothorax.     MEDIASTINUM/AXILLAE: No intrathoracic " lymphadenopathy by size criteria. Normal caliber thoracic aorta. Normal heart size. No pericardial effusion. Small hiatal hernia.     CORONARY ARTERY CALCIFICATION: Moderate.     UPPER ABDOMEN: Bilateral renal cysts for which no follow-up is recommended. Cholecystectomy clips. Peripherally calcified 1.6 cm splenic arterial aneurysm.     MUSCULOSKELETAL: Partly visualized cervical spinal fixation. Degenerative changes of the spine.     IMPRESSION:   1. No acute findings to suggest pneumonitis.   2.  Small hiatal hernia.   3.  Peripherally calcified 1.6 cm splenic arterial aneurysm.       EXAM: CT ABDOMEN AND PELVIS WITH CONTRAST   LOCATION: [facility]   DATE: 07/05/2023     INDICATION: Left lower quadrant abdominal pain.   COMPARISON: None.   TECHNIQUE: CT scan of the abdomen and pelvis was performed following injection of IV contrast. Multiplanar reformats were obtained. Dose reduction techniques were used.   CONTRAST: Omnipaque 350, 91 cc.     FINDINGS:   LOWER CHEST: Mild bibasilar atelectasis.     HEPATOBILIARY: Postop cholecystectomy. No bile duct dilatation. Low-attenuation subcentimeter liver lesion(s) compatible with benign cysts or other benign lesions. No specific evaluation or follow-up is recommended in a low risk patient.     PANCREAS: Normal.     SPLEEN: Normal.     ADRENAL GLANDS: Normal.     KIDNEYS/BLADDER: Simple bilateral renal cysts measuring greater than 1 cm require no further evaluation. Low-attenuation subcentimeter renal lesion(s). These are compatible with small benign cysts and no specific imaging evaluation or follow-up is recommended. No hydronephrosis. The ureters and bladder are unremarkable.     BOWEL: Normal.     LYMPH NODES: Normal.     VASCULATURE: Unremarkable.     PELVIC ORGANS: Normal.     MUSCULOSKELETAL: Hypertrophic and degenerative changes thoracolumbar spine with degenerative disc disease L3-L5 interspaces. Mild anterior wedging L1 vertebral body.     IMPRESSION:   1. No  acute findings.   2.  Simple renal cysts require no follow-up.   3.  Hypertrophic and degenerative changes thoracolumbar spine with degenerative disc disease L3-L5 disc interspaces and mild anterior wedging L1 vertebral body.   4.  Postop cholecystectomy.       ASSESSMENT/PLAN:    1. Early satiety  - NM Gastric Emptying; Future  - Adult GI Clinic Follow-Up Order (Blank); Future    2. Nausea  - Adult GI  Referral - Consult Only    3. Unintentional weight loss  - Adult GI  Referral - Consult Only  - CBC with Platelets & Differential; Future  - Adult GI Clinic Follow-Up Order (Blank); Future    4. Gastritis without bleeding, unspecified chronicity, unspecified gastritis type  - omeprazole (PRILOSEC) 20 MG DR capsule; Take 1 capsule (20 mg) by mouth every morning On an empty stomach about 30 minutes before breakfast  Dispense: 90 capsule; Refill: 1  - famotidine (PEPCID) 40 MG tablet; Take 1 tablet (40 mg) by mouth At Bedtime  Dispense: 90 tablet; Refill: 1  - Adult GI Clinic Follow-Up Order (Blank); Future    5. Gastroesophageal reflux disease without esophagitis    6. Left upper quadrant pain  - Enteric Bacteria and Virus Panel PCR; Future    7. Diarrhea, unspecified type  - CRP inflammation; Future  - Erythrocyte sedimentation rate auto; Future  - Tissue transglutaminase sol IgA and IgG; Future  - IgA; Future  - C. difficile Toxin B PCR with reflex to C. difficile Antigen and Toxins A/B EIA; Future  - Calprotectin Feces; Future  - Enteric Bacteria and Virus Panel PCR; Future  - Elastase Fecal; Future    8. Anemia, unspecified type  - CBC with Platelets & Differential; Future          Marcela Allen is a 72 year old female with pmhx of HTN, COPD, CKD, hypothyroidism who presents for evaluation of unintentional weight loss, nausea, early satiety. She reports long hx of early satiety and nausea with progressively worsening symptoms over the years. In particular with a 7 lb weight loss in the last  month. Recent work up notes mild anemia with low iron levels but normal ferritin. Recent EGD noted gastritis and reactive gastropathy. She was taking NSAID's recently due to hip pain but has discontinued since EGD. She does have of GERD for which she takes omeprazole 20mg 3 times a week. This however is not controlling her reflux symptoms. Recommended increasing omeprazole to daily use. Also discussed increasing further to 40mg daily however she is concerned given hx of CKD and prefers lower dose. Will additionally add in pepcid at bedtime. Further evaluate with 4 hr GES to r/o delayed gastric emptying. Recheck cbc and inflammatory markers. Will check celiac screening, c diff, fecal elastase, enteric pathogens, stool cx and stool inflammatory marker. Continue with colonoscopy as scheduled. Take a high calorie protein nutritional shake/drink daily. Offered nutritional referral, pt politely declines.     Further recommendations after work up. Report any new or worsening symptoms. Follow up in 3 months, sooner if needed.       Thank you for this consultation.  It was a pleasure to participate in the care of this patient; please contact us with any further questions.      I spent a total of 86 minutes on the day of the visit.   Time spent by me doing chart review, history and exam, documentation and further activities per the note    This note was created with voice recognition software, and while reviewed for accuracy, typos may remain.     Jason Orozco PA-C  Division of Gastroenterology, Hepatology and Nutrition   [facility]       Again, thank you for allowing me to participate in the care of your patient.        Sincerely,        Jason Orozco PA-C

## 2023-08-01 NOTE — PATIENT INSTRUCTIONS
It was a pleasure taking care of you today.  I've included a brief summary of our discussion and care plan from today's visit below.  Please review this information with your primary care provider.  _______________________________________________________________________     My recommendations are summarized as follows:     - take omeprazole 20 mg daily, on an empty stomach about 30 minutes before breakfast  - start pepcid 40 mg at bedtime   - schedule a gastric emptying scan, see scheduling info   - continue with colonoscopy as scheduled   - drink a boost/ensure (high calorie) daily    ______________________________________________________________________     How do I schedule labs, imaging studies, or procedures that were ordered in clinic today?      Labs: To schedule lab appointment you can contact your local [facility] or call [phone] to schedule at any convenient [facility] location.     Procedures: If a colonoscopy, upper endoscopy, breath test, esophageal manometry, or pH impedence was ordered today, our endoscopy team will call you to schedule this. If you have not heard from our endoscopy team within a week, please call [phone] to schedule.      Imaging Studies: If you were scheduled for a CT scan, X-ray, MRI, ultrasound, HIDA scan or other imaging study, please call [phone] to have this scheduled.      Referral: If a referral to another specialty was ordered, expect a phone call or follow instructions above. If you have not heard from anyone regarding your referral in a week, please call our clinic to check the status.      Who do I call with any questions after my visit?  Please be in touch if there are any further questions that arise following today's visit.  There are multiple ways to contact your gastroenterology care team.       During business hours, you may reach a Gastroenterology nurse at [phone]     To schedule or reschedule an appointment, please call  [phone].      You can always send a secure message through [employer].  [employer] messages are answered by your nurse or doctor typically within 24 hours.  Please allow extra time on weekends and holidays.       For urgent/emergent questions after business hours, you may reach the on-call GI Fellow by contacting the [facility]  at [phone].     How will I get the results of any tests ordered?    You will receive all of your results.  If you have signed up for [employer], any tests ordered at your visit will be available to you after your physician reviews them.  Typically this takes 1-2 weeks.  If there are urgent results that require a change in your care plan, your physician or nurse will call you to discuss the next steps.       What is [employer]?  [employer] is a secure way for you to access all of your healthcare records from the [facility].  It is a web based computer program, so you can sign on to it from any location.  It also allows you to send secure messages to your care team.  I recommend signing up for [employer] access if you have not already done so and are comfortable with using a computer.       How to I schedule a follow-up visit?  If you did not schedule a follow-up visit today, please call [phone] to schedule a follow-up office visit.      Jason Orozco PA-C  Division of Gastroenterology, Hepatology and Nutrition   [facility]

## 2023-08-02 ENCOUNTER — VIRTUAL VISIT (OUTPATIENT)
Dept: FAMILY MEDICINE | Facility: CLINIC | Age: 73
End: 2023-08-02
Payer: MEDICARE

## 2023-08-02 DIAGNOSIS — E78.5 HYPERLIPIDEMIA LDL GOAL <130: ICD-10-CM

## 2023-08-02 DIAGNOSIS — C51.9 VULVAR CANCER (H): ICD-10-CM

## 2023-08-02 DIAGNOSIS — N18.31 STAGE 3A CHRONIC KIDNEY DISEASE (H): ICD-10-CM

## 2023-08-02 DIAGNOSIS — M00.9 PYOGENIC ARTHRITIS OF LEFT HIP, DUE TO UNSPECIFIED ORGANISM (H): ICD-10-CM

## 2023-08-02 DIAGNOSIS — M25.552 HIP PAIN, LEFT: Primary | ICD-10-CM

## 2023-08-02 DIAGNOSIS — I10 HYPERTENSION GOAL BP (BLOOD PRESSURE) < 140/90: ICD-10-CM

## 2023-08-02 DIAGNOSIS — J44.9 CHRONIC OBSTRUCTIVE PULMONARY DISEASE, UNSPECIFIED COPD TYPE (H): ICD-10-CM

## 2023-08-02 DIAGNOSIS — R79.82 CRP ELEVATED: ICD-10-CM

## 2023-08-02 DIAGNOSIS — S76.319A HAMSTRING TEAR: ICD-10-CM

## 2023-08-02 DIAGNOSIS — R63.4 UNINTENTIONAL WEIGHT LOSS: ICD-10-CM

## 2023-08-02 DIAGNOSIS — R79.89 ELEVATED PLATELET COUNT: ICD-10-CM

## 2023-08-02 DIAGNOSIS — R70.0 ELEVATED SED RATE: ICD-10-CM

## 2023-08-02 DIAGNOSIS — I47.19 ATRIAL TACHYCARDIA, PAROXYSMAL (H): ICD-10-CM

## 2023-08-02 DIAGNOSIS — R50.9 LOW GRADE FEVER: ICD-10-CM

## 2023-08-02 DIAGNOSIS — S76.012A: ICD-10-CM

## 2023-08-02 PROCEDURE — 99443 PR PHYSICIAN TELEPHONE EVALUATION 21-30 MIN: CPT | Mod: 95 | Performed by: PHYSICIAN ASSISTANT

## 2023-08-02 NOTE — PROGRESS NOTES
Ramonita is a 72 year old who is being evaluated via a billable telephone visit.      What phone number would you like to be contacted at? [phone]  How would you like to obtain your AVS? Aracely    Distant Location (provider location):  On-site    Assessment & Plan     Hip pain, left  Recently hospitalized due to concern about possible septic arthritis.  Cultures were negative, however she did get antibiotics in hospital.  Inflammatory arthritis was also high on the differential, labs were normal except elevated non-specific inflammatory markers (CRP/Sed rate). Overall this pain is improving and she is doing home stretching and finished a steroid burst.   Etiology of her significant hip pain still somewhat unclear, but it is improving therefore will monitor.  Referral to rheumatology placed, especially if pain returns/worsens.   - Adult Rheumatology  Referral; Future    Tear of left gluteus lachelle tendon  Managed by ortho at [facility]    Hamstring tear  Managed by ortho at [facility]    Elevated platelet count  At this point likely reactive.  She does have a f/up appt with hematology in September.     CRP elevated  Nonspecific.   - Adult Rheumatology  Referral; Future    Elevated sed rate  Non specific.   - Adult Rheumatology  Referral; Future    Stage 3a chronic kidney disease (H)  Will monitor, and recheck potassium  - Comprehensive metabolic panel (BMP + Alb, Alk Phos, ALT, AST, Total. Bili, TP); Future    Unintentional weight loss  Recently seen by GI and further testing ordered.     Low grade fever  This has resolved.     Hyperlipidemia LDL goal <130  Off statin, will continue to hold until symptoms completely resolved.  CK in hospital normal.     Hypertension goal BP (blood pressure) < 140/90  BP at goal    (J44.9) Chronic obstructive pulmonary disease, unspecified COPD type (H)  Comment: breathing stable.   Plan:     (C51.9) Vulvar cancer (H)  Comment: in remission  Plan:     (I47.1) Atrial  "tachycardia, paroxysmal (H)  Comment: stable.   Plan:     (M00.9) Pyogenic arthritis of left hip, due to unspecified organism (H)  Comment: questionable diagnosis, she did receive IV antibiotics  Plan:       21 minutes spent by me on the date of the encounter doing chart review, history and exam, documentation and further activities per the note     MED REC REQUIRED  Post Medication Reconciliation Status: discharge medications reconciled, continue medications without change  BMI:   Estimated body mass index is 27.25 kg/m  as calculated from the following:    Height as of 8/1/23: 1.575 m (5' 2\").    Weight as of 8/1/23: 67.6 kg (149 lb).           FRANC Zuleta [facility] MAEGAN Shipman is a 72 year old, presenting for the following health issues:  Hospital F/U      [facility]       Hospital Follow-up Visit:    Hospital/Nursing Home/IP Rehab Facility:  [facility]  Date of Admission: 07/25/23  Date of Discharge: 07/28/23  Reason(s) for Admission: Hip/Back Pain. Fevers with sweats during the night.     \"Left hip pain  Gluteal and Hamstring tendon tear  Trochanteric bursitis  - ID and Orthopedics consult  - initial concern and hospitalization was to rule out septic arthritis. All cultures and fluid count less concerning  - can't rule out reactive or inflammatory arthritis in addition to muscle/tendon tears  - CRP and SedRate are both elevated  - rheumatologic studies sent  - pain control and therapies  - steroid burst to help with pain  - stop statin at present, consider resumption when symptoms improve.\"      No longer having fevers and pain has improved quite a bit. Still having night sweats, some pain in the morning but this has improved overall. 3/10 pain level at time of call.   Took a couple doses of antibiotics while in the hospital.  Has some loose stools, not watery at this time.     Was referred to sports pain but pt is has been doing home stretches and these have improved " her pain, not sure if she wants to go to Physical Therapy.     Potassium levels were low, would like to recheck.    Hip pain has improved quite a bit.  She was on a steroid burst.      Saw GI and orders placed for gastric emptying.     Was your hospitalization related to COVID-19? No   Problems taking medications regularly:  None  Medication changes since discharge: None  Problems adhering to non-medication therapy:  None    Summary of hospitalization:  CareEverywhere information obtained and reviewed  Diagnostic Tests/Treatments reviewed.  Follow up needed: PCP and ortho  Other Healthcare Providers Involved in Patient s Care:         Specialist appointment - GI specialist  Update since discharge: improved.         Plan of care communicated with patient                   Review of Systems   Constitutional, HEENT, cardiovascular, pulmonary, gi and gu systems are negative, except as otherwise noted.      Objective           Vitals:  No vitals were obtained today due to virtual visit.    Physical Exam   healthy, alert, and no distress  PSYCH: Alert and oriented times 3; coherent speech, normal   rate and volume, able to articulate logical thoughts, able   to abstract reason, no tangential thoughts, no hallucinations   or delusions  Her affect is normal  RESP: No cough, no audible wheezing, able to talk in full sentences  Remainder of exam unable to be completed due to telephone visits                Phone call duration: 13 minutes

## 2023-08-03 ENCOUNTER — MYC MEDICAL ADVICE (OUTPATIENT)
Dept: FAMILY MEDICINE | Facility: CLINIC | Age: 73
End: 2023-08-03
Payer: MEDICARE

## 2023-08-04 ENCOUNTER — LAB (OUTPATIENT)
Dept: LAB | Facility: CLINIC | Age: 73
End: 2023-08-04
Payer: MEDICARE

## 2023-08-04 ENCOUNTER — MYC MEDICAL ADVICE (OUTPATIENT)
Dept: FAMILY MEDICINE | Facility: CLINIC | Age: 73
End: 2023-08-04

## 2023-08-04 DIAGNOSIS — R19.7 DIARRHEA, UNSPECIFIED TYPE: ICD-10-CM

## 2023-08-04 DIAGNOSIS — N18.31 STAGE 3A CHRONIC KIDNEY DISEASE (H): ICD-10-CM

## 2023-08-04 DIAGNOSIS — R50.9 LOW GRADE FEVER: ICD-10-CM

## 2023-08-04 DIAGNOSIS — M25.552 HIP PAIN, LEFT: Primary | ICD-10-CM

## 2023-08-04 DIAGNOSIS — R79.82 CRP ELEVATED: ICD-10-CM

## 2023-08-04 DIAGNOSIS — S76.012A: ICD-10-CM

## 2023-08-04 DIAGNOSIS — R70.0 ELEVATED SED RATE: ICD-10-CM

## 2023-08-04 LAB
ALBUMIN SERPL BCG-MCNC: 3.8 G/DL (ref 3.5–5.2)
ALP SERPL-CCNC: 102 U/L (ref 35–104)
ALT SERPL W P-5'-P-CCNC: 23 U/L (ref 0–50)
ANION GAP SERPL CALCULATED.3IONS-SCNC: 12 MMOL/L (ref 7–15)
AST SERPL W P-5'-P-CCNC: 36 U/L (ref 0–45)
BILIRUB SERPL-MCNC: 0.8 MG/DL
BUN SERPL-MCNC: 11.1 MG/DL (ref 8–23)
CALCIUM SERPL-MCNC: 9.5 MG/DL (ref 8.8–10.2)
CHLORIDE SERPL-SCNC: 94 MMOL/L (ref 98–107)
CREAT SERPL-MCNC: 0.96 MG/DL (ref 0.51–0.95)
CRP SERPL-MCNC: 97.4 MG/L
DEPRECATED HCO3 PLAS-SCNC: 27 MMOL/L (ref 22–29)
ERYTHROCYTE [SEDIMENTATION RATE] IN BLOOD BY WESTERGREN METHOD: 84 MM/HR (ref 0–30)
GFR SERPL CREATININE-BSD FRML MDRD: 63 ML/MIN/1.73M2
GLUCOSE SERPL-MCNC: 109 MG/DL (ref 70–99)
POTASSIUM SERPL-SCNC: 4.1 MMOL/L (ref 3.4–5.3)
PROT SERPL-MCNC: 7.5 G/DL (ref 6.4–8.3)
SODIUM SERPL-SCNC: 133 MMOL/L (ref 136–145)

## 2023-08-04 PROCEDURE — 86140 C-REACTIVE PROTEIN: CPT

## 2023-08-04 PROCEDURE — 85652 RBC SED RATE AUTOMATED: CPT

## 2023-08-04 PROCEDURE — 80053 COMPREHEN METABOLIC PANEL: CPT

## 2023-08-04 PROCEDURE — 82784 ASSAY IGA/IGD/IGG/IGM EACH: CPT

## 2023-08-04 PROCEDURE — 36415 COLL VENOUS BLD VENIPUNCTURE: CPT

## 2023-08-04 PROCEDURE — 86364 TISS TRNSGLTMNASE EA IG CLAS: CPT

## 2023-08-04 NOTE — TELEPHONE ENCOUNTER
Honey and Covering pool,    Please see mychart. Patient's rheumatic symptoms not resolving, asking for steroid until able to see rheumatology.     Thanks,  AWA Gould  [facility]

## 2023-08-07 LAB
IGA SERPL-MCNC: 304 MG/DL (ref 84–499)
TTG IGA SER-ACNC: 0.6 U/ML
TTG IGG SER-ACNC: 0.9 U/ML

## 2023-08-07 PROCEDURE — 82653 EL-1 FECAL QUANTITATIVE: CPT

## 2023-08-07 PROCEDURE — 83993 ASSAY FOR CALPROTECTIN FECAL: CPT

## 2023-08-07 RX ORDER — PREDNISONE 5 MG/1
TABLET ORAL
Qty: 53 TABLET | Refills: 0 | Status: SHIPPED | OUTPATIENT
Start: 2023-08-07 | End: 2023-09-01

## 2023-08-09 ENCOUNTER — MYC MEDICAL ADVICE (OUTPATIENT)
Dept: FAMILY MEDICINE | Facility: CLINIC | Age: 73
End: 2023-08-09
Payer: MEDICARE

## 2023-08-09 LAB
CALPROTECTIN STL-MCNT: 111 MG/KG (ref 0–49.9)
ELASTASE PANC STL-MCNT: >800 UG/G

## 2023-08-17 ENCOUNTER — MYC MEDICAL ADVICE (OUTPATIENT)
Dept: FAMILY MEDICINE | Facility: CLINIC | Age: 73
End: 2023-08-17
Payer: MEDICARE

## 2023-08-17 NOTE — TELEPHONE ENCOUNTER
Looks like other RN gave advice.    Patient's initial note was an update to provider as well, so routed to Honey García for update when she is back next week.    Bee ROWELL RN  [facility] Triage

## 2023-08-17 NOTE — TELEPHONE ENCOUNTER
Aracely sent regarding chronic pain.    Dayton Shaffer, RN  Triage Nurse  [facility]

## 2023-08-21 ENCOUNTER — TELEPHONE (OUTPATIENT)
Dept: GASTROENTEROLOGY | Facility: CLINIC | Age: 73
End: 2023-08-21

## 2023-08-21 DIAGNOSIS — D64.9 ANEMIA, UNSPECIFIED TYPE: ICD-10-CM

## 2023-08-21 DIAGNOSIS — R19.7 DIARRHEA, UNSPECIFIED TYPE: Primary | ICD-10-CM

## 2023-08-21 RX ORDER — BISACODYL 5 MG/1
TABLET, DELAYED RELEASE ORAL
Qty: 4 TABLET | Refills: 0 | Status: SHIPPED | OUTPATIENT
Start: 2023-08-21 | End: 2023-09-21

## 2023-08-21 NOTE — TELEPHONE ENCOUNTER
Pre assessment completed for upcoming procedure.   (Please see previous telephone encounter notes for complete details)    Patient  returned call.       Procedure details:    Arrival time and facility location reviewed    Pre op exam needed? N/A    Designated  policy reviewed. Instructed to have someone stay 6 hours post procedure.     COVID policy reviewed.      Medication review:    Medications reviewed. Please see supporting documentation below. Holding recommendations discussed (if applicable).       Prep for procedure:     Reviewed procedure prep instructions.       Additional information needed?  N/A      Patient  verbalized understanding and had no questions or concerns at this time.      Flaquita Reyes RN  Endoscopy Procedure Pre Assessment RN  [phone] option 4

## 2023-08-21 NOTE — TELEPHONE ENCOUNTER
Attempted to contact patient in order to complete pre assessment questions.     No answer. Left message to return call to [phone] option 4      Procedure details:    Patient scheduled for Colonoscopy  on 09.05.2023.     Arrival time: 0915. Procedure time 1000    Pre op exam needed? N/A    Facility location: [facility]; [address], [place] [address]    Sedation type: Conscious sedation     Indication for procedure:   Unintentional weight loss   Diarrhea, unspecified type   Anemia, unspecified type         Chart review:     Electronic implanted devices? No    Diabetic? No    Diabetic medication HOLDING recommendations: (if applicable)  Oral diabetic medications: N/A  Diabetic injectables: N/A  Insulin: N/A      Medication review:    Anticoagulants? No    NSAIDS? No NSAID medications per patient's medication list.  RN will verify with pre-assessment call.    Other medication HOLDING recommendations:  N/A      Prep for procedure:     Bowel prep recommendation: Extended Golytely   Due to: constipation noted or reported.     Prep instructions sent via [email or web address]. Bowel prep script sent to    [facility] [address], [address]      Flaquita Enriquez RN  Endoscopy Procedure Pre Assessment RN

## 2023-08-25 ENCOUNTER — ANCILLARY PROCEDURE (OUTPATIENT)
Dept: NUCLEAR MEDICINE | Facility: CLINIC | Age: 73
End: 2023-08-25
Attending: PHYSICIAN ASSISTANT
Payer: MEDICARE

## 2023-08-25 DIAGNOSIS — R68.81 EARLY SATIETY: ICD-10-CM

## 2023-08-25 PROCEDURE — A9541 TC99M SULFUR COLLOID: HCPCS | Performed by: RADIOLOGY

## 2023-08-25 PROCEDURE — G1010 CDSM STANSON: HCPCS | Performed by: RADIOLOGY

## 2023-08-25 PROCEDURE — 78264 GASTRIC EMPTYING IMG STUDY: CPT | Mod: MG | Performed by: RADIOLOGY

## 2023-08-27 ENCOUNTER — MYC MEDICAL ADVICE (OUTPATIENT)
Dept: FAMILY MEDICINE | Facility: CLINIC | Age: 73
End: 2023-08-27
Payer: MEDICARE

## 2023-08-27 DIAGNOSIS — R21 RASH AND NONSPECIFIC SKIN ERUPTION: Primary | ICD-10-CM

## 2023-08-28 NOTE — TELEPHONE ENCOUNTER
Patient advised to return around 10/2/23. Schedule sooner to discuss?    Thank  you,  Honey Orellana RN

## 2023-08-31 ENCOUNTER — MYC MEDICAL ADVICE (OUTPATIENT)
Dept: FAMILY MEDICINE | Facility: CLINIC | Age: 73
End: 2023-08-31
Payer: MEDICARE

## 2023-09-05 ENCOUNTER — HOSPITAL ENCOUNTER (OUTPATIENT)
Facility: AMBULATORY SURGERY CENTER | Age: 73
Discharge: HOME | End: 2023-09-05
Attending: SURGERY | Admitting: SURGERY
Payer: MEDICARE

## 2023-09-05 VITALS
DIASTOLIC BLOOD PRESSURE: 77 MMHG | SYSTOLIC BLOOD PRESSURE: 103 MMHG | TEMPERATURE: 98.1 F | HEART RATE: 74 BPM | RESPIRATION RATE: 16 BRPM | OXYGEN SATURATION: 96 %

## 2023-09-05 LAB — COLONOSCOPY: NORMAL

## 2023-09-05 PROCEDURE — 45378 DIAGNOSTIC COLONOSCOPY: CPT

## 2023-09-05 PROCEDURE — G8907 PT DOC NO EVENTS ON DISCHARG: HCPCS

## 2023-09-05 PROCEDURE — G8918 PT W/O PREOP ORDER IV AB PRO: HCPCS

## 2023-09-05 RX ORDER — LIDOCAINE 40 MG/G
CREAM TOPICAL
Status: DISCONTINUED | OUTPATIENT
Start: 2023-09-05 | End: 2023-09-06 | Stop reason: HOSPADM

## 2023-09-05 RX ORDER — ONDANSETRON 2 MG/ML
4 INJECTION INTRAMUSCULAR; INTRAVENOUS EVERY 6 HOURS PRN
Status: DISCONTINUED | OUTPATIENT
Start: 2023-09-05 | End: 2023-09-06 | Stop reason: HOSPADM

## 2023-09-05 RX ORDER — ONDANSETRON 4 MG/1
4 TABLET, ORALLY DISINTEGRATING ORAL EVERY 6 HOURS PRN
Status: DISCONTINUED | OUTPATIENT
Start: 2023-09-05 | End: 2023-09-06 | Stop reason: HOSPADM

## 2023-09-05 RX ORDER — NALOXONE HYDROCHLORIDE 0.4 MG/ML
0.2 INJECTION, SOLUTION INTRAMUSCULAR; INTRAVENOUS; SUBCUTANEOUS
Status: DISCONTINUED | OUTPATIENT
Start: 2023-09-05 | End: 2023-09-06 | Stop reason: HOSPADM

## 2023-09-05 RX ORDER — NALOXONE HYDROCHLORIDE 0.4 MG/ML
0.4 INJECTION, SOLUTION INTRAMUSCULAR; INTRAVENOUS; SUBCUTANEOUS
Status: DISCONTINUED | OUTPATIENT
Start: 2023-09-05 | End: 2023-09-06 | Stop reason: HOSPADM

## 2023-09-05 RX ORDER — ONDANSETRON 2 MG/ML
4 INJECTION INTRAMUSCULAR; INTRAVENOUS
Status: DISCONTINUED | OUTPATIENT
Start: 2023-09-05 | End: 2023-09-06 | Stop reason: HOSPADM

## 2023-09-05 RX ORDER — FLUMAZENIL 0.1 MG/ML
0.2 INJECTION, SOLUTION INTRAVENOUS
Status: ACTIVE | OUTPATIENT
Start: 2023-09-05 | End: 2023-09-05

## 2023-09-05 RX ORDER — PROCHLORPERAZINE MALEATE 5 MG
5 TABLET ORAL EVERY 6 HOURS PRN
Status: DISCONTINUED | OUTPATIENT
Start: 2023-09-05 | End: 2023-09-06 | Stop reason: HOSPADM

## 2023-09-05 RX ORDER — FENTANYL CITRATE 50 UG/ML
INJECTION, SOLUTION INTRAMUSCULAR; INTRAVENOUS PRN
Status: DISCONTINUED | OUTPATIENT
Start: 2023-09-05 | End: 2023-09-05 | Stop reason: HOSPADM

## 2023-09-05 NOTE — H&P
Patient seen for Endoscopy    HPI:  Patient is a 72 year old female w unintentional weight loss and anemia here for endoscopy. Not taking blood thinning medications. No MI or CVA history. No issues with previous sedation. No recent acute illness.    Review Of Systems    Skin: negative  Ears/Nose/Throat: negative  Respiratory: No shortness of breath, dyspnea on exertion, cough, or hemoptysis  Cardiovascular: negative  Gastrointestinal: negative  Genitourinary: negative  Musculoskeletal: negative  Neurologic: negative  Hematologic/Lymphatic/Immunologic: negative  Endocrine: negative      Past Medical History:   Diagnosis Date    Atrial tachycardia, paroxysmal (H) 03/29/2021    BULMARO II (cervical intraepithelial neoplasia II) 01/01/2007    on colp - leep path WNL    COPD (chronic obstructive pulmonary disease) (H)     Depression 01/01/1990    Depressive disorder 25 yrs    Eczema     GERD (gastroesophageal reflux disease)     Hiatal hernia     History of blood transfusion 01/01/2013    Post op    HTN (hypertension)     Hypercholesterolemia     Infection due to 2019 novel coronavirus 03/2022    Liver hemangioma 09/04/2012    OA (osteoarthritis)     Osteopenia     Papillary thyroid carcinoma (H) 01/01/2000    BL, MF; Tx in 2 operations, RRA    PONV (postoperative nausea and vomiting)     Postsurgical hypothyroidism 01/01/2000    FELICIA III (vulvar intraepithelial neoplasia III) 01/01/2006    wide local exc x 2       Past Surgical History:   Procedure Laterality Date    ABDOMEN SURGERY      Teenager    APPENDECTOMY OPEN  01/01/1968    ARTHROPLASTY MINIMALLY INVASIVE KNEE BILATERAL  06/27/2013    Procedure: ARTHROPLASTY MINIMALLY INVASIVE KNEE BILATERAL;  Minimally Invasive Bilateral Total Knee Arthroplasty;  Surgeon: Christophe Welch MD;  Location: [facility] OR    BALLOON COMPRESSION RHIZOTOMY      by spine specialist    BIOPSY  Ongoing    Lymph nodes neck    BONE MARROW ASPIRATION ONLY Left 12/07/2015    Procedure: BONE MARROW  ASPIRATION ONLY;  Surgeon: Aguilar Roldan MD;  Location:  OR    CARPAL TUNNEL RELEASE RT/LT      COLONOSCOPY  01/01/2007    neg    COLPOSCOPY CERVIX, BIOPSY CERVIX, ENDOCERVICAL CURETTAGE, COMBINED  2003, 2007    COMBINED ESOPHAGOSCOPY, GASTROSCOPY, DUODENOSCOPY (EGD) WITH CO2 INSUFFLATION N/A 7/25/2023    Procedure: Combined Esophagoscopy, Gastroscopy, Duodenoscopy (Egd) With Co2 Insufflation;  Surgeon: Elsie Cary DO;  Location:  OR    completion thyroidectomy Right 03/14/2000    CYSTECTOMY OVARIAN BENIGN  01/01/1969    ESOPHAGOSCOPY, GASTROSCOPY, DUODENOSCOPY (EGD), COMBINED N/A 08/28/2014    Procedure: COMBINED ESOPHAGOSCOPY, GASTROSCOPY, DUODENOSCOPY (EGD), BIOPSY SINGLE OR MULTIPLE;  Surgeon: Kelvin Montgomery MD;  Location:  OR    ESOPHAGOSCOPY, GASTROSCOPY, DUODENOSCOPY (EGD), COMBINED N/A 7/25/2023    Procedure: ESOPHAGOGASTRODUODENOSCOPY, WITH BIOPSY;  Surgeon: Elsie Cary DO;  Location: [facility] OR    FUSION CERVICAL ANTERIOR THREE+ LEVELS N/A 02/26/2015    C4-C7    FUSION CERVICAL ANTERIOR THREE+ LEVELS WITH BONE ALLOGRAFT N/A 12/07/2015    Procedure: FUSION CERVICAL ANTERIOR THREE+ LEVELS WITH BONE ALLOGRAFT;  Surgeon: Aguilar Roldan MD;  Location:  OR    HAND SURGERY      HEAD & NECK SURGERY  01/01/2000    Thyroidectomy    KNEE SURGERY  2001 & 2005    bilat    LAPAROSCOPIC CHOLECYSTECTOMY  01/01/1998    LEEP TX, CERVICAL  01/01/2007    BULMARO II on colp, leep path WNL    left thyroid total lobectomy, isthmusectomy and 50% right thyroid lobectomy Left 03/07/2000    TUBAL LIGATION  01/01/1988    VULVECTOMY SIMPLE  01/01/2006    FELICIA 3, wide local excision x 2    VULVECTOMY SIMPLE         Family History   Problem Relation Age of Onset    Blood Disease Mother 75        thrombocythemia on hydrea    Hypertension Mother     Cerebrovascular Disease Mother         TIA    Anesthesia Reaction Mother         N/V    Osteoporosis Mother     C.A.D. Father 61        three MI's, smoker     Hypertension Father     Coronary Artery Disease Father     Diabetes Paternal Aunt     Cancer - colorectal Maternal Grandmother         unsure of her age.    Breast Cancer No family hx of     Asthma No family hx of        Social History     Socioeconomic History    Marital status:      Spouse name: Tirso    Number of children: 2    Years of education: Not on file    Highest education level: Not on file   Occupational History    Occupation: RN     Employer: [employer]     Comment: retired - physical rehab   Tobacco Use    Smoking status: Former     Packs/day: 1.00     Years: 45.00     Pack years: 45.00     Types: Cigarettes     Quit date: 2012     Years since quittin.1    Smokeless tobacco: Never    Tobacco comments:     quitting with e cigarette   Substance and Sexual Activity    Alcohol use: Yes     Comment: less than weekly    Drug use: No    Sexual activity: Not Currently     Partners: Male     Birth control/protection: Post-menopausal     Comment: TL and vas   Other Topics Concern    Parent/sibling w/ CABG, MI or angioplasty before 65F 55M? No   Social History Narrative    Retired RN; ;      Social Determinants of Health     Financial Resource Strain: Not on file   Food Insecurity: Not on file   Transportation Needs: Not on file   Physical Activity: Not on file   Stress: Not on file   Social Connections: Not on file   Intimate Partner Violence: Not on file   Housing Stability: Not on file       Current Outpatient Medications   Medication Sig Dispense Refill    levothyroxine (SYNTHROID/LEVOTHROID) 100 MCG tablet Monday-Saturday: 1 tablet/day,  : skip 90 tablet 4    aspirin 81 MG tablet Take 81 mg by mouth daily       atorvastatin (LIPITOR) 20 MG tablet Take 1 tablet (20 mg) by mouth daily (Patient not taking: Reported on 2023) 90 tablet 1    bisacodyl (DULCOLAX) 5 MG EC tablet 2 days prior to procedure, take 2 tablets at 4 pm. 1 day prior to procedure,  take 2 tablets at 4 pm. For additional instructions refer to your colonoscopy prep instructions. 4 tablet 0    CALCIUM CITRATE PO Take 1,200 mg by mouth daily       cetirizine (ZYRTEC) 10 MG tablet Take 1 tablet (10 mg) by mouth daily 90 tablet 1    Cholecalciferol (VITAMIN D) 1000 UNIT capsule Take 1 capsule by mouth daily.      famotidine (PEPCID) 40 MG tablet Take 1 tablet (40 mg) by mouth At Bedtime 90 tablet 1    FLUoxetine (PROZAC) 20 MG capsule Take 3 capsules (60 mg) by mouth daily 270 capsule 2    hypromellose (ARTIFICIAL TEARS) 0.5 % SOLN Place 1 drop into both eyes every 4 hours as needed       magnesium oxide (MAG-OX) 400 (240 Mg) MG tablet Take 400 mg by mouth daily      multivitamin w/minerals (THERA-VIT-M) tablet Take 1 tablet by mouth daily      omeprazole (PRILOSEC) 20 MG DR capsule Take 1 capsule (20 mg) by mouth every morning On an empty stomach about 30 minutes before breakfast 90 capsule 1    omeprazole (PRILOSEC) 20 MG DR capsule 1 capsule M, W, F 90 capsule 2    polyethylene glycol (GOLYTELY) 236 g suspension 2 days prior at 5pm, mix and drink half of a jug of Golytely. Drink an 8 oz. glass of Golytely every 15 minutes until half of the jug is gone. Place remainder of Golytely in the refrigerator. 1 day prior at 5 pm, drink the 2nd half of a jug of Golytely bowel prep. 6 hours before your check-in time, drink an 8 oz. glass of Golytely every 15 minutes until half of the 2nd jug of Golytely is gone. Discard remainder of second jug. 8000 mL 0    sennosides (SENOKOT) 8.6 MG tablet Take 2 tablets by mouth daily       sodium fluoride dental gel (PREVIDENT) 1.1 % GEL topical gel Apply to affected area At Bedtime 100 mL 3    triamcinolone (KENALOG) 0.1 % external ointment Apply to affected area of the skin twice a day after using a moisturizer. Can do wet wraps with this. 454 g 1    triamterene-HCTZ (MAXZIDE-25) 37.5-25 MG tablet Take 0.5 tablets by mouth daily 45 tablet 1       Medications and  history reviewed    Physical exam:  Vitals: BP (!) 144/89   Pulse 80   Temp 98.1  F (36.7  C) (Temporal)   Resp 16   LMP  (LMP Unknown)   SpO2 97%   BMI= There is no height or weight on file to calculate BMI.    Constitutional: Healthy, alert, non-distressed   Head: Normo-cephalic, atraumatic, no lesions, masses or tenderness   Cardiovascular: RRR, no new murmurs, +S1, +S2 heart sounds, no clicks, rubs or gallops   Respiratory: CTAB, no rales, rhonchi or wheezing, equal chest rise, good respiratory effort   Gastrointestinal: Soft, non-tender, non distended, no rebound rigidity or guarding, no masses or hernias palpated   : Deferred  Musculoskeletal: Moves all extremities, normal  strength, no deformities noted   Skin: No suspicious lesions or rashes   Psychiatric: Mentation appears normal, affect appropriate   Hematologic/Lymphatic/Immunologic: Normal cervical and supraclavicular lymph nodes   Patient able to get up on table without difficulty.    Labs show:  No results found for this or any previous visit (from the past 24 hour(s)).    Assessment: Endoscopy  Plan: Pt cleared for anesthesia for proposed procedure.    Praful Waddell DO

## 2023-09-10 ENCOUNTER — MYC MEDICAL ADVICE (OUTPATIENT)
Dept: FAMILY MEDICINE | Facility: CLINIC | Age: 73
End: 2023-09-10
Payer: MEDICARE

## 2023-09-11 NOTE — TELEPHONE ENCOUNTER
RECORDS STATUS - ALL OTHER DIAGNOSIS      RECORDS RECEIVED FROM: [facility]   DATE RECEIVED: 9/11   NOTES STATUS DETAILS   OFFICE NOTE from referring provider Honey Foster PA-C in  FAMILY PRACTICE   OFFICE NOTE from medical oncologist Candice Rosario: 7/19/13   OPERATIVE REPORT Epic 9/5/23, 7/25/23: Colonoscopy  8/28/14: EGD  2/17/06: Colposcopy   MEDICATION LIST [facility] 8/21/23   LABS     ANYTHING RELATED TO DIAGNOSIS Epic 8/7/23

## 2023-09-21 ENCOUNTER — PRE VISIT (OUTPATIENT)
Dept: ONCOLOGY | Facility: CLINIC | Age: 73
End: 2023-09-21

## 2023-09-21 ENCOUNTER — ONCOLOGY VISIT (OUTPATIENT)
Dept: ONCOLOGY | Facility: CLINIC | Age: 73
End: 2023-09-21
Attending: PHYSICIAN ASSISTANT
Payer: MEDICARE

## 2023-09-21 VITALS
HEIGHT: 62 IN | DIASTOLIC BLOOD PRESSURE: 79 MMHG | WEIGHT: 146 LBS | BODY MASS INDEX: 26.87 KG/M2 | HEART RATE: 82 BPM | SYSTOLIC BLOOD PRESSURE: 127 MMHG | OXYGEN SATURATION: 97 %

## 2023-09-21 DIAGNOSIS — R50.9 LOW GRADE FEVER: ICD-10-CM

## 2023-09-21 DIAGNOSIS — R10.30 INGUINAL PAIN, UNSPECIFIED LATERALITY: ICD-10-CM

## 2023-09-21 DIAGNOSIS — R11.0 NAUSEA: ICD-10-CM

## 2023-09-21 DIAGNOSIS — R63.4 UNINTENTIONAL WEIGHT LOSS: ICD-10-CM

## 2023-09-21 DIAGNOSIS — D75.839 THROMBOCYTOSIS: ICD-10-CM

## 2023-09-21 LAB
ALBUMIN SERPL BCG-MCNC: 3.9 G/DL (ref 3.5–5.2)
ALP SERPL-CCNC: 88 U/L (ref 35–104)
ALT SERPL W P-5'-P-CCNC: 8 U/L (ref 0–50)
ANION GAP SERPL CALCULATED.3IONS-SCNC: 10 MMOL/L (ref 7–15)
AST SERPL W P-5'-P-CCNC: 28 U/L (ref 0–45)
BASOPHILS # BLD AUTO: 0.1 10E3/UL (ref 0–0.2)
BASOPHILS NFR BLD AUTO: 1 %
BILIRUB SERPL-MCNC: 0.4 MG/DL
BUN SERPL-MCNC: 6.5 MG/DL (ref 8–23)
CALCIUM SERPL-MCNC: 9 MG/DL (ref 8.8–10.2)
CHLORIDE SERPL-SCNC: 97 MMOL/L (ref 98–107)
CK SERPL-CCNC: 114 U/L (ref 26–192)
CREAT SERPL-MCNC: 0.84 MG/DL (ref 0.51–0.95)
CRP SERPL-MCNC: 23 MG/L
DEPRECATED HCO3 PLAS-SCNC: 27 MMOL/L (ref 22–29)
EGFRCR SERPLBLD CKD-EPI 2021: 74 ML/MIN/1.73M2
EOSINOPHIL # BLD AUTO: 0.2 10E3/UL (ref 0–0.7)
EOSINOPHIL NFR BLD AUTO: 2 %
ERYTHROCYTE [DISTWIDTH] IN BLOOD BY AUTOMATED COUNT: 14.7 % (ref 10–15)
ERYTHROCYTE [SEDIMENTATION RATE] IN BLOOD BY WESTERGREN METHOD: 31 MM/HR (ref 0–30)
GLUCOSE SERPL-MCNC: 122 MG/DL (ref 70–99)
HCT VFR BLD AUTO: 37 % (ref 35–47)
HGB BLD-MCNC: 11.9 G/DL (ref 11.7–15.7)
IMM GRANULOCYTES # BLD: 0.1 10E3/UL
IMM GRANULOCYTES NFR BLD: 1 %
INTERPRETATION: NORMAL
LYMPHOCYTES # BLD AUTO: 2.1 10E3/UL (ref 0.8–5.3)
LYMPHOCYTES NFR BLD AUTO: 19 %
MCH RBC QN AUTO: 26.4 PG (ref 26.5–33)
MCHC RBC AUTO-ENTMCNC: 32.2 G/DL (ref 31.5–36.5)
MCV RBC AUTO: 82 FL (ref 78–100)
MONOCYTES # BLD AUTO: 0.6 10E3/UL (ref 0–1.3)
MONOCYTES NFR BLD AUTO: 5 %
NEUTROPHILS # BLD AUTO: 8.3 10E3/UL (ref 1.6–8.3)
NEUTROPHILS NFR BLD AUTO: 72 %
NRBC # BLD AUTO: 0 10E3/UL
NRBC BLD AUTO-RTO: 0 /100
PLATELET # BLD AUTO: 481 10E3/UL (ref 150–450)
POTASSIUM SERPL-SCNC: 4.2 MMOL/L (ref 3.4–5.3)
PROT SERPL-MCNC: 7.2 G/DL (ref 6.4–8.3)
RBC # BLD AUTO: 4.51 10E6/UL (ref 3.8–5.2)
RETICS # AUTO: 0.08 10E6/UL (ref 0.03–0.1)
RETICS/RBC NFR AUTO: 1.7 % (ref 0.5–2)
SIGNIFICANT RESULTS: NORMAL
SODIUM SERPL-SCNC: 134 MMOL/L (ref 136–145)
SPECIMEN DESCRIPTION: NORMAL
TEST DETAILS, MDL: NORMAL
WBC # BLD AUTO: 11.4 10E3/UL (ref 4–11)

## 2023-09-21 PROCEDURE — 99207 BLOOD MORPHOLOGY PATHOLOGIST REVIEW: CPT | Mod: GC | Performed by: PATHOLOGY

## 2023-09-21 PROCEDURE — 85652 RBC SED RATE AUTOMATED: CPT | Performed by: INTERNAL MEDICINE

## 2023-09-21 PROCEDURE — 85025 COMPLETE CBC W/AUTO DIFF WBC: CPT | Performed by: INTERNAL MEDICINE

## 2023-09-21 PROCEDURE — 81219 CALR GENE COM VARIANTS: CPT | Performed by: INTERNAL MEDICINE

## 2023-09-21 PROCEDURE — 99205 OFFICE O/P NEW HI 60 MIN: CPT | Performed by: INTERNAL MEDICINE

## 2023-09-21 PROCEDURE — 80053 COMPREHEN METABOLIC PANEL: CPT | Performed by: INTERNAL MEDICINE

## 2023-09-21 PROCEDURE — 82550 ASSAY OF CK (CPK): CPT | Performed by: INTERNAL MEDICINE

## 2023-09-21 PROCEDURE — 85045 AUTOMATED RETICULOCYTE COUNT: CPT | Performed by: INTERNAL MEDICINE

## 2023-09-21 PROCEDURE — 86431 RHEUMATOID FACTOR QUANT: CPT | Performed by: INTERNAL MEDICINE

## 2023-09-21 PROCEDURE — 86038 ANTINUCLEAR ANTIBODIES: CPT | Performed by: INTERNAL MEDICINE

## 2023-09-21 PROCEDURE — 36415 COLL VENOUS BLD VENIPUNCTURE: CPT | Performed by: INTERNAL MEDICINE

## 2023-09-21 PROCEDURE — G0452 MOLECULAR PATHOLOGY INTERPR: HCPCS | Performed by: STUDENT IN AN ORGANIZED HEALTH CARE EDUCATION/TRAINING PROGRAM

## 2023-09-21 PROCEDURE — 86140 C-REACTIVE PROTEIN: CPT | Performed by: INTERNAL MEDICINE

## 2023-09-21 PROCEDURE — 81339 MPL GENE SEQ ALYS EXON 10: CPT | Performed by: INTERNAL MEDICINE

## 2023-09-21 PROCEDURE — 81338 MPL GENE COMMON VARIANTS: CPT | Performed by: INTERNAL MEDICINE

## 2023-09-21 ASSESSMENT — PAIN SCALES - GENERAL: PAINLEVEL: NO PAIN (0)

## 2023-09-21 NOTE — NURSING NOTE
"Oncology Rooming Note    September 21, 2023 11:23 AM   Marcela Allen is a 72 year old female who presents for:    Chief Complaint   Patient presents with    Oncology Clinic Visit     New Patient     Initial Vitals: /79 (BP Location: Left arm, Patient Position: Chair, Cuff Size: Adult Regular)   Pulse 82   Ht 1.575 m (5' 2\")   Wt 66.2 kg (146 lb)   LMP  (LMP Unknown)   SpO2 97%   BMI 26.70 kg/m   Estimated body mass index is 26.7 kg/m  as calculated from the following:    Height as of this encounter: 1.575 m (5' 2\").    Weight as of this encounter: 66.2 kg (146 lb). Body surface area is 1.7 meters squared.  No Pain (0) Comment: Data Unavailable   No LMP recorded (lmp unknown). Patient is postmenopausal.  Allergies reviewed: Yes  Medications reviewed: Yes    Medications: Medication refills not needed today.  Pharmacy name entered into [facility]: [facility] [place], [place] - [address] [address], [address]    Clinical concerns: New Patient  Dr. Galvez was notified.      Teri Coyne CMA              "

## 2023-09-21 NOTE — LETTER
9/21/2023         RE: Marcela Allen  [address] [facility] [address]        Dear Colleague,    Thank you for referring your patient, Marcela Allen, to the [facility] [place]. Please see a copy of my visit note below.    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     Ramonita is alone at this clinic visit and the following history has been provided by her.  She has not been doing well for the last 3 months. She noted pain in her joints/hips and low grade fevers. She was referred to ED for septic arthritis as she had previous knee replacement. She was referred to orthopedics. She had MRI of hip and lumbar spine. There was some fluid around her spine. She was recommended hospital admission. She was treated with intravenous antibiotics but it did not seem to help. She was discharged on antibiotics and steroid burst. It is only the steroids that seemed to help. Pain medications have not helped. In fact nothing helps other than her steroids She can wake up at 8 am and go back to sleep again until noon. She is very tired without the steroids but is able to function near normally with it. Her CRP was elevated at 96 at last check.     She had a CT chest in the hospital. She is ex smoker and had cough in the hospital.     She saw GI for unintentional weight loss. She had upper GI, emptying study and colonoscopy.     She has been referred to rheumatology but the earliest she could get in is in November.     She has been referred for thrombocythemia. Her mother had thrombocythemia and was on hydrea. She was  worked up with a bone marrow biopsy. Unsure if she had JAK2 mutation.     She had bilateral knee replacement and was kept on anticoagulation for 4 months as her platelet counts post discharge were over a million.     She has hypertension, hypercholesterolemia, hypothyroidism (post total thyroidectomy). She had multiple surgeries done - cervical spinal fusion.      PAST MEDICAL HISTORY     Past Medical History:   Diagnosis Date     Atrial tachycardia, paroxysmal (H) 03/29/2021     BULMARO II (cervical intraepithelial neoplasia II) 01/01/2007    on colp - leep path WNL     COPD (chronic obstructive pulmonary disease) (H)      Depression 01/01/1990     Depressive disorder 25 yrs     Eczema      GERD (gastroesophageal reflux disease)      Hiatal hernia      History of blood transfusion 01/01/2013    Post op     HTN (hypertension)      Hypercholesterolemia      Infection due to 2019 novel coronavirus 03/2022     Liver hemangioma 09/04/2012     OA (osteoarthritis)      Osteopenia      Papillary thyroid carcinoma (H) 01/01/2000    BL, MF; Tx in 2 operations, RRA     PONV (postoperative nausea and vomiting)      Postsurgical hypothyroidism 01/01/2000     FELICIA III (vulvar intraepithelial neoplasia III) 01/01/2006    wide local exc x 2          CURRENT OUTPATIENT MEDICATIONS     Current Outpatient Medications   Medication Sig     aspirin 81 MG tablet Take 81 mg by mouth daily      atorvastatin (LIPITOR) 20 MG tablet Take 1 tablet (20 mg) by mouth daily     CALCIUM CITRATE PO Take 1,200 mg by mouth daily      cetirizine (ZYRTEC) 10 MG tablet Take 1 tablet (10 mg) by mouth daily     Cholecalciferol (VITAMIN D) 1000 UNIT capsule Take 1 capsule by mouth daily.     famotidine (PEPCID) 40 MG tablet Take 1 tablet (40 mg) by mouth At Bedtime     FLUoxetine (PROZAC) 20 MG capsule Take 3 capsules (60 mg) by mouth daily     hypromellose (ARTIFICIAL TEARS) 0.5 % SOLN Place 1 drop into both eyes every 4 hours as needed      levothyroxine  "(SYNTHROID/LEVOTHROID) 100 MCG tablet Monday-Saturday: 1 tablet/day,  : skip     multivitamin w/minerals (THERA-VIT-M) tablet Take 1 tablet by mouth daily     omeprazole (PRILOSEC) 20 MG DR capsule Take 1 capsule (20 mg) by mouth every morning On an empty stomach about 30 minutes before breakfast     sennosides (SENOKOT) 8.6 MG tablet Take 2 tablets by mouth daily      sodium fluoride dental gel (PREVIDENT) 1.1 % GEL topical gel Apply to affected area At Bedtime     triamcinolone (KENALOG) 0.1 % external ointment Apply to affected area of the skin twice a day after using a moisturizer. Can do wet wraps with this.     triamterene-HCTZ (MAXZIDE-25) 37.5-25 MG tablet Take 0.5 tablets by mouth daily     No current facility-administered medications for this visit.        ALLERGIES    No Known Allergies     SOCIAL HISTORY     She is  and lives alone - with her dogs. 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     Social History     Social History Narrative    Retired RN; ;      She does not smoke any more. She smoked pack a day for 45 yrs; started at 16 and quit at 61 yrs of age. She does not drink alcohol.      FAMILY HISTORY   Father had cardiac disease -  at 1961 before bypass was available.      REVIEW OF SYSTEMS   As above in the HPI, o/w complete 12-point ROS was negative.     PHYSICAL EXAM   /79 (BP Location: Left arm, Patient Position: Chair, Cuff Size: Adult Regular)   Pulse 82   Ht 1.575 m (5' 2\")   Wt 66.2 kg (146 lb)   LMP  (LMP Unknown)   SpO2 97%   BMI 26.70 kg/m     Wt Readings from Last 3 Encounters:   23 66.2 kg (146 lb)   23 67.6 kg (149 lb)   23 69.4 kg (153 lb)     GEN: NAD  HEENT: PERRL, EOMI, no icterus, injection or pallor. Oropharynx is clear.  NECK: no cervical or supraclavicular lymphadenopathy  LUNGS: clear bilaterally  CV: regular, no murmurs, rubs, or gallops  ABDOMEN: soft, non-tender, non-distended, normal " bowel sounds, no hepatosplenomegaly by percussion or palpation  EXT: warm, well perfused, no edema  NEURO: alert  SKIN: no rashes     LABORATORY AND IMAGING STUDIES     Recent Labs   Lab Test 08/04/23  1015 07/13/23  0937 10/10/22  1036 10/14/21  1053 03/29/21  1013   * 137 139 139 137   POTASSIUM 4.1 3.9 4.7 4.2 4.4   CHLORIDE 94* 99 105 105 101   CO2 27 26 32 29 33*   ANIONGAP 12 12 2* 5 3   BUN 11.1 6.0* 8 10 13   CR 0.96* 0.74 0.87 0.91 0.90   * 98 96 92 93   JUNE 9.5 9.2 9.4 9.0 9.6     No results for input(s): MAG, PHOS in the last 60112 hours.  Recent Labs   Lab Test 07/24/23  1114 07/17/23  1132 07/13/23  0937 10/10/22  1036 05/05/20  1241 10/07/19  1039 04/04/16  1012 01/04/16  0956 12/07/15  1604 11/23/15  1059   WBC 12.1* 14.2* 11.2*  --  7.3 7.0   < > 7.2   < > 8.0   HGB 11.7 12.1 12.3 14.1 14.2 14.2   < > 12.8   < > 13.2   * 684* 524*  --  350 364   < > 420   < > 408   MCV 84 84 84  --  84 87   < > 87   < > 85   NEUTROPHIL 70 73 76  --   --   --   --  42.6  --  60.7    < > = values in this interval not displayed.     Recent Labs   Lab Test 08/04/23  1015 07/13/23  0937 10/10/22  1036   BILITOTAL 0.8 0.6 0.8   ALKPHOS 102 107* 76   ALT 23 21 33   AST 36 41 36   ALBUMIN 3.8 3.8 3.7     TSH   Date Value Ref Range Status   06/28/2023 1.31 0.30 - 4.20 uIU/mL Final   06/20/2022 0.21 (L) 0.40 - 4.00 mU/L Final   01/18/2021 0.29 (L) 0.40 - 4.00 mU/L Final   05/05/2020 0.33 (L) 0.40 - 4.00 mU/L Final   11/05/2019 0.22 (L) 0.40 - 4.00 mU/L Final     No results for input(s): CEA in the last 84232 hours.  Results for orders placed or performed in visit on 08/25/23   NM Gastric Emptying    Narrative    EXAM:  NM GASTRIC EMPTYING, 8/25/2023 12:43 PM  HISTORY: Early satiety    TECHNIQUE: The patient ingested 2 mCi of Tc-99m sulfur colloid in 2  eggs, 1 piece of toast with jelly, and water. Static images were  acquired at approximately 1 hour intervals out through 4 hours using a  dual head gamma  camera in an anterior and posterior position. The  calculation of emptying was based on dual head imaging with geometric  mean calculations.  A Linear square fit was calculated to the emptying  curve to determine the amount of residual activity at each time point.    FINDINGS:   Percent retention at 60 min = 50%.  Percent retention at 120 min = 36%.  Percent retention at 180 min = 27%.  Percent retention at 240 min = 16%.    T 1/2 emptying time =  60 mins.      Impression    IMPRESSION: Abnormal gastric emptying at 240 minutes. The remaining  evaluated time points and T 1/2 are within normal limits.    =====================  Reference Range:  Gastric emptying  - 30 minutes: <70% of retention (> 30% emptying) suggests abnormally     fast emptying.  - 60 minutes: <90% retention (>10% emptying) is normal; less than 30%     retention (>70% emptying) suggests abnormally rapid emptying.  - 90 minutes: <65% retention (> 35% emptying) is normal.  - 120 minutes: <60% retention (> 40% emptying) is normal.  - 180 minutes: <30% retention (> 70% emptying) is normal.  - 180 minutes: <30% retention (> 70% emptying) is normal.  - 240 minutes: <10% retention (> 90% emptying) is normal.    Gastric emptying T-1/2:  Solid: The normal range is  minutes  Liquid only: Normal range is 10-45 minutes.  Liquid only-children: At 60 minutes, normal range is 44-58 % .  Liquid only-infants: At 60 minutes, normal range is 32-64 %.      I have personally reviewed the examination and initial interpretation  and I agree with the findings.    SRINIVAS FLORES MD         SYSTEM ID:  [id number]       Lab Results   Component Value Date    PATH  07/03/2017     Patient Name: DANILO VENTURA  MR#: [id number]  Specimen #: [id number]  Collected: 7/3/2017  Received: 7/5/2017  Reported: 7/12/2017 15:36  Ordering Phy(s): ANJANA HUNTER    For improved result formatting, select 'View Enhanced Report Format'  under Linked Documents  "section.    SPECIMEN(S):  A: Skin, right forearm  B: Immunofluorescent study, Skin    FINAL DIAGNOSIS:  A. Skin, forearm, right:  - Subtle features of spongiotic dermatitis - (see comment)    B. Skin, forearm, right:  - Pending DIF study - (see comment)    COMMENT:  A.  The primary considerations include atopic, contact or other  eczematous dermatitis.  Given the presence of parakeratosis in small  mounds, the differential diagnosis also includes pityriasis rosea (VT)  or a VT-like drug eruption.  Clinical correlation is recommended.    B. DIF testing is in process.  Results will be reported in a followup as  they become available.    I have personally reviewed all specimens and or slides, including the  listed special stains, and used them with my medical judgement to  determine the final diagnosis.    Electronically signed out by:    Sagar Mast M.D., [facility]    CLINICAL HISTORY:  The patient is a 66-year-old female.    GROSS:  A. The specimen is received in formalin with proper patient's  identification, labeled \"skin, right forearm\".  The specimen consists of  a 4 mm diameter punch skin biopsy tissue fragment measuring 0.3 cm in  depth.  The biopsy margins are inked in black.  Bisected and entirely  submitted in one cassette.    B. The specimen is received in Rajesh solution with proper patient's  identification, labeled \"skin, right forearm, Immunofluorescent study\".  The specimen consists of a 4 mm diameter punch skin biopsy tissue  fragment measuring 0.4 cm in depth.  The specimen is sent out for direct  immunofluorescent study as received. (Dictated by: Emmanuelle Zaldivar 7/5/2017  11:02 AM)    MICROSCOPIC:  A. The specimen exhibits intermittent small mounds of parakeratosis,  mild spongiosis and a mild superficial perivascular lymphocytic  infiltrate.    B. Pending DIF study    CPT Codes:  A: 40762-SS7.T, 77885-XW9.P  B: SO, 46491-LCQF(5)    TESTING LAB LOCATION:  [facility]" [facility] [address] [place]   [address]  [phone]    COLLECTION SITE:  Client: [facility]  Location: [facility] ([place])                       ASSESSMENT    Thrombocytosis and neutrophilia  Constellation of symptoms concerning for rheumatologic disorder   Joint pain, severe fatigue, fevers, weight loss; responsive to steroids  Hypertension, dyslipidemia  Previous bilateral knee replacement  ECOG performance status 1    DISCUSSION     I had a lengthy discussion with Ramonita who is alone at this clinic visit.  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  She is currently retired.  She has very good understanding of her health and disease.  She has a constellation of symptoms for the last 3 months which has included severe joint pain, marked fatigue, fevers, unintentional weight loss.  She was started on opiate pain medications for her pain.  She notes that she has very high pain tolerance and did not have any difficulty with bilateral knee replacement done at the same time.  However her current pain in her hip joints is bad enough that she tried opiates which did not seem to help.  She had an excellent symptomatic response to corticosteroids.  She has continued on the steroids.    She has been referred for thrombocytosis.  I explained her that both platelets and neutrophils are acute phase reactants.  Both of them are elevated in the setting of any stress, infection or inflammation.  Until recently the greatest challenge for humans has been infections or bleeding to death.  Humans have lived in a hostile environment where they fought with other animals or humans for their survival.  Bleeding was a challenge.  So whenever the body undergoes a stressful situation it presumes a possible infection or bleeding risk.  She has elevated neutrophil count and platelets likely in  response to her rheumatology condition or the steroid used to treat it.    Her iron panel showed elevated ferritin and a low iron, low TIBC and a low percent saturation suggestive of anemia of chronic disease.      She already had JAK2 mutation testing in 2013 and it was negative.  I will check for the other 2 mutations seen with essential thrombocythemia - CALR and MPL.  Presence of neither of these 2 mutations require any additional treatment.    She is scheduled to have a rheumatologic evaluation done in November.  I will add NATALIYA and rheumatoid factor to the labs from today.  She had extensive testing done at [facility] but I do not see either of these tested.  I will also check for CRP and ESR as markers of inflammation.  These had been elevated in the past but could be normal with the steroid use.     ROCIO Gonzalez has thrombocytosis and neutrophilia likely in response to acute stressful situation and possible steroid use  -I will get initial work-up done including NGS for CALR and MPL, peripheral smear, CBC with differential, CMP  -I will check for ESR, CRP, NATALIYA, rheumatoid factor    I will communicate with her over [employer].  I will not schedule a follow-up visit for her in the hematology.  I do not feel she has a primary hematologic condition.  She has a very good understanding of health and disease.  She knows to reach out to me with any questions or concerns.    60 minutes spent on the date of the encounter doing chart review, history and exam, documentation and further activities as noted above     Isaias Galvez  Adj ,  Division of Hematology, Oncology & Transplantation  [facility].       Again, thank you for allowing me to participate in the care of your patient.        Sincerely,        Isaias Galvez MD

## 2023-09-21 NOTE — PROGRESS NOTES
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     Ramonita is alone at this clinic visit and the following history has been provided by her.  She has not been doing well for the last 3 months. She noted pain in her joints/hips and low grade fevers. She was referred to ED for septic arthritis as she had previous knee replacement. She was referred to orthopedics. She had MRI of hip and lumbar spine. There was some fluid around her spine. She was recommended hospital admission. She was treated with intravenous antibiotics but it did not seem to help. She was discharged on antibiotics and steroid burst. It is only the steroids that seemed to help. Pain medications have not helped. In fact nothing helps other than her steroids She can wake up at 8 am and go back to sleep again until noon. She is very tired without the steroids but is able to function near normally with it. Her CRP was elevated at 96 at last check.     She had a CT chest in the hospital. She is ex smoker and had cough in the hospital.     She saw GI for unintentional weight loss. She had upper GI, emptying study and colonoscopy.     She has been referred to rheumatology but the earliest she could get in is in November.     She has been referred for thrombocythemia. Her mother had thrombocythemia and was on hydrea. She was worked up with a bone marrow biopsy. Unsure if she had JAK2 mutation.     She had bilateral knee replacement and was kept on anticoagulation for 4 months as her platelet counts post discharge were over a million.     She has hypertension, hypercholesterolemia,  hypothyroidism (post total thyroidectomy). She had multiple surgeries done - cervical spinal fusion.      PAST MEDICAL HISTORY     Past Medical History:   Diagnosis Date    Atrial tachycardia, paroxysmal (H) 03/29/2021    BULMARO II (cervical intraepithelial neoplasia II) 01/01/2007    on colp - leep path WNL    COPD (chronic obstructive pulmonary disease) (H)     Depression 01/01/1990    Depressive disorder 25 yrs    Eczema     GERD (gastroesophageal reflux disease)     Hiatal hernia     History of blood transfusion 01/01/2013    Post op    HTN (hypertension)     Hypercholesterolemia     Infection due to 2019 novel coronavirus 03/2022    Liver hemangioma 09/04/2012    OA (osteoarthritis)     Osteopenia     Papillary thyroid carcinoma (H) 01/01/2000    BL, MF; Tx in 2 operations, RRA    PONV (postoperative nausea and vomiting)     Postsurgical hypothyroidism 01/01/2000    FELICIA III (vulvar intraepithelial neoplasia III) 01/01/2006    wide local exc x 2          CURRENT OUTPATIENT MEDICATIONS     Current Outpatient Medications   Medication Sig    aspirin 81 MG tablet Take 81 mg by mouth daily     atorvastatin (LIPITOR) 20 MG tablet Take 1 tablet (20 mg) by mouth daily    CALCIUM CITRATE PO Take 1,200 mg by mouth daily     cetirizine (ZYRTEC) 10 MG tablet Take 1 tablet (10 mg) by mouth daily    Cholecalciferol (VITAMIN D) 1000 UNIT capsule Take 1 capsule by mouth daily.    famotidine (PEPCID) 40 MG tablet Take 1 tablet (40 mg) by mouth At Bedtime    FLUoxetine (PROZAC) 20 MG capsule Take 3 capsules (60 mg) by mouth daily    hypromellose (ARTIFICIAL TEARS) 0.5 % SOLN Place 1 drop into both eyes every 4 hours as needed     levothyroxine (SYNTHROID/LEVOTHROID) 100 MCG tablet Monday-Saturday: 1 tablet/day,  Sunday: skip    multivitamin w/minerals (THERA-VIT-M) tablet Take 1 tablet by mouth daily    omeprazole (PRILOSEC) 20 MG DR capsule Take 1 capsule (20 mg) by mouth every morning On an empty stomach about 30 minutes before  "breakfast    sennosides (SENOKOT) 8.6 MG tablet Take 2 tablets by mouth daily     sodium fluoride dental gel (PREVIDENT) 1.1 % GEL topical gel Apply to affected area At Bedtime    triamcinolone (KENALOG) 0.1 % external ointment Apply to affected area of the skin twice a day after using a moisturizer. Can do wet wraps with this.    triamterene-HCTZ (MAXZIDE-25) 37.5-25 MG tablet Take 0.5 tablets by mouth daily     No current facility-administered medications for this visit.        ALLERGIES    No Known Allergies     SOCIAL HISTORY     She is  and lives alone - with her dogs. 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     Social History     Social History Narrative    Retired RN; ;      She does not smoke any more. She smoked pack a day for 45 yrs; started at 16 and quit at 61 yrs of age. She does not drink alcohol.      FAMILY HISTORY   Father had cardiac disease -  at 1961 before bypass was available.      REVIEW OF SYSTEMS   As above in the HPI, o/w complete 12-point ROS was negative.     PHYSICAL EXAM   /79 (BP Location: Left arm, Patient Position: Chair, Cuff Size: Adult Regular)   Pulse 82   Ht 1.575 m (5' 2\")   Wt 66.2 kg (146 lb)   LMP  (LMP Unknown)   SpO2 97%   BMI 26.70 kg/m     Wt Readings from Last 3 Encounters:   23 66.2 kg (146 lb)   23 67.6 kg (149 lb)   23 69.4 kg (153 lb)     GEN: NAD  HEENT: PERRL, EOMI, no icterus, injection or pallor. Oropharynx is clear.  NECK: no cervical or supraclavicular lymphadenopathy  LUNGS: clear bilaterally  CV: regular, no murmurs, rubs, or gallops  ABDOMEN: soft, non-tender, non-distended, normal bowel sounds, no hepatosplenomegaly by percussion or palpation  EXT: warm, well perfused, no edema  NEURO: alert  SKIN: no rashes     LABORATORY AND IMAGING STUDIES     Recent Labs   Lab Test 23  1015 23  0937 10/10/22  1036 10/14/21  1053 21  1013   * 137 139 139 137 "   POTASSIUM 4.1 3.9 4.7 4.2 4.4   CHLORIDE 94* 99 105 105 101   CO2 27 26 32 29 33*   ANIONGAP 12 12 2* 5 3   BUN 11.1 6.0* 8 10 13   CR 0.96* 0.74 0.87 0.91 0.90   * 98 96 92 93   JUNE 9.5 9.2 9.4 9.0 9.6     No results for input(s): MAG, PHOS in the last 42478 hours.  Recent Labs   Lab Test 07/24/23  1114 07/17/23  1132 07/13/23  0937 10/10/22  1036 05/05/20  1241 10/07/19  1039 04/04/16  1012 01/04/16  0956 12/07/15  1604 11/23/15  1059   WBC 12.1* 14.2* 11.2*  --  7.3 7.0   < > 7.2   < > 8.0   HGB 11.7 12.1 12.3 14.1 14.2 14.2   < > 12.8   < > 13.2   * 684* 524*  --  350 364   < > 420   < > 408   MCV 84 84 84  --  84 87   < > 87   < > 85   NEUTROPHIL 70 73 76  --   --   --   --  42.6  --  60.7    < > = values in this interval not displayed.     Recent Labs   Lab Test 08/04/23  1015 07/13/23  0937 10/10/22  1036   BILITOTAL 0.8 0.6 0.8   ALKPHOS 102 107* 76   ALT 23 21 33   AST 36 41 36   ALBUMIN 3.8 3.8 3.7     TSH   Date Value Ref Range Status   06/28/2023 1.31 0.30 - 4.20 uIU/mL Final   06/20/2022 0.21 (L) 0.40 - 4.00 mU/L Final   01/18/2021 0.29 (L) 0.40 - 4.00 mU/L Final   05/05/2020 0.33 (L) 0.40 - 4.00 mU/L Final   11/05/2019 0.22 (L) 0.40 - 4.00 mU/L Final     No results for input(s): CEA in the last 14722 hours.  Results for orders placed or performed in visit on 08/25/23   NM Gastric Emptying    Narrative    EXAM:  NM GASTRIC EMPTYING, 8/25/2023 12:43 PM  HISTORY: Early satiety    TECHNIQUE: The patient ingested 2 mCi of Tc-99m sulfur colloid in 2  eggs, 1 piece of toast with jelly, and water. Static images were  acquired at approximately 1 hour intervals out through 4 hours using a  dual head gamma camera in an anterior and posterior position. The  calculation of emptying was based on dual head imaging with geometric  mean calculations.  A Linear square fit was calculated to the emptying  curve to determine the amount of residual activity at each time point.    FINDINGS:   Percent  retention at 60 min = 50%.  Percent retention at 120 min = 36%.  Percent retention at 180 min = 27%.  Percent retention at 240 min = 16%.    T 1/2 emptying time =  60 mins.      Impression    IMPRESSION: Abnormal gastric emptying at 240 minutes. The remaining  evaluated time points and T 1/2 are within normal limits.    =====================  Reference Range:  Gastric emptying  - 30 minutes: <70% of retention (> 30% emptying) suggests abnormally     fast emptying.  - 60 minutes: <90% retention (>10% emptying) is normal; less than 30%     retention (>70% emptying) suggests abnormally rapid emptying.  - 90 minutes: <65% retention (> 35% emptying) is normal.  - 120 minutes: <60% retention (> 40% emptying) is normal.  - 180 minutes: <30% retention (> 70% emptying) is normal.  - 180 minutes: <30% retention (> 70% emptying) is normal.  - 240 minutes: <10% retention (> 90% emptying) is normal.    Gastric emptying T-1/2:  Solid: The normal range is  minutes  Liquid only: Normal range is 10-45 minutes.  Liquid only-children: At 60 minutes, normal range is 44-58 % .  Liquid only-infants: At 60 minutes, normal range is 32-64 %.      I have personally reviewed the examination and initial interpretation  and I agree with the findings.    SRINIVAS FLORES MD         SYSTEM ID:  [id number]       Lab Results   Component Value Date    PATH  07/03/2017     Patient Name: DANILO VENTURA  MR#: [id number]  Specimen #: [id number]  Collected: 7/3/2017  Received: 7/5/2017  Reported: 7/12/2017 15:36  Ordering Phy(s): ANJANA HUNTER    For improved result formatting, select 'View Enhanced Report Format'  under Linked Documents section.    SPECIMEN(S):  A: Skin, right forearm  B: Immunofluorescent study, Skin    FINAL DIAGNOSIS:  A. Skin, forearm, right:  - Subtle features of spongiotic dermatitis - (see comment)    B. Skin, forearm, right:  - Pending DIF study - (see comment)    COMMENT:  A.  The primary considerations include atopic,  "contact or other  eczematous dermatitis.  Given the presence of parakeratosis in small  mounds, the differential diagnosis also includes pityriasis rosea ([place])  or a [place]-like drug eruption.  Clinical correlation is recommended.    B. DIF testing is in process.  Results will be reported in a followup as  they become available.    I have personally reviewed all specimens and or slides, including the  listed special stains, and used them with my medical judgement to  determine the final diagnosis.    Electronically signed out by:    Sagar Mast M.D., [facility]    CLINICAL HISTORY:  The patient is a 66-year-old female.    GROSS:  A. The specimen is received in formalin with proper patient's  identification, labeled \"skin, right forearm\".  The specimen consists of  a 4 mm diameter punch skin biopsy tissue fragment measuring 0.3 cm in  depth.  The biopsy margins are inked in black.  Bisected and entirely  submitted in one cassette.    B. The specimen is received in Rajesh solution with proper patient's  identification, labeled \"skin, right forearm, Immunofluorescent study\".  The specimen consists of a 4 mm diameter punch skin biopsy tissue  fragment measuring 0.4 cm in depth.  The specimen is sent out for direct  immunofluorescent study as received. (Dictated by: Emmanuelle Zaldivar 7/5/2017  11:02 AM)    MICROSCOPIC:  A. The specimen exhibits intermittent small mounds of parakeratosis,  mild spongiosis and a mild superficial perivascular lymphocytic  infiltrate.    B. Pending DIF study    CPT Codes:  A: 85690-RX1.T, 76130-IS8.P  B: SO, 10653-VFXA(5)    TESTING LAB LOCATION:  [address]   [id number] [phone]    COLLECTION SITE:  Client: [facility]  Location: [facility] (B)                       ASSESSMENT    Thrombocytosis and neutrophilia  Constellation of symptoms concerning for " rheumatologic disorder   Joint pain, severe fatigue, fevers, weight loss; responsive to steroids  Hypertension, dyslipidemia  Previous bilateral knee replacement  ECOG performance status 1    DISCUSSION     I had a lengthy discussion with Ramonita who is alone at this clinic visit.  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  She is currently retired.  She has very good understanding of her health and disease.  She has a constellation of symptoms for the last 3 months which has included severe joint pain, marked fatigue, fevers, unintentional weight loss.  She was started on opiate pain medications for her pain.  She notes that she has very high pain tolerance and did not have any difficulty with bilateral knee replacement done at the same time.  However her current pain in her hip joints is bad enough that she tried opiates which did not seem to help.  She had an excellent symptomatic response to corticosteroids.  She has continued on the steroids.    She has been referred for thrombocytosis.  I explained her that both platelets and neutrophils are acute phase reactants.  Both of them are elevated in the setting of any stress, infection or inflammation.  Until recently the greatest challenge for humans has been infections or bleeding to death.  Humans have lived in a hostile environment where they fought with other animals or humans for their survival.  Bleeding was a challenge.  So whenever the body undergoes a stressful situation it presumes a possible infection or bleeding risk.  She has elevated neutrophil count and platelets likely in response to her rheumatology condition or the steroid used to treat it.    Her iron panel showed elevated ferritin and a low iron, low TIBC and a low percent saturation suggestive of anemia of chronic disease.      She already had JAK2 mutation testing in 2013 and it was negative.  I will check for the other 2  mutations seen with essential thrombocythemia - CALR and MPL.  Presence of neither of these 2 mutations require any additional treatment.    She is scheduled to have a rheumatologic evaluation done in November.  I will add NATALIYA and rheumatoid factor to the labs from today.  She had extensive testing done at [facility] but I do not see either of these tested.  I will also check for CRP and ESR as markers of inflammation.  These had been elevated in the past but could be normal with the steroid use.     ROCIO Gonzalez has thrombocytosis and neutrophilia likely in response to acute stressful situation and possible steroid use  -I will get initial work-up done including NGS for CALR and MPL, peripheral smear, CBC with differential, CMP  -I will check for ESR, CRP, NATALIYA, rheumatoid factor    I will communicate with her over [employer] Inchart.  I will not schedule a follow-up visit for her in the hematology.  I do not feel she has a primary hematologic condition.  She has a very good understanding of health and disease.  She knows to reach out to me with any questions or concerns.    60 minutes spent on the date of the encounter doing chart review, history and exam, documentation and further activities as noted above     Isaias Galvez  Adj ,  Division of Hematology, Oncology & Transplantation  [facility].

## 2023-09-22 LAB
ANA SER QL IF: NEGATIVE
PATH REPORT.COMMENTS IMP SPEC: NORMAL
PATH REPORT.COMMENTS IMP SPEC: NORMAL
PATH REPORT.FINAL DX SPEC: NORMAL
PATH REPORT.MICROSCOPIC SPEC OTHER STN: NORMAL
PATH REPORT.MICROSCOPIC SPEC OTHER STN: NORMAL
PATH REPORT.RELEVANT HX SPEC: NORMAL
RHEUMATOID FACT SER NEPH-ACNC: 6 IU/ML

## 2023-09-26 ENCOUNTER — TRANSFERRED RECORDS (OUTPATIENT)
Dept: HEALTH INFORMATION MANAGEMENT | Facility: CLINIC | Age: 73
End: 2023-09-26
Payer: MEDICARE

## 2023-10-03 ENCOUNTER — OFFICE VISIT (OUTPATIENT)
Dept: GASTROENTEROLOGY | Facility: CLINIC | Age: 73
End: 2023-10-03
Attending: PHYSICIAN ASSISTANT
Payer: MEDICARE

## 2023-10-03 VITALS
WEIGHT: 144.5 LBS | BODY MASS INDEX: 26.43 KG/M2 | HEART RATE: 90 BPM | DIASTOLIC BLOOD PRESSURE: 79 MMHG | OXYGEN SATURATION: 96 % | SYSTOLIC BLOOD PRESSURE: 125 MMHG

## 2023-10-03 DIAGNOSIS — R63.4 UNINTENTIONAL WEIGHT LOSS: ICD-10-CM

## 2023-10-03 DIAGNOSIS — R19.5 ELEVATED FECAL CALPROTECTIN: ICD-10-CM

## 2023-10-03 DIAGNOSIS — R68.81 EARLY SATIETY: ICD-10-CM

## 2023-10-03 DIAGNOSIS — D64.9 ANEMIA, UNSPECIFIED TYPE: ICD-10-CM

## 2023-10-03 DIAGNOSIS — K29.70 GASTRITIS WITHOUT BLEEDING, UNSPECIFIED CHRONICITY, UNSPECIFIED GASTRITIS TYPE: ICD-10-CM

## 2023-10-03 DIAGNOSIS — K44.9 HIATAL HERNIA: Primary | ICD-10-CM

## 2023-10-03 PROCEDURE — 99215 OFFICE O/P EST HI 40 MIN: CPT | Performed by: PHYSICIAN ASSISTANT

## 2023-10-03 RX ORDER — OMEPRAZOLE 40 MG/1
40 CAPSULE, DELAYED RELEASE ORAL EVERY MORNING
Qty: 90 CAPSULE | Refills: 0 | Status: SHIPPED | OUTPATIENT
Start: 2023-10-03 | End: 2024-01-30

## 2023-10-03 ASSESSMENT — PAIN SCALES - GENERAL: PAINLEVEL: NO PAIN (0)

## 2023-10-03 NOTE — PROGRESS NOTES
GI FOLLOW UP CLINIC VISIT     CC/REFERRING MD:    Honey García PA-C     REASON FOR VISIT: Follow Up    HPI: Marcela Allen is 72 year old female with pmhx of HTN, COPD, CKD, hypothyroidism who presents for follow up.     I initially visited with patient about 2 months ago for GI consult.  Please see initial HPI below:     Initial consult HPI 8/1/23:  Symptoms started about a month ago with low grade fever of unclear etiology. She was additionally having hip pain. An MRI noted concern for septic joint. She was admitted for evaluation and mgmt. She was started on IV abx and had a culture of aspirated fluid from joint which suprisingly didn't show any growth. She also had a rheumatological work up which was negative. Since IV abx she reports fever has resolved as well as joint pain.      She has lost weight in the last month, down about 7 lbs. She reports feeling full quickly and not able to eat much. She mostly snacks during the day. Not able to eat full meals. Does have nausea and feels queasy. No vomiting. No dysphagia. Feels like a hot coal is sitting in her stomach. Stomach ache all the time, worse with eating. The early satiety actually started years ago but has progressively worsened. She does have hx of reflux and takes omeprazole 20mg three times a week. This does not compltely control reflux. She additionally takes mylanta and tums as needed without temporary relief. She had an EGD last week which noted gastritis, tortuous esophagus and 3 cm hiatal hernia. Gastric bx noted reactive gastropathy, no H pylori. She was taking NSAIDs, ibuprofen 400mg twice daily for the joint pains. She has stopped since the EGD.      She is normally more constipated, only having a BM every 3-4 days. She typically takes 2 sennakot daily to manage. Recently however she has developed one loose watery diarrhea stools since receiving abx during recent admission for septic arthritis. No odor to stools. No hematochezia or  melena. She feels stools are improving and having more form.      She had a CT abd/pelvis scan and CT chest recently without any acute findings to explain symptoms. She does have a colonoscopy scheduled for next month. Her last colonoscopy was in 2017 with small precancerous polyp and diverticulosis. Was due for repeat at 5 years (2022).     Today, she returns for follow up. Since our last visit she has had several labs and stool studies.  Celiac screening negative.  Mildly elevated fecal calprotectin.  Negative fecal elastase.  She did have a significantly elevated CRP level which was thought to be related to joint pains.  She is on prednisone with improvement in CRP and has a consult scheduled with rheumatology to rule out other autoimmune conditions.     She has had a colonoscopy which was unremarkable.  She additionally had a gastric emptying scan which did show slight delay at 240 minutes was otherwise unrevealing.  She continues to be symptomatic. She feels like there is a coal sitting in her stomach. She is still feeling full quickly. We did increase her omeprazole  from three times a week to daily. She has been concerned about potential kidney changes as a result but recent kidney tests wnl. BM's continue to be mostly constipated. She takes a bowel regimen that helps. She continues to lose weight. She is 5 lbs down from our last visit.     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 Former smoker, quit 11 years ago. Stopped alcohol at this time due to her GI symptoms.       PREVIOUS ENDOSCOPY:    COLONOSCOPY 9/5/2023  Impression:        - The entire examined colon is normal.                             - No specimens collected.     UPPER ENDOSCOPY 7/25/23  Impression:               - 3 cm hiatal hernia.                             - Tortuous esophagus.                             - Gastritis. Biopsied.                             - Normal examined duodenum.     Final Diagnosis   A. STOMACH, ANTRUM, BIOPSY:  Gastric  antral mucosa with reactive antral gastropathy and focus of neutrophilic inflammation; could be NSAID related; no intestinal metaplasia or dysplasia; report of H. pylori immunohistochemistry to follow      B. STOMACH, BODY, BIOPSY:   Gastric body type mucosa with focus of superficial erosion; could be NSAID related; no H. Pylori-like organisms identified on routine staining; negative also for intestinal metaplasia or dysplasia         ALLERGIES:   No Known Allergies    PERTINENT MEDICATIONS:    Current Outpatient Medications:     aspirin 81 MG tablet, Take 81 mg by mouth daily , Disp: , Rfl:     atorvastatin (LIPITOR) 20 MG tablet, Take 1 tablet (20 mg) by mouth daily, Disp: 90 tablet, Rfl: 1    CALCIUM CITRATE PO, Take 1,200 mg by mouth daily , Disp: , Rfl:     cetirizine (ZYRTEC) 10 MG tablet, Take 1 tablet (10 mg) by mouth daily, Disp: 90 tablet, Rfl: 1    Cholecalciferol (VITAMIN D) 1000 UNIT capsule, Take 1 capsule by mouth daily., Disp: , Rfl:     famotidine (PEPCID) 40 MG tablet, Take 1 tablet (40 mg) by mouth At Bedtime, Disp: 90 tablet, Rfl: 1    FLUoxetine (PROZAC) 20 MG capsule, Take 3 capsules (60 mg) by mouth daily, Disp: 270 capsule, Rfl: 2    hypromellose (ARTIFICIAL TEARS) 0.5 % SOLN, Place 1 drop into both eyes every 4 hours as needed , Disp: , Rfl:     levothyroxine (SYNTHROID/LEVOTHROID) 100 MCG tablet, Monday-Saturday: 1 tablet/day,  Sunday: skip, Disp: 90 tablet, Rfl: 4    methylPREDNISolone (MEDROL) 2 MG tablet, Take 2 mg by mouth daily, Disp: , Rfl:     multivitamin w/minerals (THERA-VIT-M) tablet, Take 1 tablet by mouth daily, Disp: , Rfl:     omeprazole (PRILOSEC) 20 MG DR capsule, Take 1 capsule (20 mg) by mouth every morning On an empty stomach about 30 minutes before breakfast, Disp: 90 capsule, Rfl: 1    sennosides (SENOKOT) 8.6 MG tablet, Take 2 tablets by mouth daily , Disp: , Rfl:     sodium fluoride dental gel (PREVIDENT) 1.1 % GEL topical gel, Apply to affected area At Bedtime,  Disp: 100 mL, Rfl: 3    triamcinolone (KENALOG) 0.1 % external ointment, Apply to affected area of the skin twice a day after using a moisturizer. Can do wet wraps with this., Disp: 454 g, Rfl: 1    triamterene-HCTZ (MAXZIDE-25) 37.5-25 MG tablet, Take 0.5 tablets by mouth daily, Disp: 45 tablet, Rfl: 1      PHYSICAL EXAMINATION:  Constitutional: aaox3, cooperative, pleasant, not dyspneic/diaphoretic, no acute distress  Vitals reviewed: /79 (BP Location: Left arm, Patient Position: Sitting, Cuff Size: Adult Regular)   Pulse 90   Wt 65.5 kg (144 lb 8 oz)   LMP  (LMP Unknown)   SpO2 96%   BMI 26.43 kg/m     Wt:   Wt Readings from Last 2 Encounters:   10/03/23 65.5 kg (144 lb 8 oz)   09/21/23 66.2 kg (146 lb)        Eyes: Sclera anicteric/injected  CV: RRR, No edema  Respiratory: Unlabored breathing  Skin: no jaundice  Psych: Normal affect  MSK: Normal gait      PERTINENT STUDIES:   Most recent CBC:  Recent Labs   Lab Test 09/21/23  1240 07/24/23  1114   WBC 11.4* 12.1*   HGB 11.9 11.7   HCT 37.0 34.9*   * 745*     Most recent hepatic panel:  Recent Labs   Lab Test 09/21/23  1240 08/04/23  1015   ALT 8 23   AST 28 36     Most recent creatinine:  Recent Labs   Lab Test 09/21/23  1240 08/04/23  1015   CR 0.84 0.96*       RADIOLOGY:    Narrative & Impression   EXAM:  NM GASTRIC EMPTYING, 8/25/2023 12:43 PM  HISTORY: Early satiety     TECHNIQUE: The patient ingested 2 mCi of Tc-99m sulfur colloid in 2  eggs, 1 piece of toast with jelly, and water. Static images were  acquired at approximately 1 hour intervals out through 4 hours using a  dual head gamma camera in an anterior and posterior position. The  calculation of emptying was based on dual head imaging with geometric  mean calculations.  A Linear square fit was calculated to the emptying  curve to determine the amount of residual activity at each time point.     FINDINGS:   Percent retention at 60 min = 50%.  Percent retention at 120 min =  36%.  Percent retention at 180 min = 27%.  Percent retention at 240 min = 16%.     T 1/2 emptying time =  60 mins.                                                                      IMPRESSION: Abnormal gastric emptying at 240 minutes. The remaining  evaluated time points and T 1/2 are within normal limits.     =====================  Reference Range:  Gastric emptying  - 30 minutes: <70% of retention (> 30% emptying) suggests abnormally     fast emptying.  - 60 minutes: <90% retention (>10% emptying) is normal; less than 30%     retention (>70% emptying) suggests abnormally rapid emptying.  - 90 minutes: <65% retention (> 35% emptying) is normal.  - 120 minutes: <60% retention (> 40% emptying) is normal.  - 180 minutes: <30% retention (> 70% emptying) is normal.  - 180 minutes: <30% retention (> 70% emptying) is normal.  - 240 minutes: <10% retention (> 90% emptying) is normal.     Gastric emptying T-1/2:  Solid: The normal range is  minutes  Liquid only: Normal range is 10-45 minutes.  Liquid only-children: At 60 minutes, normal range is 44-58 % .  Liquid only-infants: At 60 minutes, normal range is 32-64 %.        I have personally reviewed the examination and initial interpretation  and I agree with the findings.     SRINIVAS FLORES MD          ASSESSMENT/PLAN:    Unintentional weight loss  Early satiety  Gastritis   Hiatal hernia   Anemia       Marcela Allen is a 72 year old female with pmhx of HTN, COPD, CKD, hypothyroidism who presents for follow up. Reviewed GI work up thus far which includes EGD, colonoscopy and 4 hr GES. While she does have some gastritis and reactive gastropathy and mild delayed emptying at 240 minutes, her GI work up has mostly been unrevealing.  She does continue to have early satiety and feels like there is a cold sore in her stomach.  She is admits that she has had the symptoms for a number of years but has never had weight loss over that before.  We did increase her  omeprazole from 20 mg 3 times a week to daily at her last visit however she has not seen any benefit with this.  I recommended increasing this to 40 mg and evaluating with a timed barium esophagram to determine if there could be some EGJ outflow obstruction possibly related to hernia causing her upper GI symptoms.    Also recommended checking an MR enterography to rule out any small bowel pathology that could be explaining her weight loss, elevated CRP and given her mildly elevated fecal calpro.         Recommendations/Plan:   - timed barium esophagram to assess hiatal hernia and determine if EGJOO   - increase omeprazole to 40mg daily   - MRE to r/o small bowel pathology   - Follow up in ~ 2 months, sooner if needed.       I spent a total of 55 minutes on the day of the visit.   Time spent by me doing chart review, history and exam, documentation and further activities per the note      This note was created with voice recognition software, and while reviewed for accuracy, typos may remain.     Jason Orozco PA-C  Division of Gastroenterology, Hepatology and Nutrition   [facility] [place]

## 2023-10-03 NOTE — PATIENT INSTRUCTIONS
It was a pleasure taking care of you today.  I've included a brief summary of our discussion and care plan from today's visit below.  Please review this information with your primary care provider.  _______________________________________________________________________     My recommendations are summarized as follows:     - increase omeprazole to 40mg daily   - schedule a timed barium esophagram and MR enterography   - follow up in 2-3 months   ______________________________________________________________________     How do I schedule labs, imaging studies, or procedures that were ordered in clinic today?      Labs: To schedule lab appointment you can contact your local [facility] or call [phone] to schedule at any convenient [facility] location.     Procedures: If a colonoscopy, upper endoscopy, breath test, esophageal manometry, or pH impedence was ordered today, our endoscopy team will call you to schedule this. If you have not heard from our endoscopy team within a week, please call [phone] to schedule.      Imaging Studies: If you were scheduled for a CT scan, X-ray, MRI, ultrasound, HIDA scan or other imaging study, please call [phone] to have this scheduled.      Referral: If a referral to another specialty was ordered, expect a phone call or follow instructions above. If you have not heard from anyone regarding your referral in a week, please call our clinic to check the status.      Who do I call with any questions after my visit?  Please be in touch if there are any further questions that arise following today's visit.  There are multiple ways to contact your gastroenterology care team.       During business hours, you may reach a Gastroenterology nurse at [phone]     To schedule or reschedule an appointment, please call [phone].      You can always send a secure message through [employer].  [employer] messages are answered by your nurse or doctor typically within 24  hours.  Please allow extra time on weekends and holidays.       For urgent/emergent questions after business hours, you may reach the on-call GI Fellow by contacting the [facility]  at [phone].     How will I get the results of any tests ordered?    You will receive all of your results.  If you have signed up for [employer], any tests ordered at your visit will be available to you after your physician reviews them.  Typically this takes 1-2 weeks.  If there are urgent results that require a change in your care plan, your physician or nurse will call you to discuss the next steps.       What is [employer]?  NextPage is a secure way for you to access all of your healthcare records from the [facility].  It is a web based computer program, so you can sign on to it from any location.  It also allows you to send secure messages to your care team.  I recommend signing up for NextPage access if you have not already done so and are comfortable with using a computer.       How to I schedule a follow-up visit?  If you did not schedule a follow-up visit today, please call [phone] to schedule a follow-up office visit.      Jason Orozco PA-C  Division of Gastroenterology, Hepatology and Nutrition   [facility]

## 2023-10-03 NOTE — LETTER
10/3/2023         RE: Marcela Allen  [address] [facility]  [place] [address]        Dear Colleague,    Thank you for referring your patient, Marcela Allen, to the [facility]. Please see a copy of my visit note below.          GI FOLLOW UP CLINIC VISIT     CC/REFERRING MD:    Honey García PA-C     REASON FOR VISIT: Follow Up    HPI: Marcela Allen is 72 year old female with pmhx of HTN, COPD, CKD, hypothyroidism who presents for follow up.     I initially visited with patient about 2 months ago for GI consult.  Please see initial HPI below:     Initial consult HPI 8/1/23:  Symptoms started about a month ago with low grade fever of unclear etiology. She was additionally having hip pain. An MRI noted concern for septic joint. She was admitted for evaluation and mgmt. She was started on IV abx and had a culture of aspirated fluid from joint which suprisingly didn't show any growth. She also had a rheumatological work up which was negative. Since IV abx she reports fever has resolved as well as joint pain.      She has lost weight in the last month, down about 7 lbs. She reports feeling full quickly and not able to eat much. She mostly snacks during the day. Not able to eat full meals. Does have nausea and feels queasy. No vomiting. No dysphagia. Feels like a hot coal is sitting in her stomach. Stomach ache all the time, worse with eating. The early satiety actually started years ago but has progressively worsened. She does have hx of reflux and takes omeprazole 20mg three times a week. This does not compltely control reflux. She additionally takes mylanta and tums as needed without temporary relief. She had an EGD last week which noted gastritis, tortuous esophagus and 3 cm hiatal hernia. Gastric bx noted reactive gastropathy, no H pylori. She was taking NSAIDs, ibuprofen 400mg twice daily for the joint pains. She has stopped since the EGD.      She is normally more constipated,  only having a BM every 3-4 days. She typically takes 2 sennakot daily to manage. Recently however she has developed one loose watery diarrhea stools since receiving abx during recent admission for septic arthritis. No odor to stools. No hematochezia or melena. She feels stools are improving and having more form.      She had a CT abd/pelvis scan and CT chest recently without any acute findings to explain symptoms. She does have a colonoscopy scheduled for next month. Her last colonoscopy was in 2017 with small precancerous polyp and diverticulosis. Was due for repeat at 5 years (2022).     Today, she returns for follow up. Since our last visit she has had several labs and stool studies.  Celiac screening negative.  Mildly elevated fecal calprotectin.  Negative fecal elastase.  She did have a significantly elevated CRP level which was thought to be related to joint pains.  She is on prednisone with improvement in CRP and has a consult scheduled with rheumatology to rule out other autoimmune conditions.     She has had a colonoscopy which was unremarkable.  She additionally had a gastric emptying scan which did show slight delay at 240 minutes was otherwise unrevealing.  She continues to be symptomatic. She feels like there is a coal sitting in her stomach. She is still feeling full quickly. We did increase her omeprazole  from three times a week to daily. She has been concerned about potential kidney changes as a result but recent kidney tests wnl. BM's continue to be mostly constipated. She takes a bowel regimen that helps. She continues to lose weight. She is 5 lbs down from our last visit.     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 Former smoker, quit 11 years ago. Stopped alcohol at this time due to her GI symptoms.       PREVIOUS ENDOSCOPY:    COLONOSCOPY 9/5/2023  Impression:        - The entire examined colon is normal.                             - No specimens collected.     UPPER ENDOSCOPY  7/25/23  Impression:               - 3 cm hiatal hernia.                             - Tortuous esophagus.                             - Gastritis. Biopsied.                             - Normal examined duodenum.     Final Diagnosis   A. STOMACH, ANTRUM, BIOPSY:  Gastric antral mucosa with reactive antral gastropathy and focus of neutrophilic inflammation; could be NSAID related; no intestinal metaplasia or dysplasia; report of H. pylori immunohistochemistry to follow      B. STOMACH, BODY, BIOPSY:   Gastric body type mucosa with focus of superficial erosion; could be NSAID related; no H. Pylori-like organisms identified on routine staining; negative also for intestinal metaplasia or dysplasia         ALLERGIES:   No Known Allergies    PERTINENT MEDICATIONS:    Current Outpatient Medications:      aspirin 81 MG tablet, Take 81 mg by mouth daily , Disp: , Rfl:      atorvastatin (LIPITOR) 20 MG tablet, Take 1 tablet (20 mg) by mouth daily, Disp: 90 tablet, Rfl: 1     CALCIUM CITRATE PO, Take 1,200 mg by mouth daily , Disp: , Rfl:      cetirizine (ZYRTEC) 10 MG tablet, Take 1 tablet (10 mg) by mouth daily, Disp: 90 tablet, Rfl: 1     Cholecalciferol (VITAMIN D) 1000 UNIT capsule, Take 1 capsule by mouth daily., Disp: , Rfl:      famotidine (PEPCID) 40 MG tablet, Take 1 tablet (40 mg) by mouth At Bedtime, Disp: 90 tablet, Rfl: 1     FLUoxetine (PROZAC) 20 MG capsule, Take 3 capsules (60 mg) by mouth daily, Disp: 270 capsule, Rfl: 2     hypromellose (ARTIFICIAL TEARS) 0.5 % SOLN, Place 1 drop into both eyes every 4 hours as needed , Disp: , Rfl:      levothyroxine (SYNTHROID/LEVOTHROID) 100 MCG tablet, Monday-Saturday: 1 tablet/day,  Sunday: skip, Disp: 90 tablet, Rfl: 4     methylPREDNISolone (MEDROL) 2 MG tablet, Take 2 mg by mouth daily, Disp: , Rfl:      multivitamin w/minerals (THERA-VIT-M) tablet, Take 1 tablet by mouth daily, Disp: , Rfl:      omeprazole (PRILOSEC) 20 MG DR capsule, Take 1 capsule (20 mg) by  mouth every morning On an empty stomach about 30 minutes before breakfast, Disp: 90 capsule, Rfl: 1     sennosides (SENOKOT) 8.6 MG tablet, Take 2 tablets by mouth daily , Disp: , Rfl:      sodium fluoride dental gel (PREVIDENT) 1.1 % GEL topical gel, Apply to affected area At Bedtime, Disp: 100 mL, Rfl: 3     triamcinolone (KENALOG) 0.1 % external ointment, Apply to affected area of the skin twice a day after using a moisturizer. Can do wet wraps with this., Disp: 454 g, Rfl: 1     triamterene-HCTZ (MAXZIDE-25) 37.5-25 MG tablet, Take 0.5 tablets by mouth daily, Disp: 45 tablet, Rfl: 1      PHYSICAL EXAMINATION:  Constitutional: aaox3, cooperative, pleasant, not dyspneic/diaphoretic, no acute distress  Vitals reviewed: /79 (BP Location: Left arm, Patient Position: Sitting, Cuff Size: Adult Regular)   Pulse 90   Wt 65.5 kg (144 lb 8 oz)   LMP  (LMP Unknown)   SpO2 96%   BMI 26.43 kg/m     Wt:   Wt Readings from Last 2 Encounters:   10/03/23 65.5 kg (144 lb 8 oz)   09/21/23 66.2 kg (146 lb)        Eyes: Sclera anicteric/injected  CV: RRR, No edema  Respiratory: Unlabored breathing  Skin: no jaundice  Psych: Normal affect  MSK: Normal gait      PERTINENT STUDIES:   Most recent CBC:  Recent Labs   Lab Test 09/21/23  1240 07/24/23  1114   WBC 11.4* 12.1*   HGB 11.9 11.7   HCT 37.0 34.9*   * 745*     Most recent hepatic panel:  Recent Labs   Lab Test 09/21/23  1240 08/04/23  1015   ALT 8 23   AST 28 36     Most recent creatinine:  Recent Labs   Lab Test 09/21/23  1240 08/04/23  1015   CR 0.84 0.96*       RADIOLOGY:    Narrative & Impression   EXAM:  NM GASTRIC EMPTYING, 8/25/2023 12:43 PM  HISTORY: Early satiety     TECHNIQUE: The patient ingested 2 mCi of Tc-99m sulfur colloid in 2  eggs, 1 piece of toast with jelly, and water. Static images were  acquired at approximately 1 hour intervals out through 4 hours using a  dual head gamma camera in an anterior and posterior position. The  calculation of  emptying was based on dual head imaging with geometric  mean calculations.  A Linear square fit was calculated to the emptying  curve to determine the amount of residual activity at each time point.     FINDINGS:   Percent retention at 60 min = 50%.  Percent retention at 120 min = 36%.  Percent retention at 180 min = 27%.  Percent retention at 240 min = 16%.     T 1/2 emptying time =  60 mins.                                                                      IMPRESSION: Abnormal gastric emptying at 240 minutes. The remaining  evaluated time points and T 1/2 are within normal limits.     =====================  Reference Range:  Gastric emptying  - 30 minutes: <70% of retention (> 30% emptying) suggests abnormally     fast emptying.  - 60 minutes: <90% retention (>10% emptying) is normal; less than 30%     retention (>70% emptying) suggests abnormally rapid emptying.  - 90 minutes: <65% retention (> 35% emptying) is normal.  - 120 minutes: <60% retention (> 40% emptying) is normal.  - 180 minutes: <30% retention (> 70% emptying) is normal.  - 180 minutes: <30% retention (> 70% emptying) is normal.  - 240 minutes: <10% retention (> 90% emptying) is normal.     Gastric emptying T-1/2:  Solid: The normal range is  minutes  Liquid only: Normal range is 10-45 minutes.  Liquid only-children: At 60 minutes, normal range is 44-58 % .  Liquid only-infants: At 60 minutes, normal range is 32-64 %.        I have personally reviewed the examination and initial interpretation  and I agree with the findings.     SRINIVAS FLORES MD          ASSESSMENT/PLAN:    Unintentional weight loss  Early satiety  Gastritis   Hiatal hernia   Anemia       Marcela Allen is a 72 year old female with pmhx of HTN, COPD, CKD, hypothyroidism who presents for follow up. Reviewed GI work up thus far which includes EGD, colonoscopy and 4 hr GES. While she does have some gastritis and reactive gastropathy and mild delayed emptying at 240  minutes, her GI work up has mostly been unrevealing.  She does continue to have early satiety and feels like there is a cold sore in her stomach.  She is admits that she has had the symptoms for a number of years but has never had weight loss over that before.  We did increase her omeprazole from 20 mg 3 times a week to daily at her last visit however she has not seen any benefit with this.  I recommended increasing this to 40 mg and evaluating with a timed barium esophagram to determine if there could be some EGJ outflow obstruction possibly related to hernia causing her upper GI symptoms.    Also recommended checking an MR enterography to rule out any small bowel pathology that could be explaining her weight loss, elevated CRP and given her mildly elevated fecal calpro.         Recommendations/Plan:   - timed barium esophagram to assess hiatal hernia and determine if EGJOO   - increase omeprazole to 40mg daily   - MRE to r/o small bowel pathology   - Follow up in ~ 2 months, sooner if needed.       I spent a total of 55 minutes on the day of the visit.   Time spent by me doing chart review, history and exam, documentation and further activities per the note      This note was created with voice recognition software, and while reviewed for accuracy, typos may remain.     Jason Orozco PA-C  Division of Gastroenterology, Hepatology and Nutrition   [facility]       Again, thank you for allowing me to participate in the care of your patient.        Sincerely,        Jason Orozco PA-C

## 2023-10-09 ASSESSMENT — ENCOUNTER SYMPTOMS
COUGH: 1
PALPITATIONS: 0
PARESTHESIAS: 0
SORE THROAT: 0
CONSTIPATION: 1
HEADACHES: 0
FREQUENCY: 0
NERVOUS/ANXIOUS: 0
SHORTNESS OF BREATH: 0
ARTHRALGIAS: 1
EYE PAIN: 0
BREAST MASS: 0
HEMATOCHEZIA: 0
HEMATURIA: 0
JOINT SWELLING: 1
FEVER: 0
DYSURIA: 0
ABDOMINAL PAIN: 0
DIARRHEA: 0
HEARTBURN: 0
MYALGIAS: 1
DIZZINESS: 0
WEAKNESS: 0
CHILLS: 0
NAUSEA: 0

## 2023-10-09 ASSESSMENT — ACTIVITIES OF DAILY LIVING (ADL): CURRENT_FUNCTION: NO ASSISTANCE NEEDED

## 2023-10-12 ENCOUNTER — OFFICE VISIT (OUTPATIENT)
Dept: FAMILY MEDICINE | Facility: CLINIC | Age: 73
End: 2023-10-12
Payer: MEDICARE

## 2023-10-12 VITALS
RESPIRATION RATE: 16 BRPM | DIASTOLIC BLOOD PRESSURE: 80 MMHG | HEART RATE: 82 BPM | BODY MASS INDEX: 25.95 KG/M2 | HEIGHT: 62 IN | SYSTOLIC BLOOD PRESSURE: 132 MMHG | TEMPERATURE: 97.6 F | OXYGEN SATURATION: 97 % | WEIGHT: 141 LBS

## 2023-10-12 DIAGNOSIS — I47.10 SUPRAVENTRICULAR TACHYCARDIA (H): ICD-10-CM

## 2023-10-12 DIAGNOSIS — M25.50 MULTIPLE JOINT PAIN: ICD-10-CM

## 2023-10-12 DIAGNOSIS — F33.42 RECURRENT MAJOR DEPRESSIVE DISORDER, IN FULL REMISSION (H): ICD-10-CM

## 2023-10-12 DIAGNOSIS — I10 HYPERTENSION GOAL BP (BLOOD PRESSURE) < 140/90: ICD-10-CM

## 2023-10-12 DIAGNOSIS — K29.70 GASTRITIS WITHOUT BLEEDING, UNSPECIFIED CHRONICITY, UNSPECIFIED GASTRITIS TYPE: ICD-10-CM

## 2023-10-12 DIAGNOSIS — Z23 HIGH PRIORITY FOR 2019-NCOV VACCINE: ICD-10-CM

## 2023-10-12 DIAGNOSIS — Z00.00 ENCOUNTER FOR MEDICARE ANNUAL WELLNESS EXAM: Primary | ICD-10-CM

## 2023-10-12 DIAGNOSIS — R79.82 CRP ELEVATED: ICD-10-CM

## 2023-10-12 DIAGNOSIS — E78.5 HYPERLIPIDEMIA LDL GOAL <130: ICD-10-CM

## 2023-10-12 DIAGNOSIS — Z23 NEED FOR PROPHYLACTIC VACCINATION AND INOCULATION AGAINST INFLUENZA: ICD-10-CM

## 2023-10-12 PROBLEM — M00.9 PYOGENIC ARTHRITIS OF LEFT HIP, DUE TO UNSPECIFIED ORGANISM (H): Status: RESOLVED | Noted: 2023-08-02 | Resolved: 2023-10-12

## 2023-10-12 PROBLEM — K63.5 POLYP OF COLON: Status: ACTIVE | Noted: 2017-10-06

## 2023-10-12 PROBLEM — H91.90 HL (HEARING LOSS): Status: ACTIVE | Noted: 2021-07-06

## 2023-10-12 PROBLEM — M67.432 GANGLION CYST OF WRIST, LEFT: Status: RESOLVED | Noted: 2020-10-13 | Resolved: 2023-10-12

## 2023-10-12 PROBLEM — I95.1 ORTHOSTATIC HYPOTENSION: Status: RESOLVED | Noted: 2019-10-07 | Resolved: 2023-10-12

## 2023-10-12 PROBLEM — Z01.30 BLOOD PRESSURE CHECK: Status: RESOLVED | Noted: 2019-05-06 | Resolved: 2023-10-12

## 2023-10-12 PROCEDURE — 91320 SARSCV2 VAC 30MCG TRS-SUC IM: CPT | Performed by: PHYSICIAN ASSISTANT

## 2023-10-12 PROCEDURE — G0008 ADMIN INFLUENZA VIRUS VAC: HCPCS | Performed by: PHYSICIAN ASSISTANT

## 2023-10-12 PROCEDURE — 90662 IIV NO PRSV INCREASED AG IM: CPT | Performed by: PHYSICIAN ASSISTANT

## 2023-10-12 PROCEDURE — 90480 ADMN SARSCOV2 VAC 1/ONLY CMP: CPT | Performed by: PHYSICIAN ASSISTANT

## 2023-10-12 PROCEDURE — G0439 PPPS, SUBSEQ VISIT: HCPCS | Performed by: PHYSICIAN ASSISTANT

## 2023-10-12 PROCEDURE — 99214 OFFICE O/P EST MOD 30 MIN: CPT | Mod: 25 | Performed by: PHYSICIAN ASSISTANT

## 2023-10-12 RX ORDER — TRIAMTERENE/HYDROCHLOROTHIAZID 37.5-25 MG
0.5 TABLET ORAL DAILY
Qty: 45 TABLET | Refills: 3 | Status: SHIPPED | OUTPATIENT
Start: 2023-10-12 | End: 2023-12-05

## 2023-10-12 RX ORDER — MAGNESIUM GLYCINATE 100 MG
400 CAPSULE ORAL DAILY
COMMUNITY

## 2023-10-12 RX ORDER — ATORVASTATIN CALCIUM 20 MG/1
20 TABLET, FILM COATED ORAL DAILY
Qty: 90 TABLET | Refills: 3 | Status: SHIPPED | OUTPATIENT
Start: 2023-10-12 | End: 2023-12-11

## 2023-10-12 RX ORDER — ASCORBIC ACID 500 MG
500 TABLET ORAL DAILY
COMMUNITY
Start: 2021-10-01

## 2023-10-12 RX ORDER — PREDNISONE 5 MG/1
2.5 TABLET ORAL DAILY
Qty: 30 TABLET | Refills: 0 | Status: SHIPPED | OUTPATIENT
Start: 2023-10-12 | End: 2024-04-22

## 2023-10-12 ASSESSMENT — ENCOUNTER SYMPTOMS
EYE PAIN: 0
CHILLS: 0
FEVER: 0
SORE THROAT: 0
HEARTBURN: 0
JOINT SWELLING: 1
DIZZINESS: 0
ARTHRALGIAS: 1
ABDOMINAL PAIN: 0
NERVOUS/ANXIOUS: 0
NAUSEA: 0
DYSURIA: 0
SHORTNESS OF BREATH: 0
PALPITATIONS: 0
MYALGIAS: 1
BREAST MASS: 0
CONSTIPATION: 1
HEMATURIA: 0
PARESTHESIAS: 0
HEADACHES: 0
COUGH: 1
DIARRHEA: 0
FREQUENCY: 0
WEAKNESS: 0
HEMATOCHEZIA: 0

## 2023-10-12 ASSESSMENT — ACTIVITIES OF DAILY LIVING (ADL): CURRENT_FUNCTION: NO ASSISTANCE NEEDED

## 2023-10-12 NOTE — PROGRESS NOTES
"SUBJECTIVE:   Ramonita is a 73 year old who presents for Preventive Visit.      Are you in the first 12 months of your Medicare coverage?  No    Healthy Habits:     In general, how would you rate your overall health?  Fair    Frequency of exercise:  None    Do you usually eat at least 4 servings of fruit and vegetables a day, include whole grains    & fiber and avoid regularly eating high fat or \"junk\" foods?  No    Taking medications regularly:  Yes    Medication side effects:  Not applicable    Ability to successfully perform activities of daily living:  No assistance needed    Home Safety:  Throw rugs in the hallway    Hearing Impairment:  No hearing concerns    In the past 6 months, have you been bothered by leaking of urine?  No    In general, how would you rate your overall mental or emotional health?  Fair    Additional concerns today:  Yes    Patient arrived for Annual Medicare Physical, undressing for breast exam. Patient is fasting for lab work. Patient would like to follow-up with the steroids she is currently taking for what she believes is arthritis, she has never been diagnosed with arthritis and has Rheumatology appointment in November.     Down 15 lbs since June 2023.   Temps resolved while on steroids   Pain and fatigue manageable while on steroids.  She does nap everyday.     Has some testing schedule with GI (small bowel).     Has rheumatology appt Nov 20th (in [place], outside of [place]).    Today's PHQ-9 Score:       10/12/2023     8:01 AM   PHQ-9 SCORE   PHQ-9 Total Score MyChart 3 (Minimal depression)   PHQ-9 Total Score 3     Have you ever done Advance Care Planning? (For example, a Health Directive, POLST, or a discussion with a medical provider or your loved ones about your wishes): Yes, advance care planning is on file.       Fall risk  Fallen 2 or more times in the past year?: No  Any fall with injury in the past year?: No    Cognitive Screening   1) Repeat 3 items (Leader, Season, Table)  "   2) Clock draw: NORMAL  3) 3 item recall: Recalls 3 objects  Results: 3 items recalled: COGNITIVE IMPAIRMENT LESS LIKELY    Mini-CogTM Copyright [employer]. Licensed by the author for use in [facility]; reprinted with permission (arleen@[email or web address]). All rights reserved.      Do you have sleep apnea, excessive snoring or daytime drowsiness? : no    Reviewed and updated as needed this visit by clinical staff   Tobacco  Allergies  Meds              Reviewed and updated as needed this visit by Provider                 Social History     Tobacco Use    Smoking status: Former     Packs/day: 1.00     Years: 45.00     Additional pack years: 0.00     Total pack years: 45.00     Types: Cigarettes     Quit date: 2012     Years since quittin.2    Smokeless tobacco: Never    Tobacco comments:     quitting with e cigarette   Substance Use Topics    Alcohol use: Yes     Comment: less than weekly             10/9/2023    10:54 AM   Alcohol Use   Prescreen: >3 drinks/day or >7 drinks/week? No     Do you have a current opioid prescription? No  Do you use any other controlled substances or medications that are not prescribed by a provider? None          Hyperlipidemia Follow-Up    Are you regularly taking any medication or supplement to lower your cholesterol?   Yes- restarted lipitor in September.  Tolerating it fine, no changes in body aches/fatigue.  Are you having muscle aches or other side effects that you think could be caused by your cholesterol lowering medication?  No    Hypertension Follow-up    Do you check your blood pressure regularly outside of the clinic? No   Are you following a low salt diet? Yes  Are your blood pressures ever more than 140 on the top number (systolic) OR more   than 90 on the bottom number (diastolic), for example 140/90? No    Depression Followup  How are you doing with your depression since your last visit? Unchanged.  Current health status is difficult but she is managing her  mental health well.     Social History     Tobacco Use    Smoking status: Former     Packs/day: 1.00     Years: 45.00     Additional pack years: 0.00     Total pack years: 45.00     Types: Cigarettes     Quit date: 2012     Years since quittin.2    Smokeless tobacco: Never    Tobacco comments:     quitting with e cigarette   Vaping Use    Vaping Use: Never used   Substance Use Topics    Alcohol use: Yes     Comment: less than weekly    Drug use: No         10/10/2022     9:30 AM 2023     8:39 AM 10/12/2023     8:01 AM   PHQ   PHQ-9 Total Score 3 5 3   Q9: Thoughts of better off dead/self-harm past 2 weeks Not at all Not at all Not at all         10/10/2016     9:55 AM 4/3/2017    11:13 AM 10/12/2020    10:08 AM   SLOANE-7 SCORE   Total Score 0 0 1         10/12/2023     8:01 AM   Last PHQ-9   1.  Little interest or pleasure in doing things 1   2.  Feeling down, depressed, or hopeless 0   3.  Trouble falling or staying asleep, or sleeping too much 0   4.  Feeling tired or having little energy 1   5.  Poor appetite or overeating 1   6.  Feeling bad about yourself 0   7.  Trouble concentrating 0   8.  Moving slowly or restless 0   Q9: Thoughts of better off dead/self-harm past 2 weeks 0   PHQ-9 Total Score 3         10/12/2020    10:08 AM   SLOANE-7    1. Feeling nervous, anxious, or on edge 0   2. Not being able to stop or control worrying 0   3. Worrying too much about different things 0   4. Trouble relaxing 0   5. Being so restless that it is hard to sit still 0   6. Becoming easily annoyed or irritable 1   7. Feeling afraid, as if something awful might happen 0   SLOANE-7 Total Score 1   If you checked any problems, how difficult have they made it for you to do your work, take care of things at home, or get along with other people? Not difficult at all       Suicide Assessment Five-step Evaluation and Treatment (SAFE-T)    Hypothyroidism Follow-up    Since last visit, patient describes the following symptoms:  weight loss of 15 lbs and fatigue  (thyroid levels have been checked and in normal range)    Current providers sharing in care for this patient include:   Patient Care Team:  Honey García PA-C as PCP - General (Family Practice)  Geovanna Chatterjee MD as MD (INTERNAL MEDICINE - ENDOCRINOLOGY, DIABETES & METABOLISM)  Faby So, RN as Specialty Care Coordinator (Clinical Cardiac Electrophysiology)  Suyapa Garrett MD as MD (Clinical Cardiac Electrophysiology)  Geovanna Chatterjee MD as Assigned Endocrinology Provider  Honey García PA-C as Assigned PCP  Jason Orozco PA-C as Physician Assistant (Gastroenterology)  Isaias Galvez MD as MD (Hematology & Oncology)  Nuzhat Preciado APRN CNP as Nurse Practitioner (Dermatology)  Isaias Galvez MD as Assigned Cancer Care Provider    The following health maintenance items are reviewed in Epic and correct as of today:  Health Maintenance   Topic Date Due    COPD ACTION PLAN  Never done    RSV VACCINE 60+ (1 - 1-dose 60+ series) Never done    ZOSTER IMMUNIZATION (2 of 3) 04/09/2014    LIPID  10/10/2023    MICROALBUMIN  10/10/2023    ANNUAL REVIEW OF HM ORDERS  10/10/2023    MEDICARE ANNUAL WELLNESS VISIT  10/10/2023    LUNG CANCER SCREENING  10/28/2023    MAMMO SCREENING  03/17/2024    PHQ-9  04/12/2024    TSH W/FREE T4 REFLEX  06/28/2024    BMP  09/21/2024    HEMOGLOBIN  09/21/2024    FALL RISK ASSESSMENT  10/12/2024    DEXA  06/20/2027    ADVANCE CARE PLANNING  10/12/2028    DTAP/TDAP/TD IMMUNIZATION (4 - Td or Tdap) 04/08/2029    SPIROMETRY  Completed    HEPATITIS C SCREENING  Completed    DEPRESSION ACTION PLAN  Completed    INFLUENZA VACCINE  Completed    Pneumococcal Vaccine: 65+ Years  Completed    URINALYSIS  Completed    COVID-19 Vaccine  Completed    IPV IMMUNIZATION  Aged Out    HPV IMMUNIZATION  Aged Out    MENINGITIS IMMUNIZATION  Aged Out    COLORECTAL CANCER SCREENING  Discontinued     Lab work is in process  Labs reviewed in  "[facility]  BP Readings from Last 3 Encounters:   10/12/23 132/80   10/03/23 125/79   09/21/23 127/79    Wt Readings from Last 3 Encounters:   10/12/23 64 kg (141 lb)   10/03/23 65.5 kg (144 lb 8 oz)   09/21/23 66.2 kg (146 lb)                          Review of Systems   Constitutional:  Negative for chills and fever.   HENT:  Positive for hearing loss. Negative for congestion, ear pain and sore throat.    Eyes:  Negative for pain and visual disturbance.   Respiratory:  Positive for cough. Negative for shortness of breath.    Cardiovascular:  Negative for chest pain, palpitations and peripheral edema.   Gastrointestinal:  Positive for constipation. Negative for abdominal pain, diarrhea, heartburn, hematochezia and nausea.   Breasts:  Negative for tenderness, breast mass and discharge.   Genitourinary:  Negative for dysuria, frequency, genital sores, hematuria, pelvic pain, urgency, vaginal bleeding and vaginal discharge.   Musculoskeletal:  Positive for arthralgias, joint swelling and myalgias.   Skin:  Negative for rash.   Neurological:  Negative for dizziness, weakness, headaches and paresthesias.   Psychiatric/Behavioral:  Negative for mood changes. The patient is not nervous/anxious.      Constitutional, HEENT, cardiovascular, pulmonary, GI, , musculoskeletal, neuro, skin, endocrine and psych systems are negative, except as otherwise noted.    OBJECTIVE:   /80 (BP Location: Left arm, Patient Position: Chair, Cuff Size: Adult Regular)   Pulse 82   Temp 97.6  F (36.4  C) (Tympanic)   Resp 16   Ht 1.575 m (5' 2\")   Wt 64 kg (141 lb)   LMP  (LMP Unknown)   SpO2 97%   BMI 25.79 kg/m   Estimated body mass index is 25.79 kg/m  as calculated from the following:    Height as of this encounter: 1.575 m (5' 2\").    Weight as of this encounter: 64 kg (141 lb).  Physical Exam  GENERAL APPEARANCE: healthy, alert and no distress  EYES: Eyes grossly normal to inspection, PERRL and conjunctivae and sclerae " normal  HENT: ear canals and TM's normal, nose and mouth without ulcers or lesions, oropharynx clear and oral mucous membranes moist  NECK: no adenopathy, no asymmetry, masses, or scars and thyroid normal to palpation  RESP: lungs clear to auscultation - no rales, rhonchi or wheezes  BREAST: normal without masses, tenderness or nipple discharge and no palpable axillary masses or adenopathy  CV: regular rate and rhythm, normal S1 S2, no S3 or S4, no murmur, click or rub, no peripheral edema and peripheral pulses strong  ABDOMEN: soft, nontender, no hepatosplenomegaly, no masses and bowel sounds normal  MS: no musculoskeletal defects are noted and gait is age appropriate without ataxia  SKIN: no suspicious lesions or rashes  NEURO: Normal strength and tone, sensory exam grossly normal, mentation intact and speech normal  PSYCH: mentation appears normal and affect normal/bright    Diagnostic Test Results:  Labs reviewed in Epic    ASSESSMENT / PLAN:   (Z00.00) Encounter for Medicare annual wellness exam  (primary encounter diagnosis)  Comment:     HEALTH CARE MAINTENANCE              Reviewed USPTF recommendations and anticipatory guidance.              See orders.     Flu and covid shot given today.   Recommend RSV and shingles vaccine at pharmacy.     (K29.70) Gastritis without bleeding, unspecified chronicity, unspecified gastritis type  Comment: dose of omeprazole increased recently by GI specialist.  Has small bowel study tomorrow.   Plan: omeprazole (PRILOSEC) 20 MG DR capsule            (R79.82) CRP elevated  Comment: Has come down with prednisone.  Has appt with rheumatology Nov 20th, 2023 (outside of  system).   Plan: predniSONE (DELTASONE) 5 MG tablet            (M25.50) Multiple joint pain  Comment: has had extensive workup and symptoms most likely a rheumatologic autoimmune disease such as rheumatoid arthritis.  Responding only to prednisone (opioids did not help with pain) .  Symptoms tolerable at 2.5 mg  "prednisone daily and will plan to continue this until she can see rheumatologist.   Plan: predniSONE (DELTASONE) 5 MG tablet            (E78.5) Hyperlipidemia LDL goal <130  Comment: restarted statin and no change in body aches or fatigue.   Plan: Lipid panel reflex to direct LDL Fasting            (I10) Hypertension goal BP (blood pressure) < 140/90  Comment: Bp stable.   Plan: Albumin Random Urine Quantitative with Creat         Ratio          Supraventricular tachycardia: stable.     (Z23) High priority for 2019-nCoV vaccine  Comment: due  Plan:     (Z23) Need for prophylactic vaccination and inoculation against influenza  Comment: due  Plan:     Patient has been advised of split billing requirements and indicates understanding: Yes      COUNSELING:  Reviewed preventive health counseling, as reflected in patient instructions       Regular exercise       Healthy diet/nutrition       Fall risk prevention       Osteoporosis prevention/bone health  Message out to endo about steroid health, if she goes higher than 2.5 mg on prednisone, may need to consider restarting a bisphosphonate for bones.     BMI:   Estimated body mass index is 25.79 kg/m  as calculated from the following:    Height as of this encounter: 1.575 m (5' 2\").    Weight as of this encounter: 64 kg (141 lb).   Weight management plan encouraged healthy nutrition and getting enough calories/day.  She does have some chocolate protein shakes she was using more in the past, I suggest restarting this.       She reports that she quit smoking about 11 years ago. Her smoking use included cigarettes. She has a 45.00 pack-year smoking history. She has never used smokeless tobacco.      Appropriate preventive services were discussed with this patient, including applicable screening as appropriate for fall prevention, nutrition, physical activity, Tobacco-use cessation, weight loss and cognition.  Checklist reviewing preventive services available has been given to " the patient.    Reviewed patients plan of care and provided an AVS. The Complex Care Plan (for patients with higher acuity and needing more deliberate coordination of services) for Marcela meets the Care Plan requirement. This Care Plan has been established and reviewed with the Patient.        Honey García PA-C  [facility]    Identified Health Risks:  I have reviewed Opioid Use Disorder and Substance Use Disorder risk factors and made any needed referrals.   Answers submitted by the patient for this visit:  Patient Health Questionnaire (Submitted on 10/12/2023)  If you checked off any problems, how difficult have these problems made it for you to do your work, take care of things at home, or get along with other people?: Not difficult at all  PHQ9 TOTAL SCORE: 3    The patient was counseled and encouraged to consider modifying their diet and eating habits. She was provided with information on recommended healthy diet options.  The patient was provided with suggestions to help her develop a healthy emotional lifestyle.

## 2023-10-12 NOTE — PATIENT INSTRUCTIONS
Patient Education   Personalized Prevention Plan  You are due for the preventive services outlined below.  Your care team is available to assist you in scheduling these services.  If you have already completed any of these items, please share that information with your care team to update in your medical record.  Health Maintenance Due   Topic Date Due     COPD Action Plan  Never done     RSV VACCINE 60+ (1 - 1-dose 60+ series) Never done     Zoster (Shingles) Vaccine (2 of 3) 04/09/2014     Flu Vaccine (1) 09/01/2023     COVID-19 Vaccine (5 - 2023-24 season) 09/01/2023     Cholesterol Lab  10/10/2023     Kidney Microalbumin Urine Test  10/10/2023     ANNUAL REVIEW OF HM ORDERS  10/10/2023     Annual Wellness Visit  10/10/2023     LUNG CANCER SCREENING  10/28/2023     Learning About Dietary Guidelines  What are the Dietary Guidelines for Americans?     Dietary Guidelines for Americans provide tips for eating well and staying healthy. This helps reduce the risk for long-term (chronic) diseases.  These guidelines recommend that you:  Eat and drink the right amount for you. The U.S. government's food guide is called MyPlate. It can help you make your own well-balanced eating plan.  Try to balance your eating with your activity. This helps you stay at a healthy weight.  Drink alcohol in moderation, if at all.  Limit foods high in salt, saturated fat, trans fat, and added sugar.  These guidelines are from the U.S. Department of Agriculture and the U.S. Department of Health and Human Services. They are updated every 5 years.  What is MyPlate?  MyPlate is the U.S. government's food guide. It can help you make your own well-balanced eating plan. A balanced eating plan means that you eat enough, but not too much, and that your food gives you the nutrients you need to stay healthy.  MyPlate focuses on eating plenty of whole grains, fruits, and vegetables, and on limiting fat and sugar. It is available online at  "www.ChooseMyPlate.gov.  How can you get started?  If you're trying to eat healthier, you can slowly change your eating habits over time. You don't have to make big changes all at once. Start by adding one or two healthy foods to your meals each day.  Grains  Choose whole-grain breads and cereals and whole-wheat pasta and whole-grain crackers.  Vegetables  Eat a variety of vegetables every day. They have lots of nutrients and are part of a heart-healthy diet.  Fruits  Eat a variety of fruits every day. Fruits contain lots of nutrients. Choose fresh fruit instead of fruit juice.  Protein foods  Choose fish and lean poultry more often. Eat red meat and fried meats less often. Dried beans, tofu, and nuts are also good sources of protein.  Dairy  Choose low-fat or fat-free products from this food group. If you have problems digesting milk, try eating cheese or yogurt instead.  Fats and oils  Limit fats and oils if you're trying to cut calories. Choose healthy fats when you cook. These include canola oil and olive oil.  Where can you learn more?  Go to https://www.[employer].net/patiented  Enter D676 in the search box to learn more about \"Learning About Dietary Guidelines.\"  Current as of: March 1, 2023               Content Version: 13.7 2006-2023 [employer].   Care instructions adapted under license by your healthcare professional. If you have questions about a medical condition or this instruction, always ask your healthcare professional. [employer] disclaims any warranty or liability for your use of this information.      Your Health Risk Assessment indicates you feel you are not in good emotional health.    Recreation   Recreation is not limited to sports and team events. It includes any activity that provides relaxation, interest, enjoyment, and exercise. Recreation provides an outlet for physical, mental, and social energy. It can give a sense of worth and achievement. It can help you " stay healthy.    Mental Exercise and Social Involvement  Mental and emotional health is as important as physical health. Keep in touch with friends and family. Stay as active as possible. Continue to learn and challenge yourself.   Things you can do to stay mentally active are:  Learn something new, like a foreign language or musical instrument.   Play SCRABBLE or do crossword puzzles. If you cannot find people to play these games with you at home, you can play them with others on your computer through the Internet.   Join a games club--anything from card games to chess or checkers or lawn bowling.   Start a new hobby.   Go back to school.   Volunteer.   Read.   Keep up with world events.

## 2023-10-13 ENCOUNTER — ANCILLARY PROCEDURE (OUTPATIENT)
Dept: GENERAL RADIOLOGY | Facility: CLINIC | Age: 73
End: 2023-10-13
Attending: PHYSICIAN ASSISTANT
Payer: MEDICARE

## 2023-10-13 DIAGNOSIS — K44.9 HIATAL HERNIA: ICD-10-CM

## 2023-10-13 PROCEDURE — 74220 X-RAY XM ESOPHAGUS 1CNTRST: CPT | Mod: GC | Performed by: RADIOLOGY

## 2023-10-17 ENCOUNTER — MYC MEDICAL ADVICE (OUTPATIENT)
Dept: FAMILY MEDICINE | Facility: CLINIC | Age: 73
End: 2023-10-17
Payer: MEDICARE

## 2023-10-17 DIAGNOSIS — E78.5 HYPERLIPIDEMIA LDL GOAL <130: ICD-10-CM

## 2023-10-30 ENCOUNTER — ANCILLARY PROCEDURE (OUTPATIENT)
Dept: MRI IMAGING | Facility: CLINIC | Age: 73
End: 2023-10-30
Attending: PHYSICIAN ASSISTANT
Payer: MEDICARE

## 2023-10-30 DIAGNOSIS — D64.9 ANEMIA, UNSPECIFIED TYPE: ICD-10-CM

## 2023-10-30 DIAGNOSIS — R63.4 UNINTENTIONAL WEIGHT LOSS: ICD-10-CM

## 2023-10-30 DIAGNOSIS — R19.5 ELEVATED FECAL CALPROTECTIN: ICD-10-CM

## 2023-10-30 PROCEDURE — G1010 CDSM STANSON: HCPCS | Performed by: RADIOLOGY

## 2023-10-30 PROCEDURE — 72197 MRI PELVIS W/O & W/DYE: CPT | Mod: MG | Performed by: RADIOLOGY

## 2023-10-30 PROCEDURE — A9585 GADOBUTROL INJECTION: HCPCS | Mod: JZ | Performed by: RADIOLOGY

## 2023-10-30 PROCEDURE — 74183 MRI ABD W/O CNTR FLWD CNTR: CPT | Mod: MG | Performed by: RADIOLOGY

## 2023-10-30 RX ORDER — GADOBUTROL 604.72 MG/ML
0.1 INJECTION INTRAVENOUS ONCE
Status: DISCONTINUED | OUTPATIENT
Start: 2023-10-30 | End: 2023-10-30

## 2023-10-30 RX ORDER — GADOBUTROL 604.72 MG/ML
7.5 INJECTION INTRAVENOUS ONCE
Status: COMPLETED | OUTPATIENT
Start: 2023-10-30 | End: 2023-10-30

## 2023-10-30 RX ADMIN — GADOBUTROL 6.5 ML: 604.72 INJECTION INTRAVENOUS at 10:19

## 2023-11-13 ENCOUNTER — MYC MEDICAL ADVICE (OUTPATIENT)
Dept: FAMILY MEDICINE | Facility: CLINIC | Age: 73
End: 2023-11-13
Payer: MEDICARE

## 2023-11-20 ENCOUNTER — TRANSFERRED RECORDS (OUTPATIENT)
Dept: HEALTH INFORMATION MANAGEMENT | Facility: CLINIC | Age: 73
End: 2023-11-20
Payer: MEDICARE

## 2023-11-20 ENCOUNTER — PATIENT OUTREACH (OUTPATIENT)
Dept: GASTROENTEROLOGY | Facility: CLINIC | Age: 73
End: 2023-11-20
Payer: MEDICARE

## 2023-11-21 ENCOUNTER — MYC MEDICAL ADVICE (OUTPATIENT)
Dept: FAMILY MEDICINE | Facility: CLINIC | Age: 73
End: 2023-11-21
Payer: MEDICARE

## 2023-11-21 DIAGNOSIS — Z87.81 HISTORY OF FRACTURE: ICD-10-CM

## 2023-11-21 DIAGNOSIS — Z79.52 ON CORTICOSTEROID THERAPY: ICD-10-CM

## 2023-11-21 DIAGNOSIS — Z78.0 POST-MENOPAUSAL: Primary | ICD-10-CM

## 2023-11-21 DIAGNOSIS — R29.890 LOSS OF HEIGHT: ICD-10-CM

## 2023-11-21 RX ORDER — ALENDRONATE SODIUM 70 MG/1
70 TABLET ORAL
Qty: 12 TABLET | Refills: 4 | Status: SHIPPED | OUTPATIENT
Start: 2023-11-21

## 2023-12-05 ENCOUNTER — MYC MEDICAL ADVICE (OUTPATIENT)
Dept: FAMILY MEDICINE | Facility: CLINIC | Age: 73
End: 2023-12-05
Payer: MEDICARE

## 2023-12-05 DIAGNOSIS — I10 HYPERTENSION GOAL BP (BLOOD PRESSURE) < 140/90: ICD-10-CM

## 2023-12-05 RX ORDER — TRIAMTERENE/HYDROCHLOROTHIAZID 37.5-25 MG
0.5 TABLET ORAL DAILY
Qty: 45 TABLET | Refills: 1 | Status: SHIPPED | OUTPATIENT
Start: 2023-12-05 | End: 2024-06-11

## 2023-12-11 ENCOUNTER — MYC MEDICAL ADVICE (OUTPATIENT)
Dept: FAMILY MEDICINE | Facility: CLINIC | Age: 73
End: 2023-12-11
Payer: MEDICARE

## 2023-12-11 DIAGNOSIS — E78.5 HYPERLIPIDEMIA LDL GOAL <130: ICD-10-CM

## 2023-12-11 DIAGNOSIS — F33.42 RECURRENT MAJOR DEPRESSIVE DISORDER, IN FULL REMISSION (H): ICD-10-CM

## 2023-12-11 RX ORDER — ATORVASTATIN CALCIUM 20 MG/1
20 TABLET, FILM COATED ORAL DAILY
Qty: 90 TABLET | Refills: 3 | Status: SHIPPED | OUTPATIENT
Start: 2023-12-11

## 2023-12-11 NOTE — TELEPHONE ENCOUNTER
Both the Atorvastatin and Fluoxetine were sent to the [facility] pharmacy on 10/12/23, however according to the pharmacy patient never filled them.     Will forward to requested pharmacy:  [facility] - [id number] RN BSN  [facility]

## 2023-12-12 ENCOUNTER — OFFICE VISIT (OUTPATIENT)
Dept: GASTROENTEROLOGY | Facility: CLINIC | Age: 73
End: 2023-12-12
Attending: PHYSICIAN ASSISTANT
Payer: MEDICARE

## 2023-12-12 VITALS
HEART RATE: 93 BPM | SYSTOLIC BLOOD PRESSURE: 139 MMHG | BODY MASS INDEX: 26.43 KG/M2 | OXYGEN SATURATION: 98 % | WEIGHT: 144.5 LBS | DIASTOLIC BLOOD PRESSURE: 85 MMHG

## 2023-12-12 DIAGNOSIS — K21.9 GASTROESOPHAGEAL REFLUX DISEASE WITHOUT ESOPHAGITIS: Primary | ICD-10-CM

## 2023-12-12 DIAGNOSIS — K44.9 HIATAL HERNIA: ICD-10-CM

## 2023-12-12 DIAGNOSIS — K59.09 CHRONIC CONSTIPATION: ICD-10-CM

## 2023-12-12 PROCEDURE — 99215 OFFICE O/P EST HI 40 MIN: CPT | Performed by: PHYSICIAN ASSISTANT

## 2023-12-12 RX ORDER — METHOTREXATE 2.5 MG/1
TABLET ORAL
COMMUNITY
Start: 2023-12-06

## 2023-12-12 ASSESSMENT — PAIN SCALES - GENERAL: PAINLEVEL: NO PAIN (0)

## 2023-12-12 NOTE — PATIENT INSTRUCTIONS
It was a pleasure taking care of you today.  I've included a brief summary of our discussion and care plan from today's visit below.  Please review this information with your primary care provider.  _______________________________________________________________________     My recommendations are summarized as follows:     - decrease omeprazole to 20mg daily. You can continue pepcid at night time   - try taking miralax 1 capful daily, this can be increased up to 3 capfuls daily  - you can use milk of magnesium or mg citrate for worsening constipation   - follow up as needed   ______________________________________________________________________     How do I schedule labs, imaging studies, or procedures that were ordered in clinic today?      Labs: To schedule lab appointment you can contact your local [facility] or call [phone] to schedule at any convenient [facility] location.     Procedures: If a colonoscopy, upper endoscopy, breath test, esophageal manometry, or pH impedence was ordered today, our endoscopy team will call you to schedule this. If you have not heard from our endoscopy team within a week, please call [phone] to schedule.      Imaging Studies: If you were scheduled for a CT scan, X-ray, MRI, ultrasound, HIDA scan or other imaging study, please call [phone] to have this scheduled.      Referral: If a referral to another specialty was ordered, expect a phone call or follow instructions above. If you have not heard from anyone regarding your referral in a week, please call our clinic to check the status.      Who do I call with any questions after my visit?  Please be in touch if there are any further questions that arise following today's visit.  There are multiple ways to contact your gastroenterology care team.       During business hours, you may reach a Gastroenterology nurse at [phone]     To schedule or reschedule an appointment, please call [phone].       You can always send a secure message through [employer].  [employer] messages are answered by your nurse or doctor typically within 24 hours.  Please allow extra time on weekends and holidays.       For urgent/emergent questions after business hours, you may reach the on-call GI Fellow by contacting the [facility]  at [phone].     How will I get the results of any tests ordered?    You will receive all of your results.  If you have signed up for [employer], any tests ordered at your visit will be available to you after your physician reviews them.  Typically this takes 1-2 weeks.  If there are urgent results that require a change in your care plan, your physician or nurse will call you to discuss the next steps.       What is [employer]?  [employer] is a secure way for you to access all of your healthcare records from the [facility].  It is a web based computer program, so you can sign on to it from any location.  It also allows you to send secure messages to your care team.  I recommend signing up for [employer] access if you have not already done so and are comfortable with using a computer.       How to I schedule a follow-up visit?  If you did not schedule a follow-up visit today, please call [phone] to schedule a follow-up office visit.      Jason Orozco PA-C  Division of Gastroenterology, Hepatology and Nutrition   [facility]

## 2023-12-12 NOTE — PROGRESS NOTES
GI FOLLOW UP CLINIC VISIT     CC/REFERRING MD:    Honey García PA-C     REASON FOR VISIT: Follow Up    HPI: Marcela Allen is 73 year old female who presents for follow up.     I initially visited with patient in August 2023 for GI consult.  Please see initial HPI below:      Initial consult HPI 8/1/23:  Symptoms started about a month ago with low grade fever of unclear etiology. She was additionally having hip pain. An MRI noted concern for septic joint. She was admitted for evaluation and mgmt. She was started on IV abx and had a culture of aspirated fluid from joint which suprisingly didn't show any growth. She also had a rheumatological work up which was negative. Since IV abx she reports fever has resolved as well as joint pain.      She has lost weight in the last month, down about 7 lbs. She reports feeling full quickly and not able to eat much. She mostly snacks during the day. Not able to eat full meals. Does have nausea and feels queasy. No vomiting. No dysphagia. Feels like a hot coal is sitting in her stomach. Stomach ache all the time, worse with eating. The early satiety actually started years ago but has progressively worsened. She does have hx of reflux and takes omeprazole 20mg three times a week. This does not compltely control reflux. She additionally takes mylanta and tums as needed without temporary relief. She had an EGD last week which noted gastritis, tortuous esophagus and 3 cm hiatal hernia. Gastric bx noted reactive gastropathy, no H pylori. She was taking NSAIDs, ibuprofen 400mg twice daily for the joint pains. She has stopped since the EGD.      She is normally more constipated, only having a BM every 3-4 days. She typically takes 2 sennakot daily to manage. Recently however she has developed one loose watery diarrhea stools since receiving abx during recent admission for septic arthritis. No odor to stools. No hematochezia or melena. She feels stools are improving and having  more form.      She had a CT abd/pelvis scan and CT chest recently without any acute findings to explain symptoms. She does have a colonoscopy scheduled for next month. Her last colonoscopy was in 2017 with small precancerous polyp and diverticulosis. Was due for repeat at 5 years (2022).      Interval hx October 2023  She returns for follow up. Since our last visit she has had several labs and stool studies.  Celiac screening negative.  Mildly elevated fecal calprotectin.  Negative fecal elastase.  She did have a significantly elevated CRP level which was thought to be related to joint pains.  She is on prednisone with improvement in CRP and has a consult scheduled with rheumatology to rule out other autoimmune conditions.      She has had a colonoscopy which was unremarkable.  She additionally had a gastric emptying scan which did show slight delay at 240 minutes was otherwise unrevealing.  She continues to be symptomatic. She feels like there is a coal sitting in her stomach. She is still feeling full quickly. We did increase her omeprazole  from three times a week to daily. She has been concerned about potential kidney changes as a result but recent kidney tests wnl. BM's continue to be mostly constipated. She takes a bowel regimen that helps. She continues to lose weight. She is 5 lbs down from our last visit.        Interval hx December 12, 2023   She returns for follow up today. Since last visit she has completed a timed barium esophagram and  MRE which were unremarkable. Overall her GI work up has been unrevealing. She does however have a new diagnosis of polymyalgia rheumatica and will be starting methotrexate soon. In the interim she is on prednisone which is managing her symptoms. She is not having any further weight loss. Reports her weight is stable. Not having abdominal pain at this time. No nausea currently. On 40mg omeprazole for past few weeks, but interested in decreasing down to 20mg. Continues to  struggle with constipation which is a chronic issue. She does take 3-4 senna daily. Uses milk of mg as needed. Its been a few days since having a BM so she plans to take a milk of mg today which usually helps.       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 Former smoker, quit 11 years ago. Stopped alcohol at this time due to her GI symptoms.         PREVIOUS ENDOSCOPY:     COLONOSCOPY 9/5/2023  Impression:        - The entire examined colon is normal.                             - No specimens collected.      UPPER ENDOSCOPY 7/25/23  Impression:        - 3 cm hiatal hernia.                             - Tortuous esophagus.                             - Gastritis. Biopsied.                             - Normal examined duodenum.      Final Diagnosis   A. STOMACH, ANTRUM, BIOPSY:  Gastric antral mucosa with reactive antral gastropathy and focus of neutrophilic inflammation; could be NSAID related; no intestinal metaplasia or dysplasia; report of H. pylori immunohistochemistry to follow      B. STOMACH, BODY, BIOPSY:   Gastric body type mucosa with focus of superficial erosion; could be NSAID related; no H. Pylori-like organisms identified on routine staining; negative also for intestinal metaplasia or dysplasia        ALLERGIES: No Known Allergies    PERTINENT MEDICATIONS:  Current Outpatient Medications   Medication    alendronate (FOSAMAX) 70 MG tablet    aspirin 81 MG tablet    atorvastatin (LIPITOR) 20 MG tablet    Calcium Citrate (CITRACAL OR)    cetirizine (ZYRTEC) 10 MG tablet    Cholecalciferol (VITAMIN D) 1000 UNIT capsule    famotidine (PEPCID) 40 MG tablet    FLUoxetine (PROZAC) 20 MG capsule    hypromellose (ARTIFICIAL TEARS) 0.5 % SOLN    levothyroxine (SYNTHROID/LEVOTHROID) 100 MCG tablet    Magnesium Glycinate 100 MG CAPS    methotrexate 2.5 MG tablet    multivitamin w/minerals (THERA-VIT-M) tablet    omeprazole (PRILOSEC) 40 MG DR capsule    sodium fluoride dental gel (PREVIDENT) 1.1 % GEL  topical gel    triamcinolone (KENALOG) 0.1 % external ointment    triamterene-HCTZ (MAXZIDE-25) 37.5-25 MG tablet    vitamin C (ASCORBIC ACID) 500 MG tablet    predniSONE (DELTASONE) 5 MG tablet    sennosides (SENOKOT) 8.6 MG tablet     No current facility-administered medications for this visit.        PHYSICAL EXAMINATION:  Constitutional: aaox3, cooperative, pleasant, not dyspneic/diaphoretic, no acute distress  Vitals reviewed: /85 (BP Location: Left arm, Patient Position: Sitting, Cuff Size: Adult Regular)   Pulse 93   Wt 65.5 kg (144 lb 8 oz)   LMP  (LMP Unknown)   SpO2 98%   BMI 26.43 kg/m     Wt:   Wt Readings from Last 2 Encounters:   12/12/23 65.5 kg (144 lb 8 oz)   10/12/23 64 kg (141 lb)      Eyes: Sclera anicteric/injected  CV: RRR, No edema  Respiratory: Unlabored breathing  Skin: no jaundice  Psych: Normal affect  MSK: Normal gait    RADIOLOGY     Narrative & Impression   EXAMINATION: MR ENTEROGRAPHY W/O AND W CONTRAST, 10/30/2023 10:10 AM     TECHNIQUE:  Multiplanar, multisequence MRI imaging of the abdomen and  pelvis was obtained using MR enterography protocol without and with  intravenous gadolinium. Contrast dose: 6.5ml Gadavist     COMPARISON: CT abdomen and pelvis dated 12/31/2015.     HISTORY: abdominal pain, mildly elevated fecal calpro, weight loss,  r/o small bowel pathology; Unintentional weight loss; Anemia,  unspecified type; Elevated fecal calprotectin     FINDINGS:     Exam quality: Optimal. Postcontrast sequences are mildly degraded by  motion artifacts.     Small bowel: No abnormal bowel thickening, enhancement or  fistulization. Small bowel loops are normal in caliber.     Large bowel: Unremarkable.     Remainder of exam: No lymphadenopathy. Multifocal T2 hyperintense  bilateral renal cysts. Few scattered T2 hyperintense hepatic cysts.  Known calcified splenic artery aneurysm is better evaluated on prior  CT. Multiple small enhancing uterine fibroids.                                                                       IMPRESSION:   Unremarkable MR enterography. No evidence of bowel inflammation.      ANGELA BEAN MD             Exam: XR ESOPHAGRAM SINGLE CONTRAST, 10/13/2023 8:56 AM  TIMED BARIUM ESOPHAGRAM:     Indication: timed barium esophagram, pt with hiatal hernia and early  satiety; Hiatal hernia     Comparison: 7/5/2023 CT abdomen/pelvis     Findings:   The patient ingested 250 mL of contrast within a 45 seconds time.  Images of the esophagus with the patient in the upright LPO position  were obtained 1 minute after ingestion of contrast. All of the  contrast had passed into the esophagus at 1 minute. Since all the  contrast had passed into the esophagus at 1 minute repeat images were  not obtained at 5 minutes.     Barium tablet passed through the esophagus and into the stomach  unimpeded.     No obvious hiatal hernia is observed.                                                                      Impression:   1. Timed barium esophagram demonstrates complete passage of contrast  into the stomach by 1 minute. Barium tablet passed unimpeded.  2. No obvious hiatal hernia is observed on this limited time barium  esophagram. If evaluation of a hiatal hernia is desired recommend  further evaluation with double contrast esophagram.     I have personally reviewed the examination and initial interpretation  and I agree with the findings.     NAY HERNANDEZ, DO        ASSESSMENT/PLAN:    GERD  Hiatal hernia  Chronic constipation      Marcela Allen is a 73 year old female who presents for follow up. She was previously experiencing abd pain, nausea and weight loss. Additionally has hx of constipation.  She actually has had some of her GI symptoms for a number of years but never caused weight loss or fevers before. She has completed an extensive GI work up which includes egd/colonoscopy, CT a/p, 4 hr GES, MRE, barium esophagram. While she does have some gastritis and reactive  gastropathy and mild delayed emptying at 240 minutes, her GI work up has mostly been unrevealing.     She was however recently diagnosed with polymyalgia rheumatica which would explain her fever and weight loss. She reports feeing improved since being on prednisone. Rheumatology plans to start her on methotrexate soon.     She does continue to have some of her chronic GI symptoms such as constipation, but overall feels much improved. Denies reflux, nausea or abd pain. She is interested in decreasing PPI back down to 20mg daily. She will give this a try. If symptomatic on lower dose, would recommend going back to 40mg. We did review tat PPI's can reduce the clearance of methotrexate and therefore lead to higher levels of methotrexate in the body especially with high doses. She plans to review this her rheumatologist.     We discussed ways to improve her chronic constipation. She currently is trying to manage with senna 3-4 tablets daily but still has difficulty requiring occ MOM. Recommended taking miralax daily. Start with 1 capful and increase up to 3 capfuls a day.     She can follow up with GI as needed since her work up was unrevealing and her symptoms have improved.       No LOS data to display   Time spent by me doing chart review, history and exam, documentation and further activities per the note      This note was created with voice recognition software, and while reviewed for accuracy, typos may remain.     Jason Orozco PA-C  Division of Gastroenterology, Hepatology and Nutrition   [facility]

## 2023-12-12 NOTE — LETTER
12/12/2023         RE: Marcela Allen  [address]  [place] [address]        Dear Colleague,    Thank you for referring your patient, Marcela Allen, to the [facility]. Please see a copy of my visit note below.        GI FOLLOW UP CLINIC VISIT     CC/REFERRING MD:    Honey García PA-C     REASON FOR VISIT: Follow Up    HPI: Marcela Allen is 73 year old female who presents for follow up.     I initially visited with patient in August 2023 for GI consult.  Please see initial HPI below:      Initial consult HPI 8/1/23:  Symptoms started about a month ago with low grade fever of unclear etiology. She was additionally having hip pain. An MRI noted concern for septic joint. She was admitted for evaluation and mgmt. She was started on IV abx and had a culture of aspirated fluid from joint which suprisingly didn't show any growth. She also had a rheumatological work up which was negative. Since IV abx she reports fever has resolved as well as joint pain.      She has lost weight in the last month, down about 7 lbs. She reports feeling full quickly and not able to eat much. She mostly snacks during the day. Not able to eat full meals. Does have nausea and feels queasy. No vomiting. No dysphagia. Feels like a hot coal is sitting in her stomach. Stomach ache all the time, worse with eating. The early satiety actually started years ago but has progressively worsened. She does have hx of reflux and takes omeprazole 20mg three times a week. This does not compltely control reflux. She additionally takes mylanta and tums as needed without temporary relief. She had an EGD last week which noted gastritis, tortuous esophagus and 3 cm hiatal hernia. Gastric bx noted reactive gastropathy, no H pylori. She was taking NSAIDs, ibuprofen 400mg twice daily for the joint pains. She has stopped since the EGD.      She is normally more constipated, only having a BM every 3-4 days. She typically  takes 2 sennakot daily to manage. Recently however she has developed one loose watery diarrhea stools since receiving abx during recent admission for septic arthritis. No odor to stools. No hematochezia or melena. She feels stools are improving and having more form.      She had a CT abd/pelvis scan and CT chest recently without any acute findings to explain symptoms. She does have a colonoscopy scheduled for next month. Her last colonoscopy was in 2017 with small precancerous polyp and diverticulosis. Was due for repeat at 5 years (2022).      Interval hx October 2023  She returns for follow up. Since our last visit she has had several labs and stool studies.  Celiac screening negative.  Mildly elevated fecal calprotectin.  Negative fecal elastase.  She did have a significantly elevated CRP level which was thought to be related to joint pains.  She is on prednisone with improvement in CRP and has a consult scheduled with rheumatology to rule out other autoimmune conditions.      She has had a colonoscopy which was unremarkable.  She additionally had a gastric emptying scan which did show slight delay at 240 minutes was otherwise unrevealing.  She continues to be symptomatic. She feels like there is a coal sitting in her stomach. She is still feeling full quickly. We did increase her omeprazole  from three times a week to daily. She has been concerned about potential kidney changes as a result but recent kidney tests wnl. BM's continue to be mostly constipated. She takes a bowel regimen that helps. She continues to lose weight. She is 5 lbs down from our last visit.        Interval hx December 12, 2023   She returns for follow up today. Since last visit she has completed a timed barium esophagram and  MRE which were unremarkable. Overall her GI work up has been unrevealing. She does however have a new diagnosis of polymyalgia rheumatica and will be starting methotrexate soon. In the interim she is on prednisone  which is managing her symptoms. She is not having any further weight loss. Reports her weight is stable. Not having abdominal pain at this time. No nausea currently. On 40mg omeprazole for past few weeks, but interested in decreasing down to 20mg. Continues to struggle with constipation which is a chronic issue. She does take 3-4 senna daily. Uses milk of mg as needed. Its been a few days since having a BM so she plans to take a milk of mg today which usually helps.       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 Former smoker, quit 11 years ago. Stopped alcohol at this time due to her GI symptoms.         PREVIOUS ENDOSCOPY:     COLONOSCOPY 9/5/2023  Impression:        - The entire examined colon is normal.                             - No specimens collected.      UPPER ENDOSCOPY 7/25/23  Impression:        - 3 cm hiatal hernia.                             - Tortuous esophagus.                             - Gastritis. Biopsied.                             - Normal examined duodenum.      Final Diagnosis   A. STOMACH, ANTRUM, BIOPSY:  Gastric antral mucosa with reactive antral gastropathy and focus of neutrophilic inflammation; could be NSAID related; no intestinal metaplasia or dysplasia; report of H. pylori immunohistochemistry to follow      B. STOMACH, BODY, BIOPSY:   Gastric body type mucosa with focus of superficial erosion; could be NSAID related; no H. Pylori-like organisms identified on routine staining; negative also for intestinal metaplasia or dysplasia        ALLERGIES: No Known Allergies    PERTINENT MEDICATIONS:  Current Outpatient Medications   Medication     alendronate (FOSAMAX) 70 MG tablet     aspirin 81 MG tablet     atorvastatin (LIPITOR) 20 MG tablet     Calcium Citrate (CITRACAL OR)     cetirizine (ZYRTEC) 10 MG tablet     Cholecalciferol (VITAMIN D) 1000 UNIT capsule     famotidine (PEPCID) 40 MG tablet     FLUoxetine (PROZAC) 20 MG capsule     hypromellose (ARTIFICIAL TEARS) 0.5 %  SOLN     levothyroxine (SYNTHROID/LEVOTHROID) 100 MCG tablet     Magnesium Glycinate 100 MG CAPS     methotrexate 2.5 MG tablet     multivitamin w/minerals (THERA-VIT-M) tablet     omeprazole (PRILOSEC) 40 MG DR capsule     sodium fluoride dental gel (PREVIDENT) 1.1 % GEL topical gel     triamcinolone (KENALOG) 0.1 % external ointment     triamterene-HCTZ (MAXZIDE-25) 37.5-25 MG tablet     vitamin C (ASCORBIC ACID) 500 MG tablet     predniSONE (DELTASONE) 5 MG tablet     sennosides (SENOKOT) 8.6 MG tablet     No current facility-administered medications for this visit.        PHYSICAL EXAMINATION:  Constitutional: aaox3, cooperative, pleasant, not dyspneic/diaphoretic, no acute distress  Vitals reviewed: /85 (BP Location: Left arm, Patient Position: Sitting, Cuff Size: Adult Regular)   Pulse 93   Wt 65.5 kg (144 lb 8 oz)   LMP  (LMP Unknown)   SpO2 98%   BMI 26.43 kg/m     Wt:   Wt Readings from Last 2 Encounters:   12/12/23 65.5 kg (144 lb 8 oz)   10/12/23 64 kg (141 lb)      Eyes: Sclera anicteric/injected  CV: RRR, No edema  Respiratory: Unlabored breathing  Skin: no jaundice  Psych: Normal affect  MSK: Normal gait    RADIOLOGY     Narrative & Impression   EXAMINATION: MR ENTEROGRAPHY W/O AND W CONTRAST, 10/30/2023 10:10 AM     TECHNIQUE:  Multiplanar, multisequence MRI imaging of the abdomen and  pelvis was obtained using MR enterography protocol without and with  intravenous gadolinium. Contrast dose: 6.5ml Gadavist     COMPARISON: CT abdomen and pelvis dated 12/31/2015.     HISTORY: abdominal pain, mildly elevated fecal calpro, weight loss,  r/o small bowel pathology; Unintentional weight loss; Anemia,  unspecified type; Elevated fecal calprotectin     FINDINGS:     Exam quality: Optimal. Postcontrast sequences are mildly degraded by  motion artifacts.     Small bowel: No abnormal bowel thickening, enhancement or  fistulization. Small bowel loops are normal in caliber.     Large bowel:  Unremarkable.     Remainder of exam: No lymphadenopathy. Multifocal T2 hyperintense  bilateral renal cysts. Few scattered T2 hyperintense hepatic cysts.  Known calcified splenic artery aneurysm is better evaluated on prior  CT. Multiple small enhancing uterine fibroids.                                                                      IMPRESSION:   Unremarkable MR enterography. No evidence of bowel inflammation.      ANGELA BEAN MD             Exam: XR ESOPHAGRAM SINGLE CONTRAST, 10/13/2023 8:56 AM  TIMED BARIUM ESOPHAGRAM:     Indication: timed barium esophagram, pt with hiatal hernia and early  satiety; Hiatal hernia     Comparison: 7/5/2023 CT abdomen/pelvis     Findings:   The patient ingested 250 mL of contrast within a 45 seconds time.  Images of the esophagus with the patient in the upright LPO position  were obtained 1 minute after ingestion of contrast. All of the  contrast had passed into the esophagus at 1 minute. Since all the  contrast had passed into the esophagus at 1 minute repeat images were  not obtained at 5 minutes.     Barium tablet passed through the esophagus and into the stomach  unimpeded.     No obvious hiatal hernia is observed.                                                                      Impression:   1. Timed barium esophagram demonstrates complete passage of contrast  into the stomach by 1 minute. Barium tablet passed unimpeded.  2. No obvious hiatal hernia is observed on this limited time barium  esophagram. If evaluation of a hiatal hernia is desired recommend  further evaluation with double contrast esophagram.     I have personally reviewed the examination and initial interpretation  and I agree with the findings.     NAY HERNANDEZ, DO        ASSESSMENT/PLAN:    GERD  Hiatal hernia  Chronic constipation      Marcela Allen is a 73 year old female who presents for follow up. She was previously experiencing abd pain, nausea and weight loss. Additionally has hx of  constipation.  She actually has had some of her GI symptoms for a number of years but never caused weight loss or fevers before. She has completed an extensive GI work up which includes egd/colonoscopy, CT a/p, 4 hr GES, MRE, barium esophagram. While she does have some gastritis and reactive gastropathy and mild delayed emptying at 240 minutes, her GI work up has mostly been unrevealing.     She was however recently diagnosed with polymyalgia rheumatica which would explain her fever and weight loss. She reports feeing improved since being on prednisone. Rheumatology plans to start her on methotrexate soon.     She does continue to have some of her chronic GI symptoms such as constipation, but overall feels much improved. Denies reflux, nausea or abd pain. She is interested in decreasing PPI back down to 20mg daily. She will give this a try. If symptomatic on lower dose, would recommend going back to 40mg. We did review tat PPI's can reduce the clearance of methotrexate and therefore lead to higher levels of methotrexate in the body especially with high doses. She plans to review this her rheumatologist.     We discussed ways to improve her chronic constipation. She currently is trying to manage with senna 3-4 tablets daily but still has difficulty requiring occ MOM. Recommended taking miralax daily. Start with 1 capful and increase up to 3 capfuls a day.     She can follow up with GI as needed since her work up was unrevealing and her symptoms have improved.       No LOS data to display   Time spent by me doing chart review, history and exam, documentation and further activities per the note      This note was created with voice recognition software, and while reviewed for accuracy, typos may remain.     Jason Orozco PA-C  Division of Gastroenterology, Hepatology and Nutrition   [facility]       Again, thank you for allowing me to participate in the care of your patient.         Sincerely,        Jason Orozco PA-C

## 2023-12-28 DIAGNOSIS — M13.0 POLYARTHROPATHY: ICD-10-CM

## 2023-12-28 DIAGNOSIS — Z79.899 NEED FOR PROPHYLACTIC CHEMOTHERAPY: Primary | ICD-10-CM

## 2024-01-05 ENCOUNTER — LAB (OUTPATIENT)
Dept: LAB | Facility: CLINIC | Age: 74
End: 2024-01-05
Payer: MEDICARE

## 2024-01-05 DIAGNOSIS — I10 HYPERTENSION GOAL BP (BLOOD PRESSURE) < 140/90: ICD-10-CM

## 2024-01-05 DIAGNOSIS — E78.5 HYPERLIPIDEMIA LDL GOAL <130: ICD-10-CM

## 2024-01-05 DIAGNOSIS — Z79.899 NEED FOR PROPHYLACTIC CHEMOTHERAPY: ICD-10-CM

## 2024-01-05 DIAGNOSIS — M13.0 POLYARTHROPATHY: ICD-10-CM

## 2024-01-05 LAB
ALT SERPL W P-5'-P-CCNC: 31 U/L (ref 0–50)
AST SERPL W P-5'-P-CCNC: 44 U/L (ref 0–45)
BASOPHILS # BLD AUTO: 0.1 10E3/UL (ref 0–0.2)
BASOPHILS NFR BLD AUTO: 1 %
CHOLEST SERPL-MCNC: 187 MG/DL
CREAT SERPL-MCNC: 0.9 MG/DL (ref 0.51–0.95)
CREAT UR-MCNC: 68.3 MG/DL
CRP SERPL-MCNC: <3 MG/L
EGFRCR SERPLBLD CKD-EPI 2021: 68 ML/MIN/1.73M2
EOSINOPHIL # BLD AUTO: 0.2 10E3/UL (ref 0–0.7)
EOSINOPHIL NFR BLD AUTO: 2 %
ERYTHROCYTE [DISTWIDTH] IN BLOOD BY AUTOMATED COUNT: 15.7 % (ref 10–15)
ERYTHROCYTE [SEDIMENTATION RATE] IN BLOOD BY WESTERGREN METHOD: 7 MM/HR (ref 0–30)
FASTING STATUS PATIENT QL REPORTED: YES
HCT VFR BLD AUTO: 44.7 % (ref 35–47)
HDLC SERPL-MCNC: 69 MG/DL
HGB BLD-MCNC: 14.7 G/DL (ref 11.7–15.7)
IMM GRANULOCYTES # BLD: 0.1 10E3/UL
IMM GRANULOCYTES NFR BLD: 1 %
LDLC SERPL CALC-MCNC: 87 MG/DL
LYMPHOCYTES # BLD AUTO: 3.5 10E3/UL (ref 0.8–5.3)
LYMPHOCYTES NFR BLD AUTO: 33 %
MCH RBC QN AUTO: 27.7 PG (ref 26.5–33)
MCHC RBC AUTO-ENTMCNC: 32.9 G/DL (ref 31.5–36.5)
MCV RBC AUTO: 84 FL (ref 78–100)
MICROALBUMIN UR-MCNC: 31.3 MG/L
MICROALBUMIN/CREAT UR: 45.83 MG/G CR (ref 0–25)
MONOCYTES # BLD AUTO: 0.7 10E3/UL (ref 0–1.3)
MONOCYTES NFR BLD AUTO: 7 %
NEUTROPHILS # BLD AUTO: 6.1 10E3/UL (ref 1.6–8.3)
NEUTROPHILS NFR BLD AUTO: 58 %
NONHDLC SERPL-MCNC: 118 MG/DL
PLATELET # BLD AUTO: 429 10E3/UL (ref 150–450)
RBC # BLD AUTO: 5.3 10E6/UL (ref 3.8–5.2)
TRIGL SERPL-MCNC: 155 MG/DL
WBC # BLD AUTO: 10.6 10E3/UL (ref 4–11)

## 2024-01-05 PROCEDURE — 82043 UR ALBUMIN QUANTITATIVE: CPT

## 2024-01-05 PROCEDURE — 86140 C-REACTIVE PROTEIN: CPT

## 2024-01-05 PROCEDURE — 85025 COMPLETE CBC W/AUTO DIFF WBC: CPT

## 2024-01-05 PROCEDURE — 80061 LIPID PANEL: CPT

## 2024-01-05 PROCEDURE — 82565 ASSAY OF CREATININE: CPT

## 2024-01-05 PROCEDURE — 36415 COLL VENOUS BLD VENIPUNCTURE: CPT

## 2024-01-05 PROCEDURE — 84450 TRANSFERASE (AST) (SGOT): CPT

## 2024-01-05 PROCEDURE — 82570 ASSAY OF URINE CREATININE: CPT

## 2024-01-05 PROCEDURE — 85652 RBC SED RATE AUTOMATED: CPT

## 2024-01-05 PROCEDURE — 84460 ALANINE AMINO (ALT) (SGPT): CPT

## 2024-01-09 DIAGNOSIS — E89.0 POSTSURGICAL HYPOTHYROIDISM: Chronic | ICD-10-CM

## 2024-01-09 DIAGNOSIS — C73 PAPILLARY THYROID CARCINOMA (H): Chronic | ICD-10-CM

## 2024-01-10 RX ORDER — LEVOTHYROXINE SODIUM 100 UG/1
TABLET ORAL
Qty: 90 TABLET | Refills: 4 | Status: SHIPPED | OUTPATIENT
Start: 2024-01-10 | End: 2024-06-24

## 2024-01-30 ENCOUNTER — MYC MEDICAL ADVICE (OUTPATIENT)
Dept: FAMILY MEDICINE | Facility: CLINIC | Age: 74
End: 2024-01-30
Payer: MEDICARE

## 2024-01-30 DIAGNOSIS — K44.9 HIATAL HERNIA: ICD-10-CM

## 2024-01-30 DIAGNOSIS — K29.70 GASTRITIS WITHOUT BLEEDING, UNSPECIFIED CHRONICITY, UNSPECIFIED GASTRITIS TYPE: ICD-10-CM

## 2024-02-04 ENCOUNTER — MYC MEDICAL ADVICE (OUTPATIENT)
Dept: FAMILY MEDICINE | Facility: CLINIC | Age: 74
End: 2024-02-04
Payer: MEDICARE

## 2024-02-12 ENCOUNTER — TRANSFERRED RECORDS (OUTPATIENT)
Dept: HEALTH INFORMATION MANAGEMENT | Facility: CLINIC | Age: 74
End: 2024-02-12
Payer: MEDICARE

## 2024-02-12 ENCOUNTER — MYC MEDICAL ADVICE (OUTPATIENT)
Dept: FAMILY MEDICINE | Facility: CLINIC | Age: 74
End: 2024-02-12
Payer: MEDICARE

## 2024-02-12 LAB
ALT SERPL-CCNC: 26 IU/L (ref 5–35)
AST SERPL-CCNC: 41 IU/L (ref 5–34)
CREATININE (EXTERNAL): 0.68 MG/DL (ref 0.5–1.3)

## 2024-02-15 DIAGNOSIS — M35.3 POLYMYALGIA RHEUMATICA (H): ICD-10-CM

## 2024-02-15 DIAGNOSIS — Z51.81 ENCOUNTER FOR THERAPEUTIC DRUG MONITORING: Primary | ICD-10-CM

## 2024-02-29 ENCOUNTER — ANCILLARY PROCEDURE (OUTPATIENT)
Dept: MAMMOGRAPHY | Facility: CLINIC | Age: 74
End: 2024-02-29
Attending: PHYSICIAN ASSISTANT
Payer: MEDICARE

## 2024-02-29 DIAGNOSIS — Z12.31 VISIT FOR SCREENING MAMMOGRAM: ICD-10-CM

## 2024-02-29 PROCEDURE — 77063 BREAST TOMOSYNTHESIS BI: CPT | Mod: TC | Performed by: STUDENT IN AN ORGANIZED HEALTH CARE EDUCATION/TRAINING PROGRAM

## 2024-02-29 PROCEDURE — 77067 SCR MAMMO BI INCL CAD: CPT | Mod: TC | Performed by: STUDENT IN AN ORGANIZED HEALTH CARE EDUCATION/TRAINING PROGRAM

## 2024-03-12 ENCOUNTER — LAB (OUTPATIENT)
Dept: LAB | Facility: CLINIC | Age: 74
End: 2024-03-12
Payer: MEDICARE

## 2024-03-12 DIAGNOSIS — M35.3 POLYMYALGIA RHEUMATICA (H): ICD-10-CM

## 2024-03-12 DIAGNOSIS — Z51.81 ENCOUNTER FOR THERAPEUTIC DRUG MONITORING: ICD-10-CM

## 2024-03-12 LAB
CRP SERPL-MCNC: <3 MG/L
ERYTHROCYTE [SEDIMENTATION RATE] IN BLOOD BY WESTERGREN METHOD: 8 MM/HR (ref 0–30)

## 2024-03-12 PROCEDURE — 86140 C-REACTIVE PROTEIN: CPT

## 2024-03-12 PROCEDURE — 36415 COLL VENOUS BLD VENIPUNCTURE: CPT

## 2024-03-12 PROCEDURE — 85652 RBC SED RATE AUTOMATED: CPT

## 2024-04-11 ENCOUNTER — APPOINTMENT (OUTPATIENT)
Dept: LAB | Facility: CLINIC | Age: 74
End: 2024-04-11
Payer: MEDICARE

## 2024-04-21 ASSESSMENT — PATIENT HEALTH QUESTIONNAIRE - PHQ9
10. IF YOU CHECKED OFF ANY PROBLEMS, HOW DIFFICULT HAVE THESE PROBLEMS MADE IT FOR YOU TO DO YOUR WORK, TAKE CARE OF THINGS AT HOME, OR GET ALONG WITH OTHER PEOPLE: NOT DIFFICULT AT ALL
SUM OF ALL RESPONSES TO PHQ QUESTIONS 1-9: 2
SUM OF ALL RESPONSES TO PHQ QUESTIONS 1-9: 2

## 2024-04-22 ENCOUNTER — OFFICE VISIT (OUTPATIENT)
Dept: FAMILY MEDICINE | Facility: CLINIC | Age: 74
End: 2024-04-22
Payer: MEDICARE

## 2024-04-22 VITALS
HEART RATE: 79 BPM | OXYGEN SATURATION: 99 % | RESPIRATION RATE: 16 BRPM | WEIGHT: 142 LBS | DIASTOLIC BLOOD PRESSURE: 68 MMHG | SYSTOLIC BLOOD PRESSURE: 128 MMHG | TEMPERATURE: 97.9 F | BODY MASS INDEX: 25.97 KG/M2

## 2024-04-22 DIAGNOSIS — J44.9 CHRONIC OBSTRUCTIVE PULMONARY DISEASE, UNSPECIFIED COPD TYPE (H): ICD-10-CM

## 2024-04-22 DIAGNOSIS — Z79.60 LONG-TERM USE OF IMMUNOSUPPRESSANT MEDICATION: ICD-10-CM

## 2024-04-22 DIAGNOSIS — J06.9 VIRAL URI WITH COUGH: Primary | ICD-10-CM

## 2024-04-22 DIAGNOSIS — R80.9 MICROALBUMINURIA: ICD-10-CM

## 2024-04-22 DIAGNOSIS — F33.42 RECURRENT MAJOR DEPRESSIVE DISORDER, IN FULL REMISSION (H): ICD-10-CM

## 2024-04-22 DIAGNOSIS — E78.5 HYPERLIPIDEMIA LDL GOAL <130: ICD-10-CM

## 2024-04-22 DIAGNOSIS — C73 PAPILLARY THYROID CARCINOMA (H): Chronic | ICD-10-CM

## 2024-04-22 DIAGNOSIS — I10 HYPERTENSION GOAL BP (BLOOD PRESSURE) < 140/90: ICD-10-CM

## 2024-04-22 DIAGNOSIS — N18.31 STAGE 3A CHRONIC KIDNEY DISEASE (H): ICD-10-CM

## 2024-04-22 DIAGNOSIS — M35.3 PMR (POLYMYALGIA RHEUMATICA) (H): ICD-10-CM

## 2024-04-22 DIAGNOSIS — M25.50 MULTIPLE JOINT PAIN: ICD-10-CM

## 2024-04-22 DIAGNOSIS — I47.10 SUPRAVENTRICULAR TACHYCARDIA (H): ICD-10-CM

## 2024-04-22 DIAGNOSIS — C51.9 VULVAR CANCER (H): ICD-10-CM

## 2024-04-22 DIAGNOSIS — E89.0 POSTSURGICAL HYPOTHYROIDISM: Chronic | ICD-10-CM

## 2024-04-22 DIAGNOSIS — H91.93 BILATERAL HEARING LOSS, UNSPECIFIED HEARING LOSS TYPE: ICD-10-CM

## 2024-04-22 LAB
CREAT UR-MCNC: 73 MG/DL
MICROALBUMIN UR-MCNC: 16 MG/L
MICROALBUMIN/CREAT UR: 21.92 MG/G CR (ref 0–25)

## 2024-04-22 PROCEDURE — 82043 UR ALBUMIN QUANTITATIVE: CPT | Performed by: PHYSICIAN ASSISTANT

## 2024-04-22 PROCEDURE — 99214 OFFICE O/P EST MOD 30 MIN: CPT | Performed by: PHYSICIAN ASSISTANT

## 2024-04-22 PROCEDURE — 82570 ASSAY OF URINE CREATININE: CPT | Performed by: PHYSICIAN ASSISTANT

## 2024-04-22 PROCEDURE — G2211 COMPLEX E/M VISIT ADD ON: HCPCS | Performed by: PHYSICIAN ASSISTANT

## 2024-04-22 RX ORDER — AMOXICILLIN 250 MG
2 CAPSULE ORAL DAILY
COMMUNITY

## 2024-04-22 RX ORDER — PREDNISONE 1 MG/1
TABLET ORAL
COMMUNITY
Start: 2023-11-20

## 2024-04-22 RX ORDER — METHOCARBAMOL 500 MG/1
500 TABLET, FILM COATED ORAL EVERY 6 HOURS PRN
COMMUNITY
Start: 2023-07-28

## 2024-04-22 RX ORDER — RESPIRATORY SYNCYTIAL VIRUS VACCINE 120MCG/0.5
0.5 KIT INTRAMUSCULAR ONCE
Qty: 1 EACH | Refills: 0 | Status: CANCELLED | OUTPATIENT
Start: 2024-04-22 | End: 2024-04-22

## 2024-04-22 NOTE — PROGRESS NOTES
Assessment & Plan     Viral URI with cough  URI (Upper Respiratory Infection)  (primary encounter diagnosis)    I discussed the pathophysiology of upper respiratory infections and likely viral etiology.   Discussed general respiratory tract infection care including importance of hydration, rest, over the counter therapies and techniques to prevent future infection as well as transmission to others.  Discussed signs or symptoms that would indicate need for recheck.    Patient was instructed to f/u or call if symptoms worsen or fail to improve as anticipated (especially given she is on immunosuppressants with both methotrexate and prednisone).  Symptoms are slowly improving, however if symptoms change or worsen, she is to send a [employer] message and will start an antibiotic at this point.      Microalbuminuria  Due to recheck.   - Albumin Random Urine Quantitative with Creat Ratio    Hypertension goal BP (blood pressure) < 140/90  BP stable.     Hyperlipidemia LDL goal <130  Stable, will recheck levels at physical in October.     Chronic obstructive pulmonary disease, unspecified COPD type (H)  Stable.  Has albuterol inhalers at home (may be ).  I offered to send a new one, she declined.  She feels she is doing fine and URI symptoms are improving.  Lungs are clear today and oxygen level normal.     Bilateral hearing loss, unspecified hearing loss type  Seen by specialist last week.  Has hearing aides.     PMR (polymyalgia rheumatica) (H24)  Managed by rheumatology    Multiple joint pain  Managed by rheumatology    Long-term use of immunosuppressant medication  Managed by rheumatology.       (F33.42) Recurrent major depressive disorder, in full remission (H24)  Comment:   Phq-9 reviewed and symptoms stable.   Plan:     (C51.9) Vulvar cancer (H)  Comment: no h/o recurrence  Plan:     (I47.10) Supraventricular tachycardia (H24)  Comment:  Plan: HR stable.     (N18.31) Stage 3a chronic kidney disease (H)  Comment:  "will continue to monitor.   Plan:               BMI  Estimated body mass index is 25.97 kg/m  as calculated from the following:    Height as of 10/12/23: 1.575 m (5' 2\").    Weight as of this encounter: 64.4 kg (142 lb).         Dutch Shipman is a 73 year old, presenting for the following health issues:  Recheck Medication        4/22/2024     8:16 AM   Additional Questions   Roomed by Yocasta   Accompanied by Self         4/22/2024     8:16 AM   Patient Reported Additional Medications   Patient reports taking the following new medications na     History of Present Illness       Reason for visit:  Monitoring of chronic condition /labs    She eats 0-1 servings of fruits and vegetables daily.She consumes 0 sweetened beverage(s) daily.She exercises with enough effort to increase her heart rate 10 to 19 minutes per day.  She exercises with enough effort to increase her heart rate 3 or less days per week.   She is taking medications regularly.     Patient who was diagnosed with Polymyalgia rheumatica in Fall of 2023 arrived for Medication follow-up and lab work if needed.     Medication Followup of Prednisone   Taking Medication as prescribed: yes  Side Effects:  None  Medication Helping Symptoms:  yes      Has a lingering URI (sore throat, cough, bronchospasms).   No fevers.  On methotrexate, she did stop this yesterday due to current illness.    Voice is starting come back.    Did 3 home covid tests that were negative.      On 7 mg prednisone last week, inflammatory markers bumps up slightly so prednisone just recently increased back up to 8 mg.    Pain levels manageable.               Review of Systems  Constitutional, HEENT, cardiovascular, pulmonary, gi and gu systems are negative, except as otherwise noted.      Objective    /68 (BP Location: Left arm, Patient Position: Chair, Cuff Size: Adult Regular)   Pulse 79   Temp 97.9  F (36.6  C) (Tympanic)   Resp 16   Wt 64.4 kg (142 lb)   LMP  (LMP Unknown)  "  SpO2 99%   BMI 25.97 kg/m    Body mass index is 25.97 kg/m .  Physical Exam   GENERAL: alert and no distress  NECK: no adenopathy, no asymmetry, masses, or scars  RESP: lungs clear to auscultation - no rales, rhonchi or wheezes  CV: regular rate and rhythm, normal S1 S2, no S3 or S4, no murmur, click or rub, no peripheral edema  MS: no gross musculoskeletal defects noted, no edema            Signed Electronically by: Honey García PA-C

## 2024-05-13 ENCOUNTER — TRANSFERRED RECORDS (OUTPATIENT)
Dept: HEALTH INFORMATION MANAGEMENT | Facility: CLINIC | Age: 74
End: 2024-05-13
Payer: MEDICARE

## 2024-06-09 ENCOUNTER — ANCILLARY PROCEDURE (OUTPATIENT)
Dept: GENERAL RADIOLOGY | Facility: CLINIC | Age: 74
End: 2024-06-09
Attending: FAMILY MEDICINE
Payer: MEDICARE

## 2024-06-09 ENCOUNTER — OFFICE VISIT (OUTPATIENT)
Dept: URGENT CARE | Facility: URGENT CARE | Age: 74
End: 2024-06-09
Payer: MEDICARE

## 2024-06-09 VITALS
SYSTOLIC BLOOD PRESSURE: 124 MMHG | RESPIRATION RATE: 16 BRPM | HEART RATE: 108 BPM | OXYGEN SATURATION: 98 % | WEIGHT: 148 LBS | BODY MASS INDEX: 27.07 KG/M2 | DIASTOLIC BLOOD PRESSURE: 71 MMHG | TEMPERATURE: 98 F

## 2024-06-09 DIAGNOSIS — M25.531 RIGHT WRIST PAIN: ICD-10-CM

## 2024-06-09 DIAGNOSIS — M25.531 RIGHT WRIST PAIN: Primary | ICD-10-CM

## 2024-06-09 PROCEDURE — 99213 OFFICE O/P EST LOW 20 MIN: CPT | Performed by: FAMILY MEDICINE

## 2024-06-09 PROCEDURE — 73110 X-RAY EXAM OF WRIST: CPT | Mod: TC | Performed by: RADIOLOGY

## 2024-06-09 NOTE — PROGRESS NOTES
Assessment & Plan     Right wrist pain  RICE therapy    Continue with ice, cockup splint likely strain  Reviewed XR with pt, no acute finding, arthritis,    - XR Wrist Right G/E 3 Views; Future            Subjective   Ramonita is a 73 year old, presenting for the following health issues:  Hand Injury (Fell yesterday and injury right wrist. Pain in right wrist unable to us right hand.)  Patient reports she fell yesterday on her right hand,  She is right-hand dominant.    Does have history of polymyalgia rheumatica, she is chronically on prednisone.  She reports she tripped on the leash she fell on her right side, she has stiffness in her right hip however she is able to walk, no weakness, no limping.    She has been wearing her cock-up splint.  She has full range of motion with her fingers, no loss of strength or sensation.      Review of Systems  Constitutional, neuro, ENT, endocrine, pulmonary, cardiac, gastrointestinal, genitourinary, musculoskeletal, integument and psychiatric systems are negative, except as otherwise noted.      Objective    /71 (BP Location: Left arm, Patient Position: Sitting, Cuff Size: Adult Regular)   Pulse 108   Temp 98  F (36.7  C) (Oral)   Resp 16   Wt 67.1 kg (148 lb)   LMP  (LMP Unknown)   SpO2 98%   BMI 27.07 kg/m    Body mass index is 27.07 kg/m .  Physical Exam   GENERAL: alert and no distress  MS: right hand/ wrist exam:  No swelling, no sign of injury.  Minimal tenderness at the distal radius area.  Limited range of motion at the wrist, painful with flexion and extension.  No weakness.  Finger exam normal, good range of motion, good capillary refills, good sensation.  Full range of motion, full sensation, good cap refills.    SKIN: no suspicious lesions or rashes  NEURO: Normal strength and tone, mentation intact and speech normal  PSYCH: mentation appears normal, affect normal/bright    Xray - Reviewed and interpreted by me.  Arthritis, no acute finding ( final report  is pending. )        Signed Electronically by: Brian Hernandez MD

## 2024-06-11 ENCOUNTER — MYC MEDICAL ADVICE (OUTPATIENT)
Dept: FAMILY MEDICINE | Facility: CLINIC | Age: 74
End: 2024-06-11
Payer: MEDICARE

## 2024-06-11 DIAGNOSIS — I10 HYPERTENSION GOAL BP (BLOOD PRESSURE) < 140/90: ICD-10-CM

## 2024-06-11 RX ORDER — TRIAMTERENE/HYDROCHLOROTHIAZID 37.5-25 MG
0.5 TABLET ORAL DAILY
Qty: 45 TABLET | Refills: 1 | Status: SHIPPED | OUTPATIENT
Start: 2024-06-11

## 2024-06-21 ENCOUNTER — LAB (OUTPATIENT)
Dept: LAB | Facility: CLINIC | Age: 74
End: 2024-06-21
Payer: MEDICARE

## 2024-06-21 ENCOUNTER — ANCILLARY PROCEDURE (OUTPATIENT)
Dept: BONE DENSITY | Facility: CLINIC | Age: 74
End: 2024-06-21
Payer: MEDICARE

## 2024-06-21 DIAGNOSIS — E89.0 POSTSURGICAL HYPOTHYROIDISM: ICD-10-CM

## 2024-06-21 DIAGNOSIS — Z78.0 POST-MENOPAUSAL: ICD-10-CM

## 2024-06-21 DIAGNOSIS — C73 PAPILLARY THYROID CARCINOMA (H): ICD-10-CM

## 2024-06-21 LAB
T4 FREE SERPL-MCNC: 1.33 NG/DL (ref 0.9–1.7)
TSH SERPL DL<=0.005 MIU/L-ACNC: 3.54 UIU/ML (ref 0.3–4.2)

## 2024-06-21 PROCEDURE — 84432 ASSAY OF THYROGLOBULIN: CPT | Mod: 90

## 2024-06-21 PROCEDURE — 84439 ASSAY OF FREE THYROXINE: CPT

## 2024-06-21 PROCEDURE — 99000 SPECIMEN HANDLING OFFICE-LAB: CPT

## 2024-06-21 PROCEDURE — 36415 COLL VENOUS BLD VENIPUNCTURE: CPT

## 2024-06-21 PROCEDURE — 84443 ASSAY THYROID STIM HORMONE: CPT

## 2024-06-21 PROCEDURE — 77080 DXA BONE DENSITY AXIAL: CPT | Mod: TC | Performed by: PHYSICIAN ASSISTANT

## 2024-06-21 PROCEDURE — 86800 THYROGLOBULIN ANTIBODY: CPT | Mod: 90

## 2024-06-24 DIAGNOSIS — E89.0 POSTSURGICAL HYPOTHYROIDISM: Chronic | ICD-10-CM

## 2024-06-24 DIAGNOSIS — C73 PAPILLARY THYROID CARCINOMA (H): Chronic | ICD-10-CM

## 2024-06-25 ENCOUNTER — TRANSFERRED RECORDS (OUTPATIENT)
Dept: HEALTH INFORMATION MANAGEMENT | Facility: CLINIC | Age: 74
End: 2024-06-25
Payer: MEDICARE

## 2024-06-25 RX ORDER — LEVOTHYROXINE SODIUM 100 UG/1
TABLET ORAL
Qty: 90 TABLET | Refills: 4 | Status: SHIPPED | OUTPATIENT
Start: 2024-06-25

## 2024-06-27 LAB — SCANNED LAB RESULT: NORMAL

## 2024-07-17 ENCOUNTER — OFFICE VISIT (OUTPATIENT)
Dept: ENDOCRINOLOGY | Facility: CLINIC | Age: 74
End: 2024-07-17
Payer: MEDICARE

## 2024-07-17 VITALS
HEIGHT: 62 IN | SYSTOLIC BLOOD PRESSURE: 127 MMHG | HEART RATE: 87 BPM | WEIGHT: 150 LBS | OXYGEN SATURATION: 100 % | BODY MASS INDEX: 27.6 KG/M2 | DIASTOLIC BLOOD PRESSURE: 74 MMHG

## 2024-07-17 DIAGNOSIS — E89.0 POSTSURGICAL HYPOTHYROIDISM: Primary | ICD-10-CM

## 2024-07-17 DIAGNOSIS — Z79.52 ON CORTICOSTEROID THERAPY: ICD-10-CM

## 2024-07-17 DIAGNOSIS — Z78.0 POSTMENOPAUSAL STATUS: ICD-10-CM

## 2024-07-17 DIAGNOSIS — C73 PAPILLARY THYROID CARCINOMA (H): ICD-10-CM

## 2024-07-17 LAB — HBA1C MFR BLD: 5.3 %

## 2024-07-17 PROCEDURE — 83036 HEMOGLOBIN GLYCOSYLATED A1C: CPT | Performed by: PATHOLOGY

## 2024-07-17 PROCEDURE — 99214 OFFICE O/P EST MOD 30 MIN: CPT

## 2024-07-17 PROCEDURE — G2211 COMPLEX E/M VISIT ADD ON: HCPCS

## 2024-07-17 ASSESSMENT — PAIN SCALES - GENERAL: PAINLEVEL: NO PAIN (0)

## 2024-07-17 NOTE — PATIENT INSTRUCTIONS
Labs in 2 months-- I will send results [email or web address]    Next Bone density [facility]  6/21/2026  or after -- ideally prior to next visit   Let me know if the prednisone dose increases, in which case we might do the repeat bone density sooner.      Next neck ultrasound 2027 or after     See me every 2 years with labs 2-3 weeks  prior     CALCIUM Recommendation 1200 mg/day in divided dose of no more than 500 mg/dose. This includes what is in your food and also what is in any supplements you are taking.      Dietary sources of calcium: These also contain vitamin D  Milk / buttermilk              8 oz            300 mg  Dry milk powder       5 Tbsp             300 mg  Yogurt                          1 cup           400 mg  Ice cream   1/2 cup          100 mg  Hard cheese                     1.5 oz          300 mg  Cottage cheese                1/2 cup        65 mg  Spinach, cooked  1 cup  240 mg  Tofu, soft regular           4 oz          120 to 390 mg  Sardines                       3 oz               370 mg  Mackerel canned         3 oz                250 mg  Canned salmon with bones 3 oz        170-210 mg  Calcium fortified cereals are available  SILK soy and almond milk products are calcium fortified  Orange juice  Fortified with Calcium       8 oz            300 mg  Low fat dairy sources are recommended    Recommended exercise goals:  30 minutes of moderate exercise on most days of the week .  Weight bearing exercise (ie, walking, jogging, hiking, dancing)    Strength training 2 or more times/week in addition to other weight -being exercise.  Strength training -- uses weight or resistance beyond that seen in everyday activities -(pilates, weight training with free weights, weight machines or resistance bands)    Bone density DXA may be performed every 2 years.  It must be performed on the same machine for comparison.

## 2024-07-17 NOTE — PROGRESS NOTES
Endocrinology  note    Assessment   1.   Hypothyroidism due to thyroidectomy -She is likely to be cured.  Target TSH < 2.5.  On LT4 100 * 6.5/week - not in steady state; labs in 2 months : TSH, free T4     Addendum  9/17/24 TSH 0.77, free T4 1.5     2.  post menopausal  with normal BMD 6/21/24 dXA    Past alendronate.     Next DXA 6/21/2026  or after and see me after     3.  On alendronate due to being on corticosteroid     4.  On corticosteroid for treatment of PMR - currently prednisone 5 mg/day, a recent dose reduction. Doses are being managed by rheumatology based on labs     5.   History of Papillary thyroid cancer, bilateral, multifocal, no nodes removed.  We are 24 years out from diagnosis.   Treatment was thyroidectomy in 2 procedures, 29 mCi 131I  in 2000.   We don't have TSH stimulated Tg data on her.   Reduce frequency of Tg testing to every 2 years   Next neck US 2027 or after    39_ minutes spent on the date of the encounter doing chart review, history and exam, documentation and further activities as noted above.    Geovanna Chatterjee MD.    JESUS Shipman  presents for follow up of thyroid cancer and post surgical  hypothyroidism.   I last saw her 6/2023.   At that time she was on 100 * 6/week (86 mcg/day).   She is now on 100 x 6.5/week since the 6/2024 labs.  She already had labs and DXA in anticipation of this appt.  The DXA shows normal BMD but significant bone loss since the prior study.   She reminds me today that we started alendronate in 11/2023 as prophylaxis due to the high dose steroid treatment she was getting for PMR.  She has been on steroids since fall 2023 as treatment for PMR .  The initial symptoms included fever, pain, reduced mobility. She was using a cane.    The steroid dose has tapered over time.  Due to the steroid, she has been back on alendronate since 11/2023.     PCT diagnosis was 2000 after she had partial thyroid surgery.   One week later she had completion thyroidectomy.   "Both operations were performed at [facility] by Dr Antonina Peralta.  See my 1/28/09 note for the operative reports.  Primary tumor size is not specified in the path reports, multifocal, bilateral..\" She got 29.3 mCi 131I, 5/30/00) and one year later she had a TBS at [facility], which was negative.    I have reviewed all available tumor marker data to date:  5/12/00: .72, ROSARIO in lab  5/18/00: 5.16 mCi 131I TBS: \"intense activity in the thyroid bed.  No evidence of activity outside of the neck\"  5/30/00: 29.3 mCi 131I ([facility])  8/24/01 4.03 mCi 131I TBS ([facility]): negative study  6/6/04: TSH 3.10,  Tg < 0.3 (US), ROSARIO < 1  1/28/09: Tg < 0.1, ROSARIO < 0.4, TSH 0.01   11/17/09: Tg < 0.1, ROSARIO < 0.4, TSH 0.02  US guided FNAB of left level 2 LN in 12/09, with benign cytology.  Needle wash Tg was < 0.1.   2/23/10: Tg < 0.1, ROSARIO < 0.4, TSH 0.04  5/18/10: Tg < 0.1, ROSARIO < 0.4, TSH 0.01  6/1/11: Tg < 0.1, ROSARIO < 0.4, TSH 0.06  4/30/13: Tg < 0.1, ROSARIO < 0.4, TSH 0.03  10/23/13: Tg < 0.1, ROSARIO < 0.4, TSH 0.1  5/7/14: Tg <0.1, ROSARIO < 0.4, TSH   6/8/15: Tg < 0.1, ROSARIO < 0.4, TSH  8/2/16: Tg < 0.1, ROSARIO < 0.4,  TSH 0.09- after this we reduced LT4 dose by 1/2 tablet per week  4/3/17: TSH 0.49  11/6/17: Tg < 0.1, ROSARIO < 0.4, TSH 0.6, free T4 1.10  11/6/18; Tg < 0.1, ROSARIO < 0.4, TSH 0.06, free T4 1.l  11/5/19: Tg < 0.1, ROSARIO < 0.4 -     1/6/2021 Tg < 0.1, ROSARIO < 0.4, TSH 0.29, free T4 0.99  6/20/2022 Tg < 0.1, ROSARIO < 0.4, TSH 0.21, free T4 1.22  6/28/23 Tg < 0.1 ROSARIO < 0.4, TSH 1.31, free T4 1.21--   8/4/23 ESR 84  6/21/24 Tg < 0.1, ROSARIO < 0.4, TSH 3.54, free T4 1.33.  On lT4 100 x 6/week.    6/24/24 Increase LT4 to 100 x 6.5/week   Today: HgbA1c POC 5.3%    images on PACS   7/13  CXR.  normal   6/13/2022 neck US compared with 10/19/19 , 5/7/14:   Right level 4 0.8 x 0.4 x 0.7 cm   Left level 6 carotid sheath 0.8 x 0.4 x 1 was 0.9 x 0.4 x 1.1  (was 0.8 x 0.4 x 1.2 cm; was  1 x 0.5 x 1.1 2011)  Left level 4 # 1 not seen  0.4 x 0.4 x 0.6 cm very " superficial  6/20/2022 DXA lowest T-score -0.6 right femoral neck;  No change hips compared with 2018. L apine increased from 1.341 to 1.387  6/21/24 DXA lowest T-score -0.6 left femoral neck , left total hip.   Mean total hips -2.9% and L spine -7.1% compared with 6/2022     ROS   Weight loss with PMR and regain on steroid  Tired   Sleep at night is good except for interruptions from nocturia  Nocturia x 3-4   Recent wrist injection with steroid     PMH   Past Medical History:   Diagnosis Date    Atrial tachycardia, paroxysmal (H24) 03/29/2021    BULMARO II (cervical intraepithelial neoplasia II) 01/01/2007    on colp - leep path WNL    COPD (chronic obstructive pulmonary disease) (H)     Depression 01/01/1990    Depressive disorder 25 yrs    Eczema     GERD (gastroesophageal reflux disease)     Hiatal hernia     History of blood transfusion 01/01/2013    Post op    HTN (hypertension)     Hypercholesterolemia     Infection due to 2019 novel coronavirus 03/2022    Liver hemangioma 09/04/2012    OA (osteoarthritis)     Osteopenia     Papillary thyroid carcinoma (H) 01/01/2000    BL, MF; Tx in 2 operations, RRA    PMR (polymyalgia rheumatica) (H24)     PONV (postoperative nausea and vomiting)     Postsurgical hypothyroidism 01/01/2000    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   cryotherapy times two     eczema and contact dermatitis.  Chronic neck pain    Arnold-Chiari malformation.    Past Surgical History:   Procedure Laterality Date    ABDOMEN SURGERY      Teenager    APPENDECTOMY OPEN  01/01/1968    ARTHROPLASTY MINIMALLY INVASIVE KNEE BILATERAL  06/27/2013    Procedure: ARTHROPLASTY MINIMALLY INVASIVE KNEE BILATERAL;  Minimally Invasive Bilateral Total Knee Arthroplasty;  Surgeon: Christophe Welch MD;  Location: [facility] OR    BALLOON COMPRESSION RHIZOTOMY      by spine specialist    BIOPSY  Ongoing    Lymph nodes neck    BONE  MARROW ASPIRATION ONLY Left 12/07/2015    Procedure: BONE MARROW ASPIRATION ONLY;  Surgeon: Aguilar Roldan MD;  Location:  OR    CARPAL TUNNEL RELEASE RT/LT      COLONOSCOPY  01/01/2007    neg    COLONOSCOPY WITH CO2 INSUFFLATION N/A 9/5/2023    Procedure: Colonoscopy with CO2 insufflation;  Surgeon: Praful Waddell DO;  Location: [facility] OR    COLPOSCOPY CERVIX, BIOPSY CERVIX, ENDOCERVICAL CURETTAGE, COMBINED  2003, 2007    COMBINED ESOPHAGOSCOPY, GASTROSCOPY, DUODENOSCOPY (EGD) WITH CO2 INSUFFLATION N/A 7/25/2023    Procedure: Combined Esophagoscopy, Gastroscopy, Duodenoscopy (Egd) With Co2 Insufflation;  Surgeon: Elsie Cary DO;  Location:  OR    completion thyroidectomy Right 03/14/2000    CYSTECTOMY OVARIAN BENIGN  01/01/1969    ESOPHAGOSCOPY, GASTROSCOPY, DUODENOSCOPY (EGD), COMBINED N/A 08/28/2014    Procedure: COMBINED ESOPHAGOSCOPY, GASTROSCOPY, DUODENOSCOPY (EGD), BIOPSY SINGLE OR MULTIPLE;  Surgeon: Kelvin Montgomery MD;  Location:  OR    ESOPHAGOSCOPY, GASTROSCOPY, DUODENOSCOPY (EGD), COMBINED N/A 7/25/2023    Procedure: ESOPHAGOGASTRODUODENOSCOPY, WITH BIOPSY;  Surgeon: Elsie Cary DO;  Location: [facility] OR    FUSION CERVICAL ANTERIOR THREE+ LEVELS N/A 02/26/2015    C4-C7    FUSION CERVICAL ANTERIOR THREE+ LEVELS WITH BONE ALLOGRAFT N/A 12/07/2015    Procedure: FUSION CERVICAL ANTERIOR THREE+ LEVELS WITH BONE ALLOGRAFT;  Surgeon: Aguilar Roldan MD;  Location:  OR    HAND SURGERY      HEAD & NECK SURGERY  01/01/2000    Thyroidectomy    KNEE SURGERY  2001 & 2005    bilat    LAPAROSCOPIC CHOLECYSTECTOMY  01/01/1998    LEEP TX, CERVICAL  01/01/2007    BULMARO II on colp, leep path WNL    left thyroid total lobectomy, isthmusectomy and 50% right thyroid lobectomy Left 03/07/2000    TUBAL LIGATION  01/01/1988    VULVECTOMY SIMPLE  01/01/2006    FELICIA 3, wide local excision x 2    VULVECTOMY SIMPLE       Finger ligament repair in 2000.  left arm ligament repair.    Current  Outpatient Medications   Medication Sig Dispense Refill    alendronate (FOSAMAX) 70 MG tablet Take 1 tablet (70 mg) by mouth every 7 days Take 60 minutes before am meal with 8 oz. water. Remain upright for 30 minutes. 12 tablet 4    aspirin 81 MG tablet Take 81 mg by mouth daily       atorvastatin (LIPITOR) 20 MG tablet Take 1 tablet (20 mg) by mouth daily 90 tablet 3    Calcium Citrate (CITRACAL OR) Take 1,200 mg by mouth daily      cetirizine (ZYRTEC) 10 MG tablet Take 1 tablet (10 mg) by mouth daily 90 tablet 1    Cholecalciferol (VITAMIN D) 1000 UNIT capsule Take 1 capsule by mouth daily.      FLUoxetine (PROZAC) 20 MG capsule Take 3 capsules (60 mg) by mouth daily 270 capsule 3    hypromellose (ARTIFICIAL TEARS) 0.5 % SOLN Place 1 drop into both eyes every 4 hours as needed       levothyroxine (SYNTHROID/LEVOTHROID) 100 MCG tablet MON to SAT 1 tablet/day; SUN 0.5 tablet 90 tablet 4    Magnesium Glycinate 100 MG CAPS Take 400 mg by mouth daily      methocarbamol (ROBAXIN) 500 MG tablet Take 500 mg by mouth every 6 hours as needed for muscle spasms      methotrexate 2.5 MG tablet take 6 (15MG)  by ORAL route  every week all tabs once per week      multivitamin w/minerals (THERA-VIT-M) tablet Take 1 tablet by mouth daily      omeprazole (PRILOSEC) 20 MG DR capsule Take 1 capsule (20 mg) by mouth every morning On an empty stomach abut 30 minutes before breakfast 90 capsule 1    predniSONE (DELTASONE) 1 MG tablet       senna-docusate (SENOKOT-S/PERICOLACE) 8.6-50 MG tablet Take 2 tablets by mouth daily      sodium fluoride dental gel (PREVIDENT) 1.1 % GEL topical gel Apply to affected area At Bedtime 100 mL 3    triamcinolone (KENALOG) 0.1 % external ointment Apply to affected area of the skin twice a day after using a moisturizer. Can do wet wraps with this. 454 g 1    triamterene-HCTZ (MAXZIDE-25) 37.5-25 MG tablet Take 0.5 tablets by mouth daily 45 tablet 1    vitamin C (ASCORBIC ACID) 500 MG tablet Take 500 mg  "by mouth daily         Social History     Tobacco Use    Smoking status: Former     Current packs/day: 0.00     Average packs/day: 1 pack/day for 45.0 years (45.0 ttl pk-yrs)     Types: Cigarettes     Start date: 1967     Quit date: 2012     Years since quittin.9    Smokeless tobacco: Never    Tobacco comments:     quitting with e cigarette   Vaping Use    Vaping status: Never Used   Substance Use Topics    Alcohol use: Yes     Comment: less than weekly    Drug use: No       Physical Exam   GENERAL no mask; in NAD ; she does not look cushingoid  /74   Pulse 87   Ht 1.575 m (5' 2\")   Wt 68 kg (150 lb)   LMP  (LMP Unknown)   SpO2 100%   BMI 27.44 kg/m    SKIN: normal color, temperature, texture   HEENT: PER, no scleral icterus,    NECK: No visible or palpable neck masses, cervical adenopathy.   LUNGS: clear to auscultation bilaterally.   CARDIAC: RRR, S1, S2 without murmurs, rubs or gallops.    BACK: normal spinal contour.    NEURO: Alert, responds appropriately to questions,  moves all extremities, DTRs 0/4, gait normal, no tremor of the outstretched hand    DATA REVIEW:   Latest Ref Rng 2022  8:52 AM 2023  2:20 PM 2024  9:47 AM   ENDO THYROID LABS-[facility]       TSH 0.40 - 4.00 mU/L 0.21 (L)      TSH 0.30 - 4.20 uIU/mL  1.31  3.54    Triiodothyronine (T3) 60 - 181 ng/dL      FREE T4 0.90 - 1.70 ng/dL 1.22  1.21  1.33         Narrative & Impression   EXAM: DX AXIAL HIPS/SPINE  LOCATION: [facility]  DATE: 2024  INDICATION: Postsurgical hypothyroidism, papillary thyroid carcinoma, post-menopausal, height loss, chronic oral steroid use, Fosamax use.  DEMOGRAPHICS: Age- 73 years. Gender- Female. Menopausal status- Postmenopausal.  COMPARISON: 2022  TECHNIQUE: Dual-energy x-ray absorptiometry (DXA) performed with routine technique.    FINDINGS:  DXA RESULTS  -Lumbar Spine: L1-L4: BMD: 1.288 g/cm2. T-score: 0.9. Z-score: 2.6. Degenerative change may " artifactually increase BMD.  -RIGHT Hip Total: BMD: 0.959 g/cm2. T-score: -0.4. Z-score: 1.2.  -RIGHT Hip Femoral neck: BMD: 0.990 g/cm2. T-score: -0.3. Z-score: 1.5.  -LEFT Hip Total: BMD: 0.938 g/cm2. T-score: -0.6. Z-score: 1.0.  -LEFT Hip Femoral neck: BMD: 0.952 g/cm2. T-score: -0.6. Z-score: 1.2.  WHO T-SCORE CRITERIA  -Normal: T score at or above -1 SD  -Osteopenia: T score between -1 and -2.5 SD  -Osteoporosis: T score at or below -2.5 SD  The World Health Organization (WHO) criteria is applicable to perimenopausal females, postmenopausal females, and men aged 50 years or older.  INTERVAL CHANGE  -There has been a 7.1% decrease in lumbar spine BMD.  -There has been a 2.9% decrease in bilateral hip BMD.  FRACTURE RISK  -The FRAX risk calculator is not applicable due to normal bone mineral density.                                                     IMPRESSION: NORMAL. Bone mineral density measurements are within normal limits using T score.

## 2024-07-17 NOTE — LETTER
7/17/2024       RE: Marcela Allen  [address] [facility]  [place] [address]     Dear Colleague,    Thank you for referring your patient, Marcela Allen, to the [facility] [place] [facility]. Please see a copy of my visit note below.    Endocrinology  note    Assessment   1.   Hypothyroidism due to thyroidectomy -She is likely to be cured.  Target TSH < 2.5.  On LT4 100 * 6.5/week - not in steady state; labs in 2 months : TSH, free T4     2.  post menopausal  with normal BMD 6/21/24 dXA    Past alendronate.     Next DXA 6/21/2026  or after and see me after     3.  On alendronate due to being on corticosteroid     4.  On corticosteroid for treatment of PMR - currently prednisone 5 mg/day, a recent dose reduction. Doses are being managed by rheumatology based on labs     5.   History of Papillary thyroid cancer, bilateral, multifocal, no nodes removed.  We are 24 years out from diagnosis.   Treatment was thyroidectomy in 2 procedures, 29 mCi 131I  in 2000.   We don't have TSH stimulated Tg data on her.   Reduce frequency of Tg testing to every 2 years   Next neck US 2027 or after    39_ minutes spent on the date of the encounter doing chart review, history and exam, documentation and further activities as noted above.    Geovanna Chatterjee MD.    JESUS Shipman  presents for follow up of thyroid cancer and post surgical  hypothyroidism.   I last saw her 6/2023.   At that time she was on 100 * 6/week (86 mcg/day).   She is now on 100 x 6.5/week since the 6/2024 labs.  She already had labs and DXA in anticipation of this appt.  The DXA shows normal BMD but significant bone loss since the prior study.   She reminds me today that we started alendronate in 11/2023 as prophylaxis due to the high dose steroid treatment she was getting for PMR.  She has been on steroids since fall 2023 as treatment for PMR .  The initial symptoms included fever, pain,  "reduced mobility. She was using a cane.    The steroid dose has tapered over time.  Due to the steroid, she has been back on alendronate since 11/2023.     PCT diagnosis was 2000 after she had partial thyroid surgery.   One week later she had completion thyroidectomy.  Both operations were performed at [facility] by Dr Antonina Peralta.  See my 1/28/09 note for the operative reports.  Primary tumor size is not specified in the path reports, multifocal, bilateral..\" She got 29.3 mCi 131I, 5/30/00) and one year later she had a TBS at [facility], which was negative.    I have reviewed all available tumor marker data to date:  5/12/00: .72, ROSARIO in lab  5/18/00: 5.16 mCi 131I TBS: \"intense activity in the thyroid bed.  No evidence of activity outside of the neck\"  5/30/00: 29.3 mCi 131I ([facility])  8/24/01 4.03 mCi 131I TBS ([facility]): negative study  6/6/04: TSH 3.10,  Tg < 0.3 ([facility]), ROSARIO < 1  1/28/09: Tg < 0.1, ROSARIO < 0.4, TSH 0.01   11/17/09: Tg < 0.1, ROSARIO < 0.4, TSH 0.02  US guided FNAB of left level 2 LN in 12/09, with benign cytology.  Needle wash Tg was < 0.1.   2/23/10: Tg < 0.1, ROSARIO < 0.4, TSH 0.04  5/18/10: Tg < 0.1, ROSARIO < 0.4, TSH 0.01  6/1/11: Tg < 0.1, ROSARIO < 0.4, TSH 0.06  4/30/13: Tg < 0.1, ROSARIO < 0.4, TSH 0.03  10/23/13: Tg < 0.1, ROSARIO < 0.4, TSH 0.1  5/7/14: Tg <0.1, ROSARIO < 0.4, TSH   6/8/15: Tg < 0.1, ROSARIO < 0.4, TSH  8/2/16: Tg < 0.1, ROSARIO < 0.4,  TSH 0.09- after this we reduced LT4 dose by 1/2 tablet per week  4/3/17: TSH 0.49  11/6/17: Tg < 0.1, ROSARIO < 0.4, TSH 0.6, free T4 1.10  11/6/18; Tg < 0.1, ROSARIO < 0.4, TSH 0.06, free T4 1.l  11/5/19: Tg < 0.1, ROSARIO < 0.4 -     1/6/2021 Tg < 0.1, ROSARIO < 0.4, TSH 0.29, free T4 0.99  6/20/2022 Tg < 0.1, ROSARIO < 0.4, TSH 0.21, free T4 1.22  6/28/23 Tg < 0.1 ROSARIO < 0.4, TSH 1.31, free T4 1.21--   8/4/23 ESR 84  6/21/24 Tg < 0.1, ROSARIO < 0.4, TSH 3.54, free T4 1.33.  On lT4 100 x 6/week.    6/24/24 Increase LT4 to 100 x 6.5/week   Today: HgbA1c POC 5.3%    images on PACS   " 7/13  CXR.  normal   6/13/2022 neck US compared with 10/19/19 , 5/7/14:   Right level 4 0.8 x 0.4 x 0.7 cm   Left level 6 carotid sheath 0.8 x 0.4 x 1 was 0.9 x 0.4 x 1.1  (was 0.8 x 0.4 x 1.2 cm; was  1 x 0.5 x 1.1 2011)  Left level 4 # 1 not seen  0.4 x 0.4 x 0.6 cm very superficial  6/20/2022 DXA lowest T-score -0.6 right femoral neck;  No change hips compared with 2018. L apine increased from 1.341 to 1.387  6/21/24 DXA lowest T-score -0.6 left femoral neck , left total hip.   Mean total hips -2.9% and L spine -7.1% compared with 6/2022     ROS   Weight loss with PMR and regain on steroid  Tired   Sleep at night is good except for interruptions from nocturia  Nocturia x 3-4   Recent wrist injection with steroid     PMH   Past Medical History:   Diagnosis Date    Atrial tachycardia, paroxysmal (H24) 03/29/2021    BULMARO II (cervical intraepithelial neoplasia II) 01/01/2007    on colp - leep path WNL    COPD (chronic obstructive pulmonary disease) (H)     Depression 01/01/1990    Depressive disorder 25 yrs    Eczema     GERD (gastroesophageal reflux disease)     Hiatal hernia     History of blood transfusion 01/01/2013    Post op    HTN (hypertension)     Hypercholesterolemia     Infection due to 2019 novel coronavirus 03/2022    Liver hemangioma 09/04/2012    OA (osteoarthritis)     Osteopenia     Papillary thyroid carcinoma (H) 01/01/2000    BL, MF; Tx in 2 operations, RRA    PMR (polymyalgia rheumatica) (H24)     PONV (postoperative nausea and vomiting)     Postsurgical hypothyroidism 01/01/2000    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   cryotherapy times two     eczema and contact dermatitis.  Chronic neck pain    Arnold-Chiari malformation.    Past Surgical History:   Procedure Laterality Date    ABDOMEN SURGERY      Teenager    APPENDECTOMY OPEN  01/01/1968    ARTHROPLASTY MINIMALLY INVASIVE KNEE BILATERAL  06/27/2013     Procedure: ARTHROPLASTY MINIMALLY INVASIVE KNEE BILATERAL;  Minimally Invasive Bilateral Total Knee Arthroplasty;  Surgeon: Christophe Welch MD;  Location: [facility] OR    BALLOON COMPRESSION RHIZOTOMY      by spine specialist    BIOPSY  Ongoing    Lymph nodes neck    BONE MARROW ASPIRATION ONLY Left 12/07/2015    Procedure: BONE MARROW ASPIRATION ONLY;  Surgeon: Aguilar Roldan MD;  Location: [facility] OR    CARPAL TUNNEL RELEASE RT/LT      COLONOSCOPY  01/01/2007    neg    COLONOSCOPY WITH CO2 INSUFFLATION N/A 9/5/2023    Procedure: Colonoscopy with CO2 insufflation;  Surgeon: Praful Waddell DO;  Location: [facility] OR    COLPOSCOPY CERVIX, BIOPSY CERVIX, ENDOCERVICAL CURETTAGE, COMBINED  2003, 2007    COMBINED ESOPHAGOSCOPY, GASTROSCOPY, DUODENOSCOPY (EGD) WITH CO2 INSUFFLATION N/A 7/25/2023    Procedure: Combined Esophagoscopy, Gastroscopy, Duodenoscopy (Egd) With Co2 Insufflation;  Surgeon: Elsie Cary DO;  Location: [facility] OR    completion thyroidectomy Right 03/14/2000    CYSTECTOMY OVARIAN BENIGN  01/01/1969    ESOPHAGOSCOPY, GASTROSCOPY, DUODENOSCOPY (EGD), COMBINED N/A 08/28/2014    Procedure: COMBINED ESOPHAGOSCOPY, GASTROSCOPY, DUODENOSCOPY (EGD), BIOPSY SINGLE OR MULTIPLE;  Surgeon: Kelvin Montgomery MD;  Location: [facility] OR    ESOPHAGOSCOPY, GASTROSCOPY, DUODENOSCOPY (EGD), COMBINED N/A 7/25/2023    Procedure: ESOPHAGOGASTRODUODENOSCOPY, WITH BIOPSY;  Surgeon: Elsie Cary DO;  Location: [facility] OR    FUSION CERVICAL ANTERIOR THREE+ LEVELS N/A 02/26/2015    C4-C7    FUSION CERVICAL ANTERIOR THREE+ LEVELS WITH BONE ALLOGRAFT N/A 12/07/2015    Procedure: FUSION CERVICAL ANTERIOR THREE+ LEVELS WITH BONE ALLOGRAFT;  Surgeon: Aguilar Roldan MD;  Location:  OR    HAND SURGERY      HEAD & NECK SURGERY  01/01/2000    Thyroidectomy    KNEE SURGERY  2001 & 2005    bilat    LAPAROSCOPIC CHOLECYSTECTOMY  01/01/1998    LEEP TX, CERVICAL  01/01/2007    BULMARO II on colp, leep path WNL    left thyroid  total lobectomy, isthmusectomy and 50% right thyroid lobectomy Left 03/07/2000    TUBAL LIGATION  01/01/1988    VULVECTOMY SIMPLE  01/01/2006    FELICIA 3, wide local excision x 2    VULVECTOMY SIMPLE       Finger ligament repair in 2000.  left arm ligament repair.    Current Outpatient Medications   Medication Sig Dispense Refill    alendronate (FOSAMAX) 70 MG tablet Take 1 tablet (70 mg) by mouth every 7 days Take 60 minutes before am meal with 8 oz. water. Remain upright for 30 minutes. 12 tablet 4    aspirin 81 MG tablet Take 81 mg by mouth daily       atorvastatin (LIPITOR) 20 MG tablet Take 1 tablet (20 mg) by mouth daily 90 tablet 3    Calcium Citrate (CITRACAL OR) Take 1,200 mg by mouth daily      cetirizine (ZYRTEC) 10 MG tablet Take 1 tablet (10 mg) by mouth daily 90 tablet 1    Cholecalciferol (VITAMIN D) 1000 UNIT capsule Take 1 capsule by mouth daily.      FLUoxetine (PROZAC) 20 MG capsule Take 3 capsules (60 mg) by mouth daily 270 capsule 3    hypromellose (ARTIFICIAL TEARS) 0.5 % SOLN Place 1 drop into both eyes every 4 hours as needed       levothyroxine (SYNTHROID/LEVOTHROID) 100 MCG tablet MON to SAT 1 tablet/day; SUN 0.5 tablet 90 tablet 4    Magnesium Glycinate 100 MG CAPS Take 400 mg by mouth daily      methocarbamol (ROBAXIN) 500 MG tablet Take 500 mg by mouth every 6 hours as needed for muscle spasms      methotrexate 2.5 MG tablet take 6 (15MG)  by ORAL route  every week all tabs once per week      multivitamin w/minerals (THERA-VIT-M) tablet Take 1 tablet by mouth daily      omeprazole (PRILOSEC) 20 MG DR capsule Take 1 capsule (20 mg) by mouth every morning On an empty stomach abut 30 minutes before breakfast 90 capsule 1    predniSONE (DELTASONE) 1 MG tablet       senna-docusate (SENOKOT-S/PERICOLACE) 8.6-50 MG tablet Take 2 tablets by mouth daily      sodium fluoride dental gel (PREVIDENT) 1.1 % GEL topical gel Apply to affected area At Bedtime 100 mL 3    triamcinolone (KENALOG) 0.1 %  "external ointment Apply to affected area of the skin twice a day after using a moisturizer. Can do wet wraps with this. 454 g 1    triamterene-HCTZ (MAXZIDE-25) 37.5-25 MG tablet Take 0.5 tablets by mouth daily 45 tablet 1    vitamin C (ASCORBIC ACID) 500 MG tablet Take 500 mg by mouth daily         Social History     Tobacco Use    Smoking status: Former     Current packs/day: 0.00     Average packs/day: 1 pack/day for 45.0 years (45.0 ttl pk-yrs)     Types: Cigarettes     Start date: 1967     Quit date: 2012     Years since quittin.9    Smokeless tobacco: Never    Tobacco comments:     quitting with e cigarette   Vaping Use    Vaping status: Never Used   Substance Use Topics    Alcohol use: Yes     Comment: less than weekly    Drug use: No       Physical Exam   GENERAL no mask; in NAD ; she does not look cushingoid  /74   Pulse 87   Ht 1.575 m (5' 2\")   Wt 68 kg (150 lb)   LMP  (LMP Unknown)   SpO2 100%   BMI 27.44 kg/m    SKIN: normal color, temperature, texture   HEENT: PER, no scleral icterus,    NECK: No visible or palpable neck masses, cervical adenopathy.   LUNGS: clear to auscultation bilaterally.   CARDIAC: RRR, S1, S2 without murmurs, rubs or gallops.    BACK: normal spinal contour.    NEURO: Alert, responds appropriately to questions,  moves all extremities, DTRs 0/4, gait normal, no tremor of the outstretched hand    DATA REVIEW:   Latest Ref Rng 2022  8:52 AM 2023  2:20 PM 2024  9:47 AM   ENDO THYROID LABS-[facility]       TSH 0.40 - 4.00 mU/L 0.21 (L)      TSH 0.30 - 4.20 uIU/mL  1.31  3.54    Triiodothyronine (T3) 60 - 181 ng/dL      FREE T4 0.90 - 1.70 ng/dL 1.22  1.21  1.33         Narrative & Impression   EXAM: DX AXIAL HIPS/SPINE  LOCATION: [facility]  DATE: 2024  INDICATION: Postsurgical hypothyroidism, papillary thyroid carcinoma, post-menopausal, height loss, chronic oral steroid use, Fosamax use.  DEMOGRAPHICS: Age- 73 years. Gender- " Female. Menopausal status- Postmenopausal.  COMPARISON: 6/20/2022  TECHNIQUE: Dual-energy x-ray absorptiometry (DXA) performed with routine technique.    FINDINGS:  DXA RESULTS  -Lumbar Spine: L1-L4: BMD: 1.288 g/cm2. T-score: 0.9. Z-score: 2.6. Degenerative change may artifactually increase BMD.  -RIGHT Hip Total: BMD: 0.959 g/cm2. T-score: -0.4. Z-score: 1.2.  -RIGHT Hip Femoral neck: BMD: 0.990 g/cm2. T-score: -0.3. Z-score: 1.5.  -LEFT Hip Total: BMD: 0.938 g/cm2. T-score: -0.6. Z-score: 1.0.  -LEFT Hip Femoral neck: BMD: 0.952 g/cm2. T-score: -0.6. Z-score: 1.2.  WHO T-SCORE CRITERIA  -Normal: T score at or above -1 SD  -Osteopenia: T score between -1 and -2.5 SD  -Osteoporosis: T score at or below -2.5 SD  The World Health Organization (WHO) criteria is applicable to perimenopausal females, postmenopausal females, and men aged 50 years or older.  INTERVAL CHANGE  -There has been a 7.1% decrease in lumbar spine BMD.  -There has been a 2.9% decrease in bilateral hip BMD.  FRACTURE RISK  -The FRAX risk calculator is not applicable due to normal bone mineral density.                                                     IMPRESSION: NORMAL. Bone mineral density measurements are within normal limits using T score.

## 2024-07-26 ENCOUNTER — TELEPHONE (OUTPATIENT)
Dept: ENDOCRINOLOGY | Facility: CLINIC | Age: 74
End: 2024-07-26
Payer: MEDICARE

## 2024-07-26 NOTE — TELEPHONE ENCOUNTER
OMAYRA, sent myc x1 to schedule labs in September per Dr. Chatterjee     Check Out Comments: Labs in 2 months Next Bone density Lizeth Chicas 6/21/2026 or after -- ideally prior to next visit     Disposition: Return in about 2 years (around 7/17/2026)     Anjelica Zeng on 7/26/2024 at 3:53 PM

## 2024-08-08 ENCOUNTER — MYC MEDICAL ADVICE (OUTPATIENT)
Dept: FAMILY MEDICINE | Facility: CLINIC | Age: 74
End: 2024-08-08
Payer: MEDICARE

## 2024-08-08 DIAGNOSIS — K44.9 HIATAL HERNIA: ICD-10-CM

## 2024-08-08 DIAGNOSIS — K29.70 GASTRITIS WITHOUT BLEEDING, UNSPECIFIED CHRONICITY, UNSPECIFIED GASTRITIS TYPE: ICD-10-CM

## 2024-08-15 ENCOUNTER — TRANSFERRED RECORDS (OUTPATIENT)
Dept: HEALTH INFORMATION MANAGEMENT | Facility: CLINIC | Age: 74
End: 2024-08-15
Payer: MEDICARE

## 2024-09-16 ENCOUNTER — TRANSFERRED RECORDS (OUTPATIENT)
Dept: HEALTH INFORMATION MANAGEMENT | Facility: CLINIC | Age: 74
End: 2024-09-16

## 2024-09-16 LAB
ALT SERPL-CCNC: 29 IU/L (ref 6–32)
CREATININE (EXTERNAL): 0.9 MG/DL (ref 0.5–1.05)

## 2024-09-17 ENCOUNTER — LAB (OUTPATIENT)
Dept: LAB | Facility: CLINIC | Age: 74
End: 2024-09-17
Payer: MEDICARE

## 2024-09-17 DIAGNOSIS — E89.0 POSTSURGICAL HYPOTHYROIDISM: ICD-10-CM

## 2024-09-17 DIAGNOSIS — C73 PAPILLARY THYROID CARCINOMA (H): ICD-10-CM

## 2024-09-17 LAB
T4 FREE SERPL-MCNC: 1.5 NG/DL (ref 0.9–1.7)
TSH SERPL DL<=0.005 MIU/L-ACNC: 0.77 UIU/ML (ref 0.3–4.2)

## 2024-09-17 PROCEDURE — 84443 ASSAY THYROID STIM HORMONE: CPT | Mod: QW

## 2024-09-17 PROCEDURE — 84439 ASSAY OF FREE THYROXINE: CPT

## 2024-09-17 PROCEDURE — 36415 COLL VENOUS BLD VENIPUNCTURE: CPT

## 2024-09-20 ENCOUNTER — TRANSFERRED RECORDS (OUTPATIENT)
Dept: HEALTH INFORMATION MANAGEMENT | Facility: CLINIC | Age: 74
End: 2024-09-20
Payer: MEDICARE

## 2024-09-22 ENCOUNTER — TRANSFERRED RECORDS (OUTPATIENT)
Dept: HEALTH INFORMATION MANAGEMENT | Facility: CLINIC | Age: 74
End: 2024-09-22
Payer: MEDICARE

## 2024-09-23 ENCOUNTER — MYC MEDICAL ADVICE (OUTPATIENT)
Dept: FAMILY MEDICINE | Facility: CLINIC | Age: 74
End: 2024-09-23
Payer: MEDICARE

## 2024-09-23 DIAGNOSIS — E78.5 HYPERLIPIDEMIA LDL GOAL <130: ICD-10-CM

## 2024-09-23 DIAGNOSIS — E87.6 HYPOKALEMIA: Primary | ICD-10-CM

## 2024-09-23 NOTE — TELEPHONE ENCOUNTER
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  Please assist in scheduling an earlier appt with me if needed (for example, if she was told to recheck labs or see PCP within 7 days).  I'm assuming we need to recheck a potassium, so in that case, we'd want to do a recheck within a week.     Honey García PA-C

## 2024-09-24 NOTE — TELEPHONE ENCOUNTER
I see [facility] ED visit dated 9/22/24 (pulled in via [employer]).    See discharge plan:      ERNESTO Allen is a 73 y.o. female presenting to the ED due to concern for left shoulder pain and shortness of breath. Vital signs are stable. Initial labs ordered in triage. EKG did show sinus rhythm with RSR prime and some PACs, per chart review, patient does have history of this. No acute ST segment elevation. Serial high sensitive troponins were stable without any significant delta change to suggest ACS and patient does not of any ongoing anginal symptoms. D-dimer was negative making PE less likely. I was concerned given the atraumatic nature of her pain and that her symptoms been present for a few weeks about a intrathoracic abnormality so did obtain CT of her chest that did not show any acute findings or mass, did show some coronary artery calcifications and a stable splenic artery aneurysm. No acute intrapulmonary findings. X-ray of the shoulder showed arthritis of the glenohumeral joint, no acute fracture or dislocation. Labs are otherwise stable besides mild hypokalemia. Patient declined pain medication here. I offered multiple different modalities for her pain and she declined. I did offer short course of narcotics for home as well and she declined this. She is established with orthopedics already and a spine team, I counseled her to follow-up with them as well as her rheumatologist. I did discuss the coronary artery calcifications and plan to place a referral to [facility] heart vascular [place]. Patient instructed to follow-up with her PCP and cardiology and return to ED if condition worsens in any way.       Flagged for RN team to call patient to do the discharge screening the schedule hospital follow up if she wants to see PCP (per her Allegory Lawhart message, she plans to follow up with cardiology as advised).    Bee ROWELL RN  [facility] Triage

## 2024-09-24 NOTE — TELEPHONE ENCOUNTER
Attempted to call pt with number on file to complete an Post Discharge Assessment. No answer, left voicemail to call clinic back at [phone].     When pt returns call, please relay pcp's message below and complete Post Discharge Assessment in Screenings and complete with dot phrase ([id number]).       Codie Etienne RN on 9/24/2024 at 10:54 AM

## 2024-09-24 NOTE — TELEPHONE ENCOUNTER
Patient elaborated on what had happened that prompted the ED visit. She requested a lab order for potassium. She is comfortable waiting until Oct 14th, for the visit, although RN did offer to schedule sooner. She declined.      Mirta Tapia RN on 9/24/2024 at 1:34 PM        Transitions of Care Outreach  No chief complaint on file.      Most Recent Admission Date: 12/7/2015   Most Recent Admission Diagnosis:      Most Recent Discharge Date: 12/9/2015   Most Recent Discharge Diagnosis: Cervical radiculopathy - M54.12     Transitions of Care Assessment    Discharge Assessment  How are you doing now that you are home?: the same  How are your symptoms? (Red Flag symptoms escalate to triage hotline per guidelines): Unchanged  Do you know how to contact your clinic care team if you have future questions or changes to your health status? : Yes  Does the patient have their discharge instructions? : Yes  Does the patient have questions regarding their discharge instructions? : No  Were you started on any new medications or were there changes to any of your previous medications? : No  Does the patient have all of their medications?: Yes  Do you have questions regarding any of your medications? : No  Do you have all of your needed medical supplies or equipment (DME)?  (i.e. oxygen tank, CPAP, cane, etc.): Yes    Follow up Plan     Discharge Follow-Up  Discharge follow up appointment scheduled in alignment with recommended follow up timeframe or Transitions of Risk Category? (Low = within 30 days; Moderate= within 14 days; High= within 7 days): Yes  Discharge Follow Up Appointment Date: 10/14/24    Future Appointments   Date Time Provider Department Center   10/14/2024  7:30 AM Honey García PA-C [facility]       Outpatient Plan as outlined on AVS reviewed with patient.    For any urgent concerns, please contact our 24 hour nurse triage line: [phone] ([phone])       Mirta Tapai RN

## 2024-10-01 ENCOUNTER — LAB (OUTPATIENT)
Dept: LAB | Facility: CLINIC | Age: 74
End: 2024-10-01
Payer: MEDICARE

## 2024-10-01 ENCOUNTER — MYC MEDICAL ADVICE (OUTPATIENT)
Dept: FAMILY MEDICINE | Facility: CLINIC | Age: 74
End: 2024-10-01

## 2024-10-01 DIAGNOSIS — E87.6 HYPOKALEMIA: ICD-10-CM

## 2024-10-01 DIAGNOSIS — E78.5 HYPERLIPIDEMIA LDL GOAL <130: ICD-10-CM

## 2024-10-01 LAB
ALBUMIN SERPL BCG-MCNC: 4.1 G/DL (ref 3.5–5.2)
ALP SERPL-CCNC: 65 U/L (ref 40–150)
ALT SERPL W P-5'-P-CCNC: 46 U/L (ref 0–50)
ANION GAP SERPL CALCULATED.3IONS-SCNC: 11 MMOL/L (ref 7–15)
AST SERPL W P-5'-P-CCNC: 59 U/L (ref 0–45)
BILIRUB SERPL-MCNC: 0.5 MG/DL
BUN SERPL-MCNC: 8.4 MG/DL (ref 8–23)
CALCIUM SERPL-MCNC: 9.2 MG/DL (ref 8.8–10.4)
CHLORIDE SERPL-SCNC: 97 MMOL/L (ref 98–107)
CHOLEST SERPL-MCNC: 170 MG/DL
CREAT SERPL-MCNC: 0.95 MG/DL (ref 0.51–0.95)
EGFRCR SERPLBLD CKD-EPI 2021: 63 ML/MIN/1.73M2
FASTING STATUS PATIENT QL REPORTED: NO
FASTING STATUS PATIENT QL REPORTED: NO
GLUCOSE SERPL-MCNC: 121 MG/DL (ref 70–99)
HCO3 SERPL-SCNC: 30 MMOL/L (ref 22–29)
HDLC SERPL-MCNC: 58 MG/DL
LDLC SERPL CALC-MCNC: 75 MG/DL
NONHDLC SERPL-MCNC: 112 MG/DL
POTASSIUM SERPL-SCNC: 3.8 MMOL/L (ref 3.4–5.3)
PROT SERPL-MCNC: 6.7 G/DL (ref 6.4–8.3)
SODIUM SERPL-SCNC: 138 MMOL/L (ref 135–145)
TRIGL SERPL-MCNC: 183 MG/DL

## 2024-10-01 PROCEDURE — 80061 LIPID PANEL: CPT

## 2024-10-01 PROCEDURE — 36415 COLL VENOUS BLD VENIPUNCTURE: CPT

## 2024-10-01 PROCEDURE — 80053 COMPREHEN METABOLIC PANEL: CPT

## 2024-10-09 SDOH — HEALTH STABILITY: PHYSICAL HEALTH: ON AVERAGE, HOW MANY DAYS PER WEEK DO YOU ENGAGE IN MODERATE TO STRENUOUS EXERCISE (LIKE A BRISK WALK)?: 0 DAYS

## 2024-10-09 ASSESSMENT — SOCIAL DETERMINANTS OF HEALTH (SDOH): HOW OFTEN DO YOU GET TOGETHER WITH FRIENDS OR RELATIVES?: PATIENT DECLINED

## 2024-10-14 ENCOUNTER — OFFICE VISIT (OUTPATIENT)
Dept: FAMILY MEDICINE | Facility: CLINIC | Age: 74
End: 2024-10-14
Payer: MEDICARE

## 2024-10-14 ENCOUNTER — MYC MEDICAL ADVICE (OUTPATIENT)
Dept: ENDOCRINOLOGY | Facility: CLINIC | Age: 74
End: 2024-10-14

## 2024-10-14 VITALS
DIASTOLIC BLOOD PRESSURE: 84 MMHG | RESPIRATION RATE: 16 BRPM | HEART RATE: 84 BPM | WEIGHT: 148.9 LBS | BODY MASS INDEX: 27.4 KG/M2 | HEIGHT: 62 IN | OXYGEN SATURATION: 100 % | TEMPERATURE: 97.3 F | SYSTOLIC BLOOD PRESSURE: 144 MMHG

## 2024-10-14 DIAGNOSIS — F33.42 RECURRENT MAJOR DEPRESSIVE DISORDER, IN FULL REMISSION (H): ICD-10-CM

## 2024-10-14 DIAGNOSIS — I25.10 CORONARY ARTERY CALCIFICATION SEEN ON CT SCAN: ICD-10-CM

## 2024-10-14 DIAGNOSIS — Z87.81 HISTORY OF FRACTURE: ICD-10-CM

## 2024-10-14 DIAGNOSIS — Z79.52 ON CORTICOSTEROID THERAPY: ICD-10-CM

## 2024-10-14 DIAGNOSIS — Z78.0 POST-MENOPAUSAL: ICD-10-CM

## 2024-10-14 DIAGNOSIS — J30.2 SEASONAL ALLERGIES: ICD-10-CM

## 2024-10-14 DIAGNOSIS — N18.30 STAGE 3 CHRONIC KIDNEY DISEASE, UNSPECIFIED WHETHER STAGE 3A OR 3B CKD (H): ICD-10-CM

## 2024-10-14 DIAGNOSIS — R29.890 LOSS OF HEIGHT: ICD-10-CM

## 2024-10-14 DIAGNOSIS — Z00.00 ENCOUNTER FOR MEDICARE ANNUAL WELLNESS EXAM: Primary | ICD-10-CM

## 2024-10-14 DIAGNOSIS — I10 HYPERTENSION GOAL BP (BLOOD PRESSURE) < 130/80: ICD-10-CM

## 2024-10-14 DIAGNOSIS — E78.5 HYPERLIPIDEMIA LDL GOAL <130: ICD-10-CM

## 2024-10-14 DIAGNOSIS — I47.10 SUPRAVENTRICULAR TACHYCARDIA (H): ICD-10-CM

## 2024-10-14 DIAGNOSIS — Z12.31 ENCOUNTER FOR SCREENING MAMMOGRAM FOR BREAST CANCER: ICD-10-CM

## 2024-10-14 DIAGNOSIS — M35.3 PMR (POLYMYALGIA RHEUMATICA) (H): ICD-10-CM

## 2024-10-14 PROCEDURE — 91320 SARSCV2 VAC 30MCG TRS-SUC IM: CPT | Performed by: PHYSICIAN ASSISTANT

## 2024-10-14 PROCEDURE — 90662 IIV NO PRSV INCREASED AG IM: CPT | Performed by: PHYSICIAN ASSISTANT

## 2024-10-14 PROCEDURE — G0439 PPPS, SUBSEQ VISIT: HCPCS | Performed by: PHYSICIAN ASSISTANT

## 2024-10-14 PROCEDURE — 90480 ADMN SARSCOV2 VAC 1/ONLY CMP: CPT | Performed by: PHYSICIAN ASSISTANT

## 2024-10-14 PROCEDURE — G0008 ADMIN INFLUENZA VIRUS VAC: HCPCS | Performed by: PHYSICIAN ASSISTANT

## 2024-10-14 PROCEDURE — 93000 ELECTROCARDIOGRAM COMPLETE: CPT | Performed by: PHYSICIAN ASSISTANT

## 2024-10-14 PROCEDURE — 99214 OFFICE O/P EST MOD 30 MIN: CPT | Mod: 25 | Performed by: PHYSICIAN ASSISTANT

## 2024-10-14 RX ORDER — TRIAMTERENE AND HYDROCHLOROTHIAZIDE 37.5; 25 MG/1; MG/1
1 TABLET ORAL DAILY
Qty: 90 TABLET | Refills: 3 | Status: SHIPPED | OUTPATIENT
Start: 2024-10-14

## 2024-10-14 RX ORDER — SODIUM FLUORIDE 5 MG/G
GEL, DENTIFRICE DENTAL AT BEDTIME
Qty: 100 ML | Refills: 3 | Status: CANCELLED | OUTPATIENT
Start: 2024-10-14

## 2024-10-14 RX ORDER — ATORVASTATIN CALCIUM 40 MG/1
40 TABLET, FILM COATED ORAL DAILY
Qty: 90 TABLET | Refills: 3 | Status: SHIPPED | OUTPATIENT
Start: 2024-10-14

## 2024-10-14 RX ORDER — CETIRIZINE HYDROCHLORIDE 10 MG/1
10 TABLET ORAL DAILY
Qty: 90 TABLET | Refills: 1 | Status: CANCELLED | OUTPATIENT
Start: 2024-10-14

## 2024-10-14 RX ORDER — TRIAMCINOLONE ACETONIDE 1 MG/G
OINTMENT TOPICAL
Qty: 454 G | Refills: 1 | Status: CANCELLED | OUTPATIENT
Start: 2024-10-14

## 2024-10-14 NOTE — PROGRESS NOTES
Preventive Care Visit  [facility] MAEGAN García PA-C, Family Medicine  Oct 14, 2024      Assessment & Plan     Encounter for Medicare annual wellness exam      HEALTH CARE MAINTENANCE              Reviewed USPTF recommendations and anticipatory guidance.              See orders.     Flu and covid shots given today.     Seasonal allergies  Gets zyrtec OTC     Hypertension goal BP (blood pressure) < 130/80  BP running high today. Was previously on full tab of maxzide, however then had to cut back as pressures were running low.  She is now on daily steroids due to PMR, this may be why pressures are a bit higher now.    Given the coronary calcifications and splenic aneurysm noted on CT, I suggest tighter control.   Will increase maxzide back up to a full tab.  She will monitor home BP's and reach out if there are issues.   - triamterene-HCTZ (MAXZIDE-25) 37.5-25 MG tablet; Take 1 tablet by mouth daily.    CKD (chronic kidney disease) stage 3, GFR 30-59 ml/min (H)  Stable.     Supraventricular tachycardia (H)  Extra beats noted during exam today, therefore EKG obtained to ensure she was not in an a fib rhythm. EKG stable compared to 2020.   - EKG 12-lead complete w/read - Clinics    Hyperlipidemia LDL goal <130  I recommend increasing statin to higher intensity (40 or 80 mg ideally).  She would like to start with 40 mg.   - atorvastatin (LIPITOR) 40 MG tablet; Take 1 tablet (40 mg) by mouth daily.    Recurrent major depressive disorder, in full remission (H)  Stable.   - FLUoxetine (PROZAC) 20 MG capsule; Take 3 capsules (60 mg) by mouth daily.    Encounter for screening mammogram for breast cancer  Due for screening.   - MA Screen Bilateral w/Elias; Future    PMR (polymyalgia rheumatica) (H)  Managed by specialist.     On Corticosteroid therapy.   Seen by endo and monitoring bone density through them.  On fosamax.   Discussed importance of dietary calcium intake (versus supplement, especially  "with calcifications noted on CT scan).       Coronary artery calcification seen on CT scan:  tighter BP control and higher statin advised.     Patient has been advised of split billing requirements and indicates understanding: Yes        BMI  Estimated body mass index is 27.05 kg/m  as calculated from the following:    Height as of this encounter: 1.58 m (5' 2.21\").    Weight as of this encounter: 67.5 kg (148 lb 14.4 oz).   Weight management plan: Discussed healthy diet and exercise guidelines    Counseling  Appropriate preventive services were addressed with this patient via screening, questionnaire, or discussion as appropriate for fall prevention, nutrition, physical activity, Tobacco-use cessation, social engagement, weight loss and cognition.  Checklist reviewing preventive services available has been given to the patient.  Reviewed patient's diet, addressing concerns and/or questions.   Discussed possible causes of fatigue. Patient reported safety concerns were addressed today.        Dutch Shipman is a 74 year old, presenting for the following:  Medicare Visit        10/14/2024     7:10 AM   Additional Questions   Roomed by MP         10/14/2024     7:10 AM   Patient Reported Additional Medications   Patient reports taking the following new medications None per patient         Health Care Directive  Patient has a Health Care Directive on file  Has ACP on file.     HPI    ED/UC Followup:    Facility:  [facility]  Date of visit: 9/22/24  Reason for visit: pain in left shoulder, SOB, CAcalficifation  Current Status: discharged        10/9/2024   General Health   How would you rate your overall physical health? (!) FAIR   Feel stress (tense, anxious, or unable to sleep) Not at all            10/9/2024   Nutrition   Diet: Regular (no restrictions)            10/9/2024   Exercise   Days per week of moderate/strenous exercise 0 days      (!) EXERCISE CONCERN      10/9/2024   Social Factors   Frequency of gathering " with friends or relatives Patient declined   Worry food won't last until get money to buy more No   Food not last or not have enough money for food? No   Do you have housing? (Housing is defined as stable permanent housing and does not include staying ouside in a car, in a tent, in an abandoned building, in an overnight shelter, or couch-surfing.) Yes   Are you worried about losing your housing? No   Lack of transportation? No   Unable to get utilities (heat,electricity)? No            10/14/2024   Fall Risk   Fallen 2 or more times in the past year? No   Trouble with walking or balance? No             10/9/2024   Activities of Daily Living- Home Safety   Needs help with the following daily activites None of the above   Safety concerns in the home Throw rugs in the hallway            10/9/2024   Dental   Dentist two times every year? Yes            10/9/2024   Hearing Screening   Hearing concerns? None of the above            10/9/2024   Driving Risk Screening   Patient/family members have concerns about driving No            10/9/2024   General Alertness/Fatigue Screening   Have you been more tired than usual lately? (!) YES            10/9/2024   Urinary Incontinence Screening   Bothered by leaking urine in past 6 months No            10/9/2024   TB Screening   Were you born outside of the [place]? No          Today's PHQ-9 Score:       10/14/2024     7:06 AM   PHQ-9 SCORE   PHQ-9 Total Score MyChart 3 (Minimal depression)   PHQ-9 Total Score 3         10/9/2024   Substance Use   Alcohol more than 3/day or more than 7/wk No   Do you have a current opioid prescription? No   How severe/bad is pain from 1 to 10? 4/10   Do you use any other substances recreationally? No        Social History     Tobacco Use    Smoking status: Former     Current packs/day: 0.00     Average packs/day: 1 pack/day for 45.0 years (45.0 ttl pk-yrs)     Types: Cigarettes     Start date: 8/1/1967     Quit date: 8/1/2012     Years since quitting:  12.2     Passive exposure: Never    Smokeless tobacco: Never    Tobacco comments:     quitting with e cigarette   Vaping Use    Vaping status: Never Used   Substance Use Topics    Alcohol use: Yes     Comment: less than weekly    Drug use: No           2/29/2024   LAST FHS-7 RESULTS   1st degree relative breast or ovarian cancer No   Any relative bilateral breast cancer No   Any male have breast cancer No   Any ONE woman have BOTH breast AND ovarian cancer No   Any woman with breast cancer before 50yrs No   2 or more relatives with breast AND/OR ovarian cancer No   2 or more relatives with breast AND/OR bowel cancer No           Mammogram Screening - Mammogram every 1-2 years updated in Health Maintenance based on mutual decision making    ASCVD Risk   The 10-year ASCVD risk score (Shivani BROOKS, et al., 2019) is: 15.6%    Values used to calculate the score:      Age: 74 years      Sex: Female      Is Non- : No      Diabetic: No      Tobacco smoker: No      Systolic Blood Pressure: 144 mmHg      Is BP treated: No      HDL Cholesterol: 58 mg/dL      Total Cholesterol: 170 mg/dL            Reviewed and updated as needed this visit by Provider   Tobacco  Allergies  Meds  Problems  Med Hx  Surg Hx  Fam Hx            Past Medical History:   Diagnosis Date    Atrial tachycardia, paroxysmal (H) 03/29/2021    BULMARO II (cervical intraepithelial neoplasia II) 01/01/2007    on colp - leep path WNL    COPD (chronic obstructive pulmonary disease) (H)     Depression 01/01/1990    Depressive disorder 25 yrs    Eczema     GERD (gastroesophageal reflux disease)     Hiatal hernia     History of blood transfusion 01/01/2013    Post op    HTN (hypertension)     Hypercholesterolemia     Infection due to 2019 novel coronavirus 03/2022    Liver hemangioma 09/04/2012    OA (osteoarthritis)     Osteopenia     Papillary thyroid carcinoma (H) 01/01/2000    BL, MF; Tx in 2 operations, RRA    PMR (polymyalgia  rheumatica) (H) 11/2023    PONV (postoperative nausea and vomiting)     Postsurgical hypothyroidism 01/01/2000    FELICIA III (vulvar intraepithelial neoplasia III) 01/01/2006    wide local exc x 2     Labs reviewed in EPIC  Current providers sharing in care for this patient include:  Patient Care Team:  Honey García PA-C as PCP - General (Family Practice)  Geovanna Chatterjee MD as MD (INTERNAL MEDICINE - ENDOCRINOLOGY, DIABETES & METABOLISM)  Faby So, RN as Specialty Care Coordinator (Clinical Cardiac Electrophysiology)  Suyapa Garrett MD as MD (Clinical Cardiac Electrophysiology)  Geovanna Chatterjee MD as Assigned Endocrinology Provider  Honey García PA-C as Assigned PCP  Jason Orozco PA-C as Physician Assistant (Gastroenterology)  Isaias Galvez MD as MD (Hematology & Oncology)  Nuzhat Preciado APRN CNP as Nurse Practitioner (Dermatology)  Isaias Galvez MD as Assigned Cancer Care Provider  Jason Orozco PA-C as Assigned Gastroenterology Provider    The following health maintenance items are reviewed in Epic and correct as of today:  Health Maintenance   Topic Date Due    COPD ACTION PLAN  Never done    ANNUAL REVIEW OF HM ORDERS  10/10/2023    HEMOGLOBIN  01/05/2025    ZOSTER IMMUNIZATION (2 of 2) 11/11/2024    COVID-19 Vaccine (7 - 2024-25 season) 12/09/2024    MAMMO SCREENING  02/28/2025    PHQ-9  04/14/2025    MICROALBUMIN  04/22/2025    TSH W/FREE T4 REFLEX  09/17/2025    LUNG CANCER SCREENING  09/22/2025    BMP  10/01/2025    LIPID  10/01/2025    MEDICARE ANNUAL WELLNESS VISIT  10/14/2025    FALL RISK ASSESSMENT  10/14/2025    GLUCOSE  10/01/2027    ADVANCE CARE PLANNING  10/12/2028    DTAP/TDAP/TD IMMUNIZATION (4 - Td or Tdap) 04/08/2029    DEXA  06/21/2029    SPIROMETRY  Completed    HEPATITIS C SCREENING  Completed    DEPRESSION ACTION PLAN  Completed    INFLUENZA VACCINE  Completed    Pneumococcal Vaccine: 65+ Years  Completed    URINALYSIS  Completed    RSV VACCINE  " Completed    HPV IMMUNIZATION  Aged Out    MENINGITIS IMMUNIZATION  Aged Out    RSV MONOCLONAL ANTIBODY  Aged Out    COLORECTAL CANCER SCREENING  Discontinued         Review of Systems  Constitutional, neuro, ENT, endocrine, pulmonary, cardiac, gastrointestinal, genitourinary, musculoskeletal, integument and psychiatric systems are negative, except as otherwise noted.     Objective    Exam  BP (!) 144/84   Pulse 84   Temp 97.3  F (36.3  C) (Temporal)   Resp 16   Ht 1.58 m (5' 2.21\")   Wt 67.5 kg (148 lb 14.4 oz)   LMP  (LMP Unknown)   SpO2 100%   BMI 27.05 kg/m     Estimated body mass index is 27.05 kg/m  as calculated from the following:    Height as of this encounter: 1.58 m (5' 2.21\").    Weight as of this encounter: 67.5 kg (148 lb 14.4 oz).    Physical Exam  GENERAL: alert and no distress  EYES: Eyes grossly normal to inspection, PERRL and conjunctivae and sclerae normal  HENT: ear canals and TM's normal, nose and mouth without ulcers or lesions  NECK: no adenopathy, no asymmetry, masses, or scars  RESP: lungs clear to auscultation - no rales, rhonchi or wheezes  CV: irregular rate noted, extra beat, normal S1 S2, no S3 or S4, no murmur, click or rub, no peripheral edema  ABDOMEN: soft, nontender, no hepatosplenomegaly, no masses and bowel sounds normal  MS: no gross musculoskeletal defects noted, no edema  SKIN: no suspicious lesions or rashes  NEURO: Normal strength and tone, mentation intact and speech normal  PSYCH: mentation appears normal, affect normal/bright        10/14/2024   Mini Cog   Clock Draw Score 2 Normal   3 Item Recall 2 objects recalled   Mini Cog Total Score 4                 Signed Electronically by: Honey García PA-C    "

## 2024-10-14 NOTE — PATIENT INSTRUCTIONS
Patient Education   Preventive Care Advice   This is general advice given by our system to help you stay healthy. However, your care team may have specific advice just for you. Please talk to your care team about your preventive care needs.  Nutrition  Eat 5 or more servings of fruits and vegetables each day.  Try wheat bread, brown rice and whole grain pasta (instead of white bread, rice, and pasta).  Get enough calcium and vitamin D. Check the label on foods and aim for 100% of the RDA (recommended daily allowance).  Lifestyle  Exercise at least 150 minutes each week  (30 minutes a day, 5 days a week).  Do muscle strengthening activities 2 days a week. These help control your weight and prevent disease.  No smoking.  Wear sunscreen to prevent skin cancer.  Have a dental exam and cleaning every 6 months.  Yearly exams  See your health care team every year to talk about:  Any changes in your health.  Any medicines your care team has prescribed.  Preventive care, family planning, and ways to prevent chronic diseases.  Shots (vaccines)   HPV shots (up to age 26), if you've never had them before.  Hepatitis B shots (up to age 59), if you've never had them before.  COVID-19 shot: Get this shot when it's due.  Flu shot: Get a flu shot every year.  Tetanus shot: Get a tetanus shot every 10 years.  Pneumococcal, hepatitis A, and RSV shots: Ask your care team if you need these based on your risk.  Shingles shot (for age 50 and up)  General health tests  Diabetes screening:  Starting at age 35, Get screened for diabetes at least every 3 years.  If you are younger than age 35, ask your care team if you should be screened for diabetes.  Cholesterol test: At age 39, start having a cholesterol test every 5 years, or more often if advised.  Bone density scan (DEXA): At age 50, ask your care team if you should have this scan for osteoporosis (brittle bones).  Hepatitis C: Get tested at least once in your life.  STIs (sexually  transmitted infections)  Before age 24: Ask your care team if you should be screened for STIs.  After age 24: Get screened for STIs if you're at risk. You are at risk for STIs (including HIV) if:  You are sexually active with more than one person.  You don't use condoms every time.  You or a partner was diagnosed with a sexually transmitted infection.  If you are at risk for HIV, ask about PrEP medicine to prevent HIV.  Get tested for HIV at least once in your life, whether you are at risk for HIV or not.  Cancer screening tests  Cervical cancer screening: If you have a cervix, begin getting regular cervical cancer screening tests starting at age 21.  Breast cancer scan (mammogram): If you've ever had breasts, begin having regular mammograms starting at age 40. This is a scan to check for breast cancer.  Colon cancer screening: It is important to start screening for colon cancer at age 45.  Have a colonoscopy test every 10 years (or more often if you're at risk) Or, ask your provider about stool tests like a FIT test every year or Cologuard test every 3 years.  To learn more about your testing options, visit:   .  For help making a decision, visit:   https://bit.ly/gd66277.  Prostate cancer screening test: If you have a prostate, ask your care team if a prostate cancer screening test (PSA) at age 55 is right for you.  Lung cancer screening: If you are a current or former smoker ages 50 to 80, ask your care team if ongoing lung cancer screenings are right for you.  For informational purposes only. Not to replace the advice of your health care provider. Copyright   2023 [facility]. All rights reserved. Clinically reviewed by the [facility] Transitions Program. [employer] 691329 - REV 01/24.  Learning About Activities of Daily Living  What are activities of daily living?     Activities of daily living (ADLs) are the basic self-care tasks you do every day. These include eating, bathing, dressing,  and moving around.  As you age, and if you have health problems, you may find that it's harder to do some of these tasks. If so, your doctor can suggest ideas that may help.  To measure what kind of help you may need, your doctor will ask how well you are able to do ADLs. Let your doctor know if there are any tasks that you are having trouble doing. This is an important first step to getting help. And when you have the help you need, you can stay as independent as possible.  How will a doctor assess your ADLs?  Asking about ADLs is part of a routine health checkup your doctor will likely do as you age. Your health check might be done in a doctor's office, in your home, or at a hospital. The goal is to find out if you are having any problems that could make it hard to care for yourself or that make it unsafe for you to be on your own.  To measure your ADLs, your doctor will ask how hard it is for you to do routine tasks. Your doctor may also want to know if you have changed the way you do a task because of a health problem. Your doctor may watch how you:  Walk back and forth.  Keep your balance while you stand or walk.  Move from sitting to standing or from a bed to a chair.  Button or unbutton a shirt or sweater.  Remove and put on your shoes.  It's common to feel a little worried or anxious if you find you can't do all the things you used to be able to do. Talking with your doctor about ADLs is a way to make sure you're as safe as possible and able to care for yourself as well as you can. You may want to bring a caregiver, friend, or family member to your checkup. They can help you talk to your doctor.  Follow-up care is a key part of your treatment and safety. Be sure to make and go to all appointments, and call your doctor if you are having problems. It's also a good idea to know your test results and keep a list of the medicines you take.  Current as of: October 24, 2023  Content Version: 14.2 2024 [facility]  [employer].   Care instructions adapted under license by your healthcare professional. If you have questions about a medical condition or this instruction, always ask your healthcare professional. [employer] disclaims any warranty or liability for your use of this information.    Learning About Sleeping Well  What does sleeping well mean?     Sleeping well means getting enough sleep to feel good and stay healthy. How much sleep is enough varies among people.  The number of hours you sleep and how you feel when you wake up are both important. If you do not feel refreshed, you probably need more sleep. Another sign of not getting enough sleep is feeling tired during the day.  Experts recommend that adults get at least 7 or more hours of sleep per day. Children and older adults need more sleep.  Why is getting enough sleep important?  Getting enough quality sleep is a basic part of good health. When your sleep suffers, your physical health, mood, and your thoughts can suffer too. You may find yourself feeling more grumpy or stressed. Not getting enough sleep also can lead to serious problems, including injury, accidents, anxiety, and depression.  What might cause poor sleeping?  Many things can cause sleep problems, including:  Changes to your sleep schedule.  Stress. Stress can be caused by fear about a single event, such as giving a speech. Or you may have ongoing stress, such as worry about work or school.  Depression, anxiety, and other mental or emotional conditions.  Changes in your sleep habits or surroundings. This includes changes that happen where you sleep, such as noise, light, or sleeping in a different bed. It also includes changes in your sleep pattern, such as having jet lag or working a late shift.  Health problems, such as pain, breathing problems, and restless legs syndrome.  Lack of regular exercise.  Using alcohol, nicotine, or caffeine before bed.  How can you help  "yourself?  Here are some tips that may help you sleep more soundly and wake up feeling more refreshed.  Your sleeping area   Use your bedroom only for sleeping and sex. A bit of light reading may help you fall asleep. But if it doesn't, do your reading elsewhere in the house. Try not to use your TV, computer, smartphone, or tablet while you are in bed.  Be sure your bed is big enough to stretch out comfortably, especially if you have a sleep partner.  Keep your bedroom quiet, dark, and cool. Use curtains, blinds, or a sleep mask to block out light. To block out noise, use earplugs, soothing music, or a \"white noise\" machine.  Your evening and bedtime routine   Create a relaxing bedtime routine. You might want to take a warm shower or bath, or listen to soothing music.  Go to bed at the same time every night. And get up at the same time every morning, even if you feel tired.  What to avoid   Limit caffeine (coffee, tea, caffeinated sodas) during the day, and don't have any for at least 6 hours before bedtime.  Avoid drinking alcohol before bedtime. Alcohol can cause you to wake up more often during the night.  Try not to smoke or use tobacco, especially in the evening. Nicotine can keep you awake.  Limit naps during the day, especially close to bedtime.  Avoid lying in bed awake for too long. If you can't fall asleep or if you wake up in the middle of the night and can't get back to sleep within about 20 minutes, get out of bed and go to another room until you feel sleepy.  Avoid taking medicine right before bed that may keep you awake or make you feel hyper or energized. Your doctor can tell you if your medicine may do this and if you can take it earlier in the day.  If you can't sleep   Imagine yourself in a peaceful, pleasant scene. Focus on the details and feelings of being in a place that is relaxing.  Get up and do a quiet or boring activity until you feel sleepy.  Avoid drinking any liquids before going to bed " "to help prevent waking up often to use the bathroom.  Where can you learn more?  Go to https://www.[employer].net/patiented  Enter J942 in the search box to learn more about \"Learning About Sleeping Well.\"  Current as of: July 10, 2023  Content Version: 14.2 2024 [facility] [employer]   Care instructions adapted under license by your healthcare professional. If you have questions about a medical condition or this instruction, always ask your healthcare professional. [employer] disclaims any warranty or liability for your use of this information.       "

## 2024-10-15 NOTE — TELEPHONE ENCOUNTER
"  alendronate (FOSAMAX) 70 MG tablet     Last Written Prescription Date:  11/21/23  Last Fill Quantity: 12,   # refills: 3  Last Office Visit : 7/17/24\" Labs in 2 months-- I will send results on mychart     Next Bone density [facility]  6/21/2026  or after -- ideally prior to next visit   Let me know if the prednisone dose increases, in which case we might do the repeat bone density sooner.       Next neck ultrasound 2027 or after      See me every 2 years with labs 2-3 weeks  prior \"     Future Office visit:  None    Routing refill request to provider for review/approval because:   alendronate (FOSAMAX) 70 MG tablet refill request. DEXA scan:6/21/24  LVD 7/17/24. Thank you       "

## 2024-10-16 RX ORDER — ALENDRONATE SODIUM 70 MG/1
70 TABLET ORAL
Qty: 12 TABLET | Refills: 4 | Status: SHIPPED | OUTPATIENT
Start: 2024-10-16

## 2024-10-28 ENCOUNTER — MYC MEDICAL ADVICE (OUTPATIENT)
Dept: FAMILY MEDICINE | Facility: CLINIC | Age: 74
End: 2024-10-28
Payer: MEDICARE

## 2024-11-11 ENCOUNTER — MYC MEDICAL ADVICE (OUTPATIENT)
Dept: FAMILY MEDICINE | Facility: CLINIC | Age: 74
End: 2024-11-11
Payer: MEDICARE

## 2024-11-19 ENCOUNTER — MYC MEDICAL ADVICE (OUTPATIENT)
Dept: FAMILY MEDICINE | Facility: CLINIC | Age: 74
End: 2024-11-19
Payer: MEDICARE

## 2024-12-01 ENCOUNTER — MYC MEDICAL ADVICE (OUTPATIENT)
Dept: FAMILY MEDICINE | Facility: CLINIC | Age: 74
End: 2024-12-01
Payer: MEDICARE

## 2024-12-01 DIAGNOSIS — I10 HYPERTENSION GOAL BP (BLOOD PRESSURE) < 130/80: Primary | ICD-10-CM

## 2024-12-02 RX ORDER — LISINOPRIL 5 MG/1
5 TABLET ORAL DAILY
Qty: 30 TABLET | Refills: 0 | Status: SHIPPED | OUTPATIENT
Start: 2024-12-02

## 2024-12-10 ENCOUNTER — TRANSFERRED RECORDS (OUTPATIENT)
Dept: HEALTH INFORMATION MANAGEMENT | Facility: CLINIC | Age: 74
End: 2024-12-10
Payer: MEDICARE

## 2024-12-20 ENCOUNTER — MYC MEDICAL ADVICE (OUTPATIENT)
Dept: FAMILY MEDICINE | Facility: CLINIC | Age: 74
End: 2024-12-20
Payer: MEDICARE

## 2025-01-02 ENCOUNTER — TRANSFERRED RECORDS (OUTPATIENT)
Dept: HEALTH INFORMATION MANAGEMENT | Facility: CLINIC | Age: 75
End: 2025-01-02
Payer: MEDICARE

## 2025-01-16 ENCOUNTER — OFFICE VISIT (OUTPATIENT)
Dept: FAMILY MEDICINE | Facility: CLINIC | Age: 75
End: 2025-01-16
Payer: MEDICARE

## 2025-01-16 VITALS
HEIGHT: 62 IN | OXYGEN SATURATION: 98 % | RESPIRATION RATE: 16 BRPM | HEART RATE: 63 BPM | BODY MASS INDEX: 27.42 KG/M2 | TEMPERATURE: 96.9 F | WEIGHT: 149 LBS | SYSTOLIC BLOOD PRESSURE: 144 MMHG | DIASTOLIC BLOOD PRESSURE: 82 MMHG

## 2025-01-16 DIAGNOSIS — I10 HYPERTENSION GOAL BP (BLOOD PRESSURE) < 130/80: ICD-10-CM

## 2025-01-16 DIAGNOSIS — N18.31 STAGE 3A CHRONIC KIDNEY DISEASE (H): ICD-10-CM

## 2025-01-16 DIAGNOSIS — C73 PAPILLARY THYROID CARCINOMA (H): Chronic | ICD-10-CM

## 2025-01-16 DIAGNOSIS — J44.9 CHRONIC OBSTRUCTIVE PULMONARY DISEASE, UNSPECIFIED COPD TYPE (H): ICD-10-CM

## 2025-01-16 DIAGNOSIS — C53.9 MALIGNANT NEOPLASM OF CERVIX, UNSPECIFIED SITE (H): ICD-10-CM

## 2025-01-16 DIAGNOSIS — E78.5 HYPERLIPIDEMIA LDL GOAL <130: ICD-10-CM

## 2025-01-16 DIAGNOSIS — Z01.818 PREOP GENERAL PHYSICAL EXAM: Primary | ICD-10-CM

## 2025-01-16 DIAGNOSIS — Z79.52 ON CORTICOSTEROID THERAPY: ICD-10-CM

## 2025-01-16 DIAGNOSIS — I48.91 ATRIAL FIBRILLATION, UNSPECIFIED TYPE (H): ICD-10-CM

## 2025-01-16 DIAGNOSIS — M35.3 PMR (POLYMYALGIA RHEUMATICA): ICD-10-CM

## 2025-01-16 RX ORDER — FLECAINIDE ACETATE 50 MG/1
50 TABLET ORAL EVERY 12 HOURS
Qty: 60 TABLET | Refills: 0 | Status: SHIPPED | OUTPATIENT
Start: 2025-01-16

## 2025-01-16 RX ORDER — FLECAINIDE ACETATE 50 MG/1
50 TABLET ORAL EVERY 12 HOURS
COMMUNITY
Start: 2024-12-23 | End: 2025-01-16

## 2025-01-16 RX ORDER — FOLIC ACID 1 MG/1
1 TABLET ORAL DAILY
COMMUNITY

## 2025-01-16 RX ORDER — METOPROLOL TARTRATE 50 MG
50 TABLET ORAL 2 TIMES DAILY
Qty: 60 TABLET | Refills: 0 | Status: SHIPPED | OUTPATIENT
Start: 2025-01-16

## 2025-01-16 RX ORDER — POLYETHYLENE GLYCOL AND PROPYLENE GLYCOL 4; 3 MG/ML; MG/ML
1 SOLUTION/ DROPS OPHTHALMIC AT BEDTIME
COMMUNITY

## 2025-01-16 RX ORDER — TRIAMTERENE AND HYDROCHLOROTHIAZIDE 37.5; 25 MG/1; MG/1
1 TABLET ORAL DAILY
COMMUNITY
Start: 2025-01-16

## 2025-01-16 RX ORDER — METOPROLOL TARTRATE 50 MG
50 TABLET ORAL 2 TIMES DAILY
COMMUNITY
Start: 2024-12-23 | End: 2025-01-16

## 2025-01-16 NOTE — PROGRESS NOTES
Preoperative Evaluation  [facility] [place]  [id number]  [place] [address]  Phone: [phone]  Primary Provider: Honey García PA-C  Pre-op Performing Provider: Honey García PA-C  Jan 16, 2025 1/11/2025   Surgical Information   What procedure is being done? Right open carpal tunnel release with SSTT injectio   Facility or [facility] where procedure[facility]surgery [facility] be performed[facility]   Who is doing the procedure / surgery? Aguilar Monk MD   Date of surgery / procedure: 1/23/25   Time of surgery / procedure: No ideas   Where do you plan to recover after surgery? at home alone     Fax number for surgical facility: [phone]    Assessment & Plan     The proposed surgical procedure is considered INTERMEDIATE risk.    Preop general physical exam  Needs to see cardiology first for clearance.     Atrial fibrillation, unspecified type (H)  Further medication management to come from cardiology.   - apixaban ANTICOAGULANT (ELIQUIS) 5 MG tablet; Take 1 tablet (5 mg) by mouth 2 times daily. Further refills through cardiology  - flecainide (TAMBOCOR) 50 MG tablet; Take 1 tablet (50 mg) by mouth every 12 hours. Further refills from cardiology  - metoprolol tartrate (LOPRESSOR) 50 MG tablet; Take 1 tablet (50 mg) by mouth 2 times daily. Refills through cardiology    Stage 3a chronic kidney disease (H)  Stable.     Hypertension goal BP (blood pressure) < 130/80  Has been running a bit high, f/up with cardiology.   - triamterene-HCTZ (MAXZIDE-25) 37.5-25 MG tablet; Take 1 tablet by mouth daily.    PMR (polymyalgia rheumatica)  Managed by specialist down to 4 mg prednisone.     On corticosteroid therapy  Due to PMR    Papillary thyroid carcinoma (H)  Remission.     Chronic obstructive pulmonary disease, unspecified COPD type (H)  Breathing stable.     Hyperlipidemia LDL goal <130  Stable.      The longitudinal plan of care for the diagnosis(es)/condition(s) as  documented were addressed during this visit. Due to the added complexity in care, I will continue to support Ramonita in the subsequent management and with ongoing continuity of care.      Risks and Recommendations  The patient has the following additional risks and recommendations for perioperative complications:   - new onset a fib.  She has not met with cardiology yet since her hospitalization.      Antiplatelet or Anticoagulation Medication Instructions   - apixaban (Eliquis), edoxaban (Savaysa), rivaroxaban (Xarelto): Bleeding risk is moderate or high for this procedure AND CrCl  (>=) 50 mL/min. DO NOT TAKE 2 days before surgery.     Additional Medication Instructions   - ACE/ARB/ARNI (lisinopril, enalapril, losartan, valsartan, olmesartan, sacubritril/valsartan) : DO NOT TAKE on day of surgery (minimum 11 hours for general anesthesia).   - Beta Blockers (atenolol, metoprolol, propranolol) : Continue taking on the day of surgery.   - Diuretics (furosemide, hydrochlorothiazide, chlorothalidone): DO NOT TAKE on the day of surgery.   - Statins (atorvastatin, simvastatin, pravastatin) : Continue taking on the day of surgery.     Recommendation  Patient referred to cardiology for evaluation before surgery. Surgery approval pending completion of consultation.    Dutch Shipman is a 74 year old, presenting for the following:  Pre-Op Exam          1/16/2025     8:50 AM   Additional Questions   Roomed by Yocasta   Accompanied by Self         1/16/2025     8:50 AM   Patient Reported Additional Medications   Patient reports taking the following new medications NA     HPI related to upcoming procedure:     Has appt with cardiology  1/28/25.   Has new onset a fib diagnosed in ER in December 2024.   Was having episodes of sweating, SOB, and dizziness.    Started on metoprolol, flecainide, and eliquis.   Stopped aspirin.    SOB has resolved.  She still feels some SOB with exertion.   She is feeling dizziness (more just feel off)  off and on.  She feels HR is running too low for her.     Still feeling very nauseated in the mornings.  Zofran makes the nausea worse.  Trying to eat meal replacement and protein bars/smoothies.         1/11/2025   Pre-Op Questionnaire   Have you ever had a heart attack or stroke? No   Have you ever had surgery on your heart or blood vessels, such as a stent placement, a coronary artery bypass, or surgery on an artery in your head, neck, heart, or legs? No   Do you have chest pain with activity? No   Do you have a history of heart failure? No   Do you currently have a cold, bronchitis or symptoms of other infection? No   Do you have a cough, shortness of breath, or wheezing? (!) YES - Chronic Cough    Do you or anyone in your family have previous history of blood clots? No   Do you or does anyone in your family have a serious bleeding problem such as prolonged bleeding following surgeries or cuts? No   Have you ever had problems with anemia or been told to take iron pills? No   Have you had any abnormal blood loss such as black, tarry or bloody stools, or abnormal vaginal bleeding? No   Have you ever had a blood transfusion? (!) YES   Have you ever had a transfusion reaction? No   Are you willing to have a blood transfusion if it is medically needed before, during, or after your surgery? Yes   Have you or any of your relatives ever had problems with anesthesia? No   Do you have sleep apnea, excessive snoring or daytime drowsiness? No   Do you have any artifical heart valves or other implanted medical devices like a pacemaker, defibrillator, or continuous glucose monitor? No   Do you have artificial joints? (!) YES   Are you allergic to latex? No     Health Care Directive  Patient has a Health Care Directive on file      Preoperative Review of    reviewed - no record of controlled substances prescribed.      Status of Chronic Conditions:  A-FIB - Patient recently diagnosed with A-fib currently on rate and  rhythm control. Current treatment regimen includes Apixaban for stroke prevention and denies significant symptoms of lightheadedness, palpitations or dyspnea.     HYPERTENSION - Patient has longstanding history of HTN , currently denies any symptoms referable to elevated blood pressure. Specifically denies chest pain, palpitations, dyspnea, orthopnea, PND or peripheral edema. Blood pressure readings have not been in normal range. Current medication regimen is as listed below. Patient does feel some side effects since starting new meds.     HYPOTHYROIDISM - Patient has a longstanding history of chronic Hypothyroidism. Patient has been doing well, noting no tremor, insomnia, hair loss or changes in skin texture. Continues to take medications as directed, without adverse reactions or side effects. Last TSH   Lab Results   Component Value Date    TSH 0.77 09/17/2024   .      Patient Active Problem List    Diagnosis Date Noted    Malignant neoplasm of cervix, unspecified site (H) 10/10/2016     Priority: High    Vulvar cancer (H) 10/10/2016     Priority: High    Cervical cancer (H) 03/23/2015     Priority: High     If neg, pt should continue Pap/HRHPV q3-5 yrs until 2027 (age 77) and yearly pelvic exams.       Chronic obstructive pulmonary disease, unspecified COPD type (H) 10/10/2013     Priority: High    On corticosteroid therapy 07/17/2024     Priority: Medium    Long-term use of immunosuppressant medication 04/22/2024     Priority: Medium    Recurrent major depressive disorder, in full remission 04/22/2024     Priority: Medium    Supraventricular tachycardia 10/12/2023     Priority: Medium    Gastritis without bleeding, unspecified chronicity, unspecified gastritis type 10/12/2023     Priority: Medium    HL (hearing loss) 07/06/2021     Priority: Medium    Atrial tachycardia, paroxysmal 03/29/2021     Priority: Medium    Tension headache 10/13/2020     Priority: Medium    Cervical adenopathy 11/05/2019     Priority:  Medium    Senile cataract of left eye, unspecified age-related cataract type 10/07/2019     Priority: Medium    HSV (herpes simplex virus) infection 10/07/2019     Priority: Medium    Pilonidal cyst without infection 10/07/2019     Priority: Medium    Cervical stenosis of spinal canal 04/08/2019     Priority: Medium    Postmenopausal status 11/06/2018     Priority: Medium    History of colonic polyps 10/11/2017     Priority: Medium     Repeat colonoscopy 5 years, 10/6/2022      Polyp of colon 10/06/2017     Priority: Medium    s/p B TKA 6/27 07/01/2013     Priority: Medium    Former smoker 03/13/2013     Priority: Medium    Liver hemangioma 09/04/2012     Priority: Medium    CKD (chronic kidney disease) stage 3, GFR 30-59 ml/min (H) 02/23/2012     Priority: Medium    Major depression in complete remission (H) 02/21/2012     Priority: Medium    Hyperlipidemia LDL goal <130 02/21/2012     Priority: Medium    Hypertension goal BP (blood pressure) < 140/90 02/21/2012     Priority: Medium    Chronic neck pain 05/25/2011     Priority: Medium    GERD (gastroesophageal reflux disease)      Priority: Medium    Eczema      Priority: Medium    Hiatal hernia      Priority: Medium    OA (osteoarthritis)      Priority: Medium    Osteopenia      Priority: Medium     Fosamax discontinued 3/23/15, drug holiday.  Will recheck DEXA in 2-3 years (0210-4303).       S/P LEEP of cervix 06/06/2007     Priority: Medium     4/03: LSIL, 6/03: ECC WNL  NIL paps: 6/04, 8/05 12/5: FELICIA II. Referred to gyn onc 1/06: FELICIA II & III  12/06: ASCUS pap  3/07: ASCUS, + HPV 16. 5/07; [place] - BULMARO II  6/07: LEEP - no dysplasia  NIL paps: 11/07, 5/08, 11/08, 12/09  6/10: Neg vulvar bx  NIL paps: 1/11, 1/12, 2/13.  Plan pap in 1 yr.  2/21/14 pap NIL/neg HPV. Plan-- repeat pap and HPV in 1 year.   3/23/15 pap NIL/neg HPV. 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    on colp - leep path WNL    COPD (chronic obstructive pulmonary disease) (H)     Depression 01/01/1990    Depressive disorder 25 yrs    Eczema     GERD (gastroesophageal reflux disease)     Hiatal hernia     History of blood transfusion 01/01/2013    Post op    HTN (hypertension)     Hypercholesterolemia     Infection due to 2019 novel coronavirus 03/2022    Liver hemangioma 09/04/2012    OA (osteoarthritis)     Osteopenia     Papillary thyroid carcinoma (H) 01/01/2000    BL, MF; Tx in 2 operations, RRA    PMR (polymyalgia rheumatica) 11/2023    PONV (postoperative nausea and vomiting)     Postsurgical hypothyroidism 01/01/2000    FELICIA III (vulvar intraepithelial neoplasia III) 01/01/2006    wide local exc x 2     Past Surgical History:   Procedure Laterality Date    ABDOMEN SURGERY      Teenager    APPENDECTOMY OPEN  01/01/1968    ARTHROPLASTY MINIMALLY INVASIVE KNEE BILATERAL  06/27/2013    Procedure: ARTHROPLASTY MINIMALLY INVASIVE KNEE BILATERAL;  Minimally Invasive Bilateral Total Knee Arthroplasty;  Surgeon: Christophe Welch MD;  Location: [facility]    BALLOON COMPRESSION RHIZOTOMY      by spine specialist    BIOPSY  Ongoing    Lymph nodes neck    BONE MARROW ASPIRATION ONLY Left 12/07/2015    Procedure: BONE MARROW ASPIRATION ONLY;  Surgeon: Aguilar Roldan MD;  Location: [facility] OR    CARPAL TUNNEL RELEASE RT/LT      COLONOSCOPY  01/01/2007    neg    COLONOSCOPY WITH CO2 INSUFFLATION N/A 9/5/2023    Procedure: Colonoscopy with CO2 insufflation;  Surgeon: Praful Waddell DO;  Location: [facility] OR    COLPOSCOPY CERVIX, BIOPSY CERVIX, ENDOCERVICAL CURETTAGE, COMBINED  2003, 2007    COMBINED ESOPHAGOSCOPY, GASTROSCOPY, DUODENOSCOPY (EGD) WITH CO2 INSUFFLATION N/A 7/25/2023    Procedure: Combined Esophagoscopy, Gastroscopy,  Duodenoscopy (Egd) With Co2 Insufflation;  Surgeon: Elsie Cary DO;  Location: [facility]    completion thyroidectomy Right 03/14/2000    CYSTECTOMY OVARIAN BENIGN  01/01/1969    ESOPHAGOSCOPY, GASTROSCOPY, DUODENOSCOPY (EGD), COMBINED N/A 08/28/2014    Procedure: COMBINED ESOPHAGOSCOPY, GASTROSCOPY, DUODENOSCOPY (EGD), BIOPSY SINGLE OR MULTIPLE;  Surgeon: Kelvin Montgomery MD;  Location: [facility]    ESOPHAGOSCOPY, GASTROSCOPY, DUODENOSCOPY (EGD), COMBINED N/A 7/25/2023    Procedure: ESOPHAGOGASTRODUODENOSCOPY, WITH BIOPSY;  Surgeon: Elsie Cary DO;  Location: [facility]    FUSION CERVICAL ANTERIOR THREE+ LEVELS N/A 02/26/2015    C4-C7    FUSION CERVICAL ANTERIOR THREE+ LEVELS WITH BONE ALLOGRAFT N/A 12/07/2015    Procedure: FUSION CERVICAL ANTERIOR THREE+ LEVELS WITH BONE ALLOGRAFT;  Surgeon: Aguilar Roldan MD;  Location: [facility] OR    HAND SURGERY      HEAD & NECK SURGERY  01/01/2000    Thyroidectomy    KNEE SURGERY  2001 & 2005    bilat    LAPAROSCOPIC CHOLECYSTECTOMY  01/01/1998    LEEP TX, CERVICAL  01/01/2007    BULMARO II on colp, leep path WNL    left thyroid total lobectomy, isthmusectomy and 50% right thyroid lobectomy Left 03/07/2000    TUBAL LIGATION  01/01/1988    VULVECTOMY SIMPLE  01/01/2006    FELICIA 3, wide local excision x 2    VULVECTOMY SIMPLE       Current Outpatient Medications   Medication Sig Dispense Refill    alendronate (FOSAMAX) 70 MG tablet Take 1 tablet (70 mg) by mouth every 7 days. Take 60 minutes before am meal with 8 oz. water. Remain upright for 30 minutes. 12 tablet 4    apixaban ANTICOAGULANT (ELIQUIS) 5 MG tablet Take 1 tablet (5 mg) by mouth 2 times daily. Further refills through cardiology 60 tablet 0    atorvastatin (LIPITOR) 40 MG tablet Take 1 tablet (40 mg) by mouth daily. 90 tablet 3    Calcium Citrate (CITRACAL OR) Take 1,200 mg by mouth daily      cetirizine (ZYRTEC) 10 MG tablet Take 1 tablet (10 mg) by mouth daily 90 tablet 1    Cholecalciferol (VITAMIN D) 1000 UNIT  capsule Take 1 capsule by mouth daily.      flecainide (TAMBOCOR) 50 MG tablet Take 1 tablet (50 mg) by mouth every 12 hours. Further refills from cardiology 60 tablet 0    FLUoxetine (PROZAC) 20 MG capsule Take 3 capsules (60 mg) by mouth daily. 270 capsule 3    hypromellose (ARTIFICIAL TEARS) 0.5 % SOLN Place 1 drop into both eyes every 4 hours as needed       levothyroxine (SYNTHROID/LEVOTHROID) 100 MCG tablet MON to SAT 1 tablet/day; SUN 0.5 tablet 90 tablet 4    lisinopril (ZESTRIL) 5 MG tablet Take 1 tablet (5 mg) by mouth daily. 90 tablet 1    Magnesium Glycinate 100 MG CAPS Take 400 mg by mouth daily      methocarbamol (ROBAXIN) 500 MG tablet Take 500 mg by mouth every 6 hours as needed for muscle spasms      methotrexate 2.5 MG tablet Take 17.5 mg by mouth every 7 days.      metoprolol tartrate (LOPRESSOR) 50 MG tablet Take 1 tablet (50 mg) by mouth 2 times daily. Refills through cardiology 60 tablet 0    multivitamin w/minerals (THERA-VIT-M) tablet Take 1 tablet by mouth daily      omeprazole (PRILOSEC) 20 MG DR capsule Take 1 capsule (20 mg) by mouth every morning On an empty stomach abut 30 minutes before breakfast 90 capsule 2    predniSONE (DELTASONE) 1 MG tablet       senna-docusate (SENOKOT-S/PERICOLACE) 8.6-50 MG tablet Take 2 tablets by mouth daily      sodium fluoride dental gel (PREVIDENT) 1.1 % GEL topical gel Apply to affected area At Bedtime 100 mL 3    triamcinolone (KENALOG) 0.1 % external ointment Apply to affected area of the skin twice a day after using a moisturizer. Can do wet wraps with this. 454 g 1    triamterene-HCTZ (MAXZIDE-25) 37.5-25 MG tablet Take 1 tablet by mouth daily.      vitamin C (ASCORBIC ACID) 500 MG tablet Take 500 mg by mouth daily      folic acid (FOLVITE) 1 MG tablet Take 1 mg by mouth daily.      polyethylene glycol-propylene glycol PF (SYSTANE PRESERVATIVE FREE) 0.4-0.3 % SOLN opthalmic solution Apply 1 drop to eye at bedtime.         No Known Allergies  "    Social History     Tobacco Use    Smoking status: Former     Current packs/day: 0.00     Average packs/day: 1 pack/day for 45.0 years (45.0 ttl pk-yrs)     Types: Cigarettes     Start date: 1967     Quit date: 2012     Years since quittin.4     Passive exposure: Never    Smokeless tobacco: Never    Tobacco comments:     quitting with e cigarette   Substance Use Topics    Alcohol use: Yes     Comment: less than weekly     Family History   Problem Relation Age of Onset    Blood Disease Mother 75        thrombocythemia on hydrea    Hypertension Mother     Cerebrovascular Disease Mother         TIA    Anesthesia Reaction Mother         N/V    Osteoporosis Mother     C.A.D. Father 61        three MI's, smoker    Hypertension Father     Coronary Artery Disease Father     Diabetes Paternal Aunt     Cancer - colorectal Maternal Grandmother         unsure of her age.    Breast Cancer No family hx of     Asthma No family hx of      History   Drug Use No             Review of Systems  Constitutional, neuro, ENT, endocrine, pulmonary, cardiac, gastrointestinal, genitourinary, musculoskeletal, integument and psychiatric systems are negative, except as otherwise noted.    Objective    BP (!) 144/82   Pulse 63   Temp 96.9  F (36.1  C) (Tympanic)   Resp 16   Ht 1.58 m (5' 2.21\")   Wt 67.6 kg (149 lb)   LMP  (LMP Unknown)   SpO2 98%   BMI 27.07 kg/m     Estimated body mass index is 27.07 kg/m  as calculated from the following:    Height as of this encounter: 1.58 m (5' 2.21\").    Weight as of this encounter: 67.6 kg (149 lb).  Physical Exam  GENERAL: alert and no distress  NECK: no adenopathy, no asymmetry, masses, or scars  RESP: lungs clear to auscultation - no rales, rhonchi or wheezes  CV: regular rate and rhythm, normal S1 S2, no S3 or S4, no murmur, click or rub, no peripheral edema  ABDOMEN: soft, nontender, no hepatosplenomegaly, no masses and bowel sounds normal  MS: no gross musculoskeletal defects " noted, no edema    Recent Labs   Lab Test 10/01/24  0940 07/17/24  1358     --    POTASSIUM 3.8  --    CR 0.95  --    A1C  --  5.3        Diagnostics  No labs were ordered during this visit.   No EKG this visit, completed in the last 90 days.    Revised Cardiac Risk Index (RCRI)  The patient has the following serious cardiovascular risks for perioperative complications:   - No serious cardiac risks = 0 points     RCRI Interpretation: 0 points: Class I (very low risk - 0.4% complication rate)         Signed Electronically by: Honey García PA-C  A copy of this evaluation report is provided to the requesting physician.

## 2025-01-16 NOTE — PATIENT INSTRUCTIONS
How to Take Your Medication Before Surgery  Preoperative Medication Instructions   Antiplatelet or Anticoagulation Medication Instructions   - apixaban (Eliquis), edoxaban (Savaysa), rivaroxaban (Xarelto): Bleeding risk is moderate or high for this procedure AND CrCl  (>=) 50 mL/min. DO NOT TAKE 2 days before surgery.     Additional Medication Instructions   - ACE/ARB/ARNI (lisinopril, enalapril, losartan, valsartan, olmesartan, sacubritril/valsartan) : DO NOT TAKE on day of surgery (minimum 11 hours for general anesthesia).   - Beta Blockers (atenolol, metoprolol, propranolol) : Continue taking on the day of surgery.   - Diuretics (furosemide, hydrochlorothiazide, chlorothalidone): DO NOT TAKE on the day of surgery.   - Statins (atorvastatin, simvastatin, pravastatin) : Continue taking on the day of surgery.        Patient Education   Preparing for Your Surgery  For Adults  Getting started  In most cases, a nurse will call to review your health history and instructions. They will give you an arrival time based on your scheduled surgery time. Please be ready to share:  Your doctor's clinic name and phone number  Your medical, surgical, and anesthesia history  A list of allergies and sensitivities  A list of medicines, including herbal treatments and over-the-counter drugs  Whether the patient has a legal guardian (ask how to send us the papers in advance)  Note: You may not receive a call if you were seen at our PAC ([facility]).  Please tell us if you're pregnant--or if there's any chance you might be pregnant. Some surgeries may injure a fetus (unborn baby), so they require a pregnancy test. Surgeries that are safe for a fetus don't always need a test, and you can choose whether to have one.   Preparing for surgery  Within 10 to 30 days of surgery: Have a pre-op exam (sometimes called an H&P, or History and Physical). This can be done at a clinic or pre-operative center.  If you're having a  , you may not need this exam. Talk to your care team.  At your pre-op exam, talk to your care team about all medicines you take. (This includes CBD oil and any drugs, such as THC, marijuana, and other forms of cannabis.) If you need to stop any medicine before surgery, ask when to start taking it again.  This is for your safety. Many medicines and drugs can make you bleed too much during surgery. Some change how well surgery (anesthesia) drugs work.  Call your insurance company to let them know you're having surgery. (If you don't have insurance, call [phone].)  Call your clinic if there's any change in your health. This includes a scrape or scratch near the surgery site, or any signs of a cold (sore throat, runny nose, cough, rash, fever).  Eating and drinking guidelines  For your safety: Unless your surgeon tells you otherwise, follow the guidelines below.  Eat and drink as normal until 8 hours before you arrive for surgery. After that, no food or milk. You can spit out gum when you arrive.  Drink clear liquids until 2 hours before you arrive. These are liquids you can see through, like water, Gatorade, and Propel Water. They also include plain black coffee and tea (no cream or milk).  No alcohol for 24 hours before you arrive. The night before surgery, stop any drinks that contain THC.  If your care team tells you to take medicine on the morning of surgery, it's okay to take it with a sip of water. No other medicines or drugs are allowed (including CBD oil)--follow your care team's instructions.  If you have questions the day of surgery, call your hospital or surgery center.   Preventing infection  Shower or bathe the night before and the morning of surgery. Follow the instructions your clinic gave you. (If no instructions, use regular soap.)  Don't shave or clip hair near your surgery site. We'll remove the hair if needed.  Don't smoke or vape the morning of surgery. No chewing tobacco for 6 hours  before you arrive. A nicotine patch is okay. You may spit out nicotine gum when you arrive.  For some surgeries, the surgeon will tell you to fully quit smoking and nicotine.  We will make every effort to keep you safe from infection. We will:  Clean our hands often with soap and water (or an alcohol-based hand rub).  Clean the skin at your surgery site with a special soap that kills germs.  Give you a special gown to keep you warm. (Cold raises the risk of infection.)  Wear hair covers, masks, gowns, and gloves during surgery.  Give antibiotic medicine, if prescribed. Not all surgeries need this medicine.  What to bring on the day of surgery  Photo ID and insurance card  Copy of your health care directive, if you have one  Glasses and hearing aids (bring cases)  You can't wear contacts during surgery  Inhaler and eye drops, if you use them (tell us about these when you arrive)  CPAP machine or breathing device, if you use them  A few personal items, if spending the night  If you have . . .  A pacemaker, ICD (cardiac defibrillator), or other implant: Bring the ID card.  An implanted stimulator: Bring the remote control.  A legal guardian: Bring a copy of the certified (court-stamped) guardianship papers.  Please remove any jewelry, including body piercings. Leave jewelry and other valuables at home.  If you're going home the day of surgery  You must have a responsible adult drive you home. They should stay with you overnight as well.  If you don't have someone to stay with you, and you aren't safe to go home alone, we may keep you overnight. Insurance often won't pay for this.  After surgery  If it's hard to control your pain or you need more pain medicine, please call your surgeon's office.  Questions?   If you have any questions for your care team, list them here:    ____________________________________________________________________________________________________________________________________________________________________________________________________________________________________________________________  For informational purposes only. Not to replace the advice of your health care provider. Copyright   2003, 2019 [facility]. All rights reserved. Clinically reviewed by Pedro Pablo Ramon MD. [employer] [id number] - REV 08/24.

## 2025-01-22 ENCOUNTER — MYC MEDICAL ADVICE (OUTPATIENT)
Dept: FAMILY MEDICINE | Facility: CLINIC | Age: 75
End: 2025-01-22
Payer: MEDICARE

## 2025-01-22 DIAGNOSIS — K44.9 HIATAL HERNIA: ICD-10-CM

## 2025-01-22 DIAGNOSIS — K29.70 GASTRITIS WITHOUT BLEEDING, UNSPECIFIED CHRONICITY, UNSPECIFIED GASTRITIS TYPE: ICD-10-CM

## 2025-01-23 RX ORDER — OMEPRAZOLE 40 MG/1
40 CAPSULE, DELAYED RELEASE ORAL DAILY
Qty: 90 CAPSULE | Refills: 0 | Status: SHIPPED | OUTPATIENT
Start: 2025-01-23

## 2025-01-28 ENCOUNTER — MYC MEDICAL ADVICE (OUTPATIENT)
Dept: FAMILY MEDICINE | Facility: CLINIC | Age: 75
End: 2025-01-28
Payer: MEDICARE

## 2025-01-28 DIAGNOSIS — K29.70 GASTRITIS WITHOUT BLEEDING, UNSPECIFIED CHRONICITY, UNSPECIFIED GASTRITIS TYPE: Primary | ICD-10-CM

## 2025-01-28 DIAGNOSIS — I10 HYPERTENSION GOAL BP (BLOOD PRESSURE) < 130/80: ICD-10-CM

## 2025-01-28 RX ORDER — TRIAMTERENE AND HYDROCHLOROTHIAZIDE 37.5; 25 MG/1; MG/1
1 TABLET ORAL DAILY
Qty: 90 TABLET | Refills: 1 | Status: SHIPPED | OUTPATIENT
Start: 2025-01-28

## 2025-01-28 NOTE — TELEPHONE ENCOUNTER
Disp Refills Start End TYREL    omeprazole (PRILOSEC) 40 MG DR capsule 90 capsule 0 1/23/2025 -- No   Sig - Route: Take 1 capsule (40 mg) by mouth daily. On an empty stomach abut 30 minutes before breakfast - Oral       Pharmacy    [facility] - [id number] [facility]

## 2025-02-04 ENCOUNTER — PATIENT OUTREACH (OUTPATIENT)
Dept: CARE COORDINATION | Facility: CLINIC | Age: 75
End: 2025-02-04
Payer: MEDICARE

## 2025-02-24 ENCOUNTER — TRANSFERRED RECORDS (OUTPATIENT)
Dept: HEALTH INFORMATION MANAGEMENT | Facility: CLINIC | Age: 75
End: 2025-02-24
Payer: MEDICARE

## 2025-03-03 ENCOUNTER — LAB (OUTPATIENT)
Dept: LAB | Facility: CLINIC | Age: 75
End: 2025-03-03
Payer: MEDICARE

## 2025-03-03 ENCOUNTER — ANCILLARY PROCEDURE (OUTPATIENT)
Dept: MAMMOGRAPHY | Facility: CLINIC | Age: 75
End: 2025-03-03
Attending: PHYSICIAN ASSISTANT
Payer: MEDICARE

## 2025-03-03 DIAGNOSIS — I10 HYPERTENSION GOAL BP (BLOOD PRESSURE) < 130/80: ICD-10-CM

## 2025-03-03 DIAGNOSIS — Z12.31 ENCOUNTER FOR SCREENING MAMMOGRAM FOR BREAST CANCER: ICD-10-CM

## 2025-03-03 LAB
ANION GAP SERPL CALCULATED.3IONS-SCNC: 12 MMOL/L (ref 7–15)
BUN SERPL-MCNC: 9 MG/DL (ref 8–23)
CALCIUM SERPL-MCNC: 9.4 MG/DL (ref 8.8–10.4)
CHLORIDE SERPL-SCNC: 89 MMOL/L (ref 98–107)
CREAT SERPL-MCNC: 0.91 MG/DL (ref 0.51–0.95)
EGFRCR SERPLBLD CKD-EPI 2021: 66 ML/MIN/1.73M2
GLUCOSE SERPL-MCNC: 90 MG/DL (ref 70–99)
HCO3 SERPL-SCNC: 28 MMOL/L (ref 22–29)
POTASSIUM SERPL-SCNC: 4.3 MMOL/L (ref 3.4–5.3)
SODIUM SERPL-SCNC: 129 MMOL/L (ref 135–145)

## 2025-03-03 PROCEDURE — 36415 COLL VENOUS BLD VENIPUNCTURE: CPT

## 2025-03-03 PROCEDURE — 77063 BREAST TOMOSYNTHESIS BI: CPT | Mod: TC | Performed by: RADIOLOGY

## 2025-03-03 PROCEDURE — 80048 BASIC METABOLIC PNL TOTAL CA: CPT

## 2025-03-03 PROCEDURE — 77067 SCR MAMMO BI INCL CAD: CPT | Mod: TC | Performed by: RADIOLOGY

## 2025-03-04 ENCOUNTER — MYC MEDICAL ADVICE (OUTPATIENT)
Dept: FAMILY MEDICINE | Facility: CLINIC | Age: 75
End: 2025-03-04
Payer: MEDICARE

## 2025-03-24 ENCOUNTER — TRANSFERRED RECORDS (OUTPATIENT)
Dept: HEALTH INFORMATION MANAGEMENT | Facility: CLINIC | Age: 75
End: 2025-03-24
Payer: MEDICARE

## 2025-04-12 ENCOUNTER — HEALTH MAINTENANCE LETTER (OUTPATIENT)
Age: 75
End: 2025-04-12

## 2025-04-28 ENCOUNTER — MYC MEDICAL ADVICE (OUTPATIENT)
Dept: FAMILY MEDICINE | Facility: CLINIC | Age: 75
End: 2025-04-28
Payer: MEDICARE

## 2025-05-01 ENCOUNTER — TRANSFERRED RECORDS (OUTPATIENT)
Dept: HEALTH INFORMATION MANAGEMENT | Facility: CLINIC | Age: 75
End: 2025-05-01
Payer: MEDICARE

## 2025-05-09 ENCOUNTER — MYC MEDICAL ADVICE (OUTPATIENT)
Dept: FAMILY MEDICINE | Facility: CLINIC | Age: 75
End: 2025-05-09
Payer: MEDICARE

## 2025-05-14 ENCOUNTER — OFFICE VISIT (OUTPATIENT)
Dept: FAMILY MEDICINE | Facility: CLINIC | Age: 75
End: 2025-05-14
Payer: MEDICARE

## 2025-05-14 ENCOUNTER — MYC MEDICAL ADVICE (OUTPATIENT)
Dept: FAMILY MEDICINE | Facility: CLINIC | Age: 75
End: 2025-05-14

## 2025-05-14 VITALS
BODY MASS INDEX: 26.34 KG/M2 | RESPIRATION RATE: 18 BRPM | SYSTOLIC BLOOD PRESSURE: 136 MMHG | OXYGEN SATURATION: 99 % | DIASTOLIC BLOOD PRESSURE: 86 MMHG | WEIGHT: 145 LBS | TEMPERATURE: 96.9 F | HEART RATE: 83 BPM

## 2025-05-14 DIAGNOSIS — N18.31 STAGE 3A CHRONIC KIDNEY DISEASE (H): ICD-10-CM

## 2025-05-14 DIAGNOSIS — I10 HYPERTENSION GOAL BP (BLOOD PRESSURE) < 130/80: ICD-10-CM

## 2025-05-14 DIAGNOSIS — Z01.818 PREOP GENERAL PHYSICAL EXAM: Primary | ICD-10-CM

## 2025-05-14 DIAGNOSIS — E89.0 POSTSURGICAL HYPOTHYROIDISM: Chronic | ICD-10-CM

## 2025-05-14 DIAGNOSIS — Z79.60 LONG-TERM USE OF IMMUNOSUPPRESSANT MEDICATION: ICD-10-CM

## 2025-05-14 DIAGNOSIS — Z79.52 ON CORTICOSTEROID THERAPY: ICD-10-CM

## 2025-05-14 DIAGNOSIS — C53.9 MALIGNANT NEOPLASM OF CERVIX, UNSPECIFIED SITE (H): ICD-10-CM

## 2025-05-14 DIAGNOSIS — F33.1 MODERATE EPISODE OF RECURRENT MAJOR DEPRESSIVE DISORDER (H): ICD-10-CM

## 2025-05-14 DIAGNOSIS — R63.0 DECREASED APPETITE: ICD-10-CM

## 2025-05-14 DIAGNOSIS — M85.80 OSTEOPENIA, UNSPECIFIED LOCATION: ICD-10-CM

## 2025-05-14 DIAGNOSIS — K29.70 GASTRITIS WITHOUT BLEEDING, UNSPECIFIED CHRONICITY, UNSPECIFIED GASTRITIS TYPE: ICD-10-CM

## 2025-05-14 DIAGNOSIS — I48.91 ATRIAL FIBRILLATION, UNSPECIFIED TYPE (H): ICD-10-CM

## 2025-05-14 DIAGNOSIS — M35.3 PMR (POLYMYALGIA RHEUMATICA): ICD-10-CM

## 2025-05-14 DIAGNOSIS — I47.10 SUPRAVENTRICULAR TACHYCARDIA: ICD-10-CM

## 2025-05-14 DIAGNOSIS — J44.9 CHRONIC OBSTRUCTIVE PULMONARY DISEASE, UNSPECIFIED COPD TYPE (H): ICD-10-CM

## 2025-05-14 DIAGNOSIS — C51.9 VULVAR CANCER (H): ICD-10-CM

## 2025-05-14 LAB
BASOPHILS # BLD AUTO: 0.1 10E3/UL (ref 0–0.2)
BASOPHILS NFR BLD AUTO: 0 %
EOSINOPHIL # BLD AUTO: 0.3 10E3/UL (ref 0–0.7)
EOSINOPHIL NFR BLD AUTO: 3 %
ERYTHROCYTE [DISTWIDTH] IN BLOOD BY AUTOMATED COUNT: 13.4 % (ref 10–15)
HCT VFR BLD AUTO: 38.5 % (ref 35–47)
HGB BLD-MCNC: 13.6 G/DL (ref 11.7–15.7)
IMM GRANULOCYTES # BLD: 0.1 10E3/UL
IMM GRANULOCYTES NFR BLD: 1 %
LYMPHOCYTES # BLD AUTO: 2.2 10E3/UL (ref 0.8–5.3)
LYMPHOCYTES NFR BLD AUTO: 19 %
MCH RBC QN AUTO: 31.3 PG (ref 26.5–33)
MCHC RBC AUTO-ENTMCNC: 35.3 G/DL (ref 31.5–36.5)
MCV RBC AUTO: 89 FL (ref 78–100)
MONOCYTES # BLD AUTO: 0.8 10E3/UL (ref 0–1.3)
MONOCYTES NFR BLD AUTO: 7 %
NEUTROPHILS # BLD AUTO: 8.2 10E3/UL (ref 1.6–8.3)
NEUTROPHILS NFR BLD AUTO: 71 %
PLATELET # BLD AUTO: 412 10E3/UL (ref 150–450)
RBC # BLD AUTO: 4.35 10E6/UL (ref 3.8–5.2)
WBC # BLD AUTO: 11.6 10E3/UL (ref 4–11)

## 2025-05-14 PROCEDURE — 99214 OFFICE O/P EST MOD 30 MIN: CPT | Performed by: PHYSICIAN ASSISTANT

## 2025-05-14 PROCEDURE — 3075F SYST BP GE 130 - 139MM HG: CPT | Performed by: PHYSICIAN ASSISTANT

## 2025-05-14 PROCEDURE — 84443 ASSAY THYROID STIM HORMONE: CPT | Performed by: PHYSICIAN ASSISTANT

## 2025-05-14 PROCEDURE — 36415 COLL VENOUS BLD VENIPUNCTURE: CPT | Performed by: PHYSICIAN ASSISTANT

## 2025-05-14 PROCEDURE — 85025 COMPLETE CBC W/AUTO DIFF WBC: CPT | Performed by: PHYSICIAN ASSISTANT

## 2025-05-14 PROCEDURE — 93000 ELECTROCARDIOGRAM COMPLETE: CPT | Performed by: PHYSICIAN ASSISTANT

## 2025-05-14 PROCEDURE — G2211 COMPLEX E/M VISIT ADD ON: HCPCS | Performed by: PHYSICIAN ASSISTANT

## 2025-05-14 PROCEDURE — 83690 ASSAY OF LIPASE: CPT | Performed by: PHYSICIAN ASSISTANT

## 2025-05-14 PROCEDURE — 80048 BASIC METABOLIC PNL TOTAL CA: CPT | Performed by: PHYSICIAN ASSISTANT

## 2025-05-14 PROCEDURE — 3079F DIAST BP 80-89 MM HG: CPT | Performed by: PHYSICIAN ASSISTANT

## 2025-05-14 PROCEDURE — 82150 ASSAY OF AMYLASE: CPT | Performed by: PHYSICIAN ASSISTANT

## 2025-05-14 ASSESSMENT — PATIENT HEALTH QUESTIONNAIRE - PHQ9
SUM OF ALL RESPONSES TO PHQ QUESTIONS 1-9: 9
SUM OF ALL RESPONSES TO PHQ QUESTIONS 1-9: 9
10. IF YOU CHECKED OFF ANY PROBLEMS, HOW DIFFICULT HAVE THESE PROBLEMS MADE IT FOR YOU TO DO YOUR WORK, TAKE CARE OF THINGS AT HOME, OR GET ALONG WITH OTHER PEOPLE: SOMEWHAT DIFFICULT

## 2025-05-14 NOTE — TELEPHONE ENCOUNTER
Called cardiology at [facility] at [phone] and transferred to Dr. Cobos's team who has a VM for Ricardo. Left VM for Ricardo to return call to clinic at [phone].     When [facility] returns call, please relay pcp's message below and confirm fax for team to fax over EKG.     Codie Etienne RN on 5/14/2025 at 12:39 PM

## 2025-05-14 NOTE — PATIENT INSTRUCTIONS
How to Take Your Medication Before Surgery  Preoperative Medication Instructions   Antiplatelet or Anticoagulation Medication Instructions   - apixaban (Eliquis), edoxaban (Savaysa), rivaroxaban (Xarelto): Bleeding risk is low for this procedure AND CrCl (>=) 50 mL/min. DO NOT TAKE 1 day before surgery.      Additional Medication Instructions   - ACE/ARB/ARNI (lisinopril, enalapril, losartan, valsartan, olmesartan, sacubritril/valsartan) : DO NOT TAKE on day of surgery (minimum 11 hours for general anesthesia).   - Beta Blockers (atenolol, metoprolol, propranolol) : Continue taking on the day of surgery.   - Diuretics (furosemide, hydrochlorothiazide, chlorothalidone): DO NOT TAKE on the day of surgery.   - Statins (atorvastatin, simvastatin, pravastatin) : Continue taking on the day of surgery.    - SSRIs, SNRIs, TCAs, Antipsychotics: Continue without modification.        Patient Education   Preparing for Your Surgery  For Adults  Getting started  In most cases, a nurse will call to review your health history and instructions. They will give you an arrival time based on your scheduled surgery time. Please be ready to share:  Your doctor's clinic name and phone number  Your medical, surgical, and anesthesia history  A list of allergies and sensitivities  A list of medicines, including herbal treatments and over-the-counter drugs  Whether the patient has a legal guardian (ask how to send us the papers in advance)  Note: You may not receive a call if you were seen at our PAC (Preoperative Assessment Center).  Please tell us if you're pregnant--or if there's any chance you might be pregnant. Some surgeries may injure a fetus (unborn baby), so they require a pregnancy test. Surgeries that are safe for a fetus don't always need a test, and you can choose whether to have one.   Preparing for surgery  Within 10 to 30 days of surgery: Have a pre-op exam (sometimes called an H&P, or History and Physical). This can be done at  a clinic or pre-operative center.  If you're having a , you may not need this exam. Talk to your care team.  At your pre-op exam, talk to your care team about all medicines you take. (This includes CBD oil and any drugs, such as THC, marijuana, and other forms of cannabis.) If you need to stop any medicine before surgery, ask when to start taking it again.  This is for your safety. Many medicines and drugs can make you bleed too much during surgery. Some change how well surgery (anesthesia) drugs work.  Call your insurance company to let them know you're having surgery. (If you don't have insurance, call [phone].)  Call your clinic if there's any change in your health. This includes a scrape or scratch near the surgery site, or any signs of a cold (sore throat, runny nose, cough, rash, fever).  Eating and drinking guidelines  For your safety: Unless your surgeon tells you otherwise, follow the guidelines below.  Eat and drink as normal until 8 hours before you arrive for surgery. After that, no food or milk. You can spit out gum when you arrive.  Drink clear liquids until 2 hours before you arrive. These are liquids you can see through, like water, Gatorade, and Propel Water. They also include plain black coffee and tea (no cream or milk).  No alcohol for 24 hours before you arrive. The night before surgery, stop any drinks that contain THC.  If your care team tells you to take medicine on the morning of surgery, it's okay to take it with a sip of water. No other medicines or drugs are allowed (including CBD oil)--follow your care team's instructions.  If you have questions the day of surgery, call your hospital or surgery center.   Preventing infection  Shower or bathe the night before and the morning of surgery. Follow the instructions your clinic gave you. (If no instructions, use regular soap.)  Don't shave or clip hair near your surgery site. We'll remove the hair if needed.  Don't smoke or vape  the morning of surgery. No chewing tobacco for 6 hours before you arrive. A nicotine patch is okay. You may spit out nicotine gum when you arrive.  For some surgeries, the surgeon will tell you to fully quit smoking and nicotine.  We will make every effort to keep you safe from infection. We will:  Clean our hands often with soap and water (or an alcohol-based hand rub).  Clean the skin at your surgery site with a special soap that kills germs.  Give you a special gown to keep you warm. (Cold raises the risk of infection.)  Wear hair covers, masks, gowns, and gloves during surgery.  Give antibiotic medicine, if prescribed. Not all surgeries need this medicine.  What to bring on the day of surgery  Photo ID and insurance card  Copy of your health care directive, if you have one  Glasses and hearing aids (bring cases)  You can't wear contacts during surgery  Inhaler and eye drops, if you use them (tell us about these when you arrive)  CPAP machine or breathing device, if you use them  A few personal items, if spending the night  If you have . . .  A pacemaker, ICD (cardiac defibrillator), or other implant: Bring the ID card.  An implanted stimulator: Bring the remote control.  A legal guardian: Bring a copy of the certified (court-stamped) guardianship papers.  Please remove any jewelry, including body piercings. Leave jewelry and other valuables at home.  If you're going home the day of surgery  You must have a responsible adult drive you home. They should stay with you overnight as well.  If you don't have someone to stay with you, and you aren't safe to go home alone, we may keep you overnight. Insurance often won't pay for this.  After surgery  If it's hard to control your pain or you need more pain medicine, please call your surgeon's office.  Questions?   If you have any questions for your care team, list them here:    ____________________________________________________________________________________________________________________________________________________________________________________________________________________________________________________________  For informational purposes only. Not to replace the advice of your health care provider. Copyright   2003, 2019 [facility]. All rights reserved. Clinically reviewed by Pedro Pablo Ramon MD. [employer] [id number] - REV 08/24.

## 2025-05-14 NOTE — PROGRESS NOTES
Preoperative Evaluation  [facility] [place]  [address] [address]  [place] [address]  Phone: [phone]  Primary Provider: Honey García PA-C  Pre-op Performing Provider: Honey García PA-C  May 14, 2025   {Provider  Link to PREOP SmartSet  REQUIRED to apply standard patient instructions and medication directions to the AVS :412163}  {ROOMER review and update patient entered surgical information if needed :416151}        5/14/2025   Surgical Information   What procedure is being done? Cardiac Ablation    Facility or Hospital where procedure/surgery will be performed: [facility]    Who is doing the procedure / surgery? Dr. Cobos    Date of surgery / procedure: 5/23/25    Time of surgery / procedure: TBD    Where do you plan to recover after surgery? Other - Might stay overnight might be sent home         Proxy-reported     Fax number for surgical facility: [phone]    {Provider Charting Preference for Preop :435769}    Dutch Shipman is a 74 year old, presenting for the following:  Pre-Op Exam    {ALERT  Recent PHQ-9/EPDS score indicates suicidal ideations, document follow-up within the A/P section of the note :568725}{Provider Documentation  Link to C-SSRS ([facility]) Flowsheet :860466}      5/14/2025     8:29 AM   Additional Questions   Roomed by Yocasta   Accompanied by Self         5/14/2025     8:29 AM   Patient Reported Additional Medications   Patient reports taking the following new medications NA     HPI: Atrial Tachycardia       Still feeling nauseated and decreased appetite.   She doesn't eat much.  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  She was previously eating a nutrition shake when she would go to the shelter, however she is not doing that anymore.  Has them at home.  Will typically just have toast in am.  Doesn't have much of an appetite and has lost 4 lbs.     Balance has been off, she fell yesterday.  She  states she was walking and not paying attention.  She has bruising today, no areas of specific tenderness.  Did not hit her head.   Doesn't have a medical alert system and she does not want one (does not want to carry phone around either).       Worried about her health, feels overwhelmed.     PMR: managed by rheumatology.  Currently on 5 mg prednisone daily, unable to taper off this (as it will flare shoulder pain up drastically).  Also on methotrexate.  Plan is to leave prednisone at 5 mg for a while at this point.         5/14/2025   Pre-Op Questionnaire   Have you ever had a heart attack or stroke? No    Have you ever had surgery on your heart or blood vessels, such as a stent placement, a coronary artery bypass, or surgery on an artery in your head, neck, heart, or legs? No    Do you have chest pain with activity? No    Do you have a history of heart failure? No    Do you currently have a cold, bronchitis or symptoms of other infection? No    Do you have a cough, shortness of breath, or wheezing? (!) YES - Chronic Cough    Do you or anyone in your family have previous history of blood clots? No    Do you or does anyone in your family have a serious bleeding problem such as prolonged bleeding following surgeries or cuts? No    Have you ever had problems with anemia or been told to take iron pills? No    Have you had any abnormal blood loss such as black, tarry or bloody stools, or abnormal vaginal bleeding? No    Have you ever had a blood transfusion? (!) YES    Have you ever had a transfusion reaction? No    Are you willing to have a blood transfusion if it is medically needed before, during, or after your surgery? Yes    Have you or any of your relatives ever had problems with anesthesia? (!) YES - Nausea     Do you have sleep apnea, excessive snoring or daytime drowsiness? (!) YES    Do you have a CPAP machine? Yes    Do you have any artifical heart valves or other implanted medical devices like a pacemaker,  defibrillator, or continuous glucose monitor? No    Do you have artificial joints? (!) YES - Bilateral Knees   Are you allergic to latex? No        Proxy-reported     Advance Care Planning  {The storyboard will display whether the patient has ACP docs on file. Hover over the Code section in the storyboard to access the ACP documents. :318806}  {(AWV REQUIRED) Advance Care Planning Reviewed:808447}    Preoperative Review of   {Mnpmpreport:825149}  {Review MNPMP for all patients per [facility] MNPMP Profile:582772}    {Chronic problem details (Optional) :302766}    Patient Active Problem List    Diagnosis Date Noted    On corticosteroid therapy 07/17/2024     Priority: Medium    Long-term use of immunosuppressant medication 04/22/2024     Priority: Medium    Recurrent major depressive disorder, in full remission 04/22/2024     Priority: Medium    Supraventricular tachycardia 10/12/2023     Priority: Medium    Gastritis without bleeding, unspecified chronicity, unspecified gastritis type 10/12/2023     Priority: Medium    HL (hearing loss) 07/06/2021     Priority: Medium    Atrial tachycardia, paroxysmal 03/29/2021     Priority: Medium    Tension headache 10/13/2020     Priority: Medium    Cervical adenopathy 11/05/2019     Priority: Medium    Senile cataract of left eye, unspecified age-related cataract type 10/07/2019     Priority: Medium    HSV (herpes simplex virus) infection 10/07/2019     Priority: Medium    Pilonidal cyst without infection 10/07/2019     Priority: Medium    Cervical stenosis of spinal canal 04/08/2019     Priority: Medium    Postmenopausal status 11/06/2018     Priority: Medium    History of colonic polyps 10/11/2017     Priority: Medium     Repeat colonoscopy 5 years, 10/6/2022      Polyp of colon 10/06/2017     Priority: Medium    Malignant neoplasm of cervix, unspecified site (H) 10/10/2016     Priority: Medium    Vulvar cancer (H) 10/10/2016     Priority: Medium    Cervical cancer (H)  03/23/2015     Priority: Medium     If neg, pt should continue Pap/HRHPV q3-5 yrs until 2027 (age 77) and yearly pelvic exams.       Chronic obstructive pulmonary disease, unspecified COPD type (H) 10/10/2013     Priority: Medium    s/p B TKA 6/27 07/01/2013     Priority: Medium    Former smoker 03/13/2013     Priority: Medium    Liver hemangioma 09/04/2012     Priority: Medium    CKD (chronic kidney disease) stage 3, GFR 30-59 ml/min (H) 02/23/2012     Priority: Medium    Major depression in complete remission (H) 02/21/2012     Priority: Medium    Hyperlipidemia LDL goal <130 02/21/2012     Priority: Medium    Hypertension goal BP (blood pressure) < 140/90 02/21/2012     Priority: Medium    Chronic neck pain 05/25/2011     Priority: Medium    GERD (gastroesophageal reflux disease)      Priority: Medium    Eczema      Priority: Medium    Hiatal hernia      Priority: Medium    OA (osteoarthritis)      Priority: Medium    Osteopenia      Priority: Medium     Fosamax discontinued 3/23/15, drug holiday.  Will recheck DEXA in 2-3 years (1959-1438).       S/P LEEP of cervix 06/06/2007     Priority: Medium     4/03: LSIL, 6/03: ECC WNL  NIL paps: 6/04, 8/05 12/5: FELICIA II. Referred to gyn onc 1/06: FELICIA II & III  12/06: ASCUS pap  3/07: ASCUS, + HPV 16. 5/07; [facility] - BULMARO II  6/07: LEEP - no dysplasia  NIL paps: 11/07, 5/08, 11/08, 12/09  6/10: Neg vulvar bx  NIL paps: 1/11, 1/12, 2/13.  Plan pap in 1 yr.  2/21/14 pap NIL/neg HPV. Plan-- repeat pap and HPV in 1 year.   3/23/15 pap NIL/neg HPV. 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    on colp - leep path WNL    COPD (chronic obstructive pulmonary disease) (H)     Depression 01/01/1990    Depressive  disorder 25 yrs    Eczema     GERD (gastroesophageal reflux disease)     Hiatal hernia     History of blood transfusion 01/01/2013    Post op    HTN (hypertension)     Hypercholesterolemia     Infection due to 2019 novel coronavirus 03/2022    Liver hemangioma 09/04/2012    OA (osteoarthritis)     Osteopenia     Papillary thyroid carcinoma (H) 01/01/2000    BL, MF; Tx in 2 operations, RRA    PMR (polymyalgia rheumatica) 11/2023    PONV (postoperative nausea and vomiting)     Postsurgical hypothyroidism 01/01/2000    FELICIA III (vulvar intraepithelial neoplasia III) 01/01/2006    wide local exc x 2     Past Surgical History:   Procedure Laterality Date    ABDOMEN SURGERY      Teenager    APPENDECTOMY OPEN  01/01/1968    ARTHROPLASTY MINIMALLY INVASIVE KNEE BILATERAL  06/27/2013    Procedure: ARTHROPLASTY MINIMALLY INVASIVE KNEE BILATERAL;  Minimally Invasive Bilateral Total Knee Arthroplasty;  Surgeon: Christophe Welch MD;  Location: [facility]    BALLOON COMPRESSION RHIZOTOMY      by spine specialist    BIOPSY  Ongoing    Lymph nodes neck    BONE MARROW ASPIRATION ONLY Left 12/07/2015    Procedure: BONE MARROW ASPIRATION ONLY;  Surgeon: Aguilar Roldan MD;  Location: [facility] OR    CARPAL TUNNEL RELEASE RT/LT      COLONOSCOPY  01/01/2007    neg    COLONOSCOPY WITH CO2 INSUFFLATION N/A 9/5/2023    Procedure: Colonoscopy with CO2 insufflation;  Surgeon: Praful Waddell DO;  Location: [facility] OR    COLPOSCOPY CERVIX, BIOPSY CERVIX, ENDOCERVICAL CURETTAGE, COMBINED  2003, 2007    COMBINED ESOPHAGOSCOPY, GASTROSCOPY, DUODENOSCOPY (EGD) WITH CO2 INSUFFLATION N/A 7/25/2023    Procedure: Combined Esophagoscopy, Gastroscopy, Duodenoscopy (Egd) With Co2 Insufflation;  Surgeon: Elsie Cary DO;  Location: [facility] OR    completion thyroidectomy Right 03/14/2000    CYSTECTOMY OVARIAN BENIGN  01/01/1969    ESOPHAGOSCOPY, GASTROSCOPY, DUODENOSCOPY (EGD), COMBINED N/A 08/28/2014    Procedure: COMBINED ESOPHAGOSCOPY, GASTROSCOPY,  DUODENOSCOPY (EGD), BIOPSY SINGLE OR MULTIPLE;  Surgeon: Kelvin Montgomery MD;  Location: [facility]    ESOPHAGOSCOPY, GASTROSCOPY, DUODENOSCOPY (EGD), COMBINED N/A 7/25/2023    Procedure: ESOPHAGOGASTRODUODENOSCOPY, WITH BIOPSY;  Surgeon: Elsie Cary DO;  Location: [facility]    FUSION CERVICAL ANTERIOR THREE+ LEVELS N/A 02/26/2015    C4-C7    FUSION CERVICAL ANTERIOR THREE+ LEVELS WITH BONE ALLOGRAFT N/A 12/07/2015    Procedure: FUSION CERVICAL ANTERIOR THREE+ LEVELS WITH BONE ALLOGRAFT;  Surgeon: Aguilar Roldan MD;  Location: [facility] OR    HAND SURGERY      HEAD & NECK SURGERY  01/01/2000    Thyroidectomy    KNEE SURGERY  2001 & 2005    bilat    LAPAROSCOPIC CHOLECYSTECTOMY  01/01/1998    LEEP TX, CERVICAL  01/01/2007    BULMARO II on colp, leep path WNL    left thyroid total lobectomy, isthmusectomy and 50% right thyroid lobectomy Left 03/07/2000    TUBAL LIGATION  01/01/1988    VULVECTOMY SIMPLE  01/01/2006    FELICIA 3, wide local excision x 2    VULVECTOMY SIMPLE       Current Outpatient Medications   Medication Sig Dispense Refill    alendronate (FOSAMAX) 70 MG tablet Take 1 tablet (70 mg) by mouth every 7 days. Take 60 minutes before am meal with 8 oz. water. Remain upright for 30 minutes. 12 tablet 4    apixaban ANTICOAGULANT (ELIQUIS) 5 MG tablet Take 1 tablet (5 mg) by mouth 2 times daily. Further refills through cardiology 60 tablet 0    atorvastatin (LIPITOR) 40 MG tablet Take 1 tablet (40 mg) by mouth daily. 90 tablet 3    Calcium Citrate (CITRACAL OR) Take 1,200 mg by mouth daily      cetirizine (ZYRTEC) 10 MG tablet Take 1 tablet (10 mg) by mouth daily 90 tablet 1    Cholecalciferol (VITAMIN D) 1000 UNIT capsule Take 1 capsule by mouth daily.      FLUoxetine (PROZAC) 20 MG capsule Take 3 capsules (60 mg) by mouth daily. 270 capsule 3    folic acid (FOLVITE) 1 MG tablet Take 1 mg by mouth daily.      hypromellose (ARTIFICIAL TEARS) 0.5 % SOLN Place 1 drop into both eyes every 4 hours as needed        levothyroxine (SYNTHROID/LEVOTHROID) 100 MCG tablet MON to SAT 1 tablet/day; SUN 0.5 tablet 90 tablet 4    lisinopril (ZESTRIL) 5 MG tablet Take 1 tablet (5 mg) by mouth daily. 90 tablet 1    Magnesium Glycinate 100 MG CAPS Take 400 mg by mouth daily      methocarbamol (ROBAXIN) 500 MG tablet Take 500 mg by mouth every 6 hours as needed for muscle spasms      methotrexate 2.5 MG tablet Take 17.5 mg by mouth every 7 days.      metoprolol tartrate (LOPRESSOR) 50 MG tablet Take 1 tablet (50 mg) by mouth 2 times daily. Refills through cardiology 60 tablet 0    multivitamin w/minerals (THERA-VIT-M) tablet Take 1 tablet by mouth daily      omeprazole (PRILOSEC) 20 MG DR capsule Take 1 capsule (20 mg) by mouth daily. 90 capsule 1    omeprazole (PRILOSEC) 40 MG DR capsule Take 1 capsule (40 mg) by mouth daily. On an empty stomach abut 30 minutes before breakfast 90 capsule 0    predniSONE (DELTASONE) 1 MG tablet       senna-docusate (SENOKOT-S/PERICOLACE) 8.6-50 MG tablet Take 2 tablets by mouth daily      sodium fluoride dental gel (PREVIDENT) 1.1 % GEL topical gel Apply to affected area At Bedtime 100 mL 3    triamcinolone (KENALOG) 0.1 % external ointment Apply to affected area of the skin twice a day after using a moisturizer. Can do wet wraps with this. 454 g 1    triamterene-HCTZ (MAXZIDE-25) 37.5-25 MG tablet Take 1 tablet by mouth daily. 90 tablet 1    vitamin C (ASCORBIC ACID) 500 MG tablet Take 500 mg by mouth daily      flecainide (TAMBOCOR) 50 MG tablet Take 1 tablet (50 mg) by mouth every 12 hours. Further refills from cardiology 60 tablet 0    polyethylene glycol-propylene glycol PF (SYSTANE PRESERVATIVE FREE) 0.4-0.3 % SOLN opthalmic solution Apply 1 drop to eye at bedtime.         No Known Allergies     Social History     Tobacco Use    Smoking status: Former     Current packs/day: 0.00     Average packs/day: 1 pack/day for 45.0 years (45.0 ttl pk-yrs)     Types: Cigarettes     Start date: 8/1/1967     Quit  "date: 2012     Years since quittin.7     Passive exposure: Never    Smokeless tobacco: Never    Tobacco comments:     quitting with e cigarette   Substance Use Topics    Alcohol use: Yes     Comment: less than weekly     {FAMILY HISTORY (Optional):324481808}  History   Drug Use No           {ROS Picklists (Optional):300272}    Objective    /86 (BP Location: Left arm, Patient Position: Chair, Cuff Size: Adult Regular)   Pulse 83   Temp 96.9  F (36.1  C) (Temporal)   Resp 18   Wt 65.8 kg (145 lb)   LMP  (LMP Unknown)   SpO2 99%   BMI 26.34 kg/m     Estimated body mass index is 26.34 kg/m  as calculated from the following:    Height as of 25: 1.58 m (5' 2.21\").    Weight as of this encounter: 65.8 kg (145 lb).  Physical Exam  {Exam List :909655}    Recent Labs   Lab Test 25  0945 10/01/24  0940 24  1358   * 138  --    POTASSIUM 4.3 3.8  --    CR 0.91 0.95  --    A1C  --   --  5.3        Diagnostics  {LABS:800021}   {EK}    Revised Cardiac Risk Index (RCRI)  The patient has the following serious cardiovascular risks for perioperative complications:  {PREOP REVISED CARDIAC RISK INDEX (RCRI) :483816}     RCRI Interpretation: {REVISED CARDIAC RISK INTERPRETATION :590983}         Signed Electronically by: Honey García PA-C  A copy of this evaluation report is provided to the requesting physician.    {Provider Resources  Preop [facility] Preop Guidelines  Revised Cardiac Risk Index :942965}   {Email feedback regarding this note to [email or web address]   :645109}  " needed       levothyroxine (SYNTHROID/LEVOTHROID) 100 MCG tablet MON to SAT 1 tablet/day; SUN 0.5 tablet 90 tablet 4    lisinopril (ZESTRIL) 5 MG tablet Take 1 tablet (5 mg) by mouth daily. 90 tablet 1    Magnesium Glycinate 100 MG CAPS Take 400 mg by mouth daily      methocarbamol (ROBAXIN) 500 MG tablet Take 500 mg by mouth every 6 hours as needed for muscle spasms      methotrexate 2.5 MG tablet Take 17.5 mg by mouth every 7 days.      metoprolol tartrate (LOPRESSOR) 50 MG tablet Take 1 tablet (50 mg) by mouth 2 times daily. Refills through cardiology 60 tablet 0    multivitamin w/minerals (THERA-VIT-M) tablet Take 1 tablet by mouth daily      omeprazole (PRILOSEC) 20 MG DR capsule Take 1 capsule (20 mg) by mouth daily. 90 capsule 1    omeprazole (PRILOSEC) 40 MG DR capsule Take 1 capsule (40 mg) by mouth daily. On an empty stomach abut 30 minutes before breakfast 90 capsule 0    predniSONE (DELTASONE) 1 MG tablet       senna-docusate (SENOKOT-S/PERICOLACE) 8.6-50 MG tablet Take 2 tablets by mouth daily      sodium fluoride dental gel (PREVIDENT) 1.1 % GEL topical gel Apply to affected area At Bedtime 100 mL 3    triamcinolone (KENALOG) 0.1 % external ointment Apply to affected area of the skin twice a day after using a moisturizer. Can do wet wraps with this. 454 g 1    triamterene-HCTZ (MAXZIDE-25) 37.5-25 MG tablet Take 1 tablet by mouth daily. 90 tablet 1    vitamin C (ASCORBIC ACID) 500 MG tablet Take 500 mg by mouth daily      flecainide (TAMBOCOR) 50 MG tablet Take 1 tablet (50 mg) by mouth every 12 hours. Further refills from cardiology 60 tablet 0    polyethylene glycol-propylene glycol PF (SYSTANE PRESERVATIVE FREE) 0.4-0.3 % SOLN opthalmic solution Apply 1 drop to eye at bedtime.         No Known Allergies     Social History     Tobacco Use    Smoking status: Former     Current packs/day: 0.00     Average packs/day: 1 pack/day for 45.0 years (45.0 ttl pk-yrs)     Types: Cigarettes     Start date:  "1967     Quit date: 2012     Years since quittin.7     Passive exposure: Never    Smokeless tobacco: Never    Tobacco comments:     quitting with e cigarette   Substance Use Topics    Alcohol use: Yes     Comment: less than weekly     Family History   Problem Relation Age of Onset    Blood Disease Mother 75        thrombocythemia on hydrea    Hypertension Mother     Cerebrovascular Disease Mother         TIA    Anesthesia Reaction Mother         N/V    Osteoporosis Mother     C.A.D. Father 61        three MI's, smoker    Hypertension Father     Coronary Artery Disease Father     Diabetes Paternal Aunt     Cancer - colorectal Maternal Grandmother         unsure of her age.    Breast Cancer No family hx of     Asthma No family hx of      History   Drug Use No             Review of Systems  Constitutional, neuro, ENT, endocrine, pulmonary, cardiac, gastrointestinal, genitourinary, musculoskeletal, integument and psychiatric systems are negative, except as otherwise noted.    Objective    /86 (BP Location: Left arm, Patient Position: Chair, Cuff Size: Adult Regular)   Pulse 83   Temp 96.9  F (36.1  C) (Temporal)   Resp 18   Wt 65.8 kg (145 lb)   LMP  (LMP Unknown)   SpO2 99%   BMI 26.34 kg/m     Estimated body mass index is 26.34 kg/m  as calculated from the following:    Height as of 25: 1.58 m (5' 2.21\").    Weight as of this encounter: 65.8 kg (145 lb).  Physical Exam  GENERAL: alert and no distress  EYES: Eyes grossly normal to inspection, PERRL and conjunctivae and sclerae normal  HENT: ear canals and TM's normal, nose and mouth without ulcers or lesions  NECK: no adenopathy, no asymmetry, masses, or scars  RESP: lungs clear to auscultation - no rales, rhonchi or wheezes  CV: regular rate and rhythm, normal S1 S2, no S3 or S4, no murmur, click or rub, no peripheral edema  ABDOMEN: soft, nontender, no hepatosplenomegaly, no masses and bowel sounds normal  MS: no gross musculoskeletal " defects noted, no edema  SKIN: no suspicious lesions or rashes  NEURO: Normal strength and tone, mentation intact and speech normal  PSYCH: mentation appears normal, affect normal/bright    Recent Labs   Lab Test 03/03/25  0945 10/01/24  0940 07/17/24  1358   * 138  --    POTASSIUM 4.3 3.8  --    CR 0.91 0.95  --    A1C  --   --  5.3        Diagnostics  Recent Results (from the past week)   Basic metabolic panel  (Ca, Cl, CO2, Creat, Gluc, K, Na, BUN)    Collection Time: 05/14/25  9:25 AM   Result Value Ref Range    Sodium 128 (L) 135 - 145 mmol/L    Potassium 4.0 3.4 - 5.3 mmol/L    Chloride 89 (L) 98 - 107 mmol/L    Carbon Dioxide (CO2) 28 22 - 29 mmol/L    Anion Gap 11 7 - 15 mmol/L    Urea Nitrogen 8.9 8.0 - 23.0 mg/dL    Creatinine 0.87 0.51 - 0.95 mg/dL    GFR Estimate 70 >60 mL/min/1.73m2    Calcium 9.2 8.8 - 10.4 mg/dL    Glucose 94 70 - 99 mg/dL   Lipase    Collection Time: 05/14/25  9:25 AM   Result Value Ref Range    Lipase 100 (H) 13 - 60 U/L   Amylase    Collection Time: 05/14/25  9:25 AM   Result Value Ref Range    Amylase 80 28 - 100 U/L   TSH    Collection Time: 05/14/25  9:25 AM   Result Value Ref Range    TSH 3.25 0.30 - 4.20 uIU/mL   CBC with platelets and differential    Collection Time: 05/14/25  9:25 AM   Result Value Ref Range    WBC Count 11.6 (H) 4.0 - 11.0 10e3/uL    RBC Count 4.35 3.80 - 5.20 10e6/uL    Hemoglobin 13.6 11.7 - 15.7 g/dL    Hematocrit 38.5 35.0 - 47.0 %    MCV 89 78 - 100 fL    MCH 31.3 26.5 - 33.0 pg    MCHC 35.3 31.5 - 36.5 g/dL    RDW 13.4 10.0 - 15.0 %    Platelet Count 412 150 - 450 10e3/uL    % Neutrophils 71 %    % Lymphocytes 19 %    % Monocytes 7 %    % Eosinophils 3 %    % Basophils 0 %    % Immature Granulocytes 1 %    Absolute Neutrophils 8.2 1.6 - 8.3 10e3/uL    Absolute Lymphocytes 2.2 0.8 - 5.3 10e3/uL    Absolute Monocytes 0.8 0.0 - 1.3 10e3/uL    Absolute Eosinophils 0.3 0.0 - 0.7 10e3/uL    Absolute Basophils 0.1 0.0 - 0.2 10e3/uL    Absolute Immature  Granulocytes 0.1 <=0.4 10e3/uL      EKG: appears normal, NSR, normal axis, normal intervals, no acute ST/T changes c/w ischemia, no LVH by voltage criteria, nonspecific ST-T changes    EKG was faxed to cardiology office, as she has been feeling more palpitations recently.  She feels this is because she hasn't eaten much and felt better later that day.    She is to go to the ER if symptoms change or worsen.       Revised Cardiac Risk Index (RCRI)  The patient has the following serious cardiovascular risks for perioperative complications:   - No serious cardiac risks = 0 points     RCRI Interpretation: 0 points: Class I (very low risk - 0.4% complication rate)         Signed Electronically by: Honey García PA-C  A copy of this evaluation report is provided to the requesting physician.

## 2025-05-14 NOTE — TELEPHONE ENCOUNTER
Please print EKG that was done today and fax over to cardiology team.    Please speak with nurse and let them know she has been feeling nauseated and weak.  Ablation is scheduled for 5/23/25.  Does she need any f/up with her current EKG and symptoms?    Honey García PA-C

## 2025-05-15 LAB
AMYLASE SERPL-CCNC: 80 U/L (ref 28–100)
ANION GAP SERPL CALCULATED.3IONS-SCNC: 11 MMOL/L (ref 7–15)
BUN SERPL-MCNC: 8.9 MG/DL (ref 8–23)
CALCIUM SERPL-MCNC: 9.2 MG/DL (ref 8.8–10.4)
CHLORIDE SERPL-SCNC: 89 MMOL/L (ref 98–107)
CREAT SERPL-MCNC: 0.87 MG/DL (ref 0.51–0.95)
EGFRCR SERPLBLD CKD-EPI 2021: 70 ML/MIN/1.73M2
GLUCOSE SERPL-MCNC: 94 MG/DL (ref 70–99)
HCO3 SERPL-SCNC: 28 MMOL/L (ref 22–29)
LIPASE SERPL-CCNC: 100 U/L (ref 13–60)
POTASSIUM SERPL-SCNC: 4 MMOL/L (ref 3.4–5.3)
SODIUM SERPL-SCNC: 128 MMOL/L (ref 135–145)
TSH SERPL DL<=0.005 MIU/L-ACNC: 3.25 UIU/ML (ref 0.3–4.2)

## 2025-05-16 NOTE — TELEPHONE ENCOUNTER
Called [facility] at the number below.    Fax for EKG is [phone] ATTN Dr. Cobos. (Faxed it to him)    Called Ricardo at the number below.     Did they answer the phone: No, left a message on voicemail to return call to the [facility] at [phone], and to ask for any available triage nurse.        Mallorie SANTOYO RN  Triage Nurse  [facility]

## 2025-05-19 NOTE — TELEPHONE ENCOUNTER
"Looks like cardiology RN attempted to call us back:    Miscellaneous Notes  - documented in this encounter   Telephone Encounter - Ricardo Ponce RN - 05/16/2025 3:33 PM CDT  Formatting of this note might be different from the original.  Received call from [facility] regarding pt and \"symptoms\" per PCP. Attempted to call back, could not stay on hold longer than 10 minutes, disconnected the call to attempt again later.    I called [facility] at [phone], chose option 2.    Reception staff says Ricardo might be at lunch but gave me Ricardo's direct line to try:   [phone].   I called that number, no answer, left message requesting Ricardo call to talk to a triage nurse about this patient at [phone].    Bee ROWELL RN  [facility] Triage      "

## 2025-05-20 NOTE — TELEPHONE ENCOUNTER
Called Ricardo at the number below.    Did they answer the phone: No, left a message on voicemail to return call to the [facility] at [phone], and to ask for any available triage nurse.  (RN did not leave specific details on voicemail for confidential reasons)      Mallorie RINCONN RN  Triage Nurse  [facility]

## 2025-05-22 NOTE — TELEPHONE ENCOUNTER
I called and spoke to patient, she says she is actually waiting for a call back from Ricardo with Dr. Cobos's office today.      She says she will discuss her nausea with Ricardo, assumes they might do some lab work before the ablation tomorrow.   She will tell Ricardo to call us if she needs anything else sent regarding the preop but [facility] should be able to see records in Care Everywhere.    Closing encounter.    Bee ROWELL RN  [facility] Triage

## 2025-05-27 ENCOUNTER — MYC MEDICAL ADVICE (OUTPATIENT)
Dept: FAMILY MEDICINE | Facility: CLINIC | Age: 75
End: 2025-05-27
Payer: MEDICARE

## 2025-06-07 ENCOUNTER — MYC MEDICAL ADVICE (OUTPATIENT)
Dept: FAMILY MEDICINE | Facility: CLINIC | Age: 75
End: 2025-06-07
Payer: MEDICARE

## 2025-06-09 ENCOUNTER — TELEPHONE (OUTPATIENT)
Dept: FAMILY MEDICINE | Facility: CLINIC | Age: 75
End: 2025-06-09
Payer: MEDICARE

## 2025-06-09 NOTE — TELEPHONE ENCOUNTER
Henny calling for verbal orders per below, RN unable to approve the PICC line cares and lab orders so PCP will need to approve those as well as address med concern here.    Henny notes Excedrine (taken PRN for headaches) and Eliquis interaction.    Routed to PCP Honey García to address, okay to take excedrine PRN or is alternate advised?    Okay to leave detailed message on Henny's secure voicemail at [phone].    Bee ROWELL RN  [facility] Triage

## 2025-06-09 NOTE — TELEPHONE ENCOUNTER
Detailed voicemail left with provider's message as written.     MCKENZIE MichaelN RN  [facility], [place]

## 2025-06-09 NOTE — TELEPHONE ENCOUNTER
Home Care is calling regarding an established patient with [facility].  Requesting orders from: Honey García  PROVIDER AUTHORIZATION REQUIRED: RN unable to provide verbal approval for all orders.  See below for additional information.  RN will contact Home care with information after provider review.  Is this a request for a temporary pause in the home care episode?  No    Orders Requested    Skilled Nursing  Request for initial certification (first set of orders)   Frequency: twice a week for 2 weeks, then once a week for 3 weeks, plus 2 PRN  for PICC line issues    RN gave verbal order: Yes      PICC line dressing changes weekly starting 6/11/25 (plus or minus 2 days), also collect labs via PICC line:  CBC with diff, creatinine, AST, CRP, ESR weekly along with PICC line dressing changes; results will be faxed to [facility], attn: CATHERINE Fox, at fax # [phone].    [facility] should be faxing results to ID.  Routed to PCP to address PICC line and lab orders.    Phone number Home Care can be reached at: [phone]  Okay to leave a detailed message?: Yes    Contacts       Contact Date/Time Type Contact Phone/Fax    06/09/2025 03:46 PM CDT Phone (Incoming) AWA Inman with [facility] [phone]          Bee Kimble RN

## 2025-06-10 DIAGNOSIS — C73 PAPILLARY THYROID CARCINOMA (H): Chronic | ICD-10-CM

## 2025-06-10 DIAGNOSIS — E89.0 POSTSURGICAL HYPOTHYROIDISM: Chronic | ICD-10-CM

## 2025-06-10 RX ORDER — LEVOTHYROXINE SODIUM 100 UG/1
TABLET ORAL
Qty: 90 TABLET | Refills: 4 | Status: SHIPPED | OUTPATIENT
Start: 2025-06-10

## 2025-06-11 ENCOUNTER — OFFICE VISIT (OUTPATIENT)
Dept: FAMILY MEDICINE | Facility: CLINIC | Age: 75
End: 2025-06-11
Payer: MEDICARE

## 2025-06-11 ENCOUNTER — MYC MEDICAL ADVICE (OUTPATIENT)
Dept: FAMILY MEDICINE | Facility: CLINIC | Age: 75
End: 2025-06-11

## 2025-06-11 VITALS
HEIGHT: 62 IN | BODY MASS INDEX: 27.23 KG/M2 | HEART RATE: 87 BPM | RESPIRATION RATE: 18 BRPM | SYSTOLIC BLOOD PRESSURE: 132 MMHG | DIASTOLIC BLOOD PRESSURE: 74 MMHG | TEMPERATURE: 97.3 F | WEIGHT: 148 LBS | OXYGEN SATURATION: 98 %

## 2025-06-11 DIAGNOSIS — I10 HYPERTENSION GOAL BP (BLOOD PRESSURE) < 130/80: ICD-10-CM

## 2025-06-11 DIAGNOSIS — N18.31 STAGE 3A CHRONIC KIDNEY DISEASE (H): ICD-10-CM

## 2025-06-11 DIAGNOSIS — C53.9 MALIGNANT NEOPLASM OF CERVIX, UNSPECIFIED SITE (H): ICD-10-CM

## 2025-06-11 DIAGNOSIS — F33.42 RECURRENT MAJOR DEPRESSIVE DISORDER, IN FULL REMISSION: ICD-10-CM

## 2025-06-11 DIAGNOSIS — N28.1 RENAL CYST: ICD-10-CM

## 2025-06-11 DIAGNOSIS — E87.8 ELECTROLYTE DISTURBANCE: ICD-10-CM

## 2025-06-11 DIAGNOSIS — R65.20 SEVERE SEPSIS (H): Primary | ICD-10-CM

## 2025-06-11 DIAGNOSIS — R78.81 MSSA BACTEREMIA: ICD-10-CM

## 2025-06-11 DIAGNOSIS — I48.91 ATRIAL FIBRILLATION, UNSPECIFIED TYPE (H): ICD-10-CM

## 2025-06-11 DIAGNOSIS — A41.9 SEVERE SEPSIS (H): Primary | ICD-10-CM

## 2025-06-11 DIAGNOSIS — B95.61 MSSA BACTEREMIA: ICD-10-CM

## 2025-06-11 DIAGNOSIS — J15.211 PNEUMONIA OF BOTH LUNGS DUE TO METHICILLIN SUSCEPTIBLE STAPHYLOCOCCUS AUREUS (MSSA), UNSPECIFIED PART OF LUNG (H): ICD-10-CM

## 2025-06-11 DIAGNOSIS — C51.9 VULVAR CANCER (H): ICD-10-CM

## 2025-06-11 LAB
ERYTHROCYTE [DISTWIDTH] IN BLOOD BY AUTOMATED COUNT: 14.3 % (ref 10–15)
ERYTHROCYTE [SEDIMENTATION RATE] IN BLOOD BY WESTERGREN METHOD: 81 MM/HR (ref 0–30)
HCT VFR BLD AUTO: 30.9 % (ref 35–47)
HGB BLD-MCNC: 10 G/DL (ref 11.7–15.7)
MCH RBC QN AUTO: 29.7 PG (ref 26.5–33)
MCHC RBC AUTO-ENTMCNC: 32.4 G/DL (ref 31.5–36.5)
MCV RBC AUTO: 92 FL (ref 78–100)
PLATELET # BLD AUTO: 697 10E3/UL (ref 150–450)
RBC # BLD AUTO: 3.37 10E6/UL (ref 3.8–5.2)
WBC # BLD AUTO: 9.7 10E3/UL (ref 4–11)

## 2025-06-11 PROCEDURE — 85652 RBC SED RATE AUTOMATED: CPT | Performed by: PHYSICIAN ASSISTANT

## 2025-06-11 PROCEDURE — 3075F SYST BP GE 130 - 139MM HG: CPT | Performed by: PHYSICIAN ASSISTANT

## 2025-06-11 PROCEDURE — 3078F DIAST BP <80 MM HG: CPT | Performed by: PHYSICIAN ASSISTANT

## 2025-06-11 PROCEDURE — 83735 ASSAY OF MAGNESIUM: CPT | Performed by: PHYSICIAN ASSISTANT

## 2025-06-11 PROCEDURE — 99495 TRANSJ CARE MGMT MOD F2F 14D: CPT | Performed by: PHYSICIAN ASSISTANT

## 2025-06-11 PROCEDURE — 86140 C-REACTIVE PROTEIN: CPT | Performed by: PHYSICIAN ASSISTANT

## 2025-06-11 PROCEDURE — 1111F DSCHRG MED/CURRENT MED MERGE: CPT | Performed by: PHYSICIAN ASSISTANT

## 2025-06-11 PROCEDURE — 85027 COMPLETE CBC AUTOMATED: CPT | Performed by: PHYSICIAN ASSISTANT

## 2025-06-11 PROCEDURE — 80053 COMPREHEN METABOLIC PANEL: CPT | Performed by: PHYSICIAN ASSISTANT

## 2025-06-11 PROCEDURE — 36415 COLL VENOUS BLD VENIPUNCTURE: CPT | Performed by: PHYSICIAN ASSISTANT

## 2025-06-11 RX ORDER — LISINOPRIL 40 MG/1
40 TABLET ORAL DAILY
Qty: 90 TABLET | Refills: 1 | Status: SHIPPED | OUTPATIENT
Start: 2025-06-11

## 2025-06-11 RX ORDER — MAGNESIUM GLYCINATE 100 MG
800 CAPSULE ORAL AT BEDTIME
COMMUNITY
Start: 2025-06-11

## 2025-06-11 RX ORDER — METOPROLOL TARTRATE 50 MG
50 TABLET ORAL 2 TIMES DAILY
Qty: 180 TABLET | Refills: 1 | Status: SHIPPED | OUTPATIENT
Start: 2025-06-11

## 2025-06-11 NOTE — PATIENT INSTRUCTIONS
Resume lipitor June 24, 2025    Resume methotrexate after you talk with Rheumatology.     Discuss with renal specialist  CT scan in hospital:   Complex 2.9 cm cystic mass in the right lower pole kidney. Recommend outpatient renal MRI for characterization in 1-3 months.  7/11/25-9/11/25

## 2025-06-11 NOTE — PROGRESS NOTES
Assessment & Plan     Severe sepsis (H)  Improving.  On home IV antibiotics and f/up with ID.  Has labs needed for ID, will fax to [phone] (Dr. Delgadillo) [facility]  - Comprehensive metabolic panel (BMP + Alb, Alk Phos, ALT, AST, Total. Bili, TP)  - CBC with platelets  - CRP, inflammation  - ESR: Erythrocyte sedimentation rate  - Magnesium    Pneumonia of both lungs due to methicillin susceptible Staphylococcus aureus (MSSA), unspecified part of lung (H)  Lungs sound clear, no labored breathing.  Continue with antibiotics per ID.     MSSA bacteremia  Continue care with ID and home care.     Renal cyst  Has an appt with renal specialist, will have them order f/up MRI.     Patient instructions:    Discuss with renal specialist  CT scan in hospital:   Complex 2.9 cm cystic mass in the right lower pole kidney. Recommend outpatient renal MRI for characterization in 1-3 months.  7/11/25-9/11/25    Stage 3a chronic kidney disease (H)  Has f/up with renal specialist.   - Comprehensive metabolic panel (BMP + Alb, Alk Phos, ALT, AST, Total. Bili, TP)    Vulvar cancer (H)  In remission    Malignant neoplasm of cervix, unspecified site (H)  In remission.     Atrial fibrillation, unspecified type (H)  Managed by cardiology, however she does need a refill.  Metoprolol increased in hospital to 50 mg BID.   - metoprolol tartrate (LOPRESSOR) 50 MG tablet; Take 1 tablet (50 mg) by mouth 2 times daily.  - apixaban ANTICOAGULANT (ELIQUIS) 5 MG tablet; Take 1 tablet (5 mg) by mouth 2 times daily.    Recurrent major depressive disorder, in full remission  Stable, however given her drastic health changes has felt overwhelmed with things.  Does have support from family.   - FLUoxetine (PROZAC) 20 MG capsule; Take 3 capsules (60 mg) by mouth daily.    Hypertension goal BP (blood pressure) < 130/80  Stable.   - lisinopril (ZESTRIL) 40 MG tablet; Take 1 tablet (40 mg) by mouth daily.    Electrolyte disturbance  Will recheck  "levels.  Had issues with sodium and magnesium while hospitalized.   - Comprehensive metabolic panel (BMP + Alb, Alk Phos, ALT, AST, Total. Bili, TP)        MED REC REQUIRED  Post Medication Reconciliation Status: discharge medications reconciled, continue medications without change  BMI  Estimated body mass index is 27.24 kg/m  as calculated from the following:    Height as of this encounter: 1.57 m (5' 1.81\").    Weight as of this encounter: 67.1 kg (148 lb).           Dutch Shipman is a 74 year old, presenting for the following health issues:  Hospital F/U      6/11/2025     1:34 PM   Additional Questions   Roomed by ETHAN Matamoros   Accompanied by N/A         6/11/2025     1:34 PM   Patient Reported Additional Medications   Patient reports taking the following new medications N/A     History of Present Illness       Reason for visit:  Post hospital admission for sepsis 5/25/25  Symptom onset:  1-2 weeks ago  Symptom progression:  Improving  Had these symptoms before:  No   She is taking medications regularly.            Hospital Follow-up Visit:    Hospital/Nursing Home/IP Rehab Facility: [facility]  Date of Admission: 05/25/2025  Date of Discharge: 06/07/2025  Reason(s) for Admission: Sepsis  Do you have any other stressors you would like to discuss with your provider? No    Problems taking medications regularly:  None  Medication changes since discharge: Iv antibiotics through 07/02/2025  Problems adhering to non-medication therapy:  None    Summary of hospitalization:  CareEverywhere information obtained and reviewed    CT abd:  4.  Complex 2.9 cm cystic mass in the right lower pole kidney. Recommend outpatient renal MRI for characterization.    Suggested follow-up: Complex right renal cyst  MR Abdomen 1-3 Months    ID and Ortho next week.     Renal appt July 3.     [phone] (Dr. Delgadillo) [facility]    On magnesium 800 mg at bedtime, goal is to keep mg over 2    Sodium over " "131      Narrative    Diagnostic Tests/Treatments reviewed.  Follow up needed: ID, ortho, rheumatology and renal.   Other Healthcare Providers Involved in Patient s Care:         Specialist appointment - needs to schedule with rheumatology and renal, has ID and ortho scheduled next week.   Update since discharge: improved.         Plan of care communicated with patient                   Review of Systems  Constitutional, neuro, ENT, endocrine, pulmonary, cardiac, gastrointestinal, genitourinary, musculoskeletal, integument and psychiatric systems are negative, except as otherwise noted.      Objective    /74   Pulse 87   Temp 97.3  F (36.3  C) (Temporal)   Resp 18   Ht 1.57 m (5' 1.81\")   Wt 67.1 kg (148 lb)   LMP  (LMP Unknown)   SpO2 98%   BMI 27.24 kg/m    Body mass index is 27.24 kg/m .  Physical Exam   GENERAL: alert and no distress  NECK: no adenopathy, no asymmetry, masses, or scars  RESP: lungs clear to auscultation - no rales, rhonchi or wheezes  CV: regular rate and rhythm, normal S1 S2, no S3 or S4, no murmur, click or rub, no peripheral edema  ABDOMEN: soft, nontender, no hepatosplenomegaly, no masses and bowel sounds normal  MS: no gross musculoskeletal defects noted, no edema    Results for orders placed or performed in visit on 06/11/25 (from the past 24 hours)   ESR: Erythrocyte sedimentation rate   Result Value Ref Range    Erythrocyte Sedimentation Rate 81 (H) 0 - 30 mm/hr           Signed Electronically by: Honey García PA-C    "

## 2025-06-12 ENCOUNTER — RESULTS FOLLOW-UP (OUTPATIENT)
Dept: FAMILY MEDICINE | Facility: CLINIC | Age: 75
End: 2025-06-12

## 2025-06-12 LAB
ALBUMIN SERPL BCG-MCNC: 3.5 G/DL (ref 3.5–5.2)
ALP SERPL-CCNC: 97 U/L (ref 40–150)
ALT SERPL W P-5'-P-CCNC: 14 U/L (ref 0–50)
ANION GAP SERPL CALCULATED.3IONS-SCNC: 12 MMOL/L (ref 7–15)
AST SERPL W P-5'-P-CCNC: 39 U/L (ref 0–45)
BILIRUB SERPL-MCNC: 0.3 MG/DL
BUN SERPL-MCNC: 12.2 MG/DL (ref 8–23)
CALCIUM SERPL-MCNC: 8.6 MG/DL (ref 8.8–10.4)
CHLORIDE SERPL-SCNC: 97 MMOL/L (ref 98–107)
CREAT SERPL-MCNC: 0.77 MG/DL (ref 0.51–0.95)
CRP SERPL-MCNC: 19.5 MG/L
EGFRCR SERPLBLD CKD-EPI 2021: 81 ML/MIN/1.73M2
GLUCOSE SERPL-MCNC: 95 MG/DL (ref 70–99)
HCO3 SERPL-SCNC: 24 MMOL/L (ref 22–29)
MAGNESIUM SERPL-MCNC: 1.7 MG/DL (ref 1.7–2.3)
POTASSIUM SERPL-SCNC: 4.6 MMOL/L (ref 3.4–5.3)
PROT SERPL-MCNC: 6.6 G/DL (ref 6.4–8.3)
SODIUM SERPL-SCNC: 133 MMOL/L (ref 135–145)

## 2025-06-19 ENCOUNTER — TRANSFERRED RECORDS (OUTPATIENT)
Dept: HEALTH INFORMATION MANAGEMENT | Facility: CLINIC | Age: 75
End: 2025-06-19
Payer: MEDICARE

## 2025-06-24 ENCOUNTER — TELEPHONE (OUTPATIENT)
Dept: FAMILY MEDICINE | Facility: CLINIC | Age: 75
End: 2025-06-24
Payer: MEDICARE

## 2025-06-24 NOTE — TELEPHONE ENCOUNTER
Forms received from: [facility]   Phone number listed: [phone]   Fax listed: [phone]  Date received: 06/24/2025  Form description: [facility]  Once forms are completed, please return to [facility] via fax.  Form placed: in providers in grey Go

## 2025-06-30 DIAGNOSIS — Z53.9 DIAGNOSIS NOT YET DEFINED: Primary | ICD-10-CM

## 2025-06-30 PROCEDURE — G0180 MD CERTIFICATION HHA PATIENT: HCPCS | Performed by: PHYSICIAN ASSISTANT

## 2025-07-01 ENCOUNTER — TELEPHONE (OUTPATIENT)
Dept: FAMILY MEDICINE | Facility: CLINIC | Age: 75
End: 2025-07-01
Payer: MEDICARE

## 2025-07-01 NOTE — TELEPHONE ENCOUNTER
Forms received from: [facility]   Phone number listed: [phone]   Fax listed: [phone]  Date received: 07/01/2025  Form description: [facility]  Once forms are completed, please return to [facility] via fax.  Form placed: in providers in grey Go

## 2025-07-29 ENCOUNTER — TRANSFERRED RECORDS (OUTPATIENT)
Dept: HEALTH INFORMATION MANAGEMENT | Facility: CLINIC | Age: 75
End: 2025-07-29
Payer: OTHER GOVERNMENT

## 2025-07-29 ENCOUNTER — MYC MEDICAL ADVICE (OUTPATIENT)
Dept: FAMILY MEDICINE | Facility: CLINIC | Age: 75
End: 2025-07-29
Payer: MEDICARE

## (undated) DEVICE — KIT ENDO FIRST STEP DISINFECTANT 200ML W/POUCH EP-4

## (undated) DEVICE — PAD CHUX UNDERPAD 23X24" 7136

## (undated) RX ORDER — FENTANYL CITRATE 50 UG/ML
INJECTION, SOLUTION INTRAMUSCULAR; INTRAVENOUS
Status: DISPENSED
Start: 2023-09-05